# Patient Record
Sex: FEMALE | Race: WHITE | NOT HISPANIC OR LATINO | Employment: OTHER | RURAL
[De-identification: names, ages, dates, MRNs, and addresses within clinical notes are randomized per-mention and may not be internally consistent; named-entity substitution may affect disease eponyms.]

---

## 2017-01-09 ENCOUNTER — CONVERSION ENCOUNTER (OUTPATIENT)
Dept: FAMILY MEDICINE CLINIC | Facility: CLINIC | Age: 82
End: 2017-01-09

## 2017-01-09 LAB
ALBUMIN SERPL-MCNC: 3.9 G/DL (ref 3.6–5.1)
ALP SERPL-CCNC: 55 U/L (ref 33–130)
AST SERPL-CCNC: 26 U/L (ref 10–35)
BILIRUB SERPL-MCNC: 0.9 MG/DL (ref 0.2–1.2)
BUN SERPL-MCNC: 18 MG/DL (ref 7–25)
BUN/CREAT SERPL: 20 (CALC) (ref 6–22)
CALCIUM SERPL-MCNC: 9.8 MG/DL (ref 8.6–10.2)
CHLORIDE SERPL-SCNC: 103 MMOL/L (ref 98–110)
CONV CO2: 28 MMOL/L (ref 21–33)
CONV TOTAL PROTEIN: 6.4 G/DL (ref 6.2–8.3)
CREAT UR-MCNC: 0.9 MG/DL (ref 0.63–1.22)
GLOBULIN UR ELPH-MCNC: 2.5 MG/DL (ref 2.2–3.9)
GLUCOSE UR QL: 89 MG/DL (ref 65–99)
INSULIN SERPL-ACNC: 1.6 (CALC) (ref 1–2.1)
POTASSIUM SERPL-SCNC: 4.2 MMOL/L (ref 3.5–5.3)
SODIUM SERPL-SCNC: 139 MMOL/L (ref 135–146)

## 2017-03-08 ENCOUNTER — CONVERSION ENCOUNTER (OUTPATIENT)
Dept: FAMILY MEDICINE CLINIC | Facility: CLINIC | Age: 82
End: 2017-03-08

## 2017-03-08 LAB — TSH SERPL-ACNC: 3.45 MIU/L (ref 0.4–4.5)

## 2017-03-14 LAB
ALBUMIN SERPL-MCNC: 3.6 G/DL (ref 3.6–5.1)
ALP SERPL-CCNC: 55 U/L (ref 33–130)
AST SERPL-CCNC: 27 U/L (ref 10–35)
BILIRUB SERPL-MCNC: 1.1 MG/DL (ref 0.2–1.2)
BUN SERPL-MCNC: 13 MG/DL (ref 7–25)
BUN/CREAT SERPL: 14.4 (CALC) (ref 6–22)
CALCIUM SERPL-MCNC: 9.9 MG/DL (ref 8.6–10.2)
CHLORIDE SERPL-SCNC: 106 MMOL/L (ref 98–110)
CHOLEST SERPL-MCNC: 231 MG/DL (ref 125–200)
CONV CO2: 26 MMOL/L (ref 21–33)
CONV TOTAL PROTEIN: 6.3 G/DL (ref 6.2–8.3)
CREAT UR-MCNC: 0.9 MG/DL (ref 0.63–1.22)
GLOBULIN UR ELPH-MCNC: 2.7 MG/DL (ref 2.2–3.9)
GLUCOSE UR QL: 87 MG/DL (ref 65–99)
HDLC SERPL-MCNC: 66 MG/DL
INSULIN SERPL-ACNC: 1.3 (CALC) (ref 1–2.1)
LDLC SERPL CALC-MCNC: 152 MG/DL
POTASSIUM SERPL-SCNC: 4.3 MMOL/L (ref 3.5–5.3)
SODIUM SERPL-SCNC: 141 MMOL/L (ref 135–146)
TRIGL SERPL-MCNC: 63 MG/DL

## 2017-09-05 ENCOUNTER — HOSPITAL ENCOUNTER (OUTPATIENT)
Dept: OTHER | Facility: HOSPITAL | Age: 82
Discharge: HOME | End: 2017-09-05
Attending: FAMILY MEDICINE | Admitting: FAMILY MEDICINE

## 2017-10-12 ENCOUNTER — HOSPITAL ENCOUNTER (OUTPATIENT)
Dept: OTHER | Facility: HOSPITAL | Age: 82
Discharge: HOME | End: 2017-10-12
Attending: FAMILY MEDICINE | Admitting: FAMILY MEDICINE

## 2018-11-06 ENCOUNTER — HOSPITAL ENCOUNTER (OUTPATIENT)
Dept: OTHER | Facility: HOSPITAL | Age: 83
Discharge: HOME | End: 2018-11-06
Attending: FAMILY MEDICINE | Admitting: FAMILY MEDICINE

## 2018-12-04 ENCOUNTER — HOSPITAL ENCOUNTER (OUTPATIENT)
Dept: OTHER | Facility: HOSPITAL | Age: 83
Discharge: HOME | End: 2018-12-04
Attending: FAMILY MEDICINE | Admitting: FAMILY MEDICINE

## 2019-03-04 ENCOUNTER — HOSPITAL ENCOUNTER (OUTPATIENT)
Dept: OTHER | Facility: HOSPITAL | Age: 84
Discharge: HOME | End: 2019-03-04
Attending: FAMILY MEDICINE | Admitting: FAMILY MEDICINE

## 2019-05-03 ENCOUNTER — HOSPITAL ENCOUNTER (OUTPATIENT)
Dept: CARDIOLOGY | Facility: HOSPITAL | Age: 84
Discharge: HOME | End: 2019-05-03
Attending: FAMILY MEDICINE | Admitting: FAMILY MEDICINE

## 2019-05-22 ENCOUNTER — CONVERSION ENCOUNTER (OUTPATIENT)
Dept: CARDIOLOGY | Facility: CLINIC | Age: 84
End: 2019-05-22

## 2019-06-04 VITALS
HEART RATE: 66 BPM | HEIGHT: 58 IN | BODY MASS INDEX: 31.49 KG/M2 | WEIGHT: 150 LBS | DIASTOLIC BLOOD PRESSURE: 64 MMHG | SYSTOLIC BLOOD PRESSURE: 132 MMHG

## 2019-06-25 ENCOUNTER — OFFICE VISIT (OUTPATIENT)
Dept: CARDIOLOGY | Facility: CLINIC | Age: 84
End: 2019-06-25

## 2019-06-25 VITALS
HEART RATE: 66 BPM | SYSTOLIC BLOOD PRESSURE: 122 MMHG | WEIGHT: 150 LBS | BODY MASS INDEX: 26.58 KG/M2 | DIASTOLIC BLOOD PRESSURE: 62 MMHG | HEIGHT: 63 IN

## 2019-06-25 DIAGNOSIS — R55 SYNCOPE AND COLLAPSE: ICD-10-CM

## 2019-06-25 DIAGNOSIS — Z45.09 ENCOUNTER FOR LOOP RECORDER CHECK: ICD-10-CM

## 2019-06-25 DIAGNOSIS — R42 LIGHTHEADEDNESS: Primary | ICD-10-CM

## 2019-06-25 DIAGNOSIS — I10 ESSENTIAL HYPERTENSION: ICD-10-CM

## 2019-06-25 PROCEDURE — 93291 INTERROG DEV EVAL SCRMS IP: CPT | Performed by: NURSE PRACTITIONER

## 2019-06-25 PROCEDURE — 93000 ELECTROCARDIOGRAM COMPLETE: CPT | Performed by: NURSE PRACTITIONER

## 2019-06-25 PROCEDURE — 99213 OFFICE O/P EST LOW 20 MIN: CPT | Performed by: NURSE PRACTITIONER

## 2019-06-25 RX ORDER — AMLODIPINE BESYLATE 5 MG/1
TABLET ORAL EVERY 24 HOURS
COMMUNITY
Start: 2019-04-25 | End: 2020-01-20

## 2019-06-25 RX ORDER — LOSARTAN POTASSIUM 50 MG/1
50 TABLET ORAL DAILY
COMMUNITY
Start: 2019-04-25 | End: 2020-03-31 | Stop reason: SDUPTHER

## 2019-06-25 RX ORDER — LEVOTHYROXINE SODIUM 0.07 MG/1
TABLET ORAL EVERY 24 HOURS
COMMUNITY
Start: 2019-04-25 | End: 2019-07-11 | Stop reason: SDUPTHER

## 2019-06-25 NOTE — PROGRESS NOTES
Cardiology Office Follow Up Visit      Primary Care Provider:  Mita Moise MD    Reason for f/u:     Dizziness  Lightheadedness  History of syncope  Hypertension  Interrogation of loop recorder      Subjective         History of Present Illness       Stacy Monroy is a 88 y.o. female.  Her past medical history significant for hypertension    In April 2019, patient was evaluated due to an episode of syncope.    Patient underwent echocardiogram in March 2019 which showed EF of 55-60%.  Patient had stress test in November 2018 at [facility] which was reportedly unremarkable for ischemia.  Holter monitor showed no significant pause of bradycardia.  Tilt-table test showed hypotensive response with bradycardia during tilt-table test.    Patient underwent loop recorder implant at [facility] on June 25, 2019 by Dr. Layne.    Patient reports no recurrent syncope, she had some periods of weakness and low blood pressures at home.  She denies chest pain, dyspnea, PND, orthopnea, palpitations, or feelings of heart racing  She reports is been compliant with her medications      Past Medical History:   Diagnosis Date   • Hypertension    • Hyperthyroidism        Past Surgical History:   Procedure Laterality Date   • OTHER SURGICAL HISTORY  2019    Tilt table    • OTHER SURGICAL HISTORY  2019    tilt table          Current Outpatient Medications:   •  amLODIPine (NORVASC) 5 MG tablet, Daily., Disp: , Rfl:   •  Calcium Carb-Cholecalciferol (CALCIUM PLUS D3 ABSORBABLE) 600-2500 MG-UNIT capsule, CALCIUM PLUS D, Disp: , Rfl:   •  levothyroxine (SYNTHROID) 75 MCG tablet, Daily., Disp: , Rfl:   •  losartan (COZAAR) 50 MG tablet, Take 50 mg by mouth Daily., Disp: , Rfl:   •  Probiotic Product (PROBIOTIC-10 PO), PROBIOTIC, Disp: , Rfl:     Social History     Socioeconomic History   • Marital status:      Spouse name: Not on file   • Number of children: Not on file   • Years of education: Not on file   •  "Highest education level: Not on file   Tobacco Use   • Smoking status: Never Smoker   Substance and Sexual Activity   • Alcohol use: No     Frequency: Never   • Drug use: No   Social History Narrative    ** Merged History Encounter **            Family History   Problem Relation Age of Onset   • Hypertension Mother    • Stroke Mother    • Hypertension Father    • Stroke Father        The following portions of the patient's history were reviewed and updated as appropriate: allergies, current medications, past family history, past medical history, past social history, past surgical history and problem list.    Review of Systems   Constitution: Positive for weakness. Negative for decreased appetite and diaphoresis.   HENT: Negative for congestion, hearing loss and nosebleeds.    Cardiovascular: Negative for chest pain, claudication, dyspnea on exertion, irregular heartbeat, leg swelling, near-syncope, orthopnea, palpitations, paroxysmal nocturnal dyspnea and syncope.   Respiratory: Negative for cough, shortness of breath and sleep disturbances due to breathing.    Endocrine: Negative for polyuria.   Hematologic/Lymphatic: Does not bruise/bleed easily.   Skin: Negative for itching and rash.   Musculoskeletal: Negative for back pain, muscle weakness and myalgias.   Gastrointestinal: Negative for abdominal pain, change in bowel habit and nausea.   Genitourinary: Negative for dysuria, flank pain, frequency and hesitancy.   Neurological: Negative for dizziness and tremors.   Psychiatric/Behavioral: Negative for altered mental status. The patient does not have insomnia.      /62   Pulse 66   Ht 160 cm (63\")   Wt 68 kg (150 lb)   BMI 26.57 kg/m² .  Objective     Physical Exam   Constitutional: She is oriented to person, place, and time. She appears well-developed and well-nourished. No distress.   HENT:   Head: Normocephalic and atraumatic.   Eyes: Pupils are equal, round, and reactive to light.   Neck: Normal range " of motion. Neck supple. No JVD present.   Cardiovascular: Normal rate, regular rhythm, S1 normal, S2 normal, normal heart sounds and intact distal pulses.   No murmur heard.  Pulmonary/Chest: Effort normal and breath sounds normal.   Abdominal: Soft. Normal appearance. She exhibits no distension. There is no tenderness.   Musculoskeletal: Normal range of motion. She exhibits no edema.   Neurological: She is alert and oriented to person, place, and time. Gait normal.   Skin: Skin is warm and dry.   Loop recorder implant site on chest with no drainage.  There is a small scab area that remains.  Steri-Strips removed   Psychiatric: She has a normal mood and affect. Her speech is normal and behavior is normal. Thought content normal.         EKG: normal sinus rhythm, Q waves in anterior leads, left axis deviation, 1st degree AV block.  Heart rate 66    In Office Device Interrogation:     In office loop recorder interrogation.  The [employer] device battery stable no symptoms of arrhythmias events or atrial for ablation detected        Diagnoses and all orders for this visit:    1. Lightheadedness (Primary): Continues to have periods of weakness but no maría syncope or associated dizziness    2. Encounter for loop recorder check: Loop recorder with no events    3. Essential hypertension: Blood pressures been stable at home depending on the low side reports a systolic pressure as low as 100    4. Syncope: No recurrent syncopal events.    Other orders  -     ECG 12 Lead          Plan:  We will reduce losartan to once daily to see if her blood pressure will improve.  We will continue monitoring of her loop recorder via remote access  Continue current medications and treatment                     Next follow-up appointment: Scheduled in 3 months according

## 2019-06-26 LAB — TSH SERPL DL<=0.005 MIU/L-ACNC: 2.51 UIU/ML (ref 0.45–4.5)

## 2019-07-12 ENCOUNTER — CLINICAL SUPPORT NO REQUIREMENTS (OUTPATIENT)
Dept: CARDIOLOGY | Facility: CLINIC | Age: 84
End: 2019-07-12

## 2019-07-12 DIAGNOSIS — R55 SYNCOPE AND COLLAPSE: Primary | ICD-10-CM

## 2019-07-12 DIAGNOSIS — Z95.818 STATUS POST PLACEMENT OF IMPLANTABLE LOOP RECORDER: ICD-10-CM

## 2019-07-12 RX ORDER — LEVOTHYROXINE SODIUM 0.07 MG/1
TABLET ORAL
Qty: 30 TABLET | Refills: 1 | Status: SHIPPED | OUTPATIENT
Start: 2019-07-12 | End: 2019-07-16 | Stop reason: SDUPTHER

## 2019-07-16 DIAGNOSIS — E03.9 HYPOTHYROIDISM, UNSPECIFIED TYPE: Primary | ICD-10-CM

## 2019-07-16 RX ORDER — LEVOTHYROXINE SODIUM 0.07 MG/1
75 TABLET ORAL DAILY
Qty: 30 TABLET | Refills: 1 | Status: SHIPPED | OUTPATIENT
Start: 2019-07-16 | End: 2019-08-15

## 2019-07-22 PROBLEM — Z95.818 STATUS POST PLACEMENT OF IMPLANTABLE LOOP RECORDER: Status: ACTIVE | Noted: 2019-06-25

## 2019-07-22 PROCEDURE — 93297 REM INTERROG DEV EVAL ICPMS: CPT | Performed by: NURSE PRACTITIONER

## 2019-07-22 PROCEDURE — 93299 PR REM INTERROG ICPMS/SCRMS <30 D TECH REVIEW: CPT | Performed by: NURSE PRACTITIONER

## 2019-07-22 NOTE — PROGRESS NOTES
REMOTE LOOP RECORDER INTERROGATION      DEVICE COMPANY: [employer]    Remote loop recorder transmission reviewed.      Battery status stable      Events: None    Symptoms: None    Atrial Fibrillation: None      Continue remote loop recorder monitoring with monthly transmissions

## 2019-08-12 ENCOUNTER — CLINICAL SUPPORT NO REQUIREMENTS (OUTPATIENT)
Dept: CARDIOLOGY | Facility: CLINIC | Age: 84
End: 2019-08-12

## 2019-08-12 DIAGNOSIS — Z95.818 STATUS POST PLACEMENT OF IMPLANTABLE LOOP RECORDER: ICD-10-CM

## 2019-08-12 DIAGNOSIS — R55 SYNCOPE AND COLLAPSE: Primary | ICD-10-CM

## 2019-08-15 ENCOUNTER — OFFICE VISIT (OUTPATIENT)
Dept: FAMILY MEDICINE CLINIC | Facility: CLINIC | Age: 84
End: 2019-08-15

## 2019-08-15 VITALS
OXYGEN SATURATION: 97 % | HEART RATE: 84 BPM | WEIGHT: 148.8 LBS | TEMPERATURE: 97.5 F | RESPIRATION RATE: 18 BRPM | SYSTOLIC BLOOD PRESSURE: 122 MMHG | DIASTOLIC BLOOD PRESSURE: 80 MMHG | BODY MASS INDEX: 27.38 KG/M2 | HEIGHT: 62 IN

## 2019-08-15 DIAGNOSIS — H92.01 RIGHT EAR PAIN: ICD-10-CM

## 2019-08-15 DIAGNOSIS — E78.5 DYSLIPIDEMIA: ICD-10-CM

## 2019-08-15 DIAGNOSIS — H61.21 RIGHT EAR IMPACTED CERUMEN: ICD-10-CM

## 2019-08-15 DIAGNOSIS — M54.31 RIGHT SIDED SCIATICA: ICD-10-CM

## 2019-08-15 DIAGNOSIS — I10 ESSENTIAL HYPERTENSION: Primary | ICD-10-CM

## 2019-08-15 DIAGNOSIS — E03.8 OTHER SPECIFIED HYPOTHYROIDISM: ICD-10-CM
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
## 2019-08-15 PROCEDURE — 69209 REMOVE IMPACTED EAR WAX UNI: CPT | Performed by: FAMILY MEDICINE

## 2019-08-15 PROCEDURE — 99214 OFFICE O/P EST MOD 30 MIN: CPT | Performed by: FAMILY MEDICINE

## 2019-08-15 NOTE — PROGRESS NOTES
Subjective   Stacy Monroy is a 89 y.o. female.     Cerumen Impaction  Stacy presents with diminished hearing in the right ear for the past 3 weeks. There is a prior history of cerumen impaction. The patient has not been using ear drops to loosen wax immediately prior to this visit. The patient denies ear pain.        Fall   The accident occurred more than 1 week ago (many months ago). The fall occurred while walking. She landed on concrete. The point of impact was the face. The pain is present in the face, left wrist and left hand. The patient is experiencing no pain. Pertinent negatives include no abdominal pain, fever, headaches, nausea, numbness, tingling or vomiting. The treatment provided significant relief.   Hypertension   This is a chronic problem. The current episode started more than 1 year ago. Associated symptoms include peripheral edema. Pertinent negatives include no blurred vision, chest pain, headaches or shortness of breath. There are no associated agents to hypertension. Risk factors for coronary artery disease include post-menopausal state. Current antihypertension treatment includes calcium channel blockers and beta blockers. There are no compliance problems.  There is no history of angina, kidney disease or CVA. Identifiable causes of hypertension include a thyroid problem.   Hypothyroidism   This is a chronic problem. The current episode started more than 1 year ago. The problem occurs constantly. The problem has been resolved (with mediaction). Pertinent negatives include no abdominal pain, arthralgias, chest pain, coughing, fever, headaches, nausea, numbness, rash or vomiting. Nothing aggravates the symptoms. Treatments tried: synthroid.   Leg Pain    There was no injury mechanism. The pain is present in the right hip and right thigh. The quality of the pain is described as aching. The pain is moderate. The pain has been fluctuating since onset. Pertinent negatives include no inability to bear  weight, loss of motion, loss of sensation, muscle weakness, numbness or tingling. Exacerbated by: bending over. Treatments tried: bengay. The treatment provided mild relief.    The pain starts in her buttocks and goes down to her toes. Worse after she was outside in her garden    The following portions of the patient's history were reviewed and updated as appropriate: allergies, current medications, past family history, past medical history, past social history, past surgical history and problem list.    Family History   Problem Relation Age of Onset   • Hypertension Mother    • Stroke Mother    • Hypertension Father    • Stroke Father        Social History     Tobacco Use   • Smoking status: Never Smoker   Substance Use Topics   • Alcohol use: No     Frequency: Never   • Drug use: No       Past Surgical History:   Procedure Laterality Date   • OTHER SURGICAL HISTORY  2019    Tilt table    • OTHER SURGICAL HISTORY  2019    tilt table        Patient Active Problem List   Diagnosis   • Status post placement of implantable loop recorder   • Essential hypertension   • Other specified hypothyroidism       Current Outpatient Medications on File Prior to Visit   Medication Sig Dispense Refill   • amLODIPine (NORVASC) 5 MG tablet Daily.     • Calcium Carb-Cholecalciferol (CALCIUM PLUS D3 ABSORBABLE) 600-2500 MG-UNIT capsule CALCIUM PLUS D     • levothyroxine (SYNTHROID, LEVOTHROID) 75 MCG tablet Take 1 tablet by mouth Daily for 30 days. 30 tablet 1   • losartan (COZAAR) 50 MG tablet Take 50 mg by mouth Daily.     • Probiotic Product (PROBIOTIC-10 PO) PROBIOTIC       No current facility-administered medications on file prior to visit.        Allergies   Allergen Reactions   • Penicillins Rash       Review of Systems   Constitutional: Negative for activity change and fever.   HENT: Negative for ear pain, rhinorrhea, sinus pressure and voice change.    Eyes: Negative for blurred vision and visual disturbance.   Respiratory:  "Negative for cough and shortness of breath.    Cardiovascular: Negative for chest pain.   Gastrointestinal: Negative for abdominal pain, diarrhea, nausea and vomiting.   Endocrine: Negative for cold intolerance and heat intolerance.   Genitourinary: Negative for frequency and urgency.   Musculoskeletal: Negative for arthralgias.   Skin: Negative for rash.   Neurological: Negative for tingling, syncope and numbness.   Hematological: Does not bruise/bleed easily.   Psychiatric/Behavioral: Negative for depressed mood. The patient is not nervous/anxious.        Objective   Visit Vitals  /80   Pulse 84   Temp 97.5 °F (36.4 °C)   Resp 18   Ht 157.5 cm (62\")   Wt 67.5 kg (148 lb 12.8 oz)   SpO2 97%   BMI 27.22 kg/m²     Physical Exam   Constitutional: She is oriented to person, place, and time. She appears well-developed and well-nourished. She is cooperative. No distress.   HENT:   Right Ear: External ear normal. cerumen impaction is present.  Left Ear: External ear normal.   Nose: Nose normal.   Mouth/Throat: Oropharynx is clear and moist. No oropharyngeal exudate.   Cardiovascular: Normal rate, regular rhythm, normal heart sounds and intact distal pulses. Exam reveals no gallop and no friction rub.   No murmur heard.  Pulmonary/Chest: Effort normal and breath sounds normal. No respiratory distress. She has no wheezes. She has no rales.   Musculoskeletal: She exhibits no edema or deformity.        Lumbar back: She exhibits normal range of motion, no tenderness, no deformity, no pain and no spasm.   Lymphadenopathy:     She has no cervical adenopathy.   Neurological: She is alert and oriented to person, place, and time. She displays normal reflexes. She exhibits normal muscle tone. Coordination normal.   Skin: Skin is warm and dry. She is not diaphoretic.   Psychiatric: She has a normal mood and affect.   Vitals reviewed.        Assessment/Plan .  Problem List Items Addressed This Visit        Cardiovascular and " Mediastinum    Essential hypertension - Primary    Current Assessment & Plan     Stable  Continue current treatment            Endocrine    Other specified hypothyroidism    Current Assessment & Plan     Recheck today           Other Visit Diagnoses     Fall, subsequent encounter        Doing better. No further falls    Right sided sciatica        Right ear impacted cerumen        Removed irrigation    Right ear pain            Ice three times a day for about 10-15 minutes for the first 1-2 days. Then may alternate heat and ice. Better body mechanics discussed. Home exercises discussed and hand out given. Discussed nsaids and if they can be taken. May need imaging and or PT if persists.  No complications with cerumen irrigation

## 2019-08-16 LAB
ALBUMIN SERPL-MCNC: 3.9 G/DL (ref 3.5–4.7)
ALBUMIN/GLOB SERPL: 1.4 {RATIO} (ref 1.2–2.2)
ALP SERPL-CCNC: 63 IU/L (ref 39–117)
ALT SERPL-CCNC: 14 IU/L (ref 0–32)
AST SERPL-CCNC: 24 IU/L (ref 0–40)
BILIRUB SERPL-MCNC: 0.5 MG/DL (ref 0–1.2)
BUN SERPL-MCNC: 22 MG/DL (ref 8–27)
BUN/CREAT SERPL: 21 (ref 12–28)
CALCIUM SERPL-MCNC: 10.2 MG/DL (ref 8.7–10.3)
CHLORIDE SERPL-SCNC: 104 MMOL/L (ref 96–106)
CHOLEST SERPL-MCNC: 219 MG/DL (ref 100–199)
CHOLEST/HDLC SERPL: 3.2 RATIO (ref 0–4.4)
CO2 SERPL-SCNC: 24 MMOL/L (ref 20–29)
CREAT SERPL-MCNC: 1.03 MG/DL (ref 0.57–1)
GLOBULIN SER CALC-MCNC: 2.7 G/DL (ref 1.5–4.5)
GLUCOSE SERPL-MCNC: 84 MG/DL (ref 65–99)
HDLC SERPL-MCNC: 69 MG/DL
LDLC SERPL CALC-MCNC: 134 MG/DL (ref 0–99)
NONHDLC SERPL-MCNC: 150 MG/DL (ref 0–129)
POTASSIUM SERPL-SCNC: 4.5 MMOL/L (ref 3.5–5.2)
PROT SERPL-MCNC: 6.6 G/DL (ref 6–8.5)
SODIUM SERPL-SCNC: 142 MMOL/L (ref 134–144)
TRIGL SERPL-MCNC: 82 MG/DL (ref 0–149)
TSH SERPL DL<=0.005 MIU/L-ACNC: 2.03 UIU/ML (ref 0.45–4.5)

## 2019-08-19 ENCOUNTER — TELEPHONE (OUTPATIENT)
Dept: FAMILY MEDICINE CLINIC | Facility: CLINIC | Age: 84
End: 2019-08-19

## 2019-08-20 ENCOUNTER — TELEPHONE (OUTPATIENT)
Dept: FAMILY MEDICINE CLINIC | Facility: CLINIC | Age: 84
End: 2019-08-20

## 2019-08-22 PROCEDURE — 93299 PR REM INTERROG ICPMS/SCRMS <30 D TECH REVIEW: CPT | Performed by: NURSE PRACTITIONER

## 2019-08-22 PROCEDURE — 93297 REM INTERROG DEV EVAL ICPMS: CPT | Performed by: NURSE PRACTITIONER

## 2019-08-22 NOTE — PROGRESS NOTES
REMOTE LOOP RECORDER INTERROGATION      DEVICE COMPANY: [employer]    Remote loop recorder transmission reviewed.      Battery status stable      Events: None    Symptoms: None    Atrial Fibrillation: None      Continue remote loop recorder monitoring with monthly transmissions

## 2019-08-26 ENCOUNTER — TELEPHONE (OUTPATIENT)
Dept: FAMILY MEDICINE CLINIC | Facility: CLINIC | Age: 84
End: 2019-08-26

## 2019-09-10 ENCOUNTER — CLINICAL SUPPORT NO REQUIREMENTS (OUTPATIENT)
Dept: CARDIOLOGY | Facility: CLINIC | Age: 84
End: 2019-09-10

## 2019-09-10 DIAGNOSIS — R55 SYNCOPE AND COLLAPSE: Primary | ICD-10-CM

## 2019-09-10 DIAGNOSIS — Z95.818 STATUS POST PLACEMENT OF IMPLANTABLE LOOP RECORDER: ICD-10-CM

## 2019-09-16 NOTE — PROGRESS NOTES
REMOTE LOOP RECORDER INTERROGATION      DEVICE COMPANY: [employer]    Remote loop recorder transmission reviewed.      Battery status stable      Events: None    Symptoms: None    Atrial Fibrillation: None      Continue remote loop recorder monitoring with monthly transmissions              This encounter is not billed due to being earlier than 30 days since last check

## 2019-09-30 PROBLEM — E03.8 OTHER SPECIFIED HYPOTHYROIDISM: Status: RESOLVED | Noted: 2019-08-15 | Resolved: 2019-09-30

## 2019-09-30 PROBLEM — R55 SYNCOPE AND COLLAPSE: Status: ACTIVE | Noted: 2019-09-30

## 2019-10-04 ENCOUNTER — OFFICE VISIT (OUTPATIENT)
Dept: CARDIOLOGY | Facility: CLINIC | Age: 84
End: 2019-10-04

## 2019-10-04 VITALS
BODY MASS INDEX: 26.22 KG/M2 | WEIGHT: 148 LBS | SYSTOLIC BLOOD PRESSURE: 128 MMHG | HEART RATE: 79 BPM | HEIGHT: 63 IN | DIASTOLIC BLOOD PRESSURE: 66 MMHG

## 2019-10-04 DIAGNOSIS — Z95.818 STATUS POST PLACEMENT OF IMPLANTABLE LOOP RECORDER: ICD-10-CM

## 2019-10-04 DIAGNOSIS — I10 ESSENTIAL HYPERTENSION: Primary | ICD-10-CM

## 2019-10-04 DIAGNOSIS — R55 SYNCOPE AND COLLAPSE: ICD-10-CM

## 2019-10-04 PROCEDURE — 99214 OFFICE O/P EST MOD 30 MIN: CPT | Performed by: INTERNAL MEDICINE

## 2019-10-04 RX ORDER — CHLORAL HYDRATE 500 MG
1 CAPSULE ORAL DAILY
COMMUNITY

## 2019-10-04 RX ORDER — UBIDECARENONE 100 MG
100 CAPSULE ORAL DAILY
COMMUNITY

## 2019-10-04 RX ORDER — LEVOTHYROXINE SODIUM 0.07 MG/1
1 TABLET ORAL DAILY
COMMUNITY
Start: 2019-09-10 | End: 2020-02-17 | Stop reason: SDUPTHER

## 2019-10-04 NOTE — PROGRESS NOTES
Date of Office Visit: 10/04/2019  Encounter Provider: Dr Ruslan Layne  Place of Service: [facility] CARDIOLOGY [place]  Patient Name: Stacy Monroy  :1930  Mita Moise MD    Chief Complaint   Patient presents with   • Hypertension  Syncope     3 mo fu   • Loss of Consciousness     History of Present Illness    I am pleased to see Mrs. Monroy in my office today as a follow-up.    As you know, patient is 89-year-old white female whose past medical history significant for hypertension, who came today for follow-up.     In 2019, patient was evaluated due to an episode of syncope. Patient underwent echocardiogram in 2019 which showed EF of 55-60%.  Patient had stress test in 2018 at [facility] which was reportedly unremarkable for ischemia.  Holter monitor showed no significant pause of bradycardia.  Tilt-table test showed hypotensive response with bradycardia during tilt-table test. Patient underwent loop recorder implant at [facility] on 2019.    Since the previous visit, patient is doing fairly well from cardiovascular standpoint.  She has not had any further episode of syncope or presyncope.  No leg edema noted.  Patient denies any orthopnea, PND, syncope or presyncope.  No palpitation.  No dizziness and lightheadedness.  No chest pain.    Loop recorder telemetry strips are monitored and reviewed and no bradyarrhythmia or tachyarrhythmia was noted.    At this stage I will continue current treatment.  She brought her blood pressure logbook and most of the blood pressures are within desirable range.  At this stage I will recommend to increase aerobic activity.  Precautions for falls are discussed.  I will see the patient in 6 months    Past Medical History:   Diagnosis Date   • Other specified hypothyroidism 8/15/2019         Past Surgical History:   Procedure Laterality Date   • OTHER SURGICAL HISTORY  2019    Tilt table    • OTHER SURGICAL HISTORY   "2019    tilt table            Current Outpatient Medications:   •  amLODIPine (NORVASC) 5 MG tablet, Daily., Disp: , Rfl:   •  Calcium Carb-Cholecalciferol (CALCIUM PLUS D3 ABSORBABLE) 600-2500 MG-UNIT capsule, CALCIUM PLUS D, Disp: , Rfl:   •  coenzyme Q10 100 MG capsule, Take 100 mg by mouth Daily., Disp: , Rfl:   •  levothyroxine (SYNTHROID, LEVOTHROID) 75 MCG tablet, 1 tablet Daily., Disp: , Rfl:   •  losartan (COZAAR) 50 MG tablet, Take 50 mg by mouth Daily., Disp: , Rfl:   •  Omega-3 1000 MG capsule, Take 1 tablet by mouth Daily., Disp: , Rfl:       Social History     Socioeconomic History   • Marital status:      Spouse name: Not on file   • Number of children: Not on file   • Years of education: Not on file   • Highest education level: Not on file   Tobacco Use   • Smoking status: Never Smoker   Substance and Sexual Activity   • Alcohol use: No     Frequency: Never   • Drug use: No   Social History Narrative    ** Merged History Encounter **              Review of Systems   Constitution: Negative for chills and fever.   HENT: Negative for ear discharge and nosebleeds.    Eyes: Negative for discharge and redness.   Cardiovascular: Negative for chest pain, orthopnea, palpitations, paroxysmal nocturnal dyspnea and syncope.   Respiratory: Negative for cough, shortness of breath and wheezing.    Endocrine: Negative for heat intolerance.   Skin: Negative for rash.   Musculoskeletal: Positive for joint pain. Negative for myalgias.   Gastrointestinal: Negative for abdominal pain, melena, nausea and vomiting.   Genitourinary: Negative for dysuria and hematuria.   Neurological: Negative for dizziness, light-headedness, numbness and tremors.   Psychiatric/Behavioral: Negative for depression. The patient is not nervous/anxious.        Procedures    Procedures    No orders to display           Objective:    /66   Pulse 79   Ht 160 cm (63\")   Wt 67.1 kg (148 lb)   BMI 26.22 kg/m²         Physical Exam "   Constitutional: She is oriented to person, place, and time. She appears well-developed and well-nourished.   HENT:   Head: Normocephalic and atraumatic.   Eyes: No scleral icterus.   Neck: No thyromegaly present.   Cardiovascular: Normal rate, regular rhythm and normal heart sounds. Exam reveals no gallop and no friction rub.   No murmur heard.  Pulmonary/Chest: Effort normal and breath sounds normal. No respiratory distress. She has no wheezes. She has no rales.   Abdominal: There is no tenderness.   Musculoskeletal: She exhibits no edema.   Lymphadenopathy:     She has no cervical adenopathy.   Neurological: She is alert and oriented to person, place, and time.   Skin: No rash noted. No erythema.   Psychiatric: She has a normal mood and affect.           Assessment:       Diagnosis Plan   1. Essential hypertension     2. Syncope and collapse     3. Status post placement of implantable loop recorder              Plan:       At this stage blood pressure is very well controlled.  She has not had any further episode of syncope.  I would recommend to continue monitoring loop recorder.  I will see the patient in 6 months

## 2019-10-10 ENCOUNTER — CLINICAL SUPPORT NO REQUIREMENTS (OUTPATIENT)
Dept: CARDIOLOGY | Facility: CLINIC | Age: 84
End: 2019-10-10

## 2019-10-24 NOTE — PROGRESS NOTES
REMOTE LOOP RECORDER INTERROGATION      DEVICE COMPANY: [employer]    Remote loop recorder transmission reviewed.      Battery status stable      Events: None    Symptoms: None    Atrial Fibrillation: None      Continue remote loop recorder monitoring with monthly transmissions        Early for billing

## 2019-11-11 ENCOUNTER — CLINICAL SUPPORT NO REQUIREMENTS (OUTPATIENT)
Dept: CARDIOLOGY | Facility: CLINIC | Age: 84
End: 2019-11-11

## 2019-11-11 DIAGNOSIS — Z95.818 STATUS POST PLACEMENT OF IMPLANTABLE LOOP RECORDER: ICD-10-CM

## 2019-11-11 DIAGNOSIS — R55 SYNCOPE AND COLLAPSE: Primary | ICD-10-CM

## 2019-11-14 PROCEDURE — 93297 REM INTERROG DEV EVAL ICPMS: CPT | Performed by: NURSE PRACTITIONER

## 2019-11-14 PROCEDURE — 93299 PR REM INTERROG ICPMS/SCRMS <30 D TECH REVIEW: CPT | Performed by: NURSE PRACTITIONER

## 2019-11-14 NOTE — PROGRESS NOTES
REMOTE LOOP RECORDER INTERROGATION      DEVICE COMPANY: [employer]    Remote loop recorder transmission reviewed.      Battery status stable      Events: None    Symptoms: None    Atrial Fibrillation: None      Continue remote loop recorder monitoring with monthly transmissions

## 2019-12-09 ENCOUNTER — CLINICAL SUPPORT NO REQUIREMENTS (OUTPATIENT)
Dept: CARDIOLOGY | Facility: CLINIC | Age: 84
End: 2019-12-09

## 2019-12-09 DIAGNOSIS — Z95.818 STATUS POST PLACEMENT OF IMPLANTABLE LOOP RECORDER: Primary | ICD-10-CM

## 2020-01-08 ENCOUNTER — CLINICAL SUPPORT NO REQUIREMENTS (OUTPATIENT)
Dept: CARDIOLOGY | Facility: CLINIC | Age: 85
End: 2020-01-08

## 2020-01-08 DIAGNOSIS — R55 SYNCOPE AND COLLAPSE: Primary | ICD-10-CM

## 2020-01-08 DIAGNOSIS — Z95.818 STATUS POST PLACEMENT OF IMPLANTABLE LOOP RECORDER: ICD-10-CM

## 2020-01-20 RX ORDER — AMLODIPINE BESYLATE 5 MG/1
TABLET ORAL
Qty: 90 TABLET | Refills: 2 | Status: SHIPPED | OUTPATIENT
Start: 2020-01-20 | End: 2020-10-14 | Stop reason: SDUPTHER

## 2020-02-17 ENCOUNTER — OFFICE VISIT (OUTPATIENT)
Dept: FAMILY MEDICINE CLINIC | Facility: CLINIC | Age: 85
End: 2020-02-17

## 2020-02-17 VITALS
HEIGHT: 63 IN | BODY MASS INDEX: 26.54 KG/M2 | SYSTOLIC BLOOD PRESSURE: 130 MMHG | HEART RATE: 103 BPM | TEMPERATURE: 96.5 F | DIASTOLIC BLOOD PRESSURE: 70 MMHG | WEIGHT: 149.8 LBS | RESPIRATION RATE: 16 BRPM | OXYGEN SATURATION: 97 %

## 2020-02-17 DIAGNOSIS — Z66 DNR (DO NOT RESUSCITATE): ICD-10-CM

## 2020-02-17 DIAGNOSIS — I10 ESSENTIAL HYPERTENSION: ICD-10-CM

## 2020-02-17 DIAGNOSIS — I70.90 ATHEROSCLEROSIS: ICD-10-CM

## 2020-02-17 DIAGNOSIS — Z00.00 MEDICARE ANNUAL WELLNESS VISIT, SUBSEQUENT: Primary | ICD-10-CM

## 2020-02-17 DIAGNOSIS — N95.8 OTHER SPECIFIED MENOPAUSAL AND PERIMENOPAUSAL DISORDERS: Chronic | ICD-10-CM

## 2020-02-17 DIAGNOSIS — E53.8 B12 DEFICIENCY: ICD-10-CM

## 2020-02-17 DIAGNOSIS — E03.9 ACQUIRED HYPOTHYROIDISM: ICD-10-CM

## 2020-02-17 DIAGNOSIS — E78.2 MIXED HYPERLIPIDEMIA: ICD-10-CM

## 2020-02-17 DIAGNOSIS — Z12.31 SCREENING MAMMOGRAM, ENCOUNTER FOR: Chronic | ICD-10-CM

## 2020-02-17 PROBLEM — S02.85XA CLOSED FRACTURE OF RIGHT ORBIT (HCC): Status: RESOLVED | Noted: 2020-02-17 | Resolved: 2020-02-17

## 2020-02-17 PROBLEM — R01.1 HEART MURMUR: Status: ACTIVE | Noted: 2020-02-17

## 2020-02-17 PROBLEM — D51.0 PERNICIOUS ANEMIA: Status: ACTIVE | Noted: 2020-02-17

## 2020-02-17 PROBLEM — N28.9 RENAL INSUFFICIENCY: Status: RESOLVED | Noted: 2020-02-17 | Resolved: 2020-02-17

## 2020-02-17 PROBLEM — H26.9 CATARACTS, BILATERAL: Status: ACTIVE | Noted: 2020-02-17

## 2020-02-17 PROBLEM — N28.9 RENAL INSUFFICIENCY: Status: ACTIVE | Noted: 2020-02-17

## 2020-02-17 PROBLEM — Z86.19 HISTORY OF CHICKEN POX: Status: ACTIVE | Noted: 2020-02-17

## 2020-02-17 PROBLEM — J30.1 ACUTE SEASONAL ALLERGIC RHINITIS DUE TO POLLEN: Status: ACTIVE | Noted: 2020-02-17

## 2020-02-17 PROBLEM — G56.03 BILATERAL CARPAL TUNNEL SYNDROME: Status: ACTIVE | Noted: 2020-02-17

## 2020-02-17 PROBLEM — M25.571 ARTHRALGIA OF RIGHT FOOT: Status: ACTIVE | Noted: 2020-02-17

## 2020-02-17 PROBLEM — R55 SYNCOPE AND COLLAPSE: Status: RESOLVED | Noted: 2019-09-30 | Resolved: 2020-02-17

## 2020-02-17 PROBLEM — S02.85XA CLOSED FRACTURE OF RIGHT ORBIT: Status: ACTIVE | Noted: 2020-02-17

## 2020-02-17 PROBLEM — N95.1 MENOPAUSAL SYNDROME: Status: ACTIVE | Noted: 2020-02-17

## 2020-02-17 LAB
BILIRUB BLD-MCNC: NEGATIVE MG/DL
CLARITY, POC: CLEAR
COLOR UR: YELLOW
GLUCOSE UR STRIP-MCNC: NEGATIVE MG/DL
KETONES UR QL: NEGATIVE
LEUKOCYTE EST, POC: NEGATIVE
NITRITE UR-MCNC: NEGATIVE MG/ML
PH UR: 6.5 [PH] (ref 5–8)
PROT UR STRIP-MCNC: NEGATIVE MG/DL
RBC # UR STRIP: ABNORMAL /UL
SP GR UR: 1.01 (ref 1–1.03)
UROBILINOGEN UR QL: NORMAL

## 2020-02-17 PROCEDURE — 81003 URINALYSIS AUTO W/O SCOPE: CPT | Performed by: FAMILY MEDICINE

## 2020-02-17 PROCEDURE — 99497 ADVNCD CARE PLAN 30 MIN: CPT | Performed by: FAMILY MEDICINE

## 2020-02-17 PROCEDURE — 99213 OFFICE O/P EST LOW 20 MIN: CPT | Performed by: FAMILY MEDICINE

## 2020-02-17 PROCEDURE — G0439 PPPS, SUBSEQ VISIT: HCPCS | Performed by: FAMILY MEDICINE

## 2020-02-17 RX ORDER — LEVOTHYROXINE SODIUM 0.07 MG/1
75 TABLET ORAL DAILY
Qty: 90 TABLET | Refills: 3 | Status: SHIPPED | OUTPATIENT
Start: 2020-02-17 | End: 2021-02-15 | Stop reason: SDUPTHER

## 2020-02-17 NOTE — PROGRESS NOTES
QUICK REFERENCE INFORMATION:  The ABCs of the Annual Wellness Visit    Subsequent Medicare Wellness Visit     HEALTH RISK ASSESSMENT    : 1930    Recent Hospitalizations:  No hospitalization(s) within the last year..  ccc    Current Medical Providers:  Patient Care Team:  Mita Moise MD as PCP - General  Mita Moise MD as PCP - Family Medicine    Smoking Status:  Social History     Tobacco Use   Smoking Status Never Smoker   Smokeless Tobacco Never Used       Alcohol Consumption:  Social History     Substance and Sexual Activity   Alcohol Use No   • Frequency: Never       Depression Screen:   PHQ-2/PHQ-9 Depression Screening 2020   Little interest or pleasure in doing things 0   Feeling down, depressed, or hopeless 0   Trouble falling or staying asleep, or sleeping too much 1   Feeling tired or having little energy 0   Poor appetite or overeating 0   Feeling bad about yourself - or that you are a failure or have let yourself or your family down 0   Trouble concentrating on things, such as reading the newspaper or watching television 0   Moving or speaking so slowly that other people could have noticed. Or the opposite - being so fidgety or restless that you have been moving around a lot more than usual 0   Thoughts that you would be better off dead, or of hurting yourself in some way 0   Total Score 1   If you checked off any problems, how difficult have these problems made it for you to do your work, take care of things at home, or get along with other people? Not difficult at all     We spent more than 16 minutes asking patient questions, counseling and documenting in the chart.    Health Habits and Functional and Cognitive Screening:  Functional & Cognitive Status 2020   Do you have difficulty preparing food and eating? No   Do you have difficulty bathing yourself, getting dressed or grooming yourself? No   Do you have difficulty using the toilet? No   Do you have difficulty moving  around from place to place? No   Do you have trouble with steps or getting out of a bed or a chair? No   Current Diet Well Balanced Diet   Dental Exam Up to date   Eye Exam Not up to date   Exercise (times per week) 0 times per week   Current Exercise Activities Include None   Do you need help using the phone?  No   Are you deaf or do you have serious difficulty hearing?  Yes   Do you need help with transportation? No   Do you need help shopping? No   Do you need help preparing meals?  No   Do you need help with housework?  No   Do you need help with laundry? No   Do you need help taking your medications? No   Do you need help managing money? No   Do you ever drive or ride in a car without wearing a seat belt? No   Have you felt unusual stress, anger or loneliness in the last month? No   Who do you live with? Alone   If you need help, do you have trouble finding someone available to you? No   Have you been bothered in the last four weeks by sexual problems? No   Do you have difficulty concentrating, remembering or making decisions? Yes       Does the patient have evidence of cognitive impairment? No    Aspirin use counseling: Start ASA 81 mg daily She declines    Recent Lab Results:    Lab Results   Component Value Date    GLU 84 08/15/2019        Lab Results   Component Value Date    CHOL 223 (H) 11/01/2018    TRIG 82 08/15/2019    HDL 69 08/15/2019       Age-appropriate Screening Schedule:  Refer to the list below for future screening recommendations based on patient's age, sex and/or medical conditions. Orders for these recommended tests are listed in the plan section. The patient has been provided with a written plan.    Health Maintenance   Topic Date Due   • TDAP/TD VACCINES (1 - Tdap) 08/01/1941   • ZOSTER VACCINE (1 of 2) 08/01/1980   • LIPID PANEL  02/17/2020   • INFLUENZA VACCINE  Addressed        Subjective   History of Present Illness    Stacy Monroy is a 89 y.o. female who presents for an Annual Wellness  Visit.  Hyperlipidemia   This is a chronic problem. The current episode started more than 1 year ago. Exacerbating diseases include hypothyroidism. There are no known factors aggravating her hyperlipidemia. Associated symptoms include leg pain. Pertinent negatives include no chest pain, myalgias or shortness of breath. Current antihyperlipidemic treatment includes herbal therapy. The current treatment provides significant improvement of lipids. There are no compliance problems.  Risk factors for coronary artery disease include post-menopausal, hypertension and dyslipidemia.   Hypertension   This is a chronic problem. The current episode started more than 1 year ago. The problem is controlled. Pertinent negatives include no chest pain, palpitations or shortness of breath. Agents associated with hypertension include thyroid hormones. Risk factors for coronary artery disease include dyslipidemia and post-menopausal state. Past treatments include calcium channel blockers and angiotensin blockers. Current antihypertension treatment includes calcium channel blockers and angiotensin blockers. The current treatment provides significant improvement. There are no compliance problems.  Identifiable causes of hypertension include a thyroid problem.   Hypothyroidism   This is a chronic problem. The current episode started more than 1 year ago. The problem occurs constantly. The problem has been unchanged. Pertinent negatives include no abdominal pain, arthralgias, chest pain, coughing, fever, myalgias, nausea, rash or vomiting. Nothing aggravates the symptoms. Treatments tried: synthroid. The treatment provided significant relief.   Coronary Artery Disease   Presents for follow-up visit. Pertinent negatives include no chest pain, chest tightness, dizziness, leg swelling, muscle weakness, palpitations, shortness of breath or weight gain. Risk factors include hyperlipidemia. The symptoms have been stable. Compliance with diet is  good. Compliance with exercise is good. Compliance with medications is good.       The following portions of the patient's history were reviewed and updated as appropriate: allergies, current medications, past family history, past medical history, past social history, past surgical history and problem list.    Outpatient Medications Prior to Visit   Medication Sig Dispense Refill   • amLODIPine (NORVASC) 5 MG tablet TAKE ONE TABLET BY MOUTH ONCE DAILY FOR HEART AND FOR BLOOD PRESSURE 90 tablet 2   • Calcium Carb-Cholecalciferol (CALCIUM PLUS D3 ABSORBABLE) 600-2500 MG-UNIT capsule CALCIUM PLUS D     • coenzyme Q10 100 MG capsule Take 100 mg by mouth Daily.     • losartan (COZAAR) 50 MG tablet Take 50 mg by mouth Daily.     • Omega-3 1000 MG capsule Take 1 tablet by mouth Daily.     • levothyroxine (SYNTHROID, LEVOTHROID) 75 MCG tablet 1 tablet Daily.       No facility-administered medications prior to visit.        Patient Active Problem List   Diagnosis   • Status post placement of implantable loop recorder   • Essential hypertension   • Acute seasonal allergic rhinitis due to pollen   • Arthralgia of right foot   • Atherosclerosis   • B12 deficiency   • Bilateral carpal tunnel syndrome   • Cataracts, bilateral   • DNR (do not resuscitate)   • Heart murmur   • History of chicken pox   • Menopausal syndrome   • Mixed hyperlipidemia   • Pernicious anemia       Advance Care Planning:  ACP discussion was held with the patient during this visit.     A face-to-face visit was completed today with patient.  Counseling explanation, and discussion of advanced directives was performed.   The last advanced care visit was performed in 2019.  In a near life ending situation, from which she is not expected to recover functionally, and she is not able to speak for her, she does not want life sustaining measures. We discussed feeding tubes, ventilators and cardiac support as well as dialysis.    We spent more than 16 minutes  discussing Advanced Care Planning information and documenting in the chart.      Identification of Risk Factors:  Risk factors include: Advance Directive Discussion  Cardiovascular risk  Fall Risk.    Review of Systems   Constitutional: Negative for activity change, fever and unexpected weight gain.   HENT: Negative for ear pain, rhinorrhea, sinus pressure and voice change.    Eyes: Negative for visual disturbance.   Respiratory: Negative for cough, chest tightness and shortness of breath.    Cardiovascular: Negative for chest pain, palpitations and leg swelling.   Gastrointestinal: Negative for abdominal pain, diarrhea, nausea and vomiting.   Endocrine: Negative for cold intolerance and heat intolerance.   Genitourinary: Negative for frequency and urgency.   Musculoskeletal: Negative for arthralgias, myalgias and muscle weakness.   Skin: Negative for rash.   Neurological: Negative for dizziness and syncope.   Hematological: Does not bruise/bleed easily.   Psychiatric/Behavioral: Negative for depressed mood. The patient is not nervous/anxious.          Compared to one year ago, the patient feels her physical health is the same.  Compared to one year ago, the patient feels her mental health is the same.    Objective     Physical Exam   Constitutional: She is oriented to person, place, and time. She appears well-developed and well-nourished. No distress.   HENT:   Head: Normocephalic and atraumatic.   Right Ear: External ear normal.   Left Ear: External ear normal.   Nose: Nose normal.   Mouth/Throat: Oropharynx is clear and moist. No oropharyngeal exudate.   Eyes: Pupils are equal, round, and reactive to light. Conjunctivae and EOM are normal. Right eye exhibits no discharge. Left eye exhibits no discharge. No scleral icterus.   Neck: Normal range of motion. Neck supple. No tracheal deviation present. No thyromegaly present.   Cardiovascular: Normal rate, regular rhythm, normal heart sounds and intact distal pulses.  "Exam reveals no gallop and no friction rub.   No murmur heard.  Pulmonary/Chest: Effort normal and breath sounds normal. No stridor. No respiratory distress. She has no wheezes. She has no rales. Right breast exhibits no inverted nipple, no mass, no nipple discharge, no skin change and no tenderness. Left breast exhibits no inverted nipple, no mass, no nipple discharge, no skin change and no tenderness.   Abdominal: Soft. Bowel sounds are normal. She exhibits no distension and no mass. There is no tenderness. There is no rebound and no guarding.   Musculoskeletal: Normal range of motion. She exhibits no edema, tenderness or deformity.   Lymphadenopathy:     She has no cervical adenopathy.   Neurological: She is alert and oriented to person, place, and time. She has normal strength and normal reflexes. She displays no atrophy, no tremor and normal reflexes. No cranial nerve deficit or sensory deficit. She exhibits normal muscle tone. Coordination and gait normal.   Skin: Skin is warm and dry. Capillary refill takes less than 2 seconds. No rash noted. She is not diaphoretic. No erythema. No pallor.   Psychiatric: She has a normal mood and affect. Her behavior is normal. Judgment and thought content normal. Cognition and memory are normal. Cognition and memory are not impaired. She exhibits normal recent memory and normal remote memory.   Nursing note and vitals reviewed.  declines  exam    Vitals:    02/17/20 0854   BP: 130/70   Pulse: 103   Resp: 16   Temp: 96.5 °F (35.8 °C)   SpO2: 97%   Weight: 67.9 kg (149 lb 12.8 oz)   Height: 160 cm (63\")       Patient's Body mass index is 26.54 kg/m². BMI is within normal parameters. No follow-up required..      Assessment/Plan   Patient Self-Management and Personalized Health Advice  The patient has been provided with information about: fall prevention and designing advance directives and preventive services including:   · Annual Wellness Visit (AWV)  · Bone Density " Measurements.    Visit Diagnoses:    Problem List Items Addressed This Visit        Cardiovascular and Mediastinum    Essential hypertension     Stable continue current treatment         Relevant Orders    POC Urinalysis Dipstick, Automated (Completed)    TSH    Atherosclerosis     stable         Mixed hyperlipidemia    Relevant Orders    Comprehensive Metabolic Panel    Lipid Panel With / Chol / HDL Ratio       Digestive    B12 deficiency    Relevant Orders    CBC & Differential    Vitamin B12       Other    DNR (do not resuscitate)      Other Visit Diagnoses     Medicare annual wellness visit, subsequent    -  Primary    Other specified menopausal and perimenopausal disorders  (Chronic)       Last dexa 12/4/2018 WNL.     Screening mammogram, encounter for  (Chronic)       Last mammo 12/4/2018.    Relevant Orders    Mammo Screening Digital Tomosynthesis Bilateral With CAD (Completed)    Acquired hypothyroidism        Relevant Medications    levothyroxine (SYNTHROID, LEVOTHROID) 75 MCG tablet           Reviewed use of high risk medication in the elderly: yes  Reviewed for potential of harmful drug interactions in the elderly: yes    Follow Up:  No follow-ups on file.     An After Visit Summary and PPPS with all of these plans were given to the patient.           Discussed wearing sunscreen, seatbelts. Avoidance or caution with alcohol. Discussed screening as appropriate for age.

## 2020-02-21 ENCOUNTER — HOSPITAL ENCOUNTER (OUTPATIENT)
Dept: MAMMOGRAPHY | Facility: HOSPITAL | Age: 85
Discharge: HOME | End: 2020-02-21
Admitting: FAMILY MEDICINE

## 2020-02-21 DIAGNOSIS — Z12.31 SCREENING MAMMOGRAM, ENCOUNTER FOR: Chronic | ICD-10-CM

## 2020-02-21 PROCEDURE — 77067 SCR MAMMO BI INCL CAD: CPT

## 2020-02-21 PROCEDURE — 77063 BREAST TOMOSYNTHESIS BI: CPT

## 2020-03-09 ENCOUNTER — CLINICAL SUPPORT NO REQUIREMENTS (OUTPATIENT)
Dept: CARDIOLOGY | Facility: CLINIC | Age: 85
End: 2020-03-09

## 2020-03-09 DIAGNOSIS — Z95.818 STATUS POST PLACEMENT OF IMPLANTABLE LOOP RECORDER: ICD-10-CM

## 2020-03-09 DIAGNOSIS — E78.2 MIXED HYPERLIPIDEMIA: Primary | ICD-10-CM

## 2020-03-09 PROCEDURE — 93297 REM INTERROG DEV EVAL ICPMS: CPT | Performed by: NURSE PRACTITIONER

## 2020-03-09 PROCEDURE — G2066 INTER DEVC REMOTE 30D: HCPCS | Performed by: NURSE PRACTITIONER

## 2020-03-12 NOTE — PROGRESS NOTES
REMOTE LOOP RECORDER INTERROGATION    Received and reviewed on: March 9, 2020      DEVICE COMPANY: [employer]    Remote loop recorder transmission reviewed.      Battery status stable      Events: None    Symptoms: None    Atrial Fibrillation: None      Continue remote loop recorder monitoring with monthly transmissions

## 2020-03-31 RX ORDER — LOSARTAN POTASSIUM 50 MG/1
50 TABLET ORAL DAILY
Qty: 90 TABLET | Refills: 1 | Status: SHIPPED | OUTPATIENT
Start: 2020-03-31 | End: 2021-02-15 | Stop reason: SDUPTHER

## 2020-04-08 ENCOUNTER — CLINICAL SUPPORT NO REQUIREMENTS (OUTPATIENT)
Dept: CARDIOLOGY | Facility: CLINIC | Age: 85
End: 2020-04-08

## 2020-04-08 DIAGNOSIS — R55 SYNCOPE AND COLLAPSE: Primary | ICD-10-CM

## 2020-04-08 DIAGNOSIS — Z95.818 STATUS POST PLACEMENT OF IMPLANTABLE LOOP RECORDER: ICD-10-CM

## 2020-04-08 PROCEDURE — G2066 INTER DEVC REMOTE 30D: HCPCS | Performed by: NURSE PRACTITIONER

## 2020-04-08 PROCEDURE — 93297 REM INTERROG DEV EVAL ICPMS: CPT | Performed by: NURSE PRACTITIONER

## 2020-04-08 NOTE — PROGRESS NOTES
REMOTE LOOP RECORDER INTERROGATION    Received and reviewed on: April 8, 2020      DEVICE COMPANY: [employer]    Remote loop recorder transmission reviewed.      Battery status stable      Events: None    Symptoms: None    Atrial Fibrillation: None      Continue remote loop recorder monitoring with monthly transmissions

## 2020-05-08 ENCOUNTER — CLINICAL SUPPORT NO REQUIREMENTS (OUTPATIENT)
Dept: CARDIOLOGY | Facility: CLINIC | Age: 85
End: 2020-05-08

## 2020-05-08 DIAGNOSIS — Z95.818 STATUS POST PLACEMENT OF IMPLANTABLE LOOP RECORDER: ICD-10-CM

## 2020-05-08 DIAGNOSIS — R55 SYNCOPE AND COLLAPSE: Primary | ICD-10-CM

## 2020-05-08 PROCEDURE — G2066 INTER DEVC REMOTE 30D: HCPCS | Performed by: NURSE PRACTITIONER

## 2020-05-08 PROCEDURE — 93297 REM INTERROG DEV EVAL ICPMS: CPT | Performed by: NURSE PRACTITIONER

## 2020-05-14 NOTE — PROGRESS NOTES
REMOTE LOOP RECORDER INTERROGATION    Received and reviewed on: 5/8/2020      DEVICE COMPANY: [employer]    Remote loop recorder transmission reviewed.      Battery status stable      Events: None    Symptoms: None    Atrial Fibrillation: None      Continue remote loop recorder monitoring with monthly transmissions

## 2020-05-22 ENCOUNTER — OFFICE VISIT (OUTPATIENT)
Dept: CARDIOLOGY | Facility: CLINIC | Age: 85
End: 2020-05-22

## 2020-05-22 VITALS
BODY MASS INDEX: 26.58 KG/M2 | HEIGHT: 63 IN | HEART RATE: 70 BPM | SYSTOLIC BLOOD PRESSURE: 127 MMHG | WEIGHT: 150 LBS | DIASTOLIC BLOOD PRESSURE: 65 MMHG

## 2020-05-22 DIAGNOSIS — R55 SYNCOPE AND COLLAPSE: ICD-10-CM

## 2020-05-22 DIAGNOSIS — E78.2 MIXED HYPERLIPIDEMIA: ICD-10-CM

## 2020-05-22 DIAGNOSIS — I10 ESSENTIAL HYPERTENSION: Primary | ICD-10-CM

## 2020-05-22 DIAGNOSIS — R01.1 HEART MURMUR: ICD-10-CM

## 2020-05-22 PROCEDURE — 93000 ELECTROCARDIOGRAM COMPLETE: CPT | Performed by: INTERNAL MEDICINE

## 2020-05-22 PROCEDURE — 99213 OFFICE O/P EST LOW 20 MIN: CPT | Performed by: INTERNAL MEDICINE

## 2020-05-22 NOTE — PROGRESS NOTES
Date of Office Visit: 2020  Encounter Provider: Dr. Ruslan Layne  Place of Service: [facility] CARDIOLOGY [place]  Patient Name: Stacy Monroy  :1930  Mita Moise MD    Chief Complaint   Patient presents with   • Heart Murmur     6 month follow up   • Hypertension   • Hyperlipidemia   • Syncope     History of Present Illness    I am pleased to see Mrs. Monroy in my office today as a follow-up.    As you know, patient is 89-year-old white female whose past medical history significant for hypertension, who came today for follow-up.     In 2019, patient was evaluated due to an episode of syncope. Patient underwent echocardiogram in 2019 which showed EF of 55-60%.  Patient had stress test in 2018 at [facility] which was reportedly unremarkable for ischemia.  Holter monitor showed no significant pause of bradycardia.  Tilt-table test showed hypotensive response with bradycardia during tilt-table test. Patient underwent loop recorder implant at [facility] on 2019.    Since the previous visit, patient is doing fairly well from cardiovascular standpoint.  Patient denies any symptom of chest pain or tightness or heaviness.  No orthopnea PND no syncope or presyncope.  No chest heaviness.  No shortness of breath.  No palpitation.    EKG showed first-degree AV block.  Leftward axis noted.  No change from previous EKG.    At this stage, I would recommend that patient should continue current treatment.  Loop recorder transmissions are reviewed and patient did not have any tachyarrhythmia or bradycardia arrhythmia.    Past Medical History:   Diagnosis Date   • Heart murmur    • Hyperlipidemia    • Hypertension    • Other specified hypothyroidism 8/15/2019         Past Surgical History:   Procedure Laterality Date   • BREAST BIOPSY Right    • OTHER SURGICAL HISTORY  2019    Tilt table    • OTHER SURGICAL HISTORY  2019    tilt table            Current  Outpatient Medications:   •  amLODIPine (NORVASC) 5 MG tablet, TAKE ONE TABLET BY MOUTH ONCE DAILY FOR HEART AND FOR BLOOD PRESSURE, Disp: 90 tablet, Rfl: 2  •  Calcium Carb-Cholecalciferol (CALCIUM PLUS D3 ABSORBABLE) 600-2500 MG-UNIT capsule, CALCIUM PLUS D, Disp: , Rfl:   •  coenzyme Q10 100 MG capsule, Take 100 mg by mouth Daily., Disp: , Rfl:   •  levothyroxine (SYNTHROID, LEVOTHROID) 75 MCG tablet, Take 1 tablet by mouth Daily., Disp: 90 tablet, Rfl: 3  •  losartan (COZAAR) 50 MG tablet, Take 1 tablet by mouth Daily., Disp: 90 tablet, Rfl: 1  •  Omega-3 1000 MG capsule, Take 1 tablet by mouth Daily., Disp: , Rfl:       Social History     Socioeconomic History   • Marital status:      Spouse name: Not on file   • Number of children: Not on file   • Years of education: Not on file   • Highest education level: Not on file   Tobacco Use   • Smoking status: Never Smoker   • Smokeless tobacco: Never Used   Substance and Sexual Activity   • Alcohol use: No     Frequency: Never   • Drug use: No   • Sexual activity: Defer   Social History Narrative    ** Merged History Encounter **              Review of Systems   Constitution: Negative for chills and fever.   HENT: Negative for ear discharge and nosebleeds.    Eyes: Negative for discharge and redness.   Cardiovascular: Negative for chest pain, orthopnea, palpitations, paroxysmal nocturnal dyspnea and syncope.   Respiratory: Negative for cough, shortness of breath and wheezing.    Endocrine: Negative for heat intolerance.   Skin: Negative for rash.   Musculoskeletal: Positive for arthritis and joint pain. Negative for myalgias.   Gastrointestinal: Negative for abdominal pain, melena, nausea and vomiting.   Genitourinary: Negative for dysuria and hematuria.   Neurological: Negative for dizziness, light-headedness, numbness and tremors.   Psychiatric/Behavioral: Negative for depression. The patient is not nervous/anxious.        Procedures      ECG 12  "Lead  Date/Time: 5/22/2020 10:44 AM  Performed by: Ruslan Layne MD  Authorized by: Ruslan Layne MD   Comparison: compared with previous ECG   Similar to previous ECG  Rhythm: sinus rhythm  Conduction: left anterior fascicular block    Clinical impression: abnormal EKG            ECG 12 Lead    (Results Pending)           Objective:    /65   Pulse 70   Ht 160 cm (62.99\")   Wt 68 kg (150 lb)   BMI 26.58 kg/m²         Physical Exam   Constitutional: She is oriented to person, place, and time. She appears well-developed and well-nourished.   HENT:   Head: Normocephalic and atraumatic.   Eyes: No scleral icterus.   Neck: No thyromegaly present.   Cardiovascular: Normal rate, regular rhythm and normal heart sounds. Exam reveals no gallop and no friction rub.   No murmur heard.  Pulmonary/Chest: Effort normal and breath sounds normal. No respiratory distress. She has no wheezes. She has no rales.   Abdominal: There is no tenderness.   Musculoskeletal: She exhibits no edema.   Lymphadenopathy:     She has no cervical adenopathy.   Neurological: She is alert and oriented to person, place, and time.   Skin: No rash noted. No erythema.   Psychiatric: She has a normal mood and affect.           Assessment:       Diagnosis Plan   1. Essential hypertension  ECG 12 Lead   2. Heart murmur  ECG 12 Lead   3. Mixed hyperlipidemia  ECG 12 Lead   4. Syncope and collapse  ECG 12 Lead            Plan:       At this stage I will continue current treatment.  I will see the patient in 1 year  "

## 2020-06-08 ENCOUNTER — CLINICAL SUPPORT NO REQUIREMENTS (OUTPATIENT)
Dept: CARDIOLOGY | Facility: CLINIC | Age: 85
End: 2020-06-08

## 2020-06-08 DIAGNOSIS — Z95.818 STATUS POST PLACEMENT OF IMPLANTABLE LOOP RECORDER: ICD-10-CM

## 2020-06-08 DIAGNOSIS — R55 SYNCOPE AND COLLAPSE: Primary | ICD-10-CM

## 2020-06-08 PROCEDURE — G2066 INTER DEVC REMOTE 30D: HCPCS | Performed by: NURSE PRACTITIONER

## 2020-06-08 PROCEDURE — 93297 REM INTERROG DEV EVAL ICPMS: CPT | Performed by: NURSE PRACTITIONER

## 2020-06-08 NOTE — PROGRESS NOTES
REMOTE LOOP RECORDER INTERROGATION    Received and reviewed on: 6/8/2020      DEVICE COMPANY: [employer]    Remote loop recorder transmission reviewed.      Battery status stable      Events: None    Symptoms: None    Atrial Fibrillation: None      Continue remote loop recorder monitoring with monthly transmissions

## 2020-07-10 ENCOUNTER — CLINICAL SUPPORT NO REQUIREMENTS (OUTPATIENT)
Dept: CARDIOLOGY | Facility: CLINIC | Age: 85
End: 2020-07-10

## 2020-07-10 DIAGNOSIS — Z95.818 STATUS POST PLACEMENT OF IMPLANTABLE LOOP RECORDER: ICD-10-CM

## 2020-07-10 DIAGNOSIS — R55 SYNCOPE AND COLLAPSE: Primary | ICD-10-CM

## 2020-07-10 PROCEDURE — 93297 REM INTERROG DEV EVAL ICPMS: CPT | Performed by: NURSE PRACTITIONER

## 2020-07-10 PROCEDURE — G2066 INTER DEVC REMOTE 30D: HCPCS | Performed by: NURSE PRACTITIONER

## 2020-07-20 NOTE — PROGRESS NOTES
REMOTE LOOP RECORDER INTERROGATION    Received and reviewed on: 7/10/2020      DEVICE COMPANY: [employer]    Remote loop recorder transmission reviewed.      Battery status stable      Events: None    Symptoms: None    Atrial Fibrillation: None      Continue remote loop recorder monitoring with monthly transmissions

## 2020-08-10 ENCOUNTER — CLINICAL SUPPORT NO REQUIREMENTS (OUTPATIENT)
Dept: CARDIOLOGY | Facility: CLINIC | Age: 85
End: 2020-08-10

## 2020-08-10 DIAGNOSIS — Z95.818 STATUS POST PLACEMENT OF IMPLANTABLE LOOP RECORDER: ICD-10-CM

## 2020-08-10 DIAGNOSIS — R55 SYNCOPE AND COLLAPSE: Primary | ICD-10-CM

## 2020-08-10 PROCEDURE — 93297 REM INTERROG DEV EVAL ICPMS: CPT | Performed by: NURSE PRACTITIONER

## 2020-08-10 PROCEDURE — G2066 INTER DEVC REMOTE 30D: HCPCS | Performed by: NURSE PRACTITIONER

## 2020-08-10 NOTE — PROGRESS NOTES
REMOTE LOOP RECORDER INTERROGATION    Received and reviewed on: 8/10/2020      DEVICE COMPANY: [employer]    Remote loop recorder transmission reviewed.      Battery status stable      Events: None    Symptoms: None    Atrial Fibrillation: None      Continue remote loop recorder monitoring with monthly transmissions

## 2020-08-17 ENCOUNTER — OFFICE VISIT (OUTPATIENT)
Dept: FAMILY MEDICINE CLINIC | Facility: CLINIC | Age: 85
End: 2020-08-17

## 2020-08-17 ENCOUNTER — HOSPITAL ENCOUNTER (OUTPATIENT)
Dept: GENERAL RADIOLOGY | Facility: HOSPITAL | Age: 85
Discharge: HOME | End: 2020-08-17
Admitting: FAMILY MEDICINE

## 2020-08-17 ENCOUNTER — HOSPITAL ENCOUNTER (OUTPATIENT)
Dept: GENERAL RADIOLOGY | Facility: HOSPITAL | Age: 85
Discharge: HOME | End: 2020-08-17

## 2020-08-17 VITALS
OXYGEN SATURATION: 94 % | TEMPERATURE: 97.7 F | BODY MASS INDEX: 27.34 KG/M2 | DIASTOLIC BLOOD PRESSURE: 84 MMHG | SYSTOLIC BLOOD PRESSURE: 138 MMHG | WEIGHT: 148.6 LBS | HEART RATE: 85 BPM | RESPIRATION RATE: 18 BRPM | HEIGHT: 62 IN

## 2020-08-17 DIAGNOSIS — M25.551 RIGHT HIP PAIN: ICD-10-CM

## 2020-08-17 DIAGNOSIS — M54.41 ACUTE RIGHT-SIDED LOW BACK PAIN WITH RIGHT-SIDED SCIATICA: ICD-10-CM

## 2020-08-17 DIAGNOSIS — I10 ESSENTIAL HYPERTENSION: Primary | ICD-10-CM

## 2020-08-17 DIAGNOSIS — I70.90 ATHEROSCLEROSIS: ICD-10-CM

## 2020-08-17 PROBLEM — M54.40 ACUTE RIGHT-SIDED LOW BACK PAIN WITH SCIATICA: Status: ACTIVE | Noted: 2020-08-17

## 2020-08-17 PROBLEM — R55 SYNCOPE AND COLLAPSE: Status: RESOLVED | Noted: 2019-09-30 | Resolved: 2020-08-17

## 2020-08-17 PROCEDURE — 99214 OFFICE O/P EST MOD 30 MIN: CPT | Performed by: FAMILY MEDICINE

## 2020-08-17 PROCEDURE — 72110 X-RAY EXAM L-2 SPINE 4/>VWS: CPT

## 2020-08-17 PROCEDURE — 73502 X-RAY EXAM HIP UNI 2-3 VIEWS: CPT

## 2020-08-17 NOTE — PROGRESS NOTES
Subjective   Stacy Monroy is a 89 y.o. female.     Chief Complaint   Patient presents with   • Hypertension   • Hip Pain       Hypertension   This is a chronic problem. The current episode started more than 1 year ago. The problem has been waxing and waning since onset. Pertinent negatives include no blurred vision, chest pain, headaches, malaise/fatigue or shortness of breath. There are no associated agents to hypertension. Risk factors for coronary artery disease include post-menopausal state and dyslipidemia. Current antihypertension treatment includes calcium channel blockers. There are no compliance problems.    Hip Pain    The incident occurred more than 1 week ago (Fell two weeks ago face down, went to the ER). Incident location: drive way. The injury mechanism was a fall. The pain is present in the right hip, right foot, right toes, right ankle, right thigh and right knee. The quality of the pain is described as aching. The pain is mild. The pain has been constant since onset. Associated symptoms include an inability to bear weight, a loss of motion, muscle weakness and numbness. Pertinent negatives include no loss of sensation or tingling. She reports no foreign bodies present. The symptoms are aggravated by movement and weight bearing. Treatments tried: Aleve. The treatment provided mild relief.   Coronary Artery Disease   Presents for follow-up visit. Pertinent negatives include no chest pain, leg swelling, muscle weakness or shortness of breath. The symptoms have been stable. Compliance with diet is good. Compliance with exercise is variable. Compliance with medications is good.    She fell about 2 weeks ago. She tripped over her feet. She did go to [facility] ER. Will get those records. Xrays were negative she says.     The following portions of the patient's history were reviewed and updated as appropriate: allergies, current medications, past family history, past medical history, past social history, past  surgical history and problem list.    Allergies:  Allergies   Allergen Reactions   • Azithromycin Rash   • Penicillin G Rash   • Penicillins Rash       Social History:  Social History     Socioeconomic History   • Marital status:      Spouse name: Not on file   • Number of children: Not on file   • Years of education: Not on file   • Highest education level: Not on file   Tobacco Use   • Smoking status: Never Smoker   • Smokeless tobacco: Never Used   Substance and Sexual Activity   • Alcohol use: No     Frequency: Never   • Drug use: No   • Sexual activity: Defer   Social History Narrative    ** Merged History Encounter **            Family History:  Family History   Problem Relation Age of Onset   • Hypertension Mother    • Stroke Mother    • Hypertension Father    • Stroke Father    • Breast cancer Sister 50   • Ovarian cancer Daughter 57   • Breast cancer Niece 53       Past Medical History :  Patient Active Problem List   Diagnosis   • Status post placement of implantable loop recorder   • Essential hypertension   • Acute seasonal allergic rhinitis due to pollen   • Arthralgia of right foot   • Atherosclerosis   • B12 deficiency   • Bilateral carpal tunnel syndrome   • Cataracts, bilateral   • DNR (do not resuscitate)   • Heart murmur   • History of chicken pox   • Menopausal syndrome   • Mixed hyperlipidemia   • Pernicious anemia   • Right hip pain   • Acute right-sided low back pain with sciatica       Medication List:    Current Outpatient Medications:   •  amLODIPine (NORVASC) 5 MG tablet, TAKE ONE TABLET BY MOUTH ONCE DAILY FOR HEART AND FOR BLOOD PRESSURE, Disp: 90 tablet, Rfl: 2  •  Calcium Carb-Cholecalciferol (CALCIUM PLUS D3 ABSORBABLE) 600-2500 MG-UNIT capsule, CALCIUM PLUS D, Disp: , Rfl:   •  coenzyme Q10 100 MG capsule, Take 100 mg by mouth Daily., Disp: , Rfl:   •  levothyroxine (SYNTHROID, LEVOTHROID) 75 MCG tablet, Take 1 tablet by mouth Daily., Disp: 90 tablet, Rfl: 3  •  losartan  "(COZAAR) 50 MG tablet, Take 1 tablet by mouth Daily., Disp: 90 tablet, Rfl: 1  •  Omega-3 1000 MG capsule, Take 1 tablet by mouth Daily., Disp: , Rfl:     Past Surgical History:  Past Surgical History:   Procedure Laterality Date   • BREAST BIOPSY Right    • OTHER SURGICAL HISTORY  2019    Tilt table    • OTHER SURGICAL HISTORY  2019    tilt table        Review of Systems:  Review of Systems   Constitutional: Negative for activity change, fever and malaise/fatigue.   HENT: Negative for ear pain, rhinorrhea, sinus pressure and voice change.    Eyes: Negative for blurred vision and visual disturbance.   Respiratory: Negative for cough and shortness of breath.    Cardiovascular: Negative for chest pain and leg swelling.   Gastrointestinal: Negative for abdominal pain, diarrhea, nausea and vomiting.   Endocrine: Negative for cold intolerance and heat intolerance.   Genitourinary: Negative for frequency and urgency.   Musculoskeletal: Negative for arthralgias and muscle weakness.   Skin: Negative for rash.   Neurological: Positive for numbness. Negative for tingling and syncope.   Hematological: Does not bruise/bleed easily.   Psychiatric/Behavioral: Negative for depressed mood. The patient is not nervous/anxious.        Physical Exam:  Vital Signs:  Visit Vitals  /84   Pulse 85   Temp 97.7 °F (36.5 °C)   Resp 18   Ht 156.2 cm (61.5\")   Wt 67.4 kg (148 lb 9.6 oz)   SpO2 94%   BMI 27.62 kg/m²       Physical Exam   Constitutional: She is oriented to person, place, and time. She appears well-developed and well-nourished. She is cooperative.   Cardiovascular: Normal rate, regular rhythm and normal heart sounds. Exam reveals no gallop and no friction rub.   No murmur heard.  Pulmonary/Chest: Effort normal and breath sounds normal. She has no wheezes. She has no rales.   Musculoskeletal:        Right hip: She exhibits normal range of motion, normal strength, no tenderness, no crepitus and no deformity.        Lumbar " back: She exhibits normal range of motion, no tenderness, no swelling, no edema, no pain and no spasm.   Neurological: She is alert and oriented to person, place, and time. Coordination normal.   Skin: Skin is warm and dry.   Psychiatric: She has a normal mood and affect.   Vitals reviewed.      Assessment and Plan:  Problem List Items Addressed This Visit        Cardiovascular and Mediastinum    Essential hypertension - Primary    Overview     Stable  Continue current treatment         Relevant Orders    XR Hip With or Without Pelvis 2 - 3 View Right    XR Spine Lumbar Complete 4+VW    Atherosclerosis    Overview     Stable            Nervous and Auditory    Right hip pain    Acute right-sided low back pain with sciatica    Overview     Ice three times a day for about 10-15 minutes for the first 1-2 days. Then may alternate heat and ice. Better body mechanics discussed. Home exercises discussed and hand out given. Discussed nsaids and if they can be taken. May need imaging and or PT if persists.  Discussed red flags, if there is severe pain, fever with pain, loss of movement in one or both legs pain, numbness in groin or both legs, trouble urinating or defecating on oneself, then patient is to go to the ER.                  An After Visit Summary and PPPS were given to the patient.             I wore protective equipment throughout this patient encounter to include mask and gloves. Hand hygiene was performed before donning protective equipment and after removal when leaving the room.

## 2020-08-21 ENCOUNTER — TELEPHONE (OUTPATIENT)
Dept: FAMILY MEDICINE CLINIC | Facility: CLINIC | Age: 85
End: 2020-08-21

## 2020-08-21 NOTE — TELEPHONE ENCOUNTER
Called pt and she said she didn't want to see a back surgeon but she starts physical therapy next week and she will go from there.

## 2020-08-21 NOTE — TELEPHONE ENCOUNTER
----- Message from Imani Groves MA sent at 8/21/2020 11:56 AM EDT -----      ----- Message -----  From: Mita Moise MD  Sent: 8/17/2020  10:12 AM EDT  To: Imani Groves MA    She has advanced arthritis of her low back. I suggest an MRI of her back with out contrast to get a better look and if she would like to see a back surgeon.

## 2020-08-25 ENCOUNTER — TREATMENT (OUTPATIENT)
Dept: PHYSICAL THERAPY | Facility: CLINIC | Age: 85
End: 2020-08-25

## 2020-08-25 DIAGNOSIS — M54.41 ACUTE BACK PAIN WITH SCIATICA, RIGHT: ICD-10-CM

## 2020-08-25 DIAGNOSIS — M25.551 RIGHT HIP PAIN: Primary | ICD-10-CM

## 2020-08-25 PROCEDURE — G0283 ELEC STIM OTHER THAN WOUND: HCPCS | Performed by: PHYSICAL THERAPIST

## 2020-08-25 PROCEDURE — 97161 PT EVAL LOW COMPLEX 20 MIN: CPT | Performed by: PHYSICAL THERAPIST

## 2020-08-25 PROCEDURE — 97140 MANUAL THERAPY 1/> REGIONS: CPT | Performed by: PHYSICAL THERAPIST

## 2020-08-25 PROCEDURE — 97110 THERAPEUTIC EXERCISES: CPT | Performed by: PHYSICAL THERAPIST

## 2020-08-25 NOTE — PROGRESS NOTES
Physical Therapy Initial Evaluation and Plan of Care    Patient: Stacy Monroy   : 1930  Diagnosis/ICD-10 Code:  Right hip pain [M25.551]  Referring practitioner: Mita Moise MD  Date of Initial Visit: 2020  Today's Date: 2020  Patient seen for 1 sessions           Subjective Questionnaire: Oxford Hip 44% deficit  Pain right hip/buttock for approx 1 yr.  Hx of fall Aug 5 but had pain prior to that.  xrays anteriolesthesis L4-5 and retrolesthesis L1-L2, advanced arthropathy right L1-2, xray right hip mild deg changes.  Feels off balance and walks on right lateral foot to avoid pain      Subjective Evaluation    History of Present Illness  Mechanism of injury: Fall Aug 5 but had pain prior to this incident    Quality of life: good    Pain  Current pain ratin  Quality: dull ache, radiating, discomfort, pulling, tight and pressure  Relieving factors: rest  Aggravating factors: ambulation, squatting, prolonged positioning, stairs, movement and standing  Progression: no change    Social Support  Lives with: alone    Diagnostic Tests  X-ray: abnormal    Treatments  No previous or current treatments  Patient Goals  Patient goals for therapy: increased strength, decreased pain, improved balance and independence with ADLs/IADLs             Objective          Observations     Additional Hip Observation Details  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  Needs work to assist and promote balance of hip and pelvic strength and ROM, balance/gait training and HEP to assist with generalized endurance/strength/gait LE's.   Prognosis: good  Prognosis details: Patient very motivated and in much better condition than typical 89yo  Functional Limitations: walking, pulling, pushing, uncomfortable because of pain and standing  Goals  Plan Goals: STG  Initiate program for home to assist with level pelvis 1 week    LTG consistent level pelvis by discharge          Confidence and balance noted with gait without devices by discharge          Oxford hip to 25% or less by discharge          Consistent pain reports of 2/10 or less by discharge    Plan  Therapy options: will be seen for skilled physical therapy services  Planned modality interventions: thermotherapy (hydrocollator packs), electrical stimulation/[place] stimulation and ultrasound  Planned therapy interventions: balance/weight-bearing training, postural training, strengthening, stretching, home exercise program and gait training  Plan details: Suggest PT 2X weekly to reach above goals        Timed:         Manual Therapy:    15     mins  74074;     Therapeutic Exercise:    15     mins  01495;     Neuromuscular Anne:    0    mins  05050;    Therapeutic Activity:     0     mins  85153;     Gait Trainin     mins  86583;     Ultrasound:      0     mins  27668;    Ionto                               0    mins   66849    Un-Timed:  Electrical Stimulation:    15     mins  04266 ( );  Dry Needling     0     mins self-pay  Traction     0     mins 98219  Low Eval     15     Mins  24132  Mod Eval     0     Mins  24171  High Eval                       0     Mins  86187        Timed Treatment:   30   mins   Total Treatment:     60   mins    PT SIGNATURE: Khushi Terry, PT   DATE TREATMENT INITIATED: 8/25/2020    Medicare Initial Certification  Certification Period: 11/23/2020  I certify that the therapy services are furnished while this patient is under my care.  The services outlined above are required by this patient, and will be reviewed every 90 days.     PHYSICIAN: Mita Moise MD      DATE:     Please sign and return via fax to [phone].. Thank you, [facility].

## 2020-09-02 ENCOUNTER — TREATMENT (OUTPATIENT)
Dept: PHYSICAL THERAPY | Facility: CLINIC | Age: 85
End: 2020-09-02

## 2020-09-02 DIAGNOSIS — M25.551 RIGHT HIP PAIN: Primary | ICD-10-CM

## 2020-09-02 DIAGNOSIS — M54.41 ACUTE BACK PAIN WITH SCIATICA, RIGHT: ICD-10-CM

## 2020-09-02 PROCEDURE — 97110 THERAPEUTIC EXERCISES: CPT | Performed by: PHYSICAL THERAPIST

## 2020-09-02 PROCEDURE — G0283 ELEC STIM OTHER THAN WOUND: HCPCS | Performed by: PHYSICAL THERAPIST

## 2020-09-02 PROCEDURE — 97140 MANUAL THERAPY 1/> REGIONS: CPT | Performed by: PHYSICAL THERAPIST

## 2020-09-02 NOTE — PROGRESS NOTES
Physical Therapy Daily Progress Note        Patient: Stacy Monroy   : 1930  Diagnosis/ICD-10 Code:  Right hip pain [M25.551]  Referring practitioner: Mita Moise MD  Date of Initial Visit: Type: THERAPY  Noted: 2020  Today's Date: 2020  Patient seen for 2 sessions         Stacy Monroy reports: feeling some improvements with initial visit. Has been performing HEP on regular basis.       Subjective     Objective Added Hip abduction prox stab, bridging   See Exercise, Manual, and Modality Logs for complete treatment.       Assessment/Plan Patient presented with R anterior Innominate corrected with MET resulting in level ASIS. Patient tolerated progressed therapeutic exercise well with no reports of increased symptoms. Patient will continue to benefit from improved proximal stabilization.     Progress strengthening /stabilization /functional activity           Manual Therapy:    15     mins  25458;  Therapeutic Exercise:    15     mins  54886;     Neuromuscular Anne:        mins  05162;    Therapeutic Activity:          mins  59115;     Gait Training:           mins  32079;     Ultrasound:          mins  03417;    Electrical Stimulation:    15     mins  00171 ( );  Dry Needling          mins self-pay    Timed Treatment:   30   mins   Total Treatment:     45   mins    Ryder Grover PTA  Physical Therapist

## 2020-09-04 ENCOUNTER — TREATMENT (OUTPATIENT)
Dept: PHYSICAL THERAPY | Facility: CLINIC | Age: 85
End: 2020-09-04

## 2020-09-04 DIAGNOSIS — M54.41 ACUTE BACK PAIN WITH SCIATICA, RIGHT: ICD-10-CM

## 2020-09-04 DIAGNOSIS — M25.551 RIGHT HIP PAIN: Primary | ICD-10-CM

## 2020-09-04 PROCEDURE — 97140 MANUAL THERAPY 1/> REGIONS: CPT | Performed by: PHYSICAL THERAPIST

## 2020-09-04 PROCEDURE — 97110 THERAPEUTIC EXERCISES: CPT | Performed by: PHYSICAL THERAPIST

## 2020-09-04 PROCEDURE — G0283 ELEC STIM OTHER THAN WOUND: HCPCS | Performed by: PHYSICAL THERAPIST

## 2020-09-04 NOTE — PROGRESS NOTES
Physical Therapy Daily Progress Note      Patient: Stacy Monroy   : 1930  Diagnosis/ICD-10 Code:  Right hip pain [M25.551]   Problem List Items Addressed This Visit        Nervous and Auditory    Right hip pain - Primary      Other Visit Diagnoses     Acute back pain with sciatica, right             Referring practitioner: Mita Moise MD  Date of Initial Visit: Type: THERAPY  Noted: 2020  Today's Date: 2020    VISIT#: 3    Subjective patient reports feeling better at this time and is pleased with progress at this time.       Objective added Nustep, leg press. Reviewed HEP.     See Exercise, Manual, and Modality Logs for complete treatment.     Assessment/Plan Patient tolerated progressed therapeutic exercise well with no reports of increased symptoms. Patient ASIS level this visit . Making gradual gains towards goals at this time.     Progress strengthening /stabilization /functional activity         Timed:         Manual Therapy:    10     mins  53051;     Therapeutic Exercise:    20     mins  63758;     Neuromuscular Anne:        mins  78507;    Therapeutic Activity:          mins  16224;     Gait Training:           mins  56913;     Ultrasound:         mins  29449;    Ionto                                   mins   67161  Canalith Repos                   mins  06739    Un-Timed:  Electrical Stimulation:    15     mins  72643 ( );  Dry Needling          mins self-pay  Traction          mins 87924    Timed Treatment:   30   mins   Total Treatment:     45   mins    Ryder Grover PTA  Physical Therapist

## 2020-09-08 ENCOUNTER — TREATMENT (OUTPATIENT)
Dept: PHYSICAL THERAPY | Facility: CLINIC | Age: 85
End: 2020-09-08

## 2020-09-08 DIAGNOSIS — M54.41 ACUTE BACK PAIN WITH SCIATICA, RIGHT: ICD-10-CM

## 2020-09-08 DIAGNOSIS — M25.551 RIGHT HIP PAIN: Primary | ICD-10-CM

## 2020-09-08 PROCEDURE — 97110 THERAPEUTIC EXERCISES: CPT | Performed by: PHYSICAL THERAPIST

## 2020-09-08 PROCEDURE — 97140 MANUAL THERAPY 1/> REGIONS: CPT | Performed by: PHYSICAL THERAPIST

## 2020-09-08 PROCEDURE — G0283 ELEC STIM OTHER THAN WOUND: HCPCS | Performed by: PHYSICAL THERAPIST

## 2020-09-08 NOTE — PROGRESS NOTES
Physical Therapy Daily Progress Note        Patient: Stacy Monroy   : 1930  Diagnosis/ICD-10 Code:  Right hip pain [M25.551]  Referring practitioner: Mita Moise MD  Date of Initial Visit: Type: THERAPY  Noted: 2020  Today's Date: 2020  Patient seen for 4 sessions         Stacy Monroy reports: was more sore after last visit, no problems during visit, feels like balance is improving      Subjective     Objective   See Exercise, Manual, and Modality Logs for complete treatment.   Added incline board, standing hip abd/flex and PNF gait      Assessment/Plan    Improved balance with gait, level today           Timed:         Manual Therapy:    15     mins  67966;     Therapeutic Exercise:    15     mins  99183;     Neuromuscular Anne:    0    mins  01064;    Therapeutic Activity:     0     mins  33320;     Gait Trainin     mins  42885;     Ultrasound:     0     mins  87742;    Ionto                               0    mins   15838  Self Care                       0     mins   82421  Canalith Repos               0    mins  4209    Un-Timed:  Electrical Stimulation:    15     mins  75496 ( );  Dry Needling     0     mins self-pay  Traction     0     mins 53725  Low Eval     0     Mins  42196  Mod Eval     0     Mins  99751  High Eval                       0     Mins  62953    Timed Treatment:   30   mins   Total Treatment:     45   mins    Khushi Terry PT  Physical Therapist  [place] license [id number]

## 2020-09-10 ENCOUNTER — TREATMENT (OUTPATIENT)
Dept: PHYSICAL THERAPY | Facility: CLINIC | Age: 85
End: 2020-09-10

## 2020-09-10 DIAGNOSIS — M25.551 RIGHT HIP PAIN: Primary | ICD-10-CM

## 2020-09-10 DIAGNOSIS — M54.41 ACUTE BACK PAIN WITH SCIATICA, RIGHT: ICD-10-CM

## 2020-09-10 PROCEDURE — 97140 MANUAL THERAPY 1/> REGIONS: CPT | Performed by: PHYSICAL THERAPIST

## 2020-09-10 PROCEDURE — G0283 ELEC STIM OTHER THAN WOUND: HCPCS | Performed by: PHYSICAL THERAPIST

## 2020-09-10 PROCEDURE — 97110 THERAPEUTIC EXERCISES: CPT | Performed by: PHYSICAL THERAPIST

## 2020-09-10 NOTE — PROGRESS NOTES
Physical Therapy Daily Progress Note      Patient: Stayc Monroy   : 1930  Diagnosis/ICD-10 Code:  Right hip pain [M25.551]   Problem List Items Addressed This Visit        Nervous and Auditory    Right hip pain - Primary      Other Visit Diagnoses     Acute back pain with sciatica, right             Referring practitioner: Mita Moise MD  Date of Initial Visit: Type: THERAPY  Noted: 2020  Today's Date: 9/10/2020    VISIT#: 5    Subjective patient reports feeling good and is pleased with progress, and is feeling improved balance.       Objective began with mhp/estim, followed by manual interventions, finishing with  therapeutic exercise.     See Exercise, Manual, and Modality Logs for complete treatment.     Assessment/Plan Patient demonstrating improved transitional mobility within clinic and is progressing well towards goals at this time.     Progress per Plan of Care         Timed:         Manual Therapy:    10     mins  96837;     Therapeutic Exercise:    20     mins  34886;     Neuromuscular Anne:        mins  86727;    Therapeutic Activity:         mins  30115;     Gait Training:           mins  82498;     Ultrasound:          mins  86428;    Ionto                                   mins   95837  Canalith Repos                   mins  28735    Un-Timed:  Electrical Stimulation:    15     mins  32440 ( );  Dry Needling          mins self-pay  Traction          mins 81672    Timed Treatment:   30   mins   Total Treatment:     45   mins    Ryder Grover PTA  Physical Therapist

## 2020-09-15 ENCOUNTER — TREATMENT (OUTPATIENT)
Dept: PHYSICAL THERAPY | Facility: CLINIC | Age: 85
End: 2020-09-15

## 2020-09-15 DIAGNOSIS — M25.551 RIGHT HIP PAIN: Primary | ICD-10-CM

## 2020-09-15 DIAGNOSIS — M54.41 ACUTE BACK PAIN WITH SCIATICA, RIGHT: ICD-10-CM

## 2020-09-15 PROCEDURE — 97110 THERAPEUTIC EXERCISES: CPT | Performed by: PHYSICAL THERAPIST

## 2020-09-15 PROCEDURE — 97140 MANUAL THERAPY 1/> REGIONS: CPT | Performed by: PHYSICAL THERAPIST

## 2020-09-15 PROCEDURE — G0283 ELEC STIM OTHER THAN WOUND: HCPCS | Performed by: PHYSICAL THERAPIST

## 2020-09-15 NOTE — PROGRESS NOTES
Physical Therapy Daily Progress Note        Patient: Stacy Monroy   : 1930  Diagnosis/ICD-10 Code:  Right hip pain [M25.551]  Referring practitioner: Mita Moise MD  Date of Initial Visit: Type: THERAPY  Noted: 2020  Today's Date: 9/15/2020  Patient seen for 6 sessions         Stacy Monroy reports: very pleased, feeling more confident with gait      Subjective     Objective   See Exercise, Manual, and Modality Logs for complete treatment.       Assessment/Plan    Excellent progress, gait more steady, progress as needed            Timed:         Manual Therapy:    15     mins  31651;     Therapeutic Exercise:    15     mins  21414;     Neuromuscular Anne:    0    mins  79013;    Therapeutic Activity:     0     mins  43351;     Gait Trainin    mins  09779;     Ultrasound:     0     mins  02422;    Ionto                               0    mins   43128  Self Care                       0     mins   39397  Canalith Repos               0    mins  4209    Un-Timed:  Electrical Stimulation:    15     mins  03772 ( );  Dry Needling     0     mins self-pay  Traction     0     mins 52895  Low Eval     0     Mins  20005  Mod Eval     0     Mins  15137  High Eval                       0     Mins  79419    Timed Treatment:   35   mins   Total Treatment:     50   mins    Khushi Terry PT  Physical Therapist  [place] license [id number]

## 2020-09-22 ENCOUNTER — TREATMENT (OUTPATIENT)
Dept: PHYSICAL THERAPY | Facility: CLINIC | Age: 85
End: 2020-09-22

## 2020-09-22 DIAGNOSIS — M25.551 RIGHT HIP PAIN: Primary | ICD-10-CM

## 2020-09-22 DIAGNOSIS — M54.41 ACUTE BACK PAIN WITH SCIATICA, RIGHT: ICD-10-CM

## 2020-09-22 PROCEDURE — 97110 THERAPEUTIC EXERCISES: CPT | Performed by: PHYSICAL THERAPIST

## 2020-09-22 PROCEDURE — 97140 MANUAL THERAPY 1/> REGIONS: CPT | Performed by: PHYSICAL THERAPIST

## 2020-09-22 PROCEDURE — G0283 ELEC STIM OTHER THAN WOUND: HCPCS | Performed by: PHYSICAL THERAPIST

## 2020-09-22 NOTE — PROGRESS NOTES
Physical Therapy Daily Progress Note        Patient: Stacy Monroy   : 1930  Diagnosis/ICD-10 Code:  Right hip pain [M25.551]  Referring practitioner: Mita Moise MD  Date of Initial Visit: Type: THERAPY  Noted: 2020  Today's Date: 2020  Patient seen for 7 sessions         Stacy Monroy reports: some occasional pain, still feels more stable with gait      Subjective     Objective   See Exercise, Manual, and Modality Logs for complete treatment.   Completes ex with good form, more stable on feet      Assessment/Plan    Reinforce continued prox strength, leg length is =           Timed:         Manual Therapy:    15     mins  23103;     Therapeutic Exercise:    15     mins  08555;     Neuromuscular Anne:    0    mins  65627;    Therapeutic Activity:     0     mins  39378;     Gait Trainin     mins  61854;     Ultrasound:     0     mins  88420;    Ionto                               0    mins   61097  Self Care                       0     mins   60956  Canalith Repos               0    mins  4209    Un-Timed:  Electrical Stimulation:    15  mins  84104 ( );  Dry Needling     0     mins self-pay  Traction     0     mins 13743  Low Eval     0     Mins  10906  Mod Eval     0     Mins  35025  High Eval                       0     Mins  96672    Timed Treatment:   30   mins   Total Treatment:     45   mins    Khushi Terry PT  Physical Therapist  [place] license [id number]

## 2020-09-24 ENCOUNTER — TREATMENT (OUTPATIENT)
Dept: PHYSICAL THERAPY | Facility: CLINIC | Age: 85
End: 2020-09-24

## 2020-09-24 DIAGNOSIS — M25.551 RIGHT HIP PAIN: Primary | ICD-10-CM

## 2020-09-24 DIAGNOSIS — M54.41 ACUTE BACK PAIN WITH SCIATICA, RIGHT: ICD-10-CM

## 2020-09-24 PROCEDURE — 97110 THERAPEUTIC EXERCISES: CPT | Performed by: PHYSICAL THERAPIST

## 2020-09-24 PROCEDURE — G0283 ELEC STIM OTHER THAN WOUND: HCPCS | Performed by: PHYSICAL THERAPIST

## 2020-09-24 PROCEDURE — 97140 MANUAL THERAPY 1/> REGIONS: CPT | Performed by: PHYSICAL THERAPIST

## 2020-09-24 NOTE — PROGRESS NOTES
Physical Therapy Daily Progress Note      Patient: Stacy Monroy   : 1930  Diagnosis/ICD-10 Code:  Right hip pain [M25.551]   Problem List Items Addressed This Visit        Nervous and Auditory    Right hip pain - Primary      Other Visit Diagnoses     Acute back pain with sciatica, right             Referring practitioner: Mita Moise MD  Date of Initial Visit: Type: THERAPY  Noted: 2020  Today's Date: 2020    VISIT#: 8    Subjective Patient reports back continues to feel good, and is pleased with progress at this time. Has noticed improved balance the past couple weeks as well.       Objective ASIS level this visit.     See Exercise, Manual, and Modality Logs for complete treatment.     Assessment/Plan Patient tolerated all performed therapeutic exercise well. ASIS level upon assessment. Patient continues to progress well towards goals at this time.   Plan Goals: STG  Initiate program for home to assist with level pelvis 1 week MET    LTG consistent level pelvis by discharge Progressing          Confidence and balance noted with gait without devices by discharge PRogressing          Oxford hip to 25% or less by discharge NOT MET          Consistent pain reports of 2/10 or less by discharge MET  Progress per Plan of Care         Timed:         Manual Therapy:    15     mins  44804;     Therapeutic Exercise:    15     mins  67046;     Neuromuscular Anne:        mins  12842;    Therapeutic Activity:          mins  48332;     Gait Training:           mins  34192;     Ultrasound:          mins  51479;    Ionto                                   mins   36060  Canalith Repos                   mins  16886    Un-Timed:  Electrical Stimulation:    15     mins  98128 ( );  Dry Needling          mins self-pay  Traction          mins 10684    Timed Treatment:   30   mins   Total Treatment:     45   mins    Ryder Grover PTA  Physical Therapist

## 2020-09-29 ENCOUNTER — TREATMENT (OUTPATIENT)
Dept: PHYSICAL THERAPY | Facility: CLINIC | Age: 85
End: 2020-09-29

## 2020-09-29 DIAGNOSIS — M25.551 RIGHT HIP PAIN: Primary | ICD-10-CM

## 2020-09-29 DIAGNOSIS — M54.41 ACUTE BACK PAIN WITH SCIATICA, RIGHT: ICD-10-CM

## 2020-09-29 PROCEDURE — G0283 ELEC STIM OTHER THAN WOUND: HCPCS | Performed by: PHYSICAL THERAPIST

## 2020-09-29 PROCEDURE — 97140 MANUAL THERAPY 1/> REGIONS: CPT | Performed by: PHYSICAL THERAPIST

## 2020-09-29 PROCEDURE — 97110 THERAPEUTIC EXERCISES: CPT | Performed by: PHYSICAL THERAPIST

## 2020-09-29 NOTE — PROGRESS NOTES
Physical Therapy Daily Progress Note        Patient: Stacy Monroy   : 1930  Diagnosis/ICD-10 Code:  Right hip pain [M25.551]  Referring practitioner: Mtia Moise MD  Date of Initial Visit: Type: THERAPY  Noted: 2020  Today's Date: 2020  Patient seen for 9 sessions         Stacy Monroy reports: continues to note improvement in balance and gait, less hip pain      Subjective     Objective   See Exercise, Manual, and Modality Logs for complete treatment.   Good transitional movement. Improving gait      Assessment/Plan    Cont per POC           Timed:         Manual Therapy:    15     mins  74600;     Therapeutic Exercise:    15     mins  95926;     Neuromuscular Anne:    0    mins  12857;    Therapeutic Activity:     0     mins  21857;     Gait Trainin     mins  43819;     Ultrasound:     0     mins  46272;    Ionto                               0    mins   21743  Self Care                       0     mins   02075  Canalith Repos               0    mins  4209    Un-Timed:  Electrical Stimulation:    15     mins  93255 (MC );  Dry Needling     0     mins self-pay  Traction     0     mins 25671  Low Eval     0     Mins  65604  Mod Eval     0     Mins  99547  High Eval                       0     Mins  69873    Timed Treatment:   30   mins   Total Treatment:     45   mins    Khushi Terry PT  Physical Therapist  [place] license [id number]

## 2020-10-01 ENCOUNTER — TREATMENT (OUTPATIENT)
Dept: PHYSICAL THERAPY | Facility: CLINIC | Age: 85
End: 2020-10-01

## 2020-10-01 DIAGNOSIS — M54.41 ACUTE BACK PAIN WITH SCIATICA, RIGHT: ICD-10-CM

## 2020-10-01 DIAGNOSIS — M25.551 RIGHT HIP PAIN: Primary | ICD-10-CM

## 2020-10-01 PROCEDURE — 97110 THERAPEUTIC EXERCISES: CPT | Performed by: PHYSICAL THERAPIST

## 2020-10-01 PROCEDURE — 97140 MANUAL THERAPY 1/> REGIONS: CPT | Performed by: PHYSICAL THERAPIST

## 2020-10-01 PROCEDURE — G0283 ELEC STIM OTHER THAN WOUND: HCPCS | Performed by: PHYSICAL THERAPIST

## 2020-10-01 NOTE — PROGRESS NOTES
Physical Therapy Daily Progress Note      Patient: Stacy Monroy   : 1930  Diagnosis/ICD-10 Code:  Right hip pain [M25.551]   Problem List Items Addressed This Visit        Nervous and Auditory    Right hip pain - Primary      Other Visit Diagnoses     Acute back pain with sciatica, right             Referring practitioner: Mita Moise MD  Date of Initial Visit: Type: THERAPY  Noted: 2020  Today's Date: 10/1/2020    VISIT#: 10    Subjective Patient reports overall feeling better since beginning PT. Does still have occasional discomfort but pain is very manageable.       Objective Level ASIS, Rains Hip score 25% impairment.     See Exercise, Manual, and Modality Logs for complete treatment.     Assessment/Plan Patient has progressed well towards goals at this time as demonstrated by improved oxford functional score.   Plan Goals: STG  Initiate program for home to assist with level pelvis 1 week MET    LTG consistent level pelvis by discharge          Confidence and balance noted with gait without devices by discharge PROGRESSING          [place] hip to 25% or less by discharge MET 25% currently           Consistent pain reports of 2/10 or less by discharge MET  Progress per Plan of Care         Timed:         Manual Therapy:    15     mins  51157;     Therapeutic Exercise:    15     mins  67945;     Neuromuscular Anne:        mins  73008;    Therapeutic Activity:          mins  19741;     Gait Training:           mins  69244;     Ultrasound:          mins  61611;    Ionto                                   mins   92045  Canalith Repos                   mins  07933    Un-Timed:  Electrical Stimulation:    15     mins  85704 ( );  Dry Needling          mins self-pay  Traction          mins 99355    Timed Treatment:   30   mins   Total Treatment:     45   mins    Ryder Grover PTA  Physical Therapist

## 2020-10-14 RX ORDER — AMLODIPINE BESYLATE 5 MG/1
TABLET ORAL
Qty: 90 TABLET | Refills: 2 | Status: SHIPPED | OUTPATIENT
Start: 2020-10-14 | End: 2020-10-15

## 2020-10-15 ENCOUNTER — TREATMENT (OUTPATIENT)
Dept: PHYSICAL THERAPY | Facility: CLINIC | Age: 85
End: 2020-10-15

## 2020-10-15 DIAGNOSIS — M54.41 ACUTE BACK PAIN WITH SCIATICA, RIGHT: ICD-10-CM

## 2020-10-15 DIAGNOSIS — M25.551 RIGHT HIP PAIN: Primary | ICD-10-CM

## 2020-10-15 PROCEDURE — G0283 ELEC STIM OTHER THAN WOUND: HCPCS | Performed by: PHYSICAL THERAPIST

## 2020-10-15 PROCEDURE — 97140 MANUAL THERAPY 1/> REGIONS: CPT | Performed by: PHYSICAL THERAPIST

## 2020-10-15 PROCEDURE — 97110 THERAPEUTIC EXERCISES: CPT | Performed by: PHYSICAL THERAPIST

## 2020-10-15 RX ORDER — AMLODIPINE BESYLATE 5 MG/1
TABLET ORAL
Qty: 90 TABLET | Refills: 2 | Status: SHIPPED | OUTPATIENT
Start: 2020-10-15 | End: 2020-10-16

## 2020-10-15 NOTE — PROGRESS NOTES
Physical Therapy Daily Progress Note      Patient: Stacy Monroy   : 1930  Diagnosis/ICD-10 Code:  Right hip pain [M25.551]   Problems Addressed this Visit        Nervous and Auditory    Right hip pain - Primary      Other Visit Diagnoses     Acute back pain with sciatica, right          Diagnoses       Codes Comments    Right hip pain    -  Primary ICD-10-CM: M25.551  ICD-9-CM: 719.45     Acute back pain with sciatica, right     ICD-10-CM: M54.41  ICD-9-CM: 724.3          Referring practitioner: Mita Moise MD  Date of Initial Visit: Type: THERAPY  Noted: 2020  Today's Date: 10/15/2020    VISIT#: 11    Subjective Patient reports continuing to feel better and is pleased with progress to this point and expresses readiness for discharge.       Objective no new changes.    See Exercise, Manual, and Modality Logs for complete treatment.     Assessment/Plan Patient demonstrating improved transitional balance and responding well to current POC. Patient has currently met all goals at this time.   Plan Goals: STG  Initiate program for home to assist with level pelvis 1 week MET    LTG consistent level pelvis by discharge MET          Confidence and balance noted with gait without devices by discharge MET          Ary hip to 25% or less by discharge MET 25% currently           Consistent pain reports of 2/10 or less by discharge MET  Progress per Plan of Care         Timed:         Manual Therapy:    15     mins  88943;     Therapeutic Exercise:    15     mins  80511;     Neuromuscular Anne:        mins  07688;    Therapeutic Activity:          mins  28579;     Gait Training:           mins  54057;     Ultrasound:          mins  07383;    Ionto                                   mins   71390  Canalith Repos                   mins  93367    Un-Timed:  Electrical Stimulation:    15     mins  37825 ( );  Dry Needling          mins self-pay  Traction          mins 03025    Timed Treatment:   30   mins    Total Treatment:     45   mins    Ryder Grover, PTA  Physical Therapist

## 2020-10-16 RX ORDER — AMLODIPINE BESYLATE 5 MG/1
TABLET ORAL
Qty: 90 TABLET | Refills: 2 | Status: SHIPPED | OUTPATIENT
Start: 2020-10-16 | End: 2022-01-10

## 2020-11-16 ENCOUNTER — OFFICE VISIT (OUTPATIENT)
Dept: FAMILY MEDICINE CLINIC | Facility: CLINIC | Age: 85
End: 2020-11-16

## 2020-11-16 VITALS
HEIGHT: 62 IN | BODY MASS INDEX: 27.12 KG/M2 | DIASTOLIC BLOOD PRESSURE: 68 MMHG | HEART RATE: 65 BPM | RESPIRATION RATE: 18 BRPM | OXYGEN SATURATION: 99 % | SYSTOLIC BLOOD PRESSURE: 126 MMHG | TEMPERATURE: 97.8 F | WEIGHT: 147.4 LBS

## 2020-11-16 DIAGNOSIS — D51.0 PERNICIOUS ANEMIA: ICD-10-CM

## 2020-11-16 DIAGNOSIS — E03.9 ACQUIRED HYPOTHYROIDISM: ICD-10-CM

## 2020-11-16 DIAGNOSIS — M25.551 RIGHT HIP PAIN: ICD-10-CM

## 2020-11-16 DIAGNOSIS — E78.2 MIXED HYPERLIPIDEMIA: ICD-10-CM

## 2020-11-16 DIAGNOSIS — Z28.21 INFLUENZA VACCINE REFUSED: ICD-10-CM

## 2020-11-16 DIAGNOSIS — I10 ESSENTIAL HYPERTENSION: Primary | ICD-10-CM

## 2020-11-16 DIAGNOSIS — I70.90 ATHEROSCLEROSIS: ICD-10-CM

## 2020-11-16 PROCEDURE — 99214 OFFICE O/P EST MOD 30 MIN: CPT | Performed by: FAMILY MEDICINE

## 2020-11-16 NOTE — PROGRESS NOTES
Subjective   Stacy Monroy is a 89 y.o. female.     Chief Complaint   Patient presents with   • Hypertension   • Hyperlipidemia   • Anemia   • Hypothyroidism   • Hip Pain       Physical therapy helped patients hip but once she stopped going it started to hurt again.     Hypertension  This is a chronic problem. The current episode started more than 1 year ago. The problem has been waxing and waning since onset. The problem is controlled. Pertinent negatives include no anxiety, blurred vision, chest pain, headaches, malaise/fatigue, palpitations, shortness of breath or sweats. There are no associated agents to hypertension. Risk factors for coronary artery disease include dyslipidemia and sedentary lifestyle. Past treatments include nothing. Current antihypertension treatment includes angiotensin blockers. The current treatment provides moderate improvement. There are no compliance problems.    Hyperlipidemia  This is a chronic problem. The current episode started more than 1 year ago. The problem is controlled. Exacerbating diseases include hypothyroidism. There are no known factors aggravating her hyperlipidemia. Pertinent negatives include no chest pain or shortness of breath. Current antihyperlipidemic treatment includes herbal therapy. The current treatment provides moderate improvement of lipids. There are no compliance problems.    Anemia  Presents for follow-up visit. Symptoms include bruises/bleeds easily. There has been no abdominal pain, fever, leg swelling, malaise/fatigue or palpitations. Past medical history includes hypothyroidism. There are no compliance problems.  Compliance with medications is %.   Hypothyroidism  This is a chronic problem. The current episode started more than 1 year ago. The problem occurs constantly. The problem has been waxing and waning. Pertinent negatives include no abdominal pain, arthralgias, chest pain, coughing, fatigue, fever, headaches, nausea, numbness, rash,  vomiting or weakness. Nothing aggravates the symptoms. Treatments tried: levothyroxine. The treatment provided moderate relief.   Hip Pain   The incident occurred more than 1 week ago. There was no injury mechanism. The pain is present in the right hip. The quality of the pain is described as aching and cramping. The pain has been fluctuating since onset. Pertinent negatives include no inability to bear weight, loss of motion, loss of sensation, numbness or tingling. She reports no foreign bodies present. The symptoms are aggravated by movement and weight bearing. Treatments tried: physcial therapy. The treatment provided significant relief.    She says the PT really helped but once it was over, the pain started again    The following portions of the patient's history were reviewed and updated as appropriate: allergies, current medications, past family history, past medical history, past social history, past surgical history and problem list.    Allergies:  Allergies   Allergen Reactions   • Azithromycin Rash   • Penicillin G Rash   • Penicillins Rash       Social History:  Social History     Socioeconomic History   • Marital status:      Spouse name: Not on file   • Number of children: Not on file   • Years of education: Not on file   • Highest education level: Not on file   Tobacco Use   • Smoking status: Never Smoker   • Smokeless tobacco: Never Used   Substance and Sexual Activity   • Alcohol use: No     Frequency: Never   • Drug use: No   • Sexual activity: Defer   Social History Narrative    ** Merged History Encounter **            Family History:  Family History   Problem Relation Age of Onset   • Hypertension Mother    • Stroke Mother    • Hypertension Father    • Stroke Father    • Breast cancer Sister 50   • Ovarian cancer Daughter 57   • Breast cancer Niece 53       Past Medical History :  Patient Active Problem List   Diagnosis   • Status post placement of implantable loop recorder   • Essential  hypertension   • Acute seasonal allergic rhinitis due to pollen   • Arthralgia of right foot   • Atherosclerosis   • B12 deficiency   • Bilateral carpal tunnel syndrome   • Cataracts, bilateral   • DNR (do not resuscitate)   • Heart murmur   • History of chicken pox   • Menopausal syndrome   • Mixed hyperlipidemia   • Pernicious anemia   • Right hip pain   • Acute right-sided low back pain with sciatica   • Influenza vaccine refused       Medication List:    Current Outpatient Medications:   •  amLODIPine (NORVASC) 5 MG tablet, TAKE ONE TABLET BY MOUTH ONCE DAILY FOR HEART AND FOR BLOOD PRESSURE, Disp: 90 tablet, Rfl: 2  •  Calcium Carb-Cholecalciferol (CALCIUM PLUS D3 ABSORBABLE) 600-2500 MG-UNIT capsule, CALCIUM PLUS D, Disp: , Rfl:   •  levothyroxine (SYNTHROID, LEVOTHROID) 75 MCG tablet, Take 1 tablet by mouth Daily., Disp: 90 tablet, Rfl: 3  •  losartan (COZAAR) 50 MG tablet, Take 1 tablet by mouth Daily., Disp: 90 tablet, Rfl: 1  •  Omega-3 1000 MG capsule, Take 1 tablet by mouth Daily., Disp: , Rfl:   •  coenzyme Q10 100 MG capsule, Take 100 mg by mouth Daily., Disp: , Rfl:     Past Surgical History:  Past Surgical History:   Procedure Laterality Date   • BREAST BIOPSY Right    • OTHER SURGICAL HISTORY  2019    Tilt table    • OTHER SURGICAL HISTORY  2019    tilt table        Review of Systems:  Review of Systems   Constitutional: Negative for activity change, fatigue, fever and malaise/fatigue.   HENT: Negative for ear pain, rhinorrhea, sinus pressure and voice change.    Eyes: Negative for blurred vision and visual disturbance.   Respiratory: Negative for cough and shortness of breath.    Cardiovascular: Negative for chest pain and palpitations.   Gastrointestinal: Negative for abdominal pain, diarrhea, nausea and vomiting.   Endocrine: Negative for cold intolerance and heat intolerance.   Genitourinary: Negative for frequency and urgency.   Musculoskeletal: Negative for arthralgias.   Skin: Negative for  "rash.   Neurological: Negative for tingling, syncope, weakness and numbness.   Hematological: Bruises/bleeds easily.        Easy bruising   Psychiatric/Behavioral: Negative for depressed mood. The patient is not nervous/anxious.        Physical Exam:  Vital Signs:  Visit Vitals  /68   Pulse 65   Temp 97.8 °F (36.6 °C)   Resp 18   Ht 156.2 cm (61.5\")   Wt 66.9 kg (147 lb 6.4 oz)   SpO2 99%   BMI 27.40 kg/m²       Physical Exam  Vitals signs reviewed.   Constitutional:       Appearance: Normal appearance. She is well-developed.   HENT:      Head: Normocephalic and atraumatic.   Cardiovascular:      Rate and Rhythm: Normal rate and regular rhythm.      Heart sounds: Normal heart sounds. No murmur. No friction rub. No gallop.    Pulmonary:      Effort: Pulmonary effort is normal.      Breath sounds: Normal breath sounds. No wheezing or rales.   Skin:     General: Skin is warm and dry.   Neurological:      General: No focal deficit present.      Mental Status: She is alert and oriented to person, place, and time.      Coordination: Coordination normal.      Gait: Gait normal.   Psychiatric:         Behavior: Behavior is cooperative.         Assessment and Plan:  Problem List Items Addressed This Visit        Cardiovascular and Mediastinum    Essential hypertension - Primary    Overview     Stable  Continue current treatment         Atherosclerosis    Overview     Stable         Mixed hyperlipidemia    Overview     will get labs today  Congratulated on weight loss.         Relevant Orders    Comprehensive Metabolic Panel    Lipid Panel With / Chol / HDL Ratio       Nervous and Auditory    Right hip pain    Overview     Comes and goes. Offered Ortho referral but she wants to wait            Hematopoietic and Hemostatic    Pernicious anemia    Overview     She was given a B12 IM inj         Relevant Orders    CBC & Differential       Other    Influenza vaccine refused    Current Assessment & Plan     She is aware of " its protective affects but she declines           Other Visit Diagnoses     Acquired hypothyroidism        Relevant Orders    TSH          An After Visit Summary and PPPS were given to the patient.             I wore protective equipment throughout this patient encounter to include mask, gloves and eye protection. Hand hygiene was performed before donning protective equipment and after removal when leaving the room.

## 2020-11-19 LAB
ALBUMIN SERPL-MCNC: 4 G/DL (ref 3.5–4.6)
ALBUMIN/GLOB SERPL: 1.7 {RATIO} (ref 1.2–2.2)
ALP SERPL-CCNC: 66 IU/L (ref 39–117)
ALT SERPL-CCNC: 17 IU/L (ref 0–32)
AST SERPL-CCNC: 23 IU/L (ref 0–40)
BASOPHILS # BLD AUTO: 0.1 X10E3/UL (ref 0–0.2)
BASOPHILS NFR BLD AUTO: 1 %
BILIRUB SERPL-MCNC: 0.3 MG/DL (ref 0–1.2)
BUN SERPL-MCNC: 26 MG/DL (ref 10–36)
BUN/CREAT SERPL: 25 (ref 12–28)
CALCIUM SERPL-MCNC: 9.7 MG/DL (ref 8.7–10.3)
CHLORIDE SERPL-SCNC: 103 MMOL/L (ref 96–106)
CHOLEST SERPL-MCNC: 229 MG/DL (ref 100–199)
CHOLEST/HDLC SERPL: 3.2 RATIO (ref 0–4.4)
CO2 SERPL-SCNC: 24 MMOL/L (ref 20–29)
CREAT SERPL-MCNC: 1.02 MG/DL (ref 0.57–1)
EOSINOPHIL # BLD AUTO: 0.2 X10E3/UL (ref 0–0.4)
EOSINOPHIL NFR BLD AUTO: 3 %
ERYTHROCYTE [DISTWIDTH] IN BLOOD BY AUTOMATED COUNT: 14.4 % (ref 11.7–15.4)
GLOBULIN SER CALC-MCNC: 2.4 G/DL (ref 1.5–4.5)
GLUCOSE SERPL-MCNC: 138 MG/DL (ref 65–99)
HCT VFR BLD AUTO: 38.4 % (ref 34–46.6)
HDLC SERPL-MCNC: 71 MG/DL
HGB BLD-MCNC: 12.5 G/DL (ref 11.1–15.9)
IMM GRANULOCYTES # BLD AUTO: 0 X10E3/UL (ref 0–0.1)
IMM GRANULOCYTES NFR BLD AUTO: 0 %
LDLC SERPL CALC-MCNC: 139 MG/DL (ref 0–99)
LYMPHOCYTES # BLD AUTO: 2.2 X10E3/UL (ref 0.7–3.1)
LYMPHOCYTES NFR BLD AUTO: 33 %
MCH RBC QN AUTO: 28.2 PG (ref 26.6–33)
MCHC RBC AUTO-ENTMCNC: 32.6 G/DL (ref 31.5–35.7)
MCV RBC AUTO: 87 FL (ref 79–97)
MONOCYTES # BLD AUTO: 0.4 X10E3/UL (ref 0.1–0.9)
MONOCYTES NFR BLD AUTO: 7 %
NEUTROPHILS # BLD AUTO: 3.7 X10E3/UL (ref 1.4–7)
NEUTROPHILS NFR BLD AUTO: 56 %
PLATELET # BLD AUTO: 297 X10E3/UL (ref 150–450)
POTASSIUM SERPL-SCNC: 4.5 MMOL/L (ref 3.5–5.2)
PROT SERPL-MCNC: 6.4 G/DL (ref 6–8.5)
RBC # BLD AUTO: 4.44 X10E6/UL (ref 3.77–5.28)
SODIUM SERPL-SCNC: 138 MMOL/L (ref 134–144)
TRIGL SERPL-MCNC: 111 MG/DL (ref 0–149)
TSH SERPL DL<=0.005 MIU/L-ACNC: 1.5 UIU/ML (ref 0.45–4.5)
VLDLC SERPL CALC-MCNC: 19 MG/DL (ref 5–40)
WBC # BLD AUTO: 6.5 X10E3/UL (ref 3.4–10.8)

## 2020-11-23 ENCOUNTER — TELEPHONE (OUTPATIENT)
Dept: FAMILY MEDICINE CLINIC | Facility: CLINIC | Age: 85
End: 2020-11-23

## 2020-11-23 NOTE — TELEPHONE ENCOUNTER
----- Message from Mita Moise MD sent at 11/20/2020  5:22 PM EST -----  Labs show kidneys are stable. LDL went up a little to 139. Is she taking fish oil and co q 10

## 2020-12-07 ENCOUNTER — DOCUMENTATION (OUTPATIENT)
Dept: PHYSICAL THERAPY | Facility: CLINIC | Age: 85
End: 2020-12-07

## 2020-12-07 NOTE — PROGRESS NOTES
Discharge Summary  Discharge Summary from Physical Therapy Report      Dates  PT visit: 10/15/20  Number of Visits: 11     Discharge Status of Patient: See  Note dated 10/15/20    Goals: MET    Plan Goals: STG  Initiate program for home to assist with level pelvis 1 week MET    LTG consistent level pelvis by discharge MET          Confidence and balance noted with gait without devices by discharge MET          [place] hip to 25% or less by discharge MET 25% currently           Consistent pain reports of 2/10 or less by discharge MET    Discharge Plan: cont HEP    Comments     Date of Discharge 12/7/20        Khushi Terry, PT  Physical Therapist

## 2021-02-15 ENCOUNTER — OFFICE VISIT (OUTPATIENT)
Dept: FAMILY MEDICINE CLINIC | Facility: CLINIC | Age: 86
End: 2021-02-15

## 2021-02-15 VITALS
HEART RATE: 98 BPM | DIASTOLIC BLOOD PRESSURE: 78 MMHG | HEIGHT: 62 IN | RESPIRATION RATE: 18 BRPM | OXYGEN SATURATION: 99 % | TEMPERATURE: 97.5 F | SYSTOLIC BLOOD PRESSURE: 124 MMHG | WEIGHT: 152.6 LBS | BODY MASS INDEX: 28.08 KG/M2

## 2021-02-15 DIAGNOSIS — D51.0 PERNICIOUS ANEMIA: ICD-10-CM

## 2021-02-15 DIAGNOSIS — E03.9 ACQUIRED HYPOTHYROIDISM: ICD-10-CM

## 2021-02-15 DIAGNOSIS — I70.90 ATHEROSCLEROSIS: ICD-10-CM

## 2021-02-15 DIAGNOSIS — I10 ESSENTIAL HYPERTENSION: Primary | ICD-10-CM

## 2021-02-15 DIAGNOSIS — E78.2 MIXED HYPERLIPIDEMIA: ICD-10-CM

## 2021-02-15 PROCEDURE — 99214 OFFICE O/P EST MOD 30 MIN: CPT | Performed by: FAMILY MEDICINE

## 2021-02-15 RX ORDER — LEVOTHYROXINE SODIUM 0.07 MG/1
75 TABLET ORAL DAILY
Qty: 30 TABLET | Refills: 6 | Status: SHIPPED | OUTPATIENT
Start: 2021-02-15 | End: 2021-12-06

## 2021-02-15 RX ORDER — LOSARTAN POTASSIUM 50 MG/1
50 TABLET ORAL DAILY
Qty: 30 TABLET | Refills: 6 | Status: SHIPPED | OUTPATIENT
Start: 2021-02-15 | End: 2021-03-18 | Stop reason: SDUPTHER

## 2021-02-15 NOTE — PROGRESS NOTES
Subjective   Stacy Monroy is a 89 y.o. female.     Chief Complaint   Patient presents with   • Hypertension   • Hyperlipidemia   • Anemia   • Hypothyroidism       Hypertension  This is a chronic problem. The current episode started more than 1 year ago. The problem has been waxing and waning since onset. The problem is controlled. Pertinent negatives include no anxiety, blurred vision, chest pain, headaches, malaise/fatigue, palpitations, shortness of breath or sweats. There are no associated agents to hypertension. Risk factors for coronary artery disease include dyslipidemia and sedentary lifestyle. Past treatments include nothing. Current antihypertension treatment includes angiotensin blockers. The current treatment provides moderate improvement. There are no compliance problems.    Hyperlipidemia  This is a chronic problem. The current episode started more than 1 year ago. The problem is controlled. Exacerbating diseases include hypothyroidism. She has no history of diabetes. There are no known factors aggravating her hyperlipidemia. Pertinent negatives include no chest pain, leg pain or shortness of breath. Current antihyperlipidemic treatment includes herbal therapy. The current treatment provides moderate improvement of lipids. There are no compliance problems.    Anemia  Presents for follow-up visit. There has been no abdominal pain, bruising/bleeding easily, fever, leg swelling, malaise/fatigue or palpitations. Past medical history includes hypothyroidism. There are no compliance problems.  Compliance with medications is %.   Hypothyroidism  This is a chronic problem. The current episode started more than 1 year ago. The problem occurs constantly. The problem has been waxing and waning. Pertinent negatives include no abdominal pain, arthralgias, chest pain, coughing, fatigue, fever, headaches, nausea, rash, vomiting or weakness. Nothing aggravates the symptoms. Treatments tried: levothyroxine. The  treatment provided moderate relief.       The following portions of the patient's history were reviewed and updated as appropriate: allergies, current medications, past family history, past medical history, past social history, past surgical history and problem list.    Allergies:  Allergies   Allergen Reactions   • Azithromycin Rash   • Penicillin G Rash   • Penicillins Rash       Social History:  Social History     Socioeconomic History   • Marital status:      Spouse name: Not on file   • Number of children: Not on file   • Years of education: Not on file   • Highest education level: Not on file   Tobacco Use   • Smoking status: Never Smoker   • Smokeless tobacco: Never Used   Substance and Sexual Activity   • Alcohol use: No     Frequency: Never   • Drug use: No   • Sexual activity: Defer   Social History Narrative    ** Merged History Encounter **            Family History:  Family History   Problem Relation Age of Onset   • Hypertension Mother    • Stroke Mother    • Hypertension Father    • Stroke Father    • Breast cancer Sister 50   • Ovarian cancer Daughter 57   • Breast cancer Niece 53       Past Medical History :  Patient Active Problem List   Diagnosis   • Status post placement of implantable loop recorder   • Essential hypertension   • Acute seasonal allergic rhinitis due to pollen   • Arthralgia of right foot   • Atherosclerosis   • B12 deficiency   • Bilateral carpal tunnel syndrome   • Cataracts, bilateral   • DNR (do not resuscitate)   • Heart murmur   • History of chicken pox   • Menopausal syndrome   • Mixed hyperlipidemia   • Pernicious anemia   • Right hip pain   • Acute right-sided low back pain with sciatica   • Influenza vaccine refused       Medication List:    Current Outpatient Medications:   •  amLODIPine (NORVASC) 5 MG tablet, TAKE ONE TABLET BY MOUTH ONCE DAILY FOR HEART AND FOR BLOOD PRESSURE, Disp: 90 tablet, Rfl: 2  •  Calcium Carb-Cholecalciferol (CALCIUM PLUS D3 ABSORBABLE)  "600-2500 MG-UNIT capsule, CALCIUM PLUS D, Disp: , Rfl:   •  coenzyme Q10 100 MG capsule, Take 100 mg by mouth Daily., Disp: , Rfl:   •  levothyroxine (SYNTHROID, LEVOTHROID) 75 MCG tablet, Take 1 tablet by mouth Daily., Disp: 30 tablet, Rfl: 6  •  losartan (COZAAR) 50 MG tablet, Take 1 tablet by mouth Daily., Disp: 30 tablet, Rfl: 6  •  Omega-3 1000 MG capsule, Take 1 tablet by mouth Daily., Disp: , Rfl:     Past Surgical History:  Past Surgical History:   Procedure Laterality Date   • BREAST BIOPSY Right    • OTHER SURGICAL HISTORY  2019    Tilt table    • OTHER SURGICAL HISTORY  2019    tilt table        Review of Systems:  Review of Systems   Constitutional: Negative for activity change, fatigue, fever and malaise/fatigue.   HENT: Negative for ear pain, rhinorrhea, sinus pressure and voice change.    Eyes: Negative for blurred vision and visual disturbance.   Respiratory: Negative for cough and shortness of breath.    Cardiovascular: Negative for chest pain and palpitations.   Gastrointestinal: Negative for abdominal pain, diarrhea, nausea and vomiting.   Endocrine: Negative for cold intolerance and heat intolerance.   Genitourinary: Negative for frequency and urgency.   Musculoskeletal: Negative for arthralgias.   Skin: Negative for rash.   Neurological: Negative for syncope and weakness.   Hematological: Does not bruise/bleed easily.   Psychiatric/Behavioral: Negative for depressed mood. The patient is not nervous/anxious.        Physical Exam:  Vital Signs:  Visit Vitals  /78   Pulse 98   Temp 97.5 °F (36.4 °C)   Resp 18   Ht 156.2 cm (61.5\")   Wt 69.2 kg (152 lb 9.6 oz)   SpO2 99%   BMI 28.37 kg/m²       Physical Exam  Vitals signs reviewed.   Constitutional:       Appearance: Normal appearance. She is well-developed.   HENT:      Head: Normocephalic and atraumatic.   Cardiovascular:      Rate and Rhythm: Normal rate and regular rhythm.      Heart sounds: Normal heart sounds. No murmur. No friction rub. " No gallop.    Pulmonary:      Effort: Pulmonary effort is normal.      Breath sounds: Normal breath sounds. No wheezing or rales.   Skin:     General: Skin is warm and dry.   Neurological:      General: No focal deficit present.      Mental Status: She is alert and oriented to person, place, and time.      Coordination: Coordination normal.      Gait: Gait normal.   Psychiatric:         Behavior: Behavior is cooperative.         Assessment and Plan:  Problem List Items Addressed This Visit        Cardiac and Vasculature    Essential hypertension - Primary    Overview     Stable. Continue with cozaar 50mg. Will refill and get labs to check kidney function         Relevant Medications    losartan (COZAAR) 50 MG tablet    Atherosclerosis    Overview     Stable         Mixed hyperlipidemia    Overview     will get labs today  She has gained a little weight. Discussed diet            Hematology and Neoplasia    Pernicious anemia    Overview     Recheck cbc           Other Visit Diagnoses     Acquired hypothyroidism        Relevant Medications    levothyroxine (SYNTHROID, LEVOTHROID) 75 MCG tablet          An After Visit Summary and PPPS were given to the patient.             I wore protective equipment throughout this patient encounter to include mask, gloves and eye protection. Hand hygiene was performed before donning protective equipment and after removal when leaving the room.

## 2021-02-16 LAB
ALBUMIN SERPL-MCNC: 3.8 G/DL (ref 3.5–4.6)
ALBUMIN/GLOB SERPL: 1.4 {RATIO} (ref 1.2–2.2)
ALP SERPL-CCNC: 55 IU/L (ref 39–117)
ALT SERPL-CCNC: 10 IU/L (ref 0–32)
AST SERPL-CCNC: 21 IU/L (ref 0–40)
BASOPHILS # BLD AUTO: 0.1 X10E3/UL (ref 0–0.2)
BASOPHILS NFR BLD AUTO: 1 %
BILIRUB SERPL-MCNC: 0.4 MG/DL (ref 0–1.2)
BUN SERPL-MCNC: 23 MG/DL (ref 10–36)
BUN/CREAT SERPL: 25 (ref 12–28)
CALCIUM SERPL-MCNC: 9.9 MG/DL (ref 8.7–10.3)
CHLORIDE SERPL-SCNC: 105 MMOL/L (ref 96–106)
CHOLEST SERPL-MCNC: 226 MG/DL (ref 100–199)
CHOLEST/HDLC SERPL: 2.9 RATIO (ref 0–4.4)
CO2 SERPL-SCNC: 23 MMOL/L (ref 20–29)
CREAT SERPL-MCNC: 0.93 MG/DL (ref 0.57–1)
EOSINOPHIL # BLD AUTO: 0.2 X10E3/UL (ref 0–0.4)
EOSINOPHIL NFR BLD AUTO: 4 %
ERYTHROCYTE [DISTWIDTH] IN BLOOD BY AUTOMATED COUNT: 14.5 % (ref 11.7–15.4)
GLOBULIN SER CALC-MCNC: 2.7 G/DL (ref 1.5–4.5)
GLUCOSE SERPL-MCNC: 86 MG/DL (ref 65–99)
HCT VFR BLD AUTO: 39 % (ref 34–46.6)
HDLC SERPL-MCNC: 77 MG/DL
HGB BLD-MCNC: 12.8 G/DL (ref 11.1–15.9)
IMM GRANULOCYTES # BLD AUTO: 0 X10E3/UL (ref 0–0.1)
IMM GRANULOCYTES NFR BLD AUTO: 0 %
LDLC SERPL CALC-MCNC: 139 MG/DL (ref 0–99)
LYMPHOCYTES # BLD AUTO: 1.8 X10E3/UL (ref 0.7–3.1)
LYMPHOCYTES NFR BLD AUTO: 33 %
MCH RBC QN AUTO: 28.2 PG (ref 26.6–33)
MCHC RBC AUTO-ENTMCNC: 32.8 G/DL (ref 31.5–35.7)
MCV RBC AUTO: 86 FL (ref 79–97)
MONOCYTES # BLD AUTO: 0.4 X10E3/UL (ref 0.1–0.9)
MONOCYTES NFR BLD AUTO: 8 %
NEUTROPHILS # BLD AUTO: 3 X10E3/UL (ref 1.4–7)
NEUTROPHILS NFR BLD AUTO: 54 %
PLATELET # BLD AUTO: 258 X10E3/UL (ref 150–450)
POTASSIUM SERPL-SCNC: 4.6 MMOL/L (ref 3.5–5.2)
PROT SERPL-MCNC: 6.5 G/DL (ref 6–8.5)
RBC # BLD AUTO: 4.54 X10E6/UL (ref 3.77–5.28)
SODIUM SERPL-SCNC: 141 MMOL/L (ref 134–144)
TRIGL SERPL-MCNC: 60 MG/DL (ref 0–149)
TSH SERPL DL<=0.005 MIU/L-ACNC: 1.5 UIU/ML (ref 0.45–4.5)
VLDLC SERPL CALC-MCNC: 10 MG/DL (ref 5–40)
WBC # BLD AUTO: 5.6 X10E3/UL (ref 3.4–10.8)

## 2021-02-18 ENCOUNTER — TELEPHONE (OUTPATIENT)
Dept: FAMILY MEDICINE CLINIC | Facility: CLINIC | Age: 86
End: 2021-02-18

## 2021-02-18 NOTE — TELEPHONE ENCOUNTER
Pt said that she would rather not start a cholesterol medication just yet. I told her to add Flax seed to her diet and we will recheck in 3 months

## 2021-02-18 NOTE — TELEPHONE ENCOUNTER
----- Message from Mita Moise MD sent at 2/16/2021  1:37 PM EST -----  Her labs show her kidneys are better. Her LDL is still elevated. Have her add flax seed to her diet. How does she feel about a low dose cholesterol medication?

## 2021-03-18 DIAGNOSIS — I10 ESSENTIAL HYPERTENSION: ICD-10-CM

## 2021-03-19 RX ORDER — LOSARTAN POTASSIUM 50 MG/1
50 TABLET ORAL DAILY
Qty: 90 TABLET | Refills: 2 | Status: SHIPPED | OUTPATIENT
Start: 2021-03-19 | End: 2021-12-27

## 2021-04-14 PROCEDURE — 93298 REM INTERROG DEV EVAL SCRMS: CPT | Performed by: NURSE PRACTITIONER

## 2021-04-14 PROCEDURE — G2066 INTER DEVC REMOTE 30D: HCPCS | Performed by: NURSE PRACTITIONER

## 2021-05-04 ENCOUNTER — TELEPHONE (OUTPATIENT)
Dept: FAMILY MEDICINE CLINIC | Facility: CLINIC | Age: 86
End: 2021-05-04

## 2021-05-04 NOTE — TELEPHONE ENCOUNTER
Caller: Stacy Monroy    Relationship: Self    Best call back number: [phone]    What medication are you requesting: SOMETHING FOR A POSSIBLE UTI    What are your current symptoms: BURNING AND PAINFUL URINATION    How long have you been experiencing symptoms: 05/03/2021    Have you had these symptoms before:    [x] Yes  [] No    Have you been treated for these symptoms before:   [x] Yes  [] No    If a prescription is needed, what is your preferred pharmacy and phone number:    [facility] [employer] - [address] - [phone]  - [phone] FX  P: [phone]  F: [phone]      Additional notes: PATIENTS STATES THAT SHE TRIED DRINKING A LOT OF FLUIDS YESTERDAY

## 2021-05-07 ENCOUNTER — OFFICE VISIT (OUTPATIENT)
Dept: FAMILY MEDICINE CLINIC | Facility: CLINIC | Age: 86
End: 2021-05-07

## 2021-05-07 VITALS
SYSTOLIC BLOOD PRESSURE: 129 MMHG | DIASTOLIC BLOOD PRESSURE: 60 MMHG | HEART RATE: 91 BPM | RESPIRATION RATE: 16 BRPM | BODY MASS INDEX: 24.92 KG/M2 | TEMPERATURE: 98.7 F | WEIGHT: 146 LBS | OXYGEN SATURATION: 96 % | HEIGHT: 64 IN

## 2021-05-07 DIAGNOSIS — D51.0 PERNICIOUS ANEMIA: ICD-10-CM

## 2021-05-07 DIAGNOSIS — R30.9 PAINFUL URINATION: ICD-10-CM

## 2021-05-07 DIAGNOSIS — R53.83 OTHER FATIGUE: ICD-10-CM

## 2021-05-07 DIAGNOSIS — E78.2 MIXED HYPERLIPIDEMIA: ICD-10-CM

## 2021-05-07 DIAGNOSIS — E53.8 B12 DEFICIENCY: ICD-10-CM

## 2021-05-07 DIAGNOSIS — I10 ESSENTIAL HYPERTENSION: ICD-10-CM

## 2021-05-07 DIAGNOSIS — R55 SYNCOPE AND COLLAPSE: Primary | ICD-10-CM

## 2021-05-07 DIAGNOSIS — N30.01 ACUTE CYSTITIS WITH HEMATURIA: ICD-10-CM

## 2021-05-07 DIAGNOSIS — R73.9 HYPERGLYCEMIA: ICD-10-CM

## 2021-05-07 DIAGNOSIS — R94.5 LIVER FUNCTION ABNORMALITY: ICD-10-CM

## 2021-05-07 LAB
BILIRUB BLD-MCNC: NEGATIVE MG/DL
CLARITY, POC: CLEAR
COLOR UR: ABNORMAL
GLUCOSE BLDC GLUCOMTR-MCNC: 142 MG/DL (ref 70–130)
GLUCOSE UR STRIP-MCNC: NEGATIVE MG/DL
KETONES UR QL: NEGATIVE
LEUKOCYTE EST, POC: NEGATIVE
NITRITE UR-MCNC: NEGATIVE MG/ML
PH UR: 6.5 [PH] (ref 5–8)
PROT UR STRIP-MCNC: NEGATIVE MG/DL
RBC # UR STRIP: ABNORMAL /UL
SP GR UR: 1.01 (ref 1–1.03)
UROBILINOGEN UR QL: NORMAL

## 2021-05-07 PROCEDURE — 93000 ELECTROCARDIOGRAM COMPLETE: CPT | Performed by: FAMILY MEDICINE

## 2021-05-07 PROCEDURE — 82962 GLUCOSE BLOOD TEST: CPT | Performed by: FAMILY MEDICINE

## 2021-05-07 PROCEDURE — 81002 URINALYSIS NONAUTO W/O SCOPE: CPT | Performed by: FAMILY MEDICINE

## 2021-05-07 PROCEDURE — 99214 OFFICE O/P EST MOD 30 MIN: CPT | Performed by: FAMILY MEDICINE

## 2021-05-07 RX ORDER — NITROFURANTOIN 25; 75 MG/1; MG/1
100 CAPSULE ORAL 2 TIMES DAILY
COMMUNITY
Start: 2021-05-05 | End: 2021-05-17

## 2021-05-07 RX ORDER — PHENAZOPYRIDINE HYDROCHLORIDE 99.5 MG/1
2 TABLET ORAL 3 TIMES DAILY
COMMUNITY
End: 2021-05-17

## 2021-05-08 LAB
ALBUMIN SERPL-MCNC: 3.4 G/DL (ref 3.5–4.6)
ALBUMIN/GLOB SERPL: 1.3 {RATIO} (ref 1.2–2.2)
ALP SERPL-CCNC: 392 IU/L (ref 39–117)
ALT SERPL-CCNC: 419 IU/L (ref 0–32)
AST SERPL-CCNC: 400 IU/L (ref 0–40)
BASOPHILS # BLD AUTO: 0 X10E3/UL (ref 0–0.2)
BASOPHILS NFR BLD AUTO: 1 %
BILIRUB SERPL-MCNC: 2.4 MG/DL (ref 0–1.2)
BUN SERPL-MCNC: 21 MG/DL (ref 10–36)
BUN/CREAT SERPL: 19 (ref 12–28)
CALCIUM SERPL-MCNC: 9.6 MG/DL (ref 8.7–10.3)
CHLORIDE SERPL-SCNC: 97 MMOL/L (ref 96–106)
CHOLEST SERPL-MCNC: 193 MG/DL (ref 100–199)
CO2 SERPL-SCNC: 24 MMOL/L (ref 20–29)
CREAT SERPL-MCNC: 1.12 MG/DL (ref 0.57–1)
EOSINOPHIL # BLD AUTO: 0.2 X10E3/UL (ref 0–0.4)
EOSINOPHIL NFR BLD AUTO: 3 %
ERYTHROCYTE [DISTWIDTH] IN BLOOD BY AUTOMATED COUNT: 15.6 % (ref 11.7–15.4)
GLOBULIN SER CALC-MCNC: 2.7 G/DL (ref 1.5–4.5)
GLUCOSE SERPL-MCNC: 133 MG/DL (ref 65–99)
HBA1C MFR BLD: 5.8 % (ref 4.8–5.6)
HCT VFR BLD AUTO: 37.5 % (ref 34–46.6)
HDLC SERPL-MCNC: 35 MG/DL
HGB BLD-MCNC: 12.5 G/DL (ref 11.1–15.9)
IMM GRANULOCYTES # BLD AUTO: 0.1 X10E3/UL (ref 0–0.1)
IMM GRANULOCYTES NFR BLD AUTO: 1 %
LDLC SERPL CALC-MCNC: 123 MG/DL (ref 0–99)
LYMPHOCYTES # BLD AUTO: 0.3 X10E3/UL (ref 0.7–3.1)
LYMPHOCYTES NFR BLD AUTO: 5 %
MCH RBC QN AUTO: 27.8 PG (ref 26.6–33)
MCHC RBC AUTO-ENTMCNC: 33.3 G/DL (ref 31.5–35.7)
MCV RBC AUTO: 84 FL (ref 79–97)
MONOCYTES # BLD AUTO: 0.3 X10E3/UL (ref 0.1–0.9)
MONOCYTES NFR BLD AUTO: 5 %
NEUTROPHILS # BLD AUTO: 5.5 X10E3/UL (ref 1.4–7)
NEUTROPHILS NFR BLD AUTO: 85 %
PLATELET # BLD AUTO: 244 X10E3/UL (ref 150–450)
POTASSIUM SERPL-SCNC: 4.2 MMOL/L (ref 3.5–5.2)
PROT SERPL-MCNC: 6.1 G/DL (ref 6–8.5)
RBC # BLD AUTO: 4.49 X10E6/UL (ref 3.77–5.28)
SODIUM SERPL-SCNC: 133 MMOL/L (ref 134–144)
T3FREE SERPL-MCNC: 1.2 PG/ML (ref 2–4.4)
T4 FREE SERPL-MCNC: 1.3 NG/DL (ref 0.82–1.77)
TRIGL SERPL-MCNC: 196 MG/DL (ref 0–149)
TSH SERPL DL<=0.005 MIU/L-ACNC: 1.96 UIU/ML (ref 0.45–4.5)
VIT B12 SERPL-MCNC: 218 PG/ML (ref 232–1245)
VLDLC SERPL CALC-MCNC: 35 MG/DL (ref 5–40)
WBC # BLD AUTO: 6.4 X10E3/UL (ref 3.4–10.8)

## 2021-05-09 PROBLEM — R94.5 LIVER FUNCTION ABNORMALITY: Status: ACTIVE | Noted: 2021-05-09

## 2021-05-09 PROBLEM — R53.83 OTHER FATIGUE: Status: ACTIVE | Noted: 2021-05-09

## 2021-05-09 NOTE — PROGRESS NOTES
Debbie,Please inform patient there are significant lab abnormalities regarding her liver tests, AST, ALT, alkaline phosphatase, and bilirubin are all elevated indicating liver injury.  I would recommend repeating a CMP, along with additional tests including fractionated bilirubin, PTT and PT with INR, acute hepatitis panel, CMV and mono testing, JUDE and smooth muscle antibody and liver/gallbladder ultrasound.  If she agrees please order for these tests and schedule appointment with me Monday the 10th if she has any additional questions or concerns.  Please confirm if she has ever had any gallbladder disease and if she is currently having any abdominal pain, particularly in the right upper quadrant or other GI symptoms such as nausea vomiting or persistence appetite suppression.   Until cause is found I would recommend she avoid all acetaminophen/Tylenol, aspirin, and any over-the-counter supplements. Depending on results of these tests may need referral to GI for further evaluation.    Dr Moise,   I saw Mrs. Monroy on Friday for an acute for UTI and syncopal episode and uncovered this abnormality.  She is scheduled with you this coming Friday for her routine appointment.  I will do what I can to work-up this new issue prior to that time.Suyapa Ram MD

## 2021-05-10 ENCOUNTER — TELEPHONE (OUTPATIENT)
Dept: FAMILY MEDICINE CLINIC | Facility: CLINIC | Age: 86
End: 2021-05-10

## 2021-05-10 DIAGNOSIS — R10.12 BILATERAL UPPER ABDOMINAL PAIN: ICD-10-CM

## 2021-05-10 DIAGNOSIS — R94.5 LIVER FUNCTION ABNORMALITY: Primary | ICD-10-CM

## 2021-05-10 DIAGNOSIS — R10.11 BILATERAL UPPER ABDOMINAL PAIN: ICD-10-CM

## 2021-05-10 NOTE — TELEPHONE ENCOUNTER
Spoke with niece let her know the us is tomorrow at 1:30 she is not suppose to eat or drink anything for 4 hours prior. She is also going to get the labs done when she does the us.

## 2021-05-10 NOTE — TELEPHONE ENCOUNTER
Caller: GAIL REZA    Relationship: Emergency Contact    Best call back number: [phone]    What orders are you requesting (i.e. lab or imaging): TESTING REFERRAL INFORMATION      Additional notes: PLEASE CALL HER BACK REGARDING HER AUNT'S REFERRAL INFORMATION.

## 2021-05-11 ENCOUNTER — HOSPITAL ENCOUNTER (OUTPATIENT)
Dept: ULTRASOUND IMAGING | Facility: HOSPITAL | Age: 86
Discharge: HOME | End: 2021-05-11
Admitting: FAMILY MEDICINE

## 2021-05-11 DIAGNOSIS — R94.5 LIVER FUNCTION ABNORMALITY: ICD-10-CM

## 2021-05-11 DIAGNOSIS — R10.11 BILATERAL UPPER ABDOMINAL PAIN: ICD-10-CM

## 2021-05-11 DIAGNOSIS — R10.12 BILATERAL UPPER ABDOMINAL PAIN: ICD-10-CM

## 2021-05-11 DIAGNOSIS — R19.01 ABDOMINAL MASS, RUQ (RIGHT UPPER QUADRANT): Primary | ICD-10-CM

## 2021-05-11 PROCEDURE — 76705 ECHO EXAM OF ABDOMEN: CPT

## 2021-05-12 DIAGNOSIS — R10.12 BILATERAL UPPER ABDOMINAL PAIN: ICD-10-CM

## 2021-05-12 DIAGNOSIS — R19.01 ABDOMINAL MASS, RUQ (RIGHT UPPER QUADRANT): ICD-10-CM

## 2021-05-12 DIAGNOSIS — R10.11 BILATERAL UPPER ABDOMINAL PAIN: ICD-10-CM

## 2021-05-12 DIAGNOSIS — R94.5 LIVER FUNCTION ABNORMALITY: Primary | ICD-10-CM

## 2021-05-17 ENCOUNTER — OFFICE VISIT (OUTPATIENT)
Dept: FAMILY MEDICINE CLINIC | Facility: CLINIC | Age: 86
End: 2021-05-17

## 2021-05-17 VITALS
SYSTOLIC BLOOD PRESSURE: 122 MMHG | BODY MASS INDEX: 25.33 KG/M2 | DIASTOLIC BLOOD PRESSURE: 58 MMHG | WEIGHT: 148.4 LBS | HEIGHT: 64 IN | TEMPERATURE: 97.3 F | HEART RATE: 106 BPM | RESPIRATION RATE: 18 BRPM | OXYGEN SATURATION: 98 %

## 2021-05-17 DIAGNOSIS — R19.01 RUQ ABDOMINAL MASS: ICD-10-CM

## 2021-05-17 DIAGNOSIS — I10 ESSENTIAL HYPERTENSION: ICD-10-CM

## 2021-05-17 DIAGNOSIS — E78.2 MIXED HYPERLIPIDEMIA: ICD-10-CM

## 2021-05-17 DIAGNOSIS — R94.5 LIVER FUNCTION ABNORMALITY: ICD-10-CM

## 2021-05-17 DIAGNOSIS — N39.0 ACUTE UTI: Primary | ICD-10-CM

## 2021-05-17 LAB
BILIRUB BLD-MCNC: NEGATIVE MG/DL
CLARITY, POC: ABNORMAL
COLOR UR: YELLOW
GLUCOSE UR STRIP-MCNC: NEGATIVE MG/DL
KETONES UR QL: NEGATIVE
LEUKOCYTE EST, POC: ABNORMAL
NITRITE UR-MCNC: NEGATIVE MG/ML
PH UR: 7.5 [PH] (ref 5–8)
PROT UR STRIP-MCNC: ABNORMAL MG/DL
RBC # UR STRIP: ABNORMAL /UL
SP GR UR: 1 (ref 1–1.03)
UROBILINOGEN UR QL: NORMAL

## 2021-05-17 PROCEDURE — 99214 OFFICE O/P EST MOD 30 MIN: CPT | Performed by: FAMILY MEDICINE

## 2021-05-17 PROCEDURE — 81003 URINALYSIS AUTO W/O SCOPE: CPT | Performed by: FAMILY MEDICINE

## 2021-05-17 NOTE — ASSESSMENT & PLAN NOTE
Hypertension is unchanged.  Continue current treatment regimen.  Dietary sodium restriction.  Blood pressure will be reassessed in 3 months.

## 2021-05-17 NOTE — PROGRESS NOTES
Subjective   Stacy Monroy is a 89 y.o. female.     Chief Complaint   Patient presents with   • Loss of Consciousness   • Urinary Tract Infection       Patient was seen by Dr. Ram last week for a UTI and syncope Last week. Patient is seeing GI tomorrow.      Urinary Tract Infection   This is a new problem. The current episode started 1 to 4 weeks ago. The problem has been waxing and waning. The quality of the pain is described as burning. The pain is mild. There has been no fever. She is not sexually active. There is no history of pyelonephritis. Associated symptoms include flank pain and nausea. Pertinent negatives include no chills, discharge, frequency, hematuria, hesitancy, possible pregnancy, urgency or vomiting. She has tried antibiotics (macrobid patient finished it.) for the symptoms.    No culture last visit.     I personally reviewed and updated the patient's allergies, medications, problem list, and past medical, surgical, social, and family history. I have reviewed and confirmed the accuracy of the History of Present Illness and Review of Symptoms as documented by the MA/LPN/RN. Mita Moise MD    Allergies:  Allergies   Allergen Reactions   • Azithromycin Rash   • Penicillin G Rash   • Penicillins Rash       Social History:  Social History     Socioeconomic History   • Marital status:      Spouse name: Not on file   • Number of children: Not on file   • Years of education: Not on file   • Highest education level: Not on file   Tobacco Use   • Smoking status: Never Smoker   • Smokeless tobacco: Never Used   Substance and Sexual Activity   • Alcohol use: No   • Drug use: No   • Sexual activity: Defer       Family History:  Family History   Problem Relation Age of Onset   • Hypertension Mother    • Stroke Mother    • Hypertension Father    • Stroke Father    • Breast cancer Sister 50   • Ovarian cancer Daughter 57   • Breast cancer Niece 53       Past Medical History :  Patient Active Problem  List   Diagnosis   • Status post placement of implantable loop recorder   • Essential hypertension   • Acute seasonal allergic rhinitis due to pollen   • Arthralgia of right foot   • Atherosclerosis   • B12 deficiency   • Bilateral carpal tunnel syndrome   • Cataracts, bilateral   • DNR (do not resuscitate)   • Heart murmur   • History of chicken pox   • Menopausal syndrome   • Mixed hyperlipidemia   • Pernicious anemia   • Right hip pain   • Acute right-sided low back pain with sciatica   • Influenza vaccine refused   • Liver function abnormality   • Other fatigue   • Acute UTI   • RUQ abdominal mass       Medication List:    Current Outpatient Medications:   •  amLODIPine (NORVASC) 5 MG tablet, TAKE ONE TABLET BY MOUTH ONCE DAILY FOR HEART AND FOR BLOOD PRESSURE, Disp: 90 tablet, Rfl: 2  •  Calcium Carb-Cholecalciferol (CALCIUM PLUS D3 ABSORBABLE) 600-2500 MG-UNIT capsule, CALCIUM PLUS D, Disp: , Rfl:   •  coenzyme Q10 100 MG capsule, Take 100 mg by mouth Daily., Disp: , Rfl:   •  CRANBERRY PO, Take 15,000 mg by mouth Daily., Disp: , Rfl:   •  levothyroxine (SYNTHROID, LEVOTHROID) 75 MCG tablet, Take 1 tablet by mouth Daily., Disp: 30 tablet, Rfl: 6  •  losartan (COZAAR) 50 MG tablet, Take 1 tablet by mouth Daily., Disp: 90 tablet, Rfl: 2  •  Omega-3 1000 MG capsule, Take 1 tablet by mouth Daily., Disp: , Rfl:     Past Surgical History:  Past Surgical History:   Procedure Laterality Date   • BREAST BIOPSY Right    • OTHER SURGICAL HISTORY  2019    Tilt table    • OTHER SURGICAL HISTORY  2019    tilt table        Review of Systems:  Review of Systems   Constitutional: Negative for activity change, chills and fever.   HENT: Negative for ear pain, rhinorrhea, sinus pressure and voice change.    Eyes: Negative for visual disturbance.   Respiratory: Negative for cough and shortness of breath.    Cardiovascular: Negative for chest pain.   Gastrointestinal: Positive for nausea. Negative for abdominal pain, diarrhea and  "vomiting.   Endocrine: Negative for cold intolerance and heat intolerance.   Genitourinary: Positive for flank pain. Negative for frequency, hematuria, hesitancy and urgency.   Musculoskeletal: Negative for arthralgias.   Skin: Negative for rash.   Neurological: Negative for syncope.   Hematological: Does not bruise/bleed easily.   Psychiatric/Behavioral: Negative for depressed mood. The patient is not nervous/anxious.        Physical Exam:  Vital Signs:  Vital Signs:   /58   Pulse 106   Temp 97.3 °F (36.3 °C)   Resp 18   Ht 162.6 cm (64\")   Wt 67.3 kg (148 lb 6.4 oz)   SpO2 98%   BMI 25.47 kg/m²     Result Review :   The following data was reviewed by: Mita Moise MD on 05/17/2021:  CMP    CMP 11/18/20 2/15/21 5/7/21   Glucose 138 (A) 86 133 (A)   BUN 26 23 21   Creatinine 1.02 (A) 0.93 1.12 (A)   eGFR Non African Am 49 (A) 54 (A) 43 (A)   eGFR  Am 56 (A) 63 50 (A)   Sodium 138 141 133 (A)   Potassium 4.5 4.6 4.2   Chloride 103 105 97   Calcium 9.7 9.9 9.6   Total Protein 6.4 6.5 6.1   Albumin 4.0 3.8 3.4 (A)   Globulin 2.4 2.7 2.7   Total Bilirubin 0.3 0.4 2.4 (A)   Alkaline Phosphatase 66 55 392 (A)   AST (SGOT) 23 21 400 (A)   ALT (SGPT) 17 10 419 (A)   (A) Abnormal value       Comments are available for some flowsheets but are not being displayed.           CBC    CBC 11/18/20 2/15/21 5/7/21   WBC 6.5 5.6 6.4   RBC 4.44 4.54 4.49   Hemoglobin 12.5 12.8 12.5   Hematocrit 38.4 39.0 37.5   MCV 87 86 84   MCH 28.2 28.2 27.8   MCHC 32.6 32.8 33.3   RDW 14.4 14.5 15.6 (A)   Platelets 297 258 244   (A) Abnormal value            Repeat labs 5/17/21 at [facility] CBC and CMP    Data reviewed: Radiologic studies U/S RUQ         Physical Exam  Vitals reviewed.   Constitutional:       Appearance: Normal appearance. She is well-developed.   HENT:      Head: Normocephalic and atraumatic.   Eyes:      General:         Right eye: No discharge.         Left eye: No discharge.   Cardiovascular:      Rate and " Rhythm: Normal rate and regular rhythm.      Heart sounds: Normal heart sounds. No murmur heard.   No friction rub. No gallop.    Pulmonary:      Effort: Pulmonary effort is normal. No respiratory distress.      Breath sounds: Normal breath sounds. No wheezing or rales.   Abdominal:      General: Abdomen is flat. Bowel sounds are normal. There is no distension.      Palpations: Abdomen is soft. There is no mass.      Tenderness: There is abdominal tenderness (mild suprapubic). There is no guarding or rebound.      Hernia: No hernia is present.   Skin:     General: Skin is warm and dry.      Findings: No rash.   Neurological:      Mental Status: She is alert and oriented to person, place, and time.      Coordination: Coordination normal.      Gait: Gait normal.   Psychiatric:         Behavior: Behavior is cooperative.         Assessment and Plan:  Problems Addressed this Visit        Cardiac and Vasculature    Essential hypertension     Hypertension is unchanged.  Continue current treatment regimen.  Dietary sodium restriction.  Blood pressure will be reassessed in 3 months.         Mixed hyperlipidemia       Gastrointestinal Abdominal     Liver function abnormality     Better on recheck today but she will be seeing [facility] tomorrow. MRI ordered          RUQ abdominal mass     Cystic structure in the gallbladder vs mass in RUQ. Awaiting MRI that was ordered            Genitourinary and Reproductive     Acute UTI - Primary     She has been treated. Will get culture to see if it is resolved. WBC is wnl         Relevant Orders    POCT urinalysis dipstick, multipro (Completed)    Urine Culture - Urine, Urine, Random Void    CBC & Differential (Completed)    Comprehensive Metabolic Panel (Completed)      Diagnoses       Codes Comments    Acute UTI    -  Primary ICD-10-CM: N39.0  ICD-9-CM: 599.0     Liver function abnormality     ICD-10-CM: R94.5  ICD-9-CM: 794.8     Essential hypertension     ICD-10-CM: I10  ICD-9-CM: 401.9      Mixed hyperlipidemia     ICD-10-CM: E78.2  ICD-9-CM: 272.2     RUQ abdominal mass     ICD-10-CM: R19.01  ICD-9-CM: 789.31            An After Visit Summary and PPPS were given to the patient.         I wore protective equipment throughout this patient encounter to include mask. Hand hygiene was performed before donning protective equipment and after removal when leaving the room.

## 2021-05-18 ENCOUNTER — OFFICE (OUTPATIENT)
Dept: URBAN - METROPOLITAN AREA CLINIC 64 | Facility: CLINIC | Age: 86
End: 2021-05-18

## 2021-05-18 ENCOUNTER — TELEPHONE (OUTPATIENT)
Dept: CARDIOLOGY | Facility: CLINIC | Age: 86
End: 2021-05-18

## 2021-05-18 ENCOUNTER — APPOINTMENT (OUTPATIENT)
Dept: GENERAL RADIOLOGY | Facility: HOSPITAL | Age: 86
End: 2021-05-18

## 2021-05-18 ENCOUNTER — HOSPITAL ENCOUNTER (INPATIENT)
Facility: HOSPITAL | Age: 86
LOS: 6 days | Discharge: HOME | End: 2021-05-24
Attending: EMERGENCY MEDICINE | Admitting: INTERNAL MEDICINE

## 2021-05-18 VITALS
DIASTOLIC BLOOD PRESSURE: 77 MMHG | HEIGHT: 63 IN | HEART RATE: 65 BPM | WEIGHT: 147 LBS | SYSTOLIC BLOOD PRESSURE: 161 MMHG

## 2021-05-18 DIAGNOSIS — I49.9 CARDIAC ARRHYTHMIA, UNSPECIFIED CARDIAC ARRHYTHMIA TYPE: Primary | ICD-10-CM

## 2021-05-18 DIAGNOSIS — R74.8 ABNORMAL LEVELS OF OTHER SERUM ENZYMES: ICD-10-CM

## 2021-05-18 DIAGNOSIS — I45.9 STOKES-ADAMS SYNCOPE: ICD-10-CM

## 2021-05-18 DIAGNOSIS — I49.5 SINUS NODE DYSFUNCTION (HCC): ICD-10-CM

## 2021-05-18 DIAGNOSIS — R94.5 LIVER FUNCTION ABNORMALITY: ICD-10-CM

## 2021-05-18 DIAGNOSIS — R94.5 ABNORMAL RESULTS OF LIVER FUNCTION STUDIES: Primary | ICD-10-CM

## 2021-05-18 DIAGNOSIS — R55 SYNCOPE, UNSPECIFIED SYNCOPE TYPE: ICD-10-CM

## 2021-05-18 DIAGNOSIS — C23 MALIGNANT NEOPLASM OF GALLBLADDER: ICD-10-CM

## 2021-05-18 PROBLEM — E03.9 HYPOTHYROIDISM (ACQUIRED): Status: ACTIVE | Noted: 2019-08-15

## 2021-05-18 LAB
ANION GAP SERPL CALCULATED.3IONS-SCNC: 10 MMOL/L (ref 5–15)
BASOPHILS # BLD AUTO: 0.1 10*3/MM3 (ref 0–0.2)
BASOPHILS NFR BLD AUTO: 0.9 % (ref 0–1.5)
BUN SERPL-MCNC: 20 MG/DL (ref 8–23)
BUN/CREAT SERPL: 20 (ref 7–25)
CALCIUM SPEC-SCNC: 9.6 MG/DL (ref 8.2–9.6)
CHLORIDE SERPL-SCNC: 103 MMOL/L (ref 98–107)
CO2 SERPL-SCNC: 26 MMOL/L (ref 22–29)
CREAT SERPL-MCNC: 1 MG/DL (ref 0.57–1)
DEPRECATED RDW RBC AUTO: 54.3 FL (ref 37–54)
EOSINOPHIL # BLD AUTO: 0.1 10*3/MM3 (ref 0–0.4)
EOSINOPHIL NFR BLD AUTO: 1.7 % (ref 0.3–6.2)
ERYTHROCYTE [DISTWIDTH] IN BLOOD BY AUTOMATED COUNT: 18.2 % (ref 12.3–15.4)
GFR SERPL CREATININE-BSD FRML MDRD: 52 ML/MIN/1.73
GLUCOSE SERPL-MCNC: 107 MG/DL (ref 65–99)
HCT VFR BLD AUTO: 35 % (ref 34–46.6)
HGB BLD-MCNC: 11.6 G/DL (ref 12–15.9)
INR PPP: 0.95 (ref 0.93–1.1)
LYMPHOCYTES # BLD AUTO: 1.9 10*3/MM3 (ref 0.7–3.1)
LYMPHOCYTES NFR BLD AUTO: 27 % (ref 19.6–45.3)
MCH RBC QN AUTO: 28 PG (ref 26.6–33)
MCHC RBC AUTO-ENTMCNC: 33.1 G/DL (ref 31.5–35.7)
MCV RBC AUTO: 84.7 FL (ref 79–97)
MONOCYTES # BLD AUTO: 0.7 10*3/MM3 (ref 0.1–0.9)
MONOCYTES NFR BLD AUTO: 9.9 % (ref 5–12)
NEUTROPHILS NFR BLD AUTO: 4.3 10*3/MM3 (ref 1.7–7)
NEUTROPHILS NFR BLD AUTO: 60.5 % (ref 42.7–76)
NRBC BLD AUTO-RTO: 0.1 /100 WBC (ref 0–0.2)
NT-PROBNP SERPL-MCNC: 150.7 PG/ML (ref 0–1800)
PLATELET # BLD AUTO: 377 10*3/MM3 (ref 140–450)
PMV BLD AUTO: 7.2 FL (ref 6–12)
POTASSIUM SERPL-SCNC: 4.5 MMOL/L (ref 3.5–5.2)
PROTHROMBIN TIME: 10.5 SECONDS (ref 9.6–11.7)
RBC # BLD AUTO: 4.13 10*6/MM3 (ref 3.77–5.28)
SODIUM SERPL-SCNC: 139 MMOL/L (ref 136–145)
WBC # BLD AUTO: 7.1 10*3/MM3 (ref 3.4–10.8)

## 2021-05-18 PROCEDURE — 85025 COMPLETE CBC W/AUTO DIFF WBC: CPT | Performed by: EMERGENCY MEDICINE

## 2021-05-18 PROCEDURE — 93005 ELECTROCARDIOGRAM TRACING: CPT

## 2021-05-18 PROCEDURE — 99222 1ST HOSP IP/OBS MODERATE 55: CPT | Performed by: PHYSICIAN ASSISTANT

## 2021-05-18 PROCEDURE — 99204 OFFICE O/P NEW MOD 45 MIN: CPT | Performed by: INTERNAL MEDICINE

## 2021-05-18 PROCEDURE — 83735 ASSAY OF MAGNESIUM: CPT | Performed by: PHYSICIAN ASSISTANT

## 2021-05-18 PROCEDURE — 99284 EMERGENCY DEPT VISIT MOD MDM: CPT

## 2021-05-18 PROCEDURE — 80048 BASIC METABOLIC PNL TOTAL CA: CPT | Performed by: EMERGENCY MEDICINE

## 2021-05-18 PROCEDURE — 71045 X-RAY EXAM CHEST 1 VIEW: CPT

## 2021-05-18 PROCEDURE — G0378 HOSPITAL OBSERVATION PER HR: HCPCS

## 2021-05-18 PROCEDURE — 93005 ELECTROCARDIOGRAM TRACING: CPT | Performed by: EMERGENCY MEDICINE

## 2021-05-18 PROCEDURE — 85610 PROTHROMBIN TIME: CPT | Performed by: EMERGENCY MEDICINE

## 2021-05-18 PROCEDURE — 83880 ASSAY OF NATRIURETIC PEPTIDE: CPT | Performed by: PHYSICIAN ASSISTANT

## 2021-05-18 PROCEDURE — 84443 ASSAY THYROID STIM HORMONE: CPT | Performed by: PHYSICIAN ASSISTANT

## 2021-05-18 RX ORDER — SODIUM CHLORIDE 9 MG/ML
125 INJECTION, SOLUTION INTRAVENOUS CONTINUOUS
Status: DISCONTINUED | OUTPATIENT
Start: 2021-05-18 | End: 2021-05-19

## 2021-05-18 RX ORDER — SODIUM CHLORIDE 0.9 % (FLUSH) 0.9 %
10 SYRINGE (ML) INJECTION AS NEEDED
Status: DISCONTINUED | OUTPATIENT
Start: 2021-05-18 | End: 2021-05-24 | Stop reason: HOSPADM

## 2021-05-18 RX ADMIN — SODIUM CHLORIDE 125 ML/HR: 9 INJECTION, SOLUTION INTRAVENOUS at 22:15

## 2021-05-18 NOTE — PROGRESS NOTES
Ms Kumar saw Dr Mcdaniels today to follow up on her elevated liver enzymes. We ordered her an MRI and her repeat labs looked better.  The antibiotic caused her liver enzymes to go up. He said this could be cancer in her gallbladder. He referred her to Dr Padilla. I spoke with him that she has a loop recorder and can not have an MRI. A CT scan was ordered

## 2021-05-18 NOTE — TELEPHONE ENCOUNTER
Received remote device transmission from patient's loop recorder this morning    It reveals an episode of prolonged pause that occurred on May 6, 2021 at 1900.    Pause was up to 20 seconds in duration.    Attempted to contact the patient and her home number with no answer.    Reviewed her medical records which reports she saw her PCP on May 7 and reported a syncopal episode occurring on May 6.    We attempted to call the patient on her cell phone and she answered and was out to lunch with a friend.  I informed the patient that she had had an arrhythmia and prolonged pauses likely being the cause of her syncopal episode.  I discussed her case with Dr. Layne and advised the patient to come to the emergency room at [facility] for admission and consideration for pacemaker implant.    The patient refused and said that she had a lot of things going on currently and could not even think about a pacemaker at this time.    I did discuss with the patient that this could be life-threatening to her.    I contacted Dr. Moise the patient's PCP to also notify her of this finding on the loop recorder.

## 2021-05-18 NOTE — PROGRESS NOTES
Nurse Practioner Fatoumata called from Dr Layne's office that Stacy had a syncopal episode 1 week ago before she saw Dr Ram for her illness. The loop recorder report returned today and showed a pause that may have caused her syncopal episode. It could be potentially dangerous. She called and spoke with Stacy who did not want to talk about it right now. She called me to try to talk to Stacy. I could not get a hold of her so I called her emergency contact, her niece, rafal. She said Stacy is finishing her lunch and would speak with Dr Layne's office.

## 2021-05-19 ENCOUNTER — APPOINTMENT (OUTPATIENT)
Dept: GENERAL RADIOLOGY | Facility: HOSPITAL | Age: 86
End: 2021-05-19

## 2021-05-19 ENCOUNTER — APPOINTMENT (OUTPATIENT)
Dept: MRI IMAGING | Facility: HOSPITAL | Age: 86
End: 2021-05-19

## 2021-05-19 PROBLEM — I49.5 SSS (SICK SINUS SYNDROME): Status: ACTIVE | Noted: 2021-05-19

## 2021-05-19 PROBLEM — I45.9 STOKES-ADAMS SYNCOPE: Status: ACTIVE | Noted: 2021-05-18

## 2021-05-19 PROBLEM — I49.5 SINUS NODE DYSFUNCTION: Status: ACTIVE | Noted: 2021-05-18

## 2021-05-19 LAB
ANION GAP SERPL CALCULATED.3IONS-SCNC: 9 MMOL/L (ref 5–15)
BASOPHILS # BLD AUTO: 0 10*3/MM3 (ref 0–0.2)
BASOPHILS NFR BLD AUTO: 0.8 % (ref 0–1.5)
BUN SERPL-MCNC: 19 MG/DL (ref 8–23)
BUN/CREAT SERPL: 20.7 (ref 7–25)
CALCIUM SPEC-SCNC: 9.5 MG/DL (ref 8.2–9.6)
CHLORIDE SERPL-SCNC: 105 MMOL/L (ref 98–107)
CO2 SERPL-SCNC: 27 MMOL/L (ref 22–29)
CREAT SERPL-MCNC: 0.92 MG/DL (ref 0.57–1)
DEPRECATED RDW RBC AUTO: 52.9 FL (ref 37–54)
EOSINOPHIL # BLD AUTO: 0.2 10*3/MM3 (ref 0–0.4)
EOSINOPHIL NFR BLD AUTO: 2.6 % (ref 0.3–6.2)
ERYTHROCYTE [DISTWIDTH] IN BLOOD BY AUTOMATED COUNT: 17.8 % (ref 12.3–15.4)
GFR SERPL CREATININE-BSD FRML MDRD: 57 ML/MIN/1.73
GLUCOSE SERPL-MCNC: 93 MG/DL (ref 65–99)
HCT VFR BLD AUTO: 34.8 % (ref 34–46.6)
HGB BLD-MCNC: 11.4 G/DL (ref 12–15.9)
LYMPHOCYTES # BLD AUTO: 1.8 10*3/MM3 (ref 0.7–3.1)
LYMPHOCYTES NFR BLD AUTO: 30.2 % (ref 19.6–45.3)
MAGNESIUM SERPL-MCNC: 2.1 MG/DL (ref 1.6–2.4)
MAGNESIUM SERPL-MCNC: 2.2 MG/DL (ref 1.6–2.4)
MCH RBC QN AUTO: 27.7 PG (ref 26.6–33)
MCHC RBC AUTO-ENTMCNC: 32.8 G/DL (ref 31.5–35.7)
MCV RBC AUTO: 84.6 FL (ref 79–97)
MONOCYTES # BLD AUTO: 0.6 10*3/MM3 (ref 0.1–0.9)
MONOCYTES NFR BLD AUTO: 9.1 % (ref 5–12)
NEUTROPHILS NFR BLD AUTO: 3.5 10*3/MM3 (ref 1.7–7)
NEUTROPHILS NFR BLD AUTO: 57.3 % (ref 42.7–76)
NRBC BLD AUTO-RTO: 0 /100 WBC (ref 0–0.2)
PLATELET # BLD AUTO: 359 10*3/MM3 (ref 140–450)
PMV BLD AUTO: 7.7 FL (ref 6–12)
POTASSIUM SERPL-SCNC: 4.4 MMOL/L (ref 3.5–5.2)
RBC # BLD AUTO: 4.11 10*6/MM3 (ref 3.77–5.28)
SARS-COV-2 RNA PNL SPEC NAA+PROBE: NOT DETECTED
SODIUM SERPL-SCNC: 141 MMOL/L (ref 136–145)
TSH SERPL DL<=0.05 MIU/L-ACNC: 4.41 UIU/ML (ref 0.27–4.2)
VIT B12 BLD-MCNC: 157 PG/ML (ref 211–946)
WBC # BLD AUTO: 6.1 10*3/MM3 (ref 3.4–10.8)

## 2021-05-19 PROCEDURE — 25010000002 FENTANYL CITRATE (PF) 100 MCG/2ML SOLUTION: Performed by: INTERNAL MEDICINE

## 2021-05-19 PROCEDURE — 02HK3JZ INSERTION OF PACEMAKER LEAD INTO RIGHT VENTRICLE, PERCUTANEOUS APPROACH: ICD-10-PCS | Performed by: INTERNAL MEDICINE

## 2021-05-19 PROCEDURE — C1898 LEAD, PMKR, OTHER THAN TRANS: HCPCS | Performed by: INTERNAL MEDICINE

## 2021-05-19 PROCEDURE — 0 IOPAMIDOL PER 1 ML: Performed by: INTERNAL MEDICINE

## 2021-05-19 PROCEDURE — C1894 INTRO/SHEATH, NON-LASER: HCPCS | Performed by: INTERNAL MEDICINE

## 2021-05-19 PROCEDURE — 85025 COMPLETE CBC W/AUTO DIFF WBC: CPT | Performed by: PHYSICIAN ASSISTANT

## 2021-05-19 PROCEDURE — 02H63JZ INSERTION OF PACEMAKER LEAD INTO RIGHT ATRIUM, PERCUTANEOUS APPROACH: ICD-10-PCS | Performed by: INTERNAL MEDICINE

## 2021-05-19 PROCEDURE — 33208 INSRT HEART PM ATRIAL & VENT: CPT | Performed by: INTERNAL MEDICINE

## 2021-05-19 PROCEDURE — G0378 HOSPITAL OBSERVATION PER HR: HCPCS

## 2021-05-19 PROCEDURE — 80048 BASIC METABOLIC PNL TOTAL CA: CPT | Performed by: PHYSICIAN ASSISTANT

## 2021-05-19 PROCEDURE — 99152 MOD SED SAME PHYS/QHP 5/>YRS: CPT | Performed by: INTERNAL MEDICINE

## 2021-05-19 PROCEDURE — 25010000002 MIDAZOLAM PER 1 MG: Performed by: INTERNAL MEDICINE

## 2021-05-19 PROCEDURE — U0003 INFECTIOUS AGENT DETECTION BY NUCLEIC ACID (DNA OR RNA); SEVERE ACUTE RESPIRATORY SYNDROME CORONAVIRUS 2 (SARS-COV-2) (CORONAVIRUS DISEASE [COVID-19]), AMPLIFIED PROBE TECHNIQUE, MAKING USE OF HIGH THROUGHPUT TECHNOLOGIES AS DESCRIBED BY CMS-2020-01-R: HCPCS | Performed by: EMERGENCY MEDICINE

## 2021-05-19 PROCEDURE — 25010000002 GADOTERIDOL PER 1 ML: Performed by: INTERNAL MEDICINE

## 2021-05-19 PROCEDURE — 25010000002 VANCOMYCIN 1 G RECONSTITUTED SOLUTION 1 EACH VIAL: Performed by: NURSE PRACTITIONER

## 2021-05-19 PROCEDURE — 71045 X-RAY EXAM CHEST 1 VIEW: CPT

## 2021-05-19 PROCEDURE — 82607 VITAMIN B-12: CPT | Performed by: PHYSICIAN ASSISTANT

## 2021-05-19 PROCEDURE — 74183 MRI ABD W/O CNTR FLWD CNTR: CPT

## 2021-05-19 PROCEDURE — 99153 MOD SED SAME PHYS/QHP EA: CPT | Performed by: INTERNAL MEDICINE

## 2021-05-19 PROCEDURE — 99232 SBSQ HOSP IP/OBS MODERATE 35: CPT | Performed by: INTERNAL MEDICINE

## 2021-05-19 PROCEDURE — 33286 RMVL SUBQ CAR RHYTHM MNTR: CPT | Performed by: INTERNAL MEDICINE

## 2021-05-19 PROCEDURE — 0JH606Z INSERTION OF PACEMAKER, DUAL CHAMBER INTO CHEST SUBCUTANEOUS TISSUE AND FASCIA, OPEN APPROACH: ICD-10-PCS | Performed by: INTERNAL MEDICINE

## 2021-05-19 PROCEDURE — 83735 ASSAY OF MAGNESIUM: CPT | Performed by: PHYSICIAN ASSISTANT

## 2021-05-19 PROCEDURE — A9579 GAD-BASE MR CONTRAST NOS,1ML: HCPCS | Performed by: INTERNAL MEDICINE

## 2021-05-19 PROCEDURE — C1785 PMKR, DUAL, RATE-RESP: HCPCS | Performed by: INTERNAL MEDICINE

## 2021-05-19 DEVICE — LD PM TENDRIL STS 6F52CM 2088TC52: Type: IMPLANTABLE DEVICE | Status: FUNCTIONAL

## 2021-05-19 DEVICE — LD PM TENDRIL STS 6F46CM 2088TC46: Type: IMPLANTABLE DEVICE | Status: FUNCTIONAL

## 2021-05-19 DEVICE — GEN PM ASSURITY MRI DR RF PM2272: Type: IMPLANTABLE DEVICE | Status: FUNCTIONAL

## 2021-05-19 RX ORDER — AMLODIPINE BESYLATE 5 MG/1
5 TABLET ORAL NIGHTLY
Status: DISCONTINUED | OUTPATIENT
Start: 2021-05-20 | End: 2021-05-24 | Stop reason: HOSPADM

## 2021-05-19 RX ORDER — SODIUM CHLORIDE 0.9 % (FLUSH) 0.9 %
10 SYRINGE (ML) INJECTION EVERY 12 HOURS SCHEDULED
Status: DISCONTINUED | OUTPATIENT
Start: 2021-05-19 | End: 2021-05-24 | Stop reason: HOSPADM

## 2021-05-19 RX ORDER — CALCIUM GLUCONATE 20 MG/ML
2 INJECTION, SOLUTION INTRAVENOUS AS NEEDED
Status: DISCONTINUED | OUTPATIENT
Start: 2021-05-19 | End: 2021-05-24 | Stop reason: HOSPADM

## 2021-05-19 RX ORDER — LIDOCAINE HYDROCHLORIDE AND EPINEPHRINE 10; 10 MG/ML; UG/ML
INJECTION, SOLUTION INFILTRATION; PERINEURAL AS NEEDED
Status: DISCONTINUED | OUTPATIENT
Start: 2021-05-19 | End: 2021-05-19 | Stop reason: HOSPADM

## 2021-05-19 RX ORDER — ONDANSETRON 4 MG/1
4 TABLET, FILM COATED ORAL EVERY 6 HOURS PRN
Status: DISCONTINUED | OUTPATIENT
Start: 2021-05-19 | End: 2021-05-24 | Stop reason: HOSPADM

## 2021-05-19 RX ORDER — AMLODIPINE BESYLATE 5 MG/1
5 TABLET ORAL
Status: DISCONTINUED | OUTPATIENT
Start: 2021-05-19 | End: 2021-05-19

## 2021-05-19 RX ORDER — POTASSIUM CHLORIDE 20 MEQ/1
40 TABLET, EXTENDED RELEASE ORAL AS NEEDED
Status: DISCONTINUED | OUTPATIENT
Start: 2021-05-19 | End: 2021-05-24 | Stop reason: HOSPADM

## 2021-05-19 RX ORDER — NALOXONE HCL 0.4 MG/ML
0.4 VIAL (ML) INJECTION
Status: DISCONTINUED | OUTPATIENT
Start: 2021-05-19 | End: 2021-05-24 | Stop reason: HOSPADM

## 2021-05-19 RX ORDER — CHOLECALCIFEROL (VITAMIN D3) 125 MCG
5 CAPSULE ORAL NIGHTLY PRN
Status: DISCONTINUED | OUTPATIENT
Start: 2021-05-19 | End: 2021-05-24 | Stop reason: HOSPADM

## 2021-05-19 RX ORDER — FENTANYL CITRATE 50 UG/ML
INJECTION, SOLUTION INTRAMUSCULAR; INTRAVENOUS AS NEEDED
Status: DISCONTINUED | OUTPATIENT
Start: 2021-05-19 | End: 2021-05-19 | Stop reason: HOSPADM

## 2021-05-19 RX ORDER — POTASSIUM CHLORIDE 1.5 G/1.77G
40 POWDER, FOR SOLUTION ORAL AS NEEDED
Status: DISCONTINUED | OUTPATIENT
Start: 2021-05-19 | End: 2021-05-24 | Stop reason: HOSPADM

## 2021-05-19 RX ORDER — LOSARTAN POTASSIUM 50 MG/1
50 TABLET ORAL DAILY
Status: DISCONTINUED | OUTPATIENT
Start: 2021-05-19 | End: 2021-05-24 | Stop reason: HOSPADM

## 2021-05-19 RX ORDER — ACETAMINOPHEN 650 MG/1
650 SUPPOSITORY RECTAL EVERY 4 HOURS PRN
Status: DISCONTINUED | OUTPATIENT
Start: 2021-05-19 | End: 2021-05-24 | Stop reason: HOSPADM

## 2021-05-19 RX ORDER — CALCIUM GLUCONATE 20 MG/ML
1 INJECTION, SOLUTION INTRAVENOUS AS NEEDED
Status: DISCONTINUED | OUTPATIENT
Start: 2021-05-19 | End: 2021-05-24 | Stop reason: HOSPADM

## 2021-05-19 RX ORDER — ACETAMINOPHEN 160 MG/5ML
650 SOLUTION ORAL EVERY 4 HOURS PRN
Status: DISCONTINUED | OUTPATIENT
Start: 2021-05-19 | End: 2021-05-24 | Stop reason: HOSPADM

## 2021-05-19 RX ORDER — SODIUM CHLORIDE 0.9 % (FLUSH) 0.9 %
3 SYRINGE (ML) INJECTION EVERY 12 HOURS SCHEDULED
Status: DISCONTINUED | OUTPATIENT
Start: 2021-05-19 | End: 2021-05-24 | Stop reason: HOSPADM

## 2021-05-19 RX ORDER — MAGNESIUM SULFATE HEPTAHYDRATE 40 MG/ML
2 INJECTION, SOLUTION INTRAVENOUS AS NEEDED
Status: DISCONTINUED | OUTPATIENT
Start: 2021-05-19 | End: 2021-05-24 | Stop reason: HOSPADM

## 2021-05-19 RX ORDER — SODIUM CHLORIDE 0.9 % (FLUSH) 0.9 %
3-10 SYRINGE (ML) INJECTION AS NEEDED
Status: DISCONTINUED | OUTPATIENT
Start: 2021-05-19 | End: 2021-05-24 | Stop reason: HOSPADM

## 2021-05-19 RX ORDER — MIDAZOLAM HYDROCHLORIDE 1 MG/ML
INJECTION INTRAMUSCULAR; INTRAVENOUS AS NEEDED
Status: DISCONTINUED | OUTPATIENT
Start: 2021-05-19 | End: 2021-05-19 | Stop reason: HOSPADM

## 2021-05-19 RX ORDER — HYDROMORPHONE HCL 110MG/55ML
0.5 PATIENT CONTROLLED ANALGESIA SYRINGE INTRAVENOUS
Status: DISCONTINUED | OUTPATIENT
Start: 2021-05-19 | End: 2021-05-24 | Stop reason: HOSPADM

## 2021-05-19 RX ORDER — SODIUM CHLORIDE 0.9 % (FLUSH) 0.9 %
10 SYRINGE (ML) INJECTION AS NEEDED
Status: DISCONTINUED | OUTPATIENT
Start: 2021-05-19 | End: 2021-05-24 | Stop reason: HOSPADM

## 2021-05-19 RX ORDER — LEVOTHYROXINE SODIUM 0.07 MG/1
75 TABLET ORAL DAILY
Status: DISCONTINUED | OUTPATIENT
Start: 2021-05-19 | End: 2021-05-24 | Stop reason: HOSPADM

## 2021-05-19 RX ORDER — ACETAMINOPHEN 325 MG/1
650 TABLET ORAL EVERY 4 HOURS PRN
Status: DISCONTINUED | OUTPATIENT
Start: 2021-05-19 | End: 2021-05-24 | Stop reason: HOSPADM

## 2021-05-19 RX ORDER — ONDANSETRON 2 MG/ML
4 INJECTION INTRAMUSCULAR; INTRAVENOUS EVERY 6 HOURS PRN
Status: DISCONTINUED | OUTPATIENT
Start: 2021-05-19 | End: 2021-05-24 | Stop reason: HOSPADM

## 2021-05-19 RX ORDER — SODIUM CHLORIDE 9 MG/ML
125 INJECTION, SOLUTION INTRAVENOUS CONTINUOUS
Status: DISPENSED | OUTPATIENT
Start: 2021-05-19 | End: 2021-05-19

## 2021-05-19 RX ORDER — MAGNESIUM SULFATE HEPTAHYDRATE 40 MG/ML
4 INJECTION, SOLUTION INTRAVENOUS AS NEEDED
Status: DISCONTINUED | OUTPATIENT
Start: 2021-05-19 | End: 2021-05-24 | Stop reason: HOSPADM

## 2021-05-19 RX ORDER — NITROGLYCERIN 0.4 MG/1
0.4 TABLET SUBLINGUAL
Status: DISCONTINUED | OUTPATIENT
Start: 2021-05-19 | End: 2021-05-24 | Stop reason: HOSPADM

## 2021-05-19 RX ADMIN — GADOTERIDOL 20 ML: 279.3 INJECTION, SOLUTION INTRAVENOUS at 12:04

## 2021-05-19 RX ADMIN — Medication 10 ML: at 20:56

## 2021-05-19 RX ADMIN — ACETAMINOPHEN 650 MG: 325 TABLET, FILM COATED ORAL at 20:56

## 2021-05-19 RX ADMIN — LEVOTHYROXINE SODIUM 75 MCG: 0.07 TABLET ORAL at 08:30

## 2021-05-19 RX ADMIN — Medication 10 ML: at 02:00

## 2021-05-19 RX ADMIN — AMLODIPINE BESYLATE 5 MG: 5 TABLET ORAL at 08:30

## 2021-05-19 RX ADMIN — Medication 10 ML: at 08:30

## 2021-05-19 RX ADMIN — SODIUM CHLORIDE 1000 MG: 900 INJECTION, SOLUTION INTRAVENOUS at 14:54

## 2021-05-20 LAB
ANION GAP SERPL CALCULATED.3IONS-SCNC: 9 MMOL/L (ref 5–15)
BACTERIA UR CULT: ABNORMAL
BACTERIA UR CULT: ABNORMAL
BASOPHILS # BLD AUTO: 0.1 10*3/MM3 (ref 0–0.2)
BASOPHILS NFR BLD AUTO: 0.8 % (ref 0–1.5)
BUN SERPL-MCNC: 19 MG/DL (ref 8–23)
BUN/CREAT SERPL: 22.4 (ref 7–25)
CALCIUM SPEC-SCNC: 8.9 MG/DL (ref 8.2–9.6)
CHLORIDE SERPL-SCNC: 107 MMOL/L (ref 98–107)
CO2 SERPL-SCNC: 23 MMOL/L (ref 22–29)
CREAT SERPL-MCNC: 0.85 MG/DL (ref 0.57–1)
DEPRECATED RDW RBC AUTO: 52.9 FL (ref 37–54)
EOSINOPHIL # BLD AUTO: 0.1 10*3/MM3 (ref 0–0.4)
EOSINOPHIL NFR BLD AUTO: 1.9 % (ref 0.3–6.2)
ERYTHROCYTE [DISTWIDTH] IN BLOOD BY AUTOMATED COUNT: 17.7 % (ref 12.3–15.4)
GFR SERPL CREATININE-BSD FRML MDRD: 63 ML/MIN/1.73
GLUCOSE SERPL-MCNC: 125 MG/DL (ref 65–99)
HCT VFR BLD AUTO: 31.8 % (ref 34–46.6)
HGB BLD-MCNC: 10.4 G/DL (ref 12–15.9)
LYMPHOCYTES # BLD AUTO: 1.7 10*3/MM3 (ref 0.7–3.1)
LYMPHOCYTES NFR BLD AUTO: 26.1 % (ref 19.6–45.3)
MAGNESIUM SERPL-MCNC: 2 MG/DL (ref 1.6–2.4)
MCH RBC QN AUTO: 27.7 PG (ref 26.6–33)
MCHC RBC AUTO-ENTMCNC: 32.6 G/DL (ref 31.5–35.7)
MCV RBC AUTO: 84.9 FL (ref 79–97)
MONOCYTES # BLD AUTO: 0.8 10*3/MM3 (ref 0.1–0.9)
MONOCYTES NFR BLD AUTO: 12 % (ref 5–12)
NEUTROPHILS NFR BLD AUTO: 3.9 10*3/MM3 (ref 1.7–7)
NEUTROPHILS NFR BLD AUTO: 59.2 % (ref 42.7–76)
NRBC BLD AUTO-RTO: 0.1 /100 WBC (ref 0–0.2)
OTHER ANTIBIOTIC SUSC ISLT: ABNORMAL
PLATELET # BLD AUTO: 318 10*3/MM3 (ref 140–450)
PMV BLD AUTO: 7.5 FL (ref 6–12)
POTASSIUM SERPL-SCNC: 4.2 MMOL/L (ref 3.5–5.2)
QT INTERVAL: 355 MS
RBC # BLD AUTO: 3.75 10*6/MM3 (ref 3.77–5.28)
SODIUM SERPL-SCNC: 139 MMOL/L (ref 136–145)
WBC # BLD AUTO: 6.6 10*3/MM3 (ref 3.4–10.8)

## 2021-05-20 PROCEDURE — 97116 GAIT TRAINING THERAPY: CPT

## 2021-05-20 PROCEDURE — 80048 BASIC METABOLIC PNL TOTAL CA: CPT | Performed by: INTERNAL MEDICINE

## 2021-05-20 PROCEDURE — 85025 COMPLETE CBC W/AUTO DIFF WBC: CPT | Performed by: INTERNAL MEDICINE

## 2021-05-20 PROCEDURE — 25010000002 MEROPENEM PER 100 MG: Performed by: INTERNAL MEDICINE

## 2021-05-20 PROCEDURE — 97161 PT EVAL LOW COMPLEX 20 MIN: CPT

## 2021-05-20 PROCEDURE — 99024 POSTOP FOLLOW-UP VISIT: CPT | Performed by: INTERNAL MEDICINE

## 2021-05-20 PROCEDURE — 83735 ASSAY OF MAGNESIUM: CPT | Performed by: INTERNAL MEDICINE

## 2021-05-20 PROCEDURE — 25010000002 VANCOMYCIN 1 G RECONSTITUTED SOLUTION 1 EACH VIAL: Performed by: INTERNAL MEDICINE

## 2021-05-20 PROCEDURE — 99232 SBSQ HOSP IP/OBS MODERATE 35: CPT | Performed by: INTERNAL MEDICINE

## 2021-05-20 RX ADMIN — LEVOTHYROXINE SODIUM 75 MCG: 0.07 TABLET ORAL at 08:08

## 2021-05-20 RX ADMIN — MEROPENEM 500 MG: 500 INJECTION, POWDER, FOR SOLUTION INTRAVENOUS at 09:58

## 2021-05-20 RX ADMIN — LOSARTAN POTASSIUM 50 MG: 50 TABLET, FILM COATED ORAL at 08:08

## 2021-05-20 RX ADMIN — SODIUM CHLORIDE 1000 MG: 900 INJECTION, SOLUTION INTRAVENOUS at 02:28

## 2021-05-20 RX ADMIN — AMLODIPINE BESYLATE 5 MG: 5 TABLET ORAL at 20:12

## 2021-05-20 RX ADMIN — Medication 10 ML: at 08:09

## 2021-05-20 RX ADMIN — Medication 3 ML: at 08:07

## 2021-05-20 RX ADMIN — MEROPENEM 500 MG: 500 INJECTION, POWDER, FOR SOLUTION INTRAVENOUS at 17:16

## 2021-05-20 RX ADMIN — Medication 3 ML: at 20:12

## 2021-05-21 LAB
ANION GAP SERPL CALCULATED.3IONS-SCNC: 8 MMOL/L (ref 5–15)
BASOPHILS # BLD AUTO: 0.1 10*3/MM3 (ref 0–0.2)
BASOPHILS NFR BLD AUTO: 0.9 % (ref 0–1.5)
BUN SERPL-MCNC: 18 MG/DL (ref 8–23)
BUN/CREAT SERPL: 21.7 (ref 7–25)
CALCIUM SPEC-SCNC: 9 MG/DL (ref 8.2–9.6)
CHLORIDE SERPL-SCNC: 106 MMOL/L (ref 98–107)
CO2 SERPL-SCNC: 23 MMOL/L (ref 22–29)
CREAT SERPL-MCNC: 0.83 MG/DL (ref 0.57–1)
DEPRECATED RDW RBC AUTO: 52.1 FL (ref 37–54)
EOSINOPHIL # BLD AUTO: 0.1 10*3/MM3 (ref 0–0.4)
EOSINOPHIL NFR BLD AUTO: 2 % (ref 0.3–6.2)
ERYTHROCYTE [DISTWIDTH] IN BLOOD BY AUTOMATED COUNT: 17.6 % (ref 12.3–15.4)
GFR SERPL CREATININE-BSD FRML MDRD: 65 ML/MIN/1.73
GLUCOSE SERPL-MCNC: 99 MG/DL (ref 65–99)
HCT VFR BLD AUTO: 33.6 % (ref 34–46.6)
HGB BLD-MCNC: 11.1 G/DL (ref 12–15.9)
LYMPHOCYTES # BLD AUTO: 1.8 10*3/MM3 (ref 0.7–3.1)
LYMPHOCYTES NFR BLD AUTO: 26.4 % (ref 19.6–45.3)
MAGNESIUM SERPL-MCNC: 2 MG/DL (ref 1.6–2.4)
MCH RBC QN AUTO: 28.1 PG (ref 26.6–33)
MCHC RBC AUTO-ENTMCNC: 33.2 G/DL (ref 31.5–35.7)
MCV RBC AUTO: 84.6 FL (ref 79–97)
MONOCYTES # BLD AUTO: 0.8 10*3/MM3 (ref 0.1–0.9)
MONOCYTES NFR BLD AUTO: 11.7 % (ref 5–12)
NEUTROPHILS NFR BLD AUTO: 4.1 10*3/MM3 (ref 1.7–7)
NEUTROPHILS NFR BLD AUTO: 59 % (ref 42.7–76)
NRBC BLD AUTO-RTO: 0 /100 WBC (ref 0–0.2)
PLATELET # BLD AUTO: 302 10*3/MM3 (ref 140–450)
PMV BLD AUTO: 7.4 FL (ref 6–12)
POTASSIUM SERPL-SCNC: 4.3 MMOL/L (ref 3.5–5.2)
RBC # BLD AUTO: 3.97 10*6/MM3 (ref 3.77–5.28)
SODIUM SERPL-SCNC: 137 MMOL/L (ref 136–145)
WBC # BLD AUTO: 7 10*3/MM3 (ref 3.4–10.8)

## 2021-05-21 PROCEDURE — 85025 COMPLETE CBC W/AUTO DIFF WBC: CPT | Performed by: INTERNAL MEDICINE

## 2021-05-21 PROCEDURE — 99232 SBSQ HOSP IP/OBS MODERATE 35: CPT | Performed by: INTERNAL MEDICINE

## 2021-05-21 PROCEDURE — 97530 THERAPEUTIC ACTIVITIES: CPT

## 2021-05-21 PROCEDURE — 83735 ASSAY OF MAGNESIUM: CPT | Performed by: INTERNAL MEDICINE

## 2021-05-21 PROCEDURE — 80048 BASIC METABOLIC PNL TOTAL CA: CPT | Performed by: INTERNAL MEDICINE

## 2021-05-21 PROCEDURE — 25010000002 MEROPENEM PER 100 MG: Performed by: INTERNAL MEDICINE

## 2021-05-21 PROCEDURE — 97116 GAIT TRAINING THERAPY: CPT

## 2021-05-21 RX ADMIN — MEROPENEM 500 MG: 500 INJECTION, POWDER, FOR SOLUTION INTRAVENOUS at 09:00

## 2021-05-21 RX ADMIN — Medication 10 ML: at 02:22

## 2021-05-21 RX ADMIN — MEROPENEM 500 MG: 500 INJECTION, POWDER, FOR SOLUTION INTRAVENOUS at 17:16

## 2021-05-21 RX ADMIN — Medication 20 ML: at 20:28

## 2021-05-21 RX ADMIN — Medication 3 ML: at 09:00

## 2021-05-21 RX ADMIN — Medication 10 ML: at 08:38

## 2021-05-21 RX ADMIN — MEROPENEM 500 MG: 500 INJECTION, POWDER, FOR SOLUTION INTRAVENOUS at 02:22

## 2021-05-21 RX ADMIN — Medication 3 ML: at 20:29

## 2021-05-21 RX ADMIN — LEVOTHYROXINE SODIUM 75 MCG: 0.07 TABLET ORAL at 08:38

## 2021-05-21 RX ADMIN — AMLODIPINE BESYLATE 5 MG: 5 TABLET ORAL at 20:28

## 2021-05-21 RX ADMIN — LOSARTAN POTASSIUM 50 MG: 50 TABLET, FILM COATED ORAL at 08:38

## 2021-05-22 LAB
ANION GAP SERPL CALCULATED.3IONS-SCNC: 7 MMOL/L (ref 5–15)
BASOPHILS # BLD AUTO: 0.1 10*3/MM3 (ref 0–0.2)
BASOPHILS NFR BLD AUTO: 0.9 % (ref 0–1.5)
BUN SERPL-MCNC: 18 MG/DL (ref 8–23)
BUN/CREAT SERPL: 21.2 (ref 7–25)
CALCIUM SPEC-SCNC: 9 MG/DL (ref 8.2–9.6)
CHLORIDE SERPL-SCNC: 106 MMOL/L (ref 98–107)
CO2 SERPL-SCNC: 25 MMOL/L (ref 22–29)
CREAT SERPL-MCNC: 0.85 MG/DL (ref 0.57–1)
DEPRECATED RDW RBC AUTO: 52.1 FL (ref 37–54)
EOSINOPHIL # BLD AUTO: 0.1 10*3/MM3 (ref 0–0.4)
EOSINOPHIL NFR BLD AUTO: 2.3 % (ref 0.3–6.2)
ERYTHROCYTE [DISTWIDTH] IN BLOOD BY AUTOMATED COUNT: 17.5 % (ref 12.3–15.4)
GFR SERPL CREATININE-BSD FRML MDRD: 63 ML/MIN/1.73
GLUCOSE SERPL-MCNC: 95 MG/DL (ref 65–99)
HCT VFR BLD AUTO: 32.2 % (ref 34–46.6)
HGB BLD-MCNC: 10.5 G/DL (ref 12–15.9)
LYMPHOCYTES # BLD AUTO: 1.7 10*3/MM3 (ref 0.7–3.1)
LYMPHOCYTES NFR BLD AUTO: 27.7 % (ref 19.6–45.3)
MAGNESIUM SERPL-MCNC: 2.1 MG/DL (ref 1.6–2.4)
MCH RBC QN AUTO: 27.7 PG (ref 26.6–33)
MCHC RBC AUTO-ENTMCNC: 32.7 G/DL (ref 31.5–35.7)
MCV RBC AUTO: 84.7 FL (ref 79–97)
MONOCYTES # BLD AUTO: 0.7 10*3/MM3 (ref 0.1–0.9)
MONOCYTES NFR BLD AUTO: 11.8 % (ref 5–12)
NEUTROPHILS NFR BLD AUTO: 3.6 10*3/MM3 (ref 1.7–7)
NEUTROPHILS NFR BLD AUTO: 57.3 % (ref 42.7–76)
NRBC BLD AUTO-RTO: 0.1 /100 WBC (ref 0–0.2)
PLATELET # BLD AUTO: 282 10*3/MM3 (ref 140–450)
PMV BLD AUTO: 7.7 FL (ref 6–12)
POTASSIUM SERPL-SCNC: 4.2 MMOL/L (ref 3.5–5.2)
RBC # BLD AUTO: 3.81 10*6/MM3 (ref 3.77–5.28)
SODIUM SERPL-SCNC: 138 MMOL/L (ref 136–145)
WBC # BLD AUTO: 6.2 10*3/MM3 (ref 3.4–10.8)

## 2021-05-22 PROCEDURE — 99232 SBSQ HOSP IP/OBS MODERATE 35: CPT | Performed by: INTERNAL MEDICINE

## 2021-05-22 PROCEDURE — 83735 ASSAY OF MAGNESIUM: CPT | Performed by: INTERNAL MEDICINE

## 2021-05-22 PROCEDURE — 80048 BASIC METABOLIC PNL TOTAL CA: CPT | Performed by: INTERNAL MEDICINE

## 2021-05-22 PROCEDURE — 25010000002 MEROPENEM PER 100 MG: Performed by: INTERNAL MEDICINE

## 2021-05-22 PROCEDURE — 85025 COMPLETE CBC W/AUTO DIFF WBC: CPT | Performed by: INTERNAL MEDICINE

## 2021-05-22 PROCEDURE — 99024 POSTOP FOLLOW-UP VISIT: CPT | Performed by: INTERNAL MEDICINE

## 2021-05-22 RX ADMIN — MEROPENEM 500 MG: 500 INJECTION, POWDER, FOR SOLUTION INTRAVENOUS at 01:22

## 2021-05-22 RX ADMIN — Medication 10 ML: at 09:22

## 2021-05-22 RX ADMIN — MEROPENEM 500 MG: 500 INJECTION, POWDER, FOR SOLUTION INTRAVENOUS at 17:22

## 2021-05-22 RX ADMIN — MEROPENEM 500 MG: 500 INJECTION, POWDER, FOR SOLUTION INTRAVENOUS at 09:21

## 2021-05-22 RX ADMIN — AMLODIPINE BESYLATE 5 MG: 5 TABLET ORAL at 20:37

## 2021-05-22 RX ADMIN — LEVOTHYROXINE SODIUM 75 MCG: 0.07 TABLET ORAL at 06:04

## 2021-05-22 RX ADMIN — Medication 3 ML: at 20:37

## 2021-05-22 RX ADMIN — Medication 10 ML: at 20:37

## 2021-05-22 RX ADMIN — LOSARTAN POTASSIUM 50 MG: 50 TABLET, FILM COATED ORAL at 09:21

## 2021-05-22 NOTE — PROGRESS NOTES
[facility] Medicine Services Daily Progress Note      Hospitalist Team  LOS 2 days      Patient Care Team:  Mita Moise MD as PCP - General  Mita Moise MD as PCP - Family Medicine    Patient Location: 2103/1      Subjective   Subjective   No complaints    Chief Complaint / Subjective  Chief Complaint   Patient presents with   • Syncope         Brief Synopsis of Hospital Course/HPI  89-year-old frail-appearing female with multiple comorbidities significant for syncope, status post loop recorder implantation which showed prolonged asystolic pause, gallbladder mass with elevated LFTs, pernicious anemia, B12 deficiency, hypothyroidism, hypertension, hypothyroidism, family history of breast cancer, CKD, stage III and HLD.  She presented to [facility] ED on 5/15/2021 complaining of syncopal episode--> with noted prolonged asystolic pause on loop recorder. Denies chest pain, no shortness of breath, and no reported fever or chills.  Abdominal pain, nausea or vomiting     Laboratory is mostly unremarkable except for elevated LFTs with ALT/AST of 419/400 and alkaline phosphate of 392. EKG shows sinus rhythm with heart rate of 75 with first-degree AV block and inferior and anterior Q waves with poor progression.  Most recent 2D echo on 3/4/2019 revealed preserved LV function, with estimated EF of 55 to 60% with diastolic dysfunction.  Chest x-ray showed mild cardiomegaly and in no acute cardiopulmonary findings.       Patient was evaluated by cardiologist and underwent PPM placement with extraction of the loop recorder on 5/19/2021.  Urine culture grew ESBL E. coli and patient started on meropenem.     5/21/2021: Feeling better and continue with meropenem today and possibly discharge in a.m. with one  treatment of fosfomycin 3 g x 1 prior to discharge as recommended by ID     5/22/2021: No complaints or events overnight.  Unfortunately, not able to discharge home with IM fosfomycin as previously  "planned .  Medication unavailable here in the hospital and out of pharmacy.  Continue current management until Monday.    Objective   Objective    Seen and examined in follow-up evaluation.  Sitting comfortably in chair and in no acute distress or discomfort.  Family member at bedside.  Vital Signs  Temp:  [97.3 °F (36.3 °C)-98 °F (36.7 °C)] 97.4 °F (36.3 °C)  Heart Rate:  [65-89] 67  Resp:  [11-17] 11  BP: (130-140)/(52-97) 140/57  Oxygen Therapy  SpO2: 98 %  Pulse Oximetry Type: Intermittent  Device (Oxygen Therapy): room air  Flow (L/min): 2  Flowsheet Rows      First Filed Value   Admission Height  160 cm (63\") Documented at 05/18/2021 1919   Admission Weight  66.7 kg (147 lb 0.8 oz) Documented at 05/18/2021 1919        Intake & Output (last 3 days)       05/19 0701 - 05/20 0700 05/20 0701 - 05/21 0700 05/21 0701 - 05/22 0700 05/22 0701 - 05/23 0700    P.O. 0 1080 1440 840    IV Piggyback   100 200    Total Intake(mL/kg) 0 (0) 1080 (16) 1540 (23) 1040 (15.5)    Urine (mL/kg/hr) 1400 (0.9) 2900 (1.8) 700 (0.4)     Total Output 1400 2900 700     Net -1400 -1820 +840 +1040            Urine Unmeasured Occurrence  1 x 2 x     Stool Unmeasured Occurrence   1 x 1 x        Lines, Drains & Airways    Active LDAs     Name:   Placement date:   Placement time:   Site:   Days:    Peripheral IV 05/21/21 1706 Left Antecubital   05/21/21 1706    Antecubital   1                  Physical Exam  Constitutional:       General: She is not in acute distress.     Appearance: Normal appearance. She is not ill-appearing.   HENT:      Head: Normocephalic and atraumatic.      Mouth/Throat:      Mouth: Mucous membranes are moist.   Eyes:      Pupils: Pupils are equal, round, and reactive to light.   Cardiovascular:      Rate and Rhythm: Normal rate and regular rhythm.      Pulses: Normal pulses.   Pulmonary:      Effort: Pulmonary effort is normal.      Breath sounds: Normal breath sounds.   Abdominal:      Palpations: Abdomen is soft. "   Musculoskeletal:         General: No swelling. Normal range of motion.      Cervical back: Normal range of motion and neck supple.   Skin:     General: Skin is warm.   Neurological:      General: No focal deficit present.      Mental Status: She is alert. Mental status is at baseline.      Comments: Hard of hearing   Psychiatric:         Mood and Affect: Mood normal.           Wounds (last 24 hours)      LDA Wound     Row Name 05/22/21 1615 05/22/21 1206 05/22/21 0750       Wound 05/19/21 1544 Left upper chest Incision    Wound - Properties Group Placement Date: 05/19/21  -KS Placement Time: 1544  -KS Present on Hospital Admission: N  -KS Side: Left  -KS Orientation: upper  -KS Location: chest  -KS Primary Wound Type: Incision  -KS    Dressing Appearance  --  --  dry;intact  -RG    Closure  LIONEL  -RG  LIONEL  -RG  LIONEL  -RG    Base  dressing in place, unable to visualize  -RG  dressing in place, unable to visualize  -RG  dressing in place, unable to visualize  -RG    Retired Wound - Properties Group Date first assessed: 05/19/21  -KS Time first assessed: 1544  -KS Present on Hospital Admission: N  -KS Side: Left  -KS Location: chest  -KS Primary Wound Type: Incision  -KS       Wound 05/19/21 1608 Left upper chest Incision    Wound - Properties Group Placement Date: 05/19/21  -KS Placement Time: 1608  -KS Present on Hospital Admission: N  -KS Side: Left  -KS Orientation: upper  -KS Location: chest  -KS Primary Wound Type: Incision  -KS    Base  dressing in place, unable to visualize  -RG  dressing in place, unable to visualize  -RG  dressing in place, unable to visualize  -RG    Retired Wound - Properties Group Date first assessed: 05/19/21  -KS Time first assessed: 1608  -KS Present on Hospital Admission: N  -KS Side: Left  -KS Location: chest  -KS Primary Wound Type: Incision  -KS    Row Name 05/22/21 0300 05/22/21 0008 05/21/21 1915       Wound 05/19/21 1544 Left upper chest Incision    Wound - Properties Group  Placement Date: 05/19/21 -KS Placement Time: 1544  -KS Present on Hospital Admission: N  -KS Side: Left  -KS Orientation: upper  -KS Location: chest  -KS Primary Wound Type: Incision  -KS    Dressing Appearance  dry;intact  -RD  dry;intact  -RD  dry;intact;dried drainage  -RD    Closure  LIONEL  -RD  LIONEL  -RD  LIONEL  -RD    Base  dressing in place, unable to visualize  -RD  dressing in place, unable to visualize  -RD  dressing in place, unable to visualize  -RD    Drainage Amount  --  scant  -RD  scant  -RD    Retired Wound - Properties Group Date first assessed: 05/19/21  -KS Time first assessed: 1544  -KS Present on Hospital Admission: N  -KS Side: Left  -KS Location: chest  -KS Primary Wound Type: Incision  -KS       Wound 05/19/21 1608 Left upper chest Incision    Wound - Properties Group Placement Date: 05/19/21 -KS Placement Time: 1608 -KS Present on Hospital Admission: N  -KS Side: Left  -KS Orientation: upper  -KS Location: chest  -KS Primary Wound Type: Incision  -KS    Dressing Appearance  dry;intact  -RD  dry;intact  -RD  dry;intact  -RD    Closure  LIONEL  -RD  LIONEL  -RD  LIONEL  -RD    Base  dressing in place, unable to visualize  -RD  dressing in place, unable to visualize  -RD  dressing in place, unable to visualize  -RD    Drainage Amount  --  none  -RD  none  -RD    Retired Wound - Properties Group Date first assessed: 05/19/21  -KS Time first assessed: 1608 -KS Present on Hospital Admission: N  -KS Side: Left  -KS Location: chest  -KS Primary Wound Type: Incision  -KS      User Key  (r) = Recorded By, (t) = Taken By, (c) = Cosigned By    Initials Name Provider Type    KS Schoenstein, Kayla Technologist    Joann Chong RN Registered Nurse    Mariajose Case RN Registered Nurse          Procedures:    Procedure(s):  Pacemaker DC new          Results Review:     I reviewed the patient's new clinical results.      Lab Results (last 24 hours)     Procedure Component Value Units Date/Time    Basic  Metabolic Panel [[id number]]  (Normal) Collected: 05/22/21 0235    Specimen: Blood Updated: 05/22/21 0416     Glucose 95 mg/dL      BUN 18 mg/dL      Creatinine 0.85 mg/dL      Sodium 138 mmol/L      Potassium 4.2 mmol/L      Chloride 106 mmol/L      CO2 25.0 mmol/L      Calcium 9.0 mg/dL      eGFR Non African Amer 63 mL/min/1.73      BUN/Creatinine Ratio 21.2     Anion Gap 7.0 mmol/L     Narrative:      GFR Normal >60  Chronic Kidney Disease <60  Kidney Failure <15      Magnesium [[id number]]  (Normal) Collected: 05/22/21 0235    Specimen: Blood Updated: 05/22/21 0416     Magnesium 2.1 mg/dL     CBC & Differential [[id number]]  (Abnormal) Collected: 05/22/21 0235    Specimen: Blood Updated: 05/22/21 0354    Narrative:      The following orders were created for panel order CBC & Differential.  Procedure                               Abnormality         Status                     ---------                               -----------         ------                     CBC Auto Differential[[id number]]        Abnormal            Final result                 Please view results for these tests on the individual orders.    CBC Auto Differential [[id number]]  (Abnormal) Collected: 05/22/21 0235    Specimen: Blood Updated: 05/22/21 0354     WBC 6.20 10*3/mm3      RBC 3.81 10*6/mm3      Hemoglobin 10.5 g/dL      Hematocrit 32.2 %      MCV 84.7 fL      MCH 27.7 pg      MCHC 32.7 g/dL      RDW 17.5 %      RDW-SD 52.1 fl      MPV 7.7 fL      Platelets 282 10*3/mm3      Neutrophil % 57.3 %      Lymphocyte % 27.7 %      Monocyte % 11.8 %      Eosinophil % 2.3 %      Basophil % 0.9 %      Neutrophils, Absolute 3.60 10*3/mm3      Lymphocytes, Absolute 1.70 10*3/mm3      Monocytes, Absolute 0.70 10*3/mm3      Eosinophils, Absolute 0.10 10*3/mm3      Basophils, Absolute 0.10 10*3/mm3      nRBC 0.1 /100 WBC         No results found for: HGBA1C  Results from last 7 days   Lab Units 05/18/21 2006   INR  0.95           No results found for:  LIPASE  Lab Results   Component Value Date    CHOL 223 (H) 11/01/2018    CHLPL 193 05/07/2021    TRIG 196 (H) 05/07/2021    HDL 35 (L) 05/07/2021     (H) 05/07/2021       No results found for: INTRAOP, PREDX, FINALDX, COMDX    Microbiology Results (last 10 days)     Procedure Component Value - Date/Time    COVID PRE-OP / PRE-PROCEDURE SCREENING ORDER (NO ISOLATION) - Swab, Nasopharynx [[id number]]  (Normal) Collected: 05/19/21 0121    Lab Status: Final result Specimen: Swab from Nasopharynx Updated: 05/19/21 0217    Narrative:      The following orders were created for panel order COVID PRE-OP / PRE-PROCEDURE SCREENING ORDER (NO ISOLATION) - Swab, Nasopharynx.  Procedure                               Abnormality         Status                     ---------                               -----------         ------                     COVID-19,CEPHEID,COR/MARISOL...[[id number]]  Normal              Final result                 Please view results for these tests on the individual orders.    COVID-19,CEPHEID,COR/MARISOL/PAD IN-HOUSE(OR EMERGENT/ADD-ON),NP SWAB IN TRANSPORT MEDIA 3-4 HR TAT, RT-PCR - Swab, Nasopharynx [[id number]]  (Normal) Collected: 05/19/21 0121    Lab Status: Final result Specimen: Swab from Nasopharynx Updated: 05/19/21 0217     COVID19 Not Detected    Narrative:      Fact sheet for providers: https://www.fda.gov/media/104101/download     Fact sheet for patients: https://www.fda.gov/media/378421/download  Fact sheet for providers: https://www.fda.gov/media/969883/download     Fact sheet for patients: https://www.fda.gov/media/231782/download    Urine Culture - Urine, Urine, Random Void [[id number]]  (Abnormal) Collected: 05/17/21 0925    Lab Status: Final result Specimen: Urine, Random Void Updated: 05/20/21 0340     Urine Culture Final report     Result 1 Escherichia coli     Comment: 50,000-100,000 colony forming units per mL  Susceptibility profile is consistent with a probable ESBL.           Susceptibility Testing Comment     Comment:       ** S = Susceptible; I = Intermediate; R = Resistant **                     P = Positive; N = Negative              MICS are expressed in micrograms per mL     Antibiotic                 RSLT#1    RSLT#2    RSLT#3    RSLT#4  Amoxicillin/Clavulanic Acid    I  Ampicillin                     R  Cefazolin                      R  Cefepime                       R  Ceftriaxone                    R  Cefuroxime                     R  Ciprofloxacin                  R  Ertapenem                      S  Gentamicin                     R  Imipenem                       S  Levofloxacin                   R  Meropenem                      S  Nitrofurantoin                 S  Piperacillin/Tazobactam        S  Tetracycline                   R  Tobramycin                     R  Trimethoprim/Sulfa             S         Narrative:      Performed at:  [address] [place] [address]  [id number]  : Cuong Casiano PhD, Phone:  [phone]          ECG/EMG Results (most recent)     Procedure Component Value Units Date/Time    EP/CRM Study [[id number]] Resulted: 05/19/21 1618     Updated: 05/20/21 0734    Narrative:            Permanent pacemaker procedure note.    Procedure:   Dual-chamber  permanent pacemaker      Date of procedure :May 19, 2021    Indication: Syncope, sinus node dysfunction, asystolic pause,, sick sinus   syndrome .    Hardware:  Pacemaker generator: [employer] model number PM [id number],serial #   [id number]  Right atrial lead is [employer] model #2088 TC-48, serial number   [id number]  Right ventricular lead is [employer] model #2088 TC-52, serial   number [id number]      Thresholds:  Right atrial threshold revealed a P wave amplitude of 3.1 mV at 1.5 V and   impedance of 660 ohms  Right ventricular threshold revealed a R wave amplitude of  10.8 mV at 0.5   V and impedance of650 ohms        Description of procedure:  Moderate  conscious sedation was given utilizing IV Versed and fentanyl and   Dilaudid sometimes, administered by RN with continuous EKG oximetry and   hemodynamic monitoring supervised by me throughout the entire case,   conscious sedation time was  60 minutes.  I was present with the patient   for the duration of moderate sedation and supervised staff who had no   other duties and monitored the patient for the entire procedure patient   had Aguilera 2-3 sedation scale.  Under local  anesthesia left subclavian   vein was accessed via the modified Seldinger technique without difficulty.    A pocket was created and hemostasis achieved.  A 6 Polish sheath was   placed in active-fixation lead was placed in right ventricular septum   adequate thresholds obtained and secured in the pocket,  10 volts   stimulation was done to make sure there was no diaphragmatic stimulation.    Similarly active fixation right atrial lead was placed in right atrial   appendage adequate this was obtained and secured in the pocket.  Pocket   was cleaned with antibiotic solution generator connected to leads and   placed in pocket, pocket closed with suture skin closed with staples.    Under local anesthesia a loop recorder pocket was opened and loop recorder   explanted and skin closed with staples. Patient tolerated procedure well   complications were none.     Blood loss :blood loss was 5 cc.        Conclusion: Successful dual-chamber permanent pacemaker implantation and   loop recorder explantation      Plan:   We will monitor overnight  Recheck device in a.m.  Pacer care and wound care education  Follow-up in 1 week in the office for staple removal  Follow-up in pacemaker clinic    ECG 12 Lead [[id number]] Collected: 05/18/21 1924     Updated: 05/20/21 0817     QT Interval 355 ms     Narrative:      HEART RATE= 75  bpm  RR Interval= 804  ms  DC Interval= 216  ms  P Horizontal Axis= -10  deg  P Front Axis= 56  deg  QRSD Interval= 81  ms  QT Interval=  355  ms  QRS Axis= -55  deg  T Wave Axis= 52  deg  - ABNORMAL ECG -  Sinus rhythm  Borderline prolonged TN interval  Probable left atrial enlargement  Inferior infarct, old  Anterior infarct, old  When compared with ECG of 28-Apr-2019 13:48:22,  Electronically Signed By: Gómez Higgins (MARISOL) 20-May-2021 08:15:56  Date and Time of Study: 2021-05-18 19:24:49                  MRI Abdomen With & Without Contrast    Result Date: 5/19/2021   1. 2.3 x 4.0 x 4.5 cm multi loculated, macrocystic lesion within the pancreatic head. It corresponds to the abnormality questioned on previous ultrasound. Given the presence of individual cystic locules measuring up to 2.8 cm in size, it would be favored to represent a mucinous cystadenoma or cystadenocarcinoma, although admittedly that entity more typically occurs within the pancreatic tail or pancreatic body region. While serous cystadenoma/cystadenocarcinoma occurs commonly in the head, the size of the cystic locules the vicinity thought less likely. Surgical consultation is advised. 2. Pancreatic parenchymal atrophy. 3. Tiny benign hepatic cysts. 4. Uncomplicated cholelithiasis. 5. Tiny bilateral renal cysts.   Electronically Signed By-Eulalia Webb MD On:5/19/2021 12:22 PM This report was finalized on [id number] by  Eulalia Webb MD.    XR Chest 1 View    Result Date: 5/19/2021  1.A left cardiac pacemaker/AICD is noted. The leads extend to the region of the right atrium and right ventricle or intraventricular septum. There is no evidence for pneumothorax following lead placement. 2.Otherwise there is no significant change when compared to the prior study. There is no evidence for acute cardiopulmonary process.  Electronically Signed By-Ventura Wells MD On:5/19/2021 5:52 PM This report was finalized on [id number] by  Ventura Wells MD.    XR Chest 1 View    Result Date: 5/18/2021  1.Mild cardiomegaly with suspected cardiac device projecting over the lower left thorax. 2.No  radiographic findings of acute pulmonary abnormality.  Electronically Signed By-Samy Martinez MD On:5/18/2021 8:25 PM This report was finalized on [id number] by  Samy Martinez MD.          Xrays, labs reviewed personally by physician.    Medication Review:   I have reviewed the patient's current medication list      Scheduled Meds  amLODIPine, 5 mg, Oral, Nightly  levothyroxine, 75 mcg, Oral, Daily  losartan, 50 mg, Oral, Daily  meropenem, 500 mg, Intravenous, Q8H  sodium chloride, 10 mL, Intravenous, Q12H  sodium chloride, 3 mL, Intravenous, Q12H        Meds Infusions  Pharmacy to Dose meropenem (MERREM),         Meds PRN  •  acetaminophen **OR** acetaminophen **OR** acetaminophen  •  Calcium Gluconate-NaCl **AND** calcium gluconate **AND** Calcium, Ionized  •  HYDROmorphone **AND** naloxone  •  magnesium sulfate **OR** magnesium sulfate **OR** magnesium sulfate  •  melatonin  •  nitroglycerin  •  ondansetron **OR** ondansetron  •  [DISCONTINUED] meropenem **AND** Pharmacy to Dose meropenem (MERREM)  •  potassium & sodium phosphates **OR** potassium & sodium phosphates  •  potassium chloride  •  potassium chloride  •  [COMPLETED] Insert peripheral IV **AND** sodium chloride  •  sodium chloride  •  sodium chloride        Assessment/Plan   Assessment/Plan     Active Hospital Problems    Diagnosis  POA   • **Syncope [R55]  Yes   • SSS (sick sinus syndrome) (CMS/HCC) [I49.5]  Unknown   • Cardiac arrhythmia [I49.9]  Yes   • 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       MEDICAL DECISION MAKING COMPLEXITY BY PROBLEM:   Syncope     -S/p PPM implantation on 5/19/2021 for SSS with prolonged pulses         SSS with asystolic pauses     -gianfranco WOODALL E.  Coli      -currently on meropenem       - fosfomycin not available here and outside pharmacy        -will cont  current antibiotic and monitor infectious parameters closely         Gallbladder mass with elevated LFTs     -GI is  aware, and was seen recently in the office with work-up in progress.      -mri of the abdomen without contrast on 5/19/2021 showed a 2.3 x 4.0 x 4.5 cm multiloculated, microcytic lesion within the pancreatic head favoring mucinous cystadenoma or cyst adenocarcinoma      -outpatient GI follow-up with management     CKD, stage IIIa     -stable, cont to monitor renal indices closely     Hypertension      -Amlodipine/losartan     Hypothyroidism     -Levothyroxine     Family history of breast CA     -biopsy in the past     History of recurrent UTI      -patient stated that she was recently treated with nitrofurantoin     Generalized weakness:      -cont PT as tolerated and encouraged to increase activities     Disposition: Possibly home on Monday                VTE Prophylaxis -   Mechanical Order History:      Ordered        05/19/21 0143  Place Sequential Compression Device  Once         05/19/21 0143  Maintain Sequential Compression Device  Continuous                 Pharmalogical Order History:     None            Code Status -   Code Status and Medical Interventions:   Ordered at: 05/18/21 2132     Limited Support to NOT Include:    Cardioversion/Defibrillation    Intubation     Code Status:    No CPR     Medical Interventions (Level of Support Prior to Arrest):    Limited       Discharge Planning          Electronically signed by Manpreet Dinh MD, 05/22/21, 17:31 EDT.  [facility] Hospitalist Team

## 2021-05-22 NOTE — PLAN OF CARE
Goal Outcome Evaluation:         Patient alert oriented able to make needs known. On room air. Patient ambulates in room with standby assist. Patient has had no complaints this shift. Sling to left arm continues IV ABT.

## 2021-05-22 NOTE — NURSING NOTE
RN spoke with [facility].  They did not have the fosfamycin in stock and the pharmacist is not able to give an im injections.  Notified Dr. Garrison. Pt and family agreeable to staying to continue iv antibiotics through the weekend.

## 2021-05-22 NOTE — PROGRESS NOTES
Cardiology Progress Note    Patient Identification:  Name: Stacy Monroy  Age: 89 y.o.  Sex: female  :  1930  MRN: [id number]                 Follow Up / Chief Complaint: Syncope, sick sinus syndrome status post pacemaker  Chief Complaint   Patient presents with   • Syncope       Interval History: Patient underwent dual-chamber permanent pacemaker implantation 2021       Subjective: Patient seen and examined.  Chart reviewed.  Labs reviewed.  Discussed with RN taking care of patient.      Objective: Urine cultures are growing [id number],000 E. coli probably ESBL     History of Present Illness:       This is a 89-year-old with PMH of     Syncope, ILR  Hypertension  Mixed hyperlipidemia  Hypothyroidism  Hyperglycemia, prediabetes with hemoglobin A1c of 5.8 on 2021  CKD 3A with GFR 43  Pernicious anemia, B12 deficiency  Gallbladder mass, elevated LFTs  Breast biopsy  Family history of breast CA and sister  Non-smoker  Allergies/intolerance to penicillin, azithromycin           Presented through emergency room 2021 with complaint of syncope.  Patient's loop recorder revealed prolonged asystolic pause therefore patient got admitted.  Patient has elevated LFTs and work-up revealed gallbladder mass.  Work-up revealed proBNP normal at 150.  CMP with elevated ALT at 419, AST at 400, alkaline phosphatase 392, GFR 43 with creatinine of 1.12.  Hemoglobin A1c 2020 of 5.8.  Lipid profile with low HDL at 35, triglycerides 196, , total cholesterol 193.  Normal CBC  Chest x-ray 2021 with mild cardiomegaly, loop recorder, no acute pulmonary abnormality  EKG reviewed by me from 2021 reveals sinus rhythm with rate of 75 bpm with first-degree AV block, left atrial enlargement, inferior and anterior Q waves and poor R wave progression.  Echocardiogram from 3/4/2019 revealed normal LV systolic function EF of 55 to 60% with diastolic dysfunction..     Assessment:       Sick sinus syndrome with  asystolic pauses s/p [employer] dual-chamber permanent pacemaker implantation 5/19/2020  Syncope  Hypertension  Dyslipidemia with low HDL  Prediabetes  Elevated LFTs possible gallbladder mass  CKD 3        Recommendations / Plan:         Telemetry is revealing sinus rhythm  Patient's TSH previously was normal.  Patient underwent dual-chamber permanent pacemaker implantation 5/19/2020  Patient is being treated for UTI currently.  Thank you very much for letting me participate in the care of this pleasant lady  We will follow up as outpatient.  Call me back if I can be of any further assistance.    Past Medical History:  Past Medical History:   Diagnosis Date   • Arthritis    • Heart murmur    • Hyperlipidemia    • Hypertension    • Other specified hypothyroidism 8/15/2019     Past Surgical History:  Past Surgical History:   Procedure Laterality Date   • BREAST BIOPSY Right    • CARDIAC ELECTROPHYSIOLOGY PROCEDURE N/A 5/19/2021    Procedure: Pacemaker DC new;  Surgeon: Edmar Rubin MD;  Location: [facility] INVASIVE LOCATION;  Service: Cardiovascular;  Laterality: N/A;   • OTHER SURGICAL HISTORY  2019    Tilt table    • OTHER SURGICAL HISTORY  2019    tilt table         Social History:   Social History     Tobacco Use   • Smoking status: Never Smoker   • Smokeless tobacco: Never Used   Substance Use Topics   • Alcohol use: No      Family History:  Family History   Problem Relation Age of Onset   • Hypertension Mother    • Stroke Mother    • Hypertension Father    • Stroke Father    • Breast cancer Sister 50   • Ovarian cancer Daughter 57   • Breast cancer Niece 53          Allergies:  Allergies   Allergen Reactions   • Azithromycin Rash   • Penicillin G Rash   • Penicillins Rash     Scheduled Meds:  amLODIPine, 5 mg, Nightly  levothyroxine, 75 mcg, Daily  losartan, 50 mg, Daily  meropenem, 500 mg, Q8H  sodium chloride, 10 mL, Q12H  sodium chloride, 3 mL, Q12H          Review of Systems:  "  ROS    Constitution: Negative for chills and fever.   Cardiovascular: Negative for chest pain and palpitations.   Respiratory: Negative for cough and hemoptysis.    Gastrointestinal: Negative for nausea.        Constitutional:  Temp:  [97.3 °F (36.3 °C)-98.5 °F (36.9 °C)] 97.3 °F (36.3 °C)  Heart Rate:  [65-89] 71  Resp:  [14-20] 14  BP: (111-135)/(52-97) 135/97    Physical Exam   /97 (BP Location: Right arm, Patient Position: Sitting)   Pulse 71   Temp 97.3 °F (36.3 °C) (Oral)   Resp 14   Ht 160 cm (63\")   Wt 67.1 kg (147 lb 14.9 oz)   LMP  (LMP Unknown)   SpO2 97%   BMI 26.20 kg/m²   General:  Appears in no acute distress  Eyes: Sclera is anicteric,  conjunctiva is clear   HEENT:  No JVD. Thyroid not visibly enlarged. No mucosal pallor or cyanosis  Respiratory: Respirations regular and unlabored at rest.  Bilaterally good breath sounds, with good air entry in all fields. No crackles, rubs or wheezes auscultated  Cardiovascular: S1,S2 Regular rate and rhythm. No murmur, rub or gallop auscultated.  . No pretibial pitting edema  Gastrointestinal: Abdomen nondistended, soft  Musculoskeletal:  No abnormal movements  Extremities: No digital clubbing or cyanosis  Skin: Color pink. Skin warm and dry to touch. No rashes  No xanthoma  Neuro: Alert and awake, no lateralizing deficits appreciated    INTAKE AND OUTPUT:    Intake/Output Summary (Last 24 hours) at 5/22/2021 1151  Last data filed at 5/22/2021 0921  Gross per 24 hour   Intake 1640 ml   Output 700 ml   Net 940 ml       Cardiographics  Telemetry: Sinus rhythm    ECG:   ECG 12 Lead   Final Result   HEART RATE= 75  bpm   RR Interval= 804  ms   MS Interval= 216  ms   P Horizontal Axis= -10  deg   P Front Axis= 56  deg   QRSD Interval= 81  ms   QT Interval= 355  ms   QRS Axis= -55  deg   T Wave Axis= 52  deg   - ABNORMAL ECG -   Sinus rhythm   Borderline prolonged MS interval   Probable left atrial enlargement   Inferior infarct, old   Anterior infarct, " "old   When compared with ECG of 28-Apr-2019 13:48:22,   Electronically Signed By: Gómez Higgins (MARISOL) 20-May-2021 08:15:56   Date and Time of Study: 2021-05-18 19:24:49        I have personally reviewed EKG    Echocardiogram:     Lab Review   I have reviewed the labs      Results from last 7 days   Lab Units 05/22/21  0235   MAGNESIUM mg/dL 2.1     Results from last 7 days   Lab Units 05/22/21  0235   SODIUM mmol/L 138   POTASSIUM mmol/L 4.2   BUN mg/dL 18   CREATININE mg/dL 0.85   CALCIUM mg/dL 9.0         Results from last 7 days   Lab Units 05/22/21  0235 05/21/21  0232 05/20/21  0259   WBC 10*3/mm3 6.20 7.00 6.60   HEMOGLOBIN g/dL 10.5* 11.1* 10.4*   HEMATOCRIT % 32.2* 33.6* 31.8*   PLATELETS 10*3/mm3 282 302 318     Results from last 7 days   Lab Units 05/18/21 2006   INR  0.95       RADIOLOGY:  Imaging Results (Last 24 Hours)     ** No results found for the last 24 hours. **                )5/22/2021  Edmar Rubin MD      EMR Dragon/Transcription:   \"Dictated utilizing Dragon dictation\".   "

## 2021-05-23 LAB
ANION GAP SERPL CALCULATED.3IONS-SCNC: 8 MMOL/L (ref 5–15)
BASOPHILS # BLD AUTO: 0.1 10*3/MM3 (ref 0–0.2)
BASOPHILS NFR BLD AUTO: 1.3 % (ref 0–1.5)
BUN SERPL-MCNC: 20 MG/DL (ref 8–23)
BUN/CREAT SERPL: 24.4 (ref 7–25)
CALCIUM SPEC-SCNC: 9.4 MG/DL (ref 8.2–9.6)
CHLORIDE SERPL-SCNC: 106 MMOL/L (ref 98–107)
CO2 SERPL-SCNC: 26 MMOL/L (ref 22–29)
CREAT SERPL-MCNC: 0.82 MG/DL (ref 0.57–1)
DEPRECATED RDW RBC AUTO: 52.5 FL (ref 37–54)
EOSINOPHIL # BLD AUTO: 0.1 10*3/MM3 (ref 0–0.4)
EOSINOPHIL NFR BLD AUTO: 2.6 % (ref 0.3–6.2)
ERYTHROCYTE [DISTWIDTH] IN BLOOD BY AUTOMATED COUNT: 17.7 % (ref 12.3–15.4)
GFR SERPL CREATININE-BSD FRML MDRD: 65 ML/MIN/1.73
GLUCOSE SERPL-MCNC: 92 MG/DL (ref 65–99)
HCT VFR BLD AUTO: 33.4 % (ref 34–46.6)
HGB BLD-MCNC: 10.9 G/DL (ref 12–15.9)
LYMPHOCYTES # BLD AUTO: 1.6 10*3/MM3 (ref 0.7–3.1)
LYMPHOCYTES NFR BLD AUTO: 30.1 % (ref 19.6–45.3)
MAGNESIUM SERPL-MCNC: 2 MG/DL (ref 1.6–2.4)
MCH RBC QN AUTO: 27.6 PG (ref 26.6–33)
MCHC RBC AUTO-ENTMCNC: 32.7 G/DL (ref 31.5–35.7)
MCV RBC AUTO: 84.3 FL (ref 79–97)
MONOCYTES # BLD AUTO: 0.6 10*3/MM3 (ref 0.1–0.9)
MONOCYTES NFR BLD AUTO: 11.3 % (ref 5–12)
NEUTROPHILS NFR BLD AUTO: 2.9 10*3/MM3 (ref 1.7–7)
NEUTROPHILS NFR BLD AUTO: 54.7 % (ref 42.7–76)
NRBC BLD AUTO-RTO: 0 /100 WBC (ref 0–0.2)
PLATELET # BLD AUTO: 275 10*3/MM3 (ref 140–450)
PMV BLD AUTO: 8 FL (ref 6–12)
POTASSIUM SERPL-SCNC: 4.6 MMOL/L (ref 3.5–5.2)
RBC # BLD AUTO: 3.96 10*6/MM3 (ref 3.77–5.28)
SODIUM SERPL-SCNC: 140 MMOL/L (ref 136–145)
WBC # BLD AUTO: 5.3 10*3/MM3 (ref 3.4–10.8)

## 2021-05-23 PROCEDURE — 80048 BASIC METABOLIC PNL TOTAL CA: CPT | Performed by: INTERNAL MEDICINE

## 2021-05-23 PROCEDURE — 83735 ASSAY OF MAGNESIUM: CPT | Performed by: INTERNAL MEDICINE

## 2021-05-23 PROCEDURE — 99232 SBSQ HOSP IP/OBS MODERATE 35: CPT | Performed by: INTERNAL MEDICINE

## 2021-05-23 PROCEDURE — 25010000002 MEROPENEM PER 100 MG: Performed by: INTERNAL MEDICINE

## 2021-05-23 PROCEDURE — 85025 COMPLETE CBC W/AUTO DIFF WBC: CPT | Performed by: INTERNAL MEDICINE

## 2021-05-23 RX ADMIN — Medication 3 ML: at 21:38

## 2021-05-23 RX ADMIN — LEVOTHYROXINE SODIUM 75 MCG: 0.07 TABLET ORAL at 08:02

## 2021-05-23 RX ADMIN — AMLODIPINE BESYLATE 5 MG: 5 TABLET ORAL at 21:37

## 2021-05-23 RX ADMIN — Medication 3 ML: at 09:43

## 2021-05-23 RX ADMIN — MEROPENEM 500 MG: 500 INJECTION, POWDER, FOR SOLUTION INTRAVENOUS at 19:19

## 2021-05-23 RX ADMIN — Medication 10 ML: at 21:38

## 2021-05-23 RX ADMIN — LOSARTAN POTASSIUM 50 MG: 50 TABLET, FILM COATED ORAL at 09:43

## 2021-05-23 RX ADMIN — MEROPENEM 500 MG: 500 INJECTION, POWDER, FOR SOLUTION INTRAVENOUS at 01:44

## 2021-05-23 RX ADMIN — MEROPENEM 500 MG: 500 INJECTION, POWDER, FOR SOLUTION INTRAVENOUS at 09:43

## 2021-05-23 NOTE — PROGRESS NOTES
[facility] Medicine Services Daily Progress Note      Hospitalist Team  LOS 3 days      Patient Care Team:  Mita Moise MD as PCP - General  Mita Moise MD as PCP - Family Medicine    Patient Location: 2103/1      Subjective   Subjective   Feels much better and has no complaint.  Chief Complaint / Subjective  Chief Complaint   Patient presents with   • Syncope         Brief Synopsis of Hospital Course/HPI  89-year-old frail-appearing female with multiple comorbidities significant for syncope, status post loop recorder implantation which showed prolonged asystolic pause, gallbladder mass with elevated LFTs, pernicious anemia, B12 deficiency, hypothyroidism, hypertension, hypothyroidism, family history of breast cancer, CKD, stage III and HLD.  She presented to [facility] ED on 5/15/2021 complaining of syncopal episode--> with noted prolonged asystolic pause on loop recorder. Denies chest pain, no shortness of breath, and no reported fever or chills.  Abdominal pain, nausea or vomiting     Laboratory is mostly unremarkable except for elevated LFTs with ALT/AST of 419/400 and alkaline phosphate of 392. EKG shows sinus rhythm with heart rate of 75 with first-degree AV block and inferior and anterior Q waves with poor progression.  Most recent 2D echo on 3/4/2019 revealed preserved LV function, with estimated EF of 55 to 60% with diastolic dysfunction.  Chest x-ray showed mild cardiomegaly and in no acute cardiopulmonary findings.       Patient was evaluated by cardiologist and underwent PPM placement with extraction of the loop recorder on 5/19/2021.  Urine culture grew ESBL E. coli and patient started on meropenem.     5/21/2021: Feeling better and continue with meropenem today and possibly discharge in a.m. with one  treatment of fosfomycin 3 g x 1 prior to discharge as recommended by ID     5/22/2021: No complaints or events overnight.  Unfortunately, not able to discharge home with IM  "fosfomycin as previously planned .  Medication unavailable here in the hospital and out of pharmacy.  Continue current management until Monday.          Objective   Objective    Seen and examined in follow-up.  Sitting comfortably in chair in no distress or discomfort.  No events reported overnight.  Vital Signs  Temp:  [97.4 °F (36.3 °C)-98.2 °F (36.8 °C)] 97.4 °F (36.3 °C)  Heart Rate:  [65-82] 69  Resp:  [11-18] 14  BP: (124-140)/(42-91) 136/42  Oxygen Therapy  SpO2: 99 %  Pulse Oximetry Type: Intermittent  Device (Oxygen Therapy): room air  Flow (L/min): 2  Flowsheet Rows      First Filed Value   Admission Height  160 cm (63\") Documented at 05/18/2021 1919   Admission Weight  66.7 kg (147 lb 0.8 oz) Documented at 05/18/2021 1919        Intake & Output (last 3 days)       05/20 0701 - 05/21 0700 05/21 0701 - 05/22 0700 05/22 0701 - 05/23 0700 05/23 0701 - 05/24 0700    P.O. 1080 1440 840 540    IV Piggyback  100 300 100    Total Intake(mL/kg) 1080 (16) 1540 (23) 1140 (17.2) 640 (9.7)    Urine (mL/kg/hr) 2900 (1.8) 700 (0.4) 900 (0.6)     Total Output 2900 700 900     Net -1820 +840 +240 +640            Urine Unmeasured Occurrence 1 x 2 x      Stool Unmeasured Occurrence  1 x 1 x         Lines, Drains & Airways    Active LDAs     Name:   Placement date:   Placement time:   Site:   Days:    Peripheral IV 05/21/21 1706 Left Antecubital   05/21/21 1706    Antecubital   1                Physical Exam  Constitutional:       General: She is not in acute distress.     Appearance: Normal appearance. She is not ill-appearing.   HENT:      Head: Normocephalic and atraumatic.      Mouth/Throat:      Mouth: Mucous membranes are moist.   Eyes:      Pupils: Pupils are equal, round, and reactive to light.   Cardiovascular:      Rate and Rhythm: Normal rate and regular rhythm.      Pulses: Normal pulses.   Pulmonary:      Effort: Pulmonary effort is normal.      Breath sounds: Normal breath sounds.   Abdominal: "      Palpations: Abdomen is soft.   Musculoskeletal:         General: No swelling. Normal range of motion.      Cervical back: Normal range of motion and neck supple.   Skin:     General: Skin is warm.   Neurological:      General: No focal deficit present.      Mental Status: She is alert. Mental status is at baseline.      Comments: Hard of hearing   Psychiatric:         Mood and Affect: Mood normal.           Wounds (last 24 hours)      LDA Wound     Row Name 05/23/21 0406 05/23/21 0034 05/22/21 2034       Wound 05/19/21 1544 Left upper chest Incision    Wound - Properties Group Placement Date: 05/19/21  -KS Placement Time: 1544  -KS Present on Hospital Admission: N  -KS Side: Left  -KS Orientation: upper  -KS Location: chest  -KS Primary Wound Type: Incision  -KS    Dressing Appearance  --  --  dry;intact  -EM    Closure  LIONEL  -EM  LIONEL  -EM  LIONEL  -EM    Base  dressing in place, unable to visualize  -EM  dressing in place, unable to visualize  -EM  dressing in place, unable to visualize  -EM    Retired Wound - Properties Group Date first assessed: 05/19/21  -KS Time first assessed: 1544  -KS Present on Hospital Admission: N  -KS Side: Left  -KS Location: chest  -KS Primary Wound Type: Incision  -KS       Wound 05/19/21 1608 Left upper chest Incision    Wound - Properties Group Placement Date: 05/19/21  -KS Placement Time: 1608  -KS Present on Hospital Admission: N  -KS Side: Left  -KS Orientation: upper  -KS Location: chest  -KS Primary Wound Type: Incision  -KS    Base  dressing in place, unable to visualize  -EM  dressing in place, unable to visualize  -EM  dressing in place, unable to visualize  -EM    Retired Wound - Properties Group Date first assessed: 05/19/21  -KS Time first assessed: 1608  -KS Present on Hospital Admission: N  -KS Side: Left  -KS Location: chest  -KS Primary Wound Type: Incision  -KS    Row Name 05/22/21 1615             Wound 05/19/21 1544 Left upper chest Incision    Wound - Properties  Group Placement Date: 05/19/21  -KS Placement Time: 1544  -KS Present on Hospital Admission: N  -KS Side: Left  -KS Orientation: upper  -KS Location: chest  -KS Primary Wound Type: Incision  -KS    Closure  LIONEL  -RG      Base  dressing in place, unable to visualize  -RG      Retired Wound - Properties Group Date first assessed: 05/19/21  -KS Time first assessed: 1544  -KS Present on Hospital Admission: N  -KS Side: Left  -KS Location: chest  -KS Primary Wound Type: Incision  -KS       Wound 05/19/21 1608 Left upper chest Incision    Wound - Properties Group Placement Date: 05/19/21  -KS Placement Time: 1608  -KS Present on Hospital Admission: N  -KS Side: Left  -KS Orientation: upper  -KS Location: chest  -KS Primary Wound Type: Incision  -KS    Base  dressing in place, unable to visualize  -RG      Retired Wound - Properties Group Date first assessed: 05/19/21  -KS Time first assessed: 1608  -KS Present on Hospital Admission: N  -KS Side: Left  -KS Location: chest  -KS Primary Wound Type: Incision  -KS      User Key  (r) = Recorded By, (t) = Taken By, (c) = Cosigned By    Initials Name Provider Type    KS Schoenstein, Kayla Technologist    Joann Chong RN Registered Nurse    Demi Herndon RN Registered Nurse          Procedures:    Procedure(s):  Pacemaker DC new    Results Review:     I reviewed the patient's new clinical results.      Lab Results (last 24 hours)     Procedure Component Value Units Date/Time    Basic Metabolic Panel [[id number]]  (Normal) Collected: 05/23/21 0441    Specimen: Blood Updated: 05/23/21 0513     Glucose 92 mg/dL      BUN 20 mg/dL      Creatinine 0.82 mg/dL      Sodium 140 mmol/L      Potassium 4.6 mmol/L      Chloride 106 mmol/L      CO2 26.0 mmol/L      Calcium 9.4 mg/dL      eGFR Non African Amer 65 mL/min/1.73      BUN/Creatinine Ratio 24.4     Anion Gap 8.0 mmol/L     Narrative:      GFR Normal >60  Chronic Kidney Disease <60  Kidney Failure <15      Magnesium  [[id number]]  (Normal) Collected: 05/23/21 0303    Specimen: Blood Updated: 05/23/21 0423     Magnesium 2.0 mg/dL     CBC & Differential [[id number]]  (Abnormal) Collected: 05/23/21 0303    Specimen: Blood Updated: 05/23/21 0409    Narrative:      The following orders were created for panel order CBC & Differential.  Procedure                               Abnormality         Status                     ---------                               -----------         ------                     CBC Auto Differential[[id number]]        Abnormal            Final result                 Please view results for these tests on the individual orders.    CBC Auto Differential [[id number]]  (Abnormal) Collected: 05/23/21 0303    Specimen: Blood Updated: 05/23/21 0409     WBC 5.30 10*3/mm3      RBC 3.96 10*6/mm3      Hemoglobin 10.9 g/dL      Hematocrit 33.4 %      MCV 84.3 fL      MCH 27.6 pg      MCHC 32.7 g/dL      RDW 17.7 %      RDW-SD 52.5 fl      MPV 8.0 fL      Platelets 275 10*3/mm3      Neutrophil % 54.7 %      Lymphocyte % 30.1 %      Monocyte % 11.3 %      Eosinophil % 2.6 %      Basophil % 1.3 %      Neutrophils, Absolute 2.90 10*3/mm3      Lymphocytes, Absolute 1.60 10*3/mm3      Monocytes, Absolute 0.60 10*3/mm3      Eosinophils, Absolute 0.10 10*3/mm3      Basophils, Absolute 0.10 10*3/mm3      nRBC 0.0 /100 WBC         No results found for: HGBA1C  Results from last 7 days   Lab Units 05/18/21 2006   INR  0.95           No results found for: LIPASE  Lab Results   Component Value Date    CHOL 223 (H) 11/01/2018    CHLPL 193 05/07/2021    TRIG 196 (H) 05/07/2021    HDL 35 (L) 05/07/2021     (H) 05/07/2021       No results found for: INTRAOP, PREDX, FINALDX, COMDX    Microbiology Results (last 10 days)     Procedure Component Value - Date/Time    COVID PRE-OP / PRE-PROCEDURE SCREENING ORDER (NO ISOLATION) - Swab, Nasopharynx [[id number]]  (Normal) Collected: 05/19/21 0121    Lab Status: Final result Specimen: Swab  from Nasopharynx Updated: 05/19/21 0217    Narrative:      The following orders were created for panel order COVID PRE-OP / PRE-PROCEDURE SCREENING ORDER (NO ISOLATION) - Swab, Nasopharynx.  Procedure                               Abnormality         Status                     ---------                               -----------         ------                     COVID-19,CEPHEID,COR/MARISOL...[[id number]]  Normal              Final result                 Please view results for these tests on the individual orders.    COVID-19,CEPHEID,COR/MARISOL/PAD IN-HOUSE(OR EMERGENT/ADD-ON),NP SWAB IN TRANSPORT MEDIA 3-4 HR TAT, RT-PCR - Swab, Nasopharynx [[id number]]  (Normal) Collected: 05/19/21 0121    Lab Status: Final result Specimen: Swab from Nasopharynx Updated: 05/19/21 0217     COVID19 Not Detected    Narrative:      Fact sheet for providers: https://www.fda.gov/media/189366/download     Fact sheet for patients: https://www.fda.gov/media/107079/download  Fact sheet for providers: https://www.fda.gov/media/196640/download     Fact sheet for patients: https://www.fda.gov/media/551527/download    Urine Culture - Urine, Urine, Random Void [[id number]]  (Abnormal) Collected: 05/17/21 0925    Lab Status: Final result Specimen: Urine, Random Void Updated: 05/20/21 0340     Urine Culture Final report     Result 1 Escherichia coli     Comment: 50,000-100,000 colony forming units per mL  Susceptibility profile is consistent with a probable ESBL.          Susceptibility Testing Comment     Comment:       ** S = Susceptible; I = Intermediate; R = Resistant **                     P = Positive; N = Negative              MICS are expressed in micrograms per mL     Antibiotic                 RSLT#1    RSLT#2    RSLT#3    RSLT#4  Amoxicillin/Clavulanic Acid    I  Ampicillin                     R  Cefazolin                      R  Cefepime                       R  Ceftriaxone                    R  Cefuroxime                     R  Ciprofloxacin                   R  Ertapenem                      S  Gentamicin                     R  Imipenem                       S  Levofloxacin                   R  Meropenem                      S  Nitrofurantoin                 S  Piperacillin/Tazobactam        S  Tetracycline                   R  Tobramycin                     R  Trimethoprim/Sulfa             S         Narrative:      Performed at:  [address] [place] [address]  [id number]  : Cuong Casiano PhD, Phone:  [phone]          ECG/EMG Results (most recent)     Procedure Component Value Units Date/Time    EP/CRM Study [[id number]] Resulted: 05/19/21 1618     Updated: 05/20/21 0734    Narrative:            Permanent pacemaker procedure note.    Procedure:   Dual-chamber  permanent pacemaker      Date of procedure :May 19, 2021    Indication: Syncope, sinus node dysfunction, asystolic pause,, sick sinus   syndrome .    Hardware:  Pacemaker generator: [employer] model number PM [id number],serial #   [id number]  Right atrial lead is [employer] model #2088 TC-48, serial number   [id number]  Right ventricular lead is [employer] model #2088 TC-52, serial   number [id number]      Thresholds:  Right atrial threshold revealed a P wave amplitude of 3.1 mV at 1.5 V and   impedance of 660 ohms  Right ventricular threshold revealed a R wave amplitude of  10.8 mV at 0.5   V and impedance of650 ohms        Description of procedure:  Moderate conscious sedation was given utilizing IV Versed and fentanyl and   Dilaudid sometimes, administered by RN with continuous EKG oximetry and   hemodynamic monitoring supervised by me throughout the entire case,   conscious sedation time was  60 minutes.  I was present with the patient   for the duration of moderate sedation and supervised staff who had no   other duties and monitored the patient for the entire procedure patient   had Aguilera 2-3 sedation scale.  Under local  anesthesia left  subclavian   vein was accessed via the modified Seldinger technique without difficulty.    A pocket was created and hemostasis achieved.  A 6 Slovak sheath was   placed in active-fixation lead was placed in right ventricular septum   adequate thresholds obtained and secured in the pocket,  10 volts   stimulation was done to make sure there was no diaphragmatic stimulation.    Similarly active fixation right atrial lead was placed in right atrial   appendage adequate this was obtained and secured in the pocket.  Pocket   was cleaned with antibiotic solution generator connected to leads and   placed in pocket, pocket closed with suture skin closed with staples.    Under local anesthesia a loop recorder pocket was opened and loop recorder   explanted and skin closed with staples. Patient tolerated procedure well   complications were none.     Blood loss :blood loss was 5 cc.        Conclusion: Successful dual-chamber permanent pacemaker implantation and   loop recorder explantation      Plan:   We will monitor overnight  Recheck device in a.m.  Pacer care and wound care education  Follow-up in 1 week in the office for staple removal  Follow-up in pacemaker clinic    ECG 12 Lead [[id number]] Collected: 05/18/21 1924     Updated: 05/20/21 0817     QT Interval 355 ms     Narrative:      HEART RATE= 75  bpm  RR Interval= 804  ms  VA Interval= 216  ms  P Horizontal Axis= -10  deg  P Front Axis= 56  deg  QRSD Interval= 81  ms  QT Interval= 355  ms  QRS Axis= -55  deg  T Wave Axis= 52  deg  - ABNORMAL ECG -  Sinus rhythm  Borderline prolonged VA interval  Probable left atrial enlargement  Inferior infarct, old  Anterior infarct, old  When compared with ECG of 28-Apr-2019 13:48:22,  Electronically Signed By: Gómez Higgins (MARISOL) 20-May-2021 08:15:56  Date and Time of Study: 2021-05-18 19:24:49                  MRI Abdomen With & Without Contrast    Result Date: 5/19/2021   1. 2.3 x 4.0 x 4.5 cm multi loculated, macrocystic lesion  within the pancreatic head. It corresponds to the abnormality questioned on previous ultrasound. Given the presence of individual cystic locules measuring up to 2.8 cm in size, it would be favored to represent a mucinous cystadenoma or cystadenocarcinoma, although admittedly that entity more typically occurs within the pancreatic tail or pancreatic body region. While serous cystadenoma/cystadenocarcinoma occurs commonly in the head, the size of the cystic locules the vicinity thought less likely. Surgical consultation is advised. 2. Pancreatic parenchymal atrophy. 3. Tiny benign hepatic cysts. 4. Uncomplicated cholelithiasis. 5. Tiny bilateral renal cysts.   Electronically Signed By-Eulalia Webb MD On:5/19/2021 12:22 PM This report was finalized on [id number] by  Eulalia Webb MD.    XR Chest 1 View    Result Date: 5/19/2021  1.A left cardiac pacemaker/AICD is noted. The leads extend to the region of the right atrium and right ventricle or intraventricular septum. There is no evidence for pneumothorax following lead placement. 2.Otherwise there is no significant change when compared to the prior study. There is no evidence for acute cardiopulmonary process.  Electronically Signed By-Ventura Wells MD On:5/19/2021 5:52 PM This report was finalized on [id number] by  Ventura Wells MD.    XR Chest 1 View    Result Date: 5/18/2021  1.Mild cardiomegaly with suspected cardiac device projecting over the lower left thorax. 2.No radiographic findings of acute pulmonary abnormality.  Electronically Signed By-Samy Martinez MD On:5/18/2021 8:25 PM This report was finalized on [id number] by  Samy Martinez MD.          Xrays, labs reviewed personally by physician.    Medication Review:   I have reviewed the patient's current medication list      Scheduled Meds  amLODIPine, 5 mg, Oral, Nightly  levothyroxine, 75 mcg, Oral, Daily  losartan, 50 mg, Oral, Daily  meropenem, 500 mg, Intravenous, Q8H  sodium chloride, 10  mL, Intravenous, Q12H  sodium chloride, 3 mL, Intravenous, Q12H        Meds Infusions  Pharmacy to Dose meropenem (MERREM),         Meds PRN  •  acetaminophen **OR** acetaminophen **OR** acetaminophen  •  Calcium Gluconate-NaCl **AND** calcium gluconate **AND** Calcium, Ionized  •  HYDROmorphone **AND** naloxone  •  magnesium sulfate **OR** magnesium sulfate **OR** magnesium sulfate  •  melatonin  •  nitroglycerin  •  ondansetron **OR** ondansetron  •  [DISCONTINUED] meropenem **AND** Pharmacy to Dose meropenem (MERREM)  •  potassium & sodium phosphates **OR** potassium & sodium phosphates  •  potassium chloride  •  potassium chloride  •  [COMPLETED] Insert peripheral IV **AND** sodium chloride  •  sodium chloride  •  sodium chloride        Assessment/Plan   Assessment/Plan     Active Hospital Problems    Diagnosis  POA   • **Syncope [R55]  Yes   • SSS (sick sinus syndrome) (CMS/HCC) [I49.5]  Unknown   • Cardiac arrhythmia [I49.9]  Yes   • 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       MEDICAL DECISION MAKING COMPLEXITY BY PROBLEM:   Syncope     -S/p PPM implantation on 5/19/2021 for SSS with prolonged pulses         SSS with asystolic pauses     -seeabove     ESBL E. Coli      -on meropenem       - stable infectious parameters afebrile          Gallbladder mass with elevated LFTs     -GI is  aware, and was seen recently in the office with work-up in progress.      -mri of the abdomen without contrast on 5/19/2021 showed a 2.3 x 4.0 x 4.5 cm multiloculated, microcytic lesion within the pancreatic head favoring mucinous cystadenoma or cyst adenocarcinoma      -outpatient GI follow-up with management     CKD, stage IIIa     -stable, cont to monitor renal  indices closely     Hypertension      -Amlodipine/losartan     Hypothyroidism     -Levothyroxine     Family history of breast CA     -biopsy in the past     History of recurrent UTI      -patient stated that she was recently treated with nitrofurantoin     Generalized weakness:      -cont PT as tolerated and encouraged to increase activities     Disposition: Possibly home in a.m.            VTE Prophylaxis -   Mechanical Order History:      Ordered        05/19/21 0143  Place Sequential Compression Device  Once         05/19/21 0143  Maintain Sequential Compression Device  Continuous                 Pharmalogical Order History:     None            Code Status -   Code Status and Medical Interventions:   Ordered at: 05/18/21 2132     Limited Support to NOT Include:    Cardioversion/Defibrillation    Intubation     Code Status:    No CPR     Medical Interventions (Level of Support Prior to Arrest):    Limited       Discharge Planning        Electronically signed by Manpreet Dinh MD, 05/23/21, 13:32 EDT.  [facility] Hospitalist Team

## 2021-05-23 NOTE — PLAN OF CARE
Goal Outcome Evaluation:      Pt is very pleasant, independent and compliant. No complaints of pain or SOA. Pt ambulated to bathroom independently. Will continue to educate about antibiotics, medications, fall prevention, and skin preservation.

## 2021-05-23 NOTE — PROGRESS NOTES
Cardiology Progress Note    Patient Identification:  Name: Stacy Monroy  Age: 89 y.o.  Sex: female  :  1930  MRN: [id number]                 Follow Up / Chief Complaint: Syncope, sick sinus syndrome status post pacemaker  Chief Complaint   Patient presents with   • Syncope       Interval History: Patient underwent dual-chamber permanent pacemaker implantation 2021       Subjective: Patient seen and examined.  Chart reviewed.  Labs reviewed.  Discussed with RN taking care of patient.      Objective: Urine cultures are growing [id number],000 E. coli probably ESBL     History of Present Illness:       This is a 89-year-old with PMH of     Syncope, ILR  Hypertension  Mixed hyperlipidemia  Hypothyroidism  Hyperglycemia, prediabetes with hemoglobin A1c of 5.8 on 2021  CKD 3A with GFR 43  Pernicious anemia, B12 deficiency  Gallbladder mass, elevated LFTs  Breast biopsy  Family history of breast CA and sister  Non-smoker  Allergies/intolerance to penicillin, azithromycin           Presented through emergency room 2021 with complaint of syncope.  Patient's loop recorder revealed prolonged asystolic pause therefore patient got admitted.  Patient has elevated LFTs and work-up revealed gallbladder mass.  Work-up revealed proBNP normal at 150.  CMP with elevated ALT at 419, AST at 400, alkaline phosphatase 392, GFR 43 with creatinine of 1.12.  Hemoglobin A1c 2020 of 5.8.  Lipid profile with low HDL at 35, triglycerides 196, , total cholesterol 193.  Normal CBC  Chest x-ray 2021 with mild cardiomegaly, loop recorder, no acute pulmonary abnormality  EKG reviewed by me from 2021 reveals sinus rhythm with rate of 75 bpm with first-degree AV block, left atrial enlargement, inferior and anterior Q waves and poor R wave progression.  Echocardiogram from 3/4/2019 revealed normal LV systolic function EF of 55 to 60% with diastolic dysfunction..     Assessment:       Sick sinus syndrome with  asystolic pauses s/p [place] dual-chamber permanent pacemaker implantation 5/19/2020  Syncope  Hypertension  Dyslipidemia with low HDL  Prediabetes  Elevated LFTs possible gallbladder mass  CKD 3        Recommendations / Plan:         Telemetry is revealing sinus rhythm  Patient's TSH previously was normal.  Patient underwent dual-chamber permanent pacemaker implantation 5/19/2020  Patient is being treated for UTI currently.  Thank you very much for letting me participate in the care of this pleasant lady  We will follow up as outpatient.  Call me back if I can be of any further assistance.    Past Medical History:  Past Medical History:   Diagnosis Date   • Arthritis    • Heart murmur    • Hyperlipidemia    • Hypertension    • Other specified hypothyroidism 8/15/2019     Past Surgical History:  Past Surgical History:   Procedure Laterality Date   • BREAST BIOPSY Right    • CARDIAC ELECTROPHYSIOLOGY PROCEDURE N/A 5/19/2021    Procedure: Pacemaker DC new;  Surgeon: Edmar Rubin MD;  Location: [facility] INVASIVE LOCATION;  Service: Cardiovascular;  Laterality: N/A;   • OTHER SURGICAL HISTORY  2019    Tilt table    • OTHER SURGICAL HISTORY  2019    tilt table         Social History:   Social History     Tobacco Use   • Smoking status: Never Smoker   • Smokeless tobacco: Never Used   Substance Use Topics   • Alcohol use: No      Family History:  Family History   Problem Relation Age of Onset   • Hypertension Mother    • Stroke Mother    • Hypertension Father    • Stroke Father    • Breast cancer Sister 50   • Ovarian cancer Daughter 57   • Breast cancer Niece 53          Allergies:  Allergies   Allergen Reactions   • Azithromycin Rash   • Penicillin G Rash   • Penicillins Rash     Scheduled Meds:  amLODIPine, 5 mg, Nightly  levothyroxine, 75 mcg, Daily  losartan, 50 mg, Daily  meropenem, 500 mg, Q8H  sodium chloride, 10 mL, Q12H  sodium chloride, 3 mL, Q12H          Review of Systems:  "  ROS    Constitution: Negative for chills and fever.   Cardiovascular: Negative for chest pain and palpitations.   Respiratory: Negative for cough and hemoptysis.    Gastrointestinal: Negative for nausea.        Constitutional:  Temp:  [97.3 °F (36.3 °C)-98.2 °F (36.8 °C)] 97.9 °F (36.6 °C)  Heart Rate:  [65-82] 65  Resp:  [11-18] 16  BP: (124-140)/(57-97) 127/57    Physical Exam   /57 (BP Location: Right arm, Patient Position: Lying)   Pulse 65   Temp 97.9 °F (36.6 °C) (Oral)   Resp 16   Ht 160 cm (63\")   Wt 66.1 kg (145 lb 11.6 oz)   LMP  (LMP Unknown)   SpO2 96%   BMI 25.81 kg/m²   General:  Appears in no acute distress  Eyes: Sclera is anicteric,  conjunctiva is clear   HEENT:  No JVD. Thyroid not visibly enlarged. No mucosal pallor or cyanosis  Respiratory: Respirations regular and unlabored at rest.  Bilaterally good breath sounds, with good air entry in all fields. No crackles, rubs or wheezes auscultated  Cardiovascular: S1,S2 Regular rate and rhythm. No murmur, rub or gallop auscultated.  . No pretibial pitting edema  Gastrointestinal: Abdomen nondistended, soft  Musculoskeletal:  No abnormal movements  Extremities: No digital clubbing or cyanosis  Skin: Color pink. Skin warm and dry to touch. No rashes  No xanthoma  Neuro: Alert and awake, no lateralizing deficits appreciated    INTAKE AND OUTPUT:    Intake/Output Summary (Last 24 hours) at 5/23/2021 0936  Last data filed at 5/23/2021 0915  Gross per 24 hour   Intake 980 ml   Output 900 ml   Net 80 ml       Cardiographics  Telemetry: Sinus rhythm    ECG:   ECG 12 Lead   Final Result   HEART RATE= 75  bpm   RR Interval= 804  ms   IA Interval= 216  ms   P Horizontal Axis= -10  deg   P Front Axis= 56  deg   QRSD Interval= 81  ms   QT Interval= 355  ms   QRS Axis= -55  deg   T Wave Axis= 52  deg   - ABNORMAL ECG -   Sinus rhythm   Borderline prolonged IA interval   Probable left atrial enlargement   Inferior infarct, old   Anterior infarct, old " "  When compared with ECG of 28-Apr-2019 13:48:22,   Electronically Signed By: Gómez Higgins (MARIS[facility]) 20-May-2021 08:15:56   Date and Time of Study: 2021-05-18 19:24:49        I have personally reviewed EKG    Echocardiogram:     Lab Review   I have reviewed the labs      Results from last 7 days   Lab Units 05/23/21  0303   MAGNESIUM mg/dL 2.0     Results from last 7 days   Lab Units 05/23/21  0441   SODIUM mmol/L 140   POTASSIUM mmol/L 4.6   BUN mg/dL 20   CREATININE mg/dL 0.82   CALCIUM mg/dL 9.4         Results from last 7 days   Lab Units 05/23/21  0303 05/22/21  0235 05/21/21  0232   WBC 10*3/mm3 5.30 6.20 7.00   HEMOGLOBIN g/dL 10.9* 10.5* 11.1*   HEMATOCRIT % 33.4* 32.2* 33.6*   PLATELETS 10*3/mm3 275 282 302     Results from last 7 days   Lab Units 05/18/21 2006   INR  0.95       RADIOLOGY:  Imaging Results (Last 24 Hours)     ** No results found for the last 24 hours. **                )5/23/2021  Edmar Rubin MD      EMR Dragon/Transcription:   \"Dictated utilizing Dragon dictation\".   "

## 2021-05-24 ENCOUNTER — READMISSION MANAGEMENT (OUTPATIENT)
Dept: CALL CENTER | Facility: HOSPITAL | Age: 86
End: 2021-05-24

## 2021-05-24 VITALS
BODY MASS INDEX: 26.21 KG/M2 | SYSTOLIC BLOOD PRESSURE: 109 MMHG | OXYGEN SATURATION: 98 % | DIASTOLIC BLOOD PRESSURE: 52 MMHG | HEART RATE: 67 BPM | WEIGHT: 147.93 LBS | HEIGHT: 63 IN | TEMPERATURE: 97.8 F | RESPIRATION RATE: 16 BRPM

## 2021-05-24 LAB
ANION GAP SERPL CALCULATED.3IONS-SCNC: 8 MMOL/L (ref 5–15)
BASOPHILS # BLD AUTO: 0.1 10*3/MM3 (ref 0–0.2)
BASOPHILS NFR BLD AUTO: 2.4 % (ref 0–1.5)
BUN SERPL-MCNC: 17 MG/DL (ref 8–23)
BUN/CREAT SERPL: 23 (ref 7–25)
CALCIUM SPEC-SCNC: 9.5 MG/DL (ref 8.2–9.6)
CHLORIDE SERPL-SCNC: 106 MMOL/L (ref 98–107)
CO2 SERPL-SCNC: 25 MMOL/L (ref 22–29)
CREAT SERPL-MCNC: 0.74 MG/DL (ref 0.57–1)
DEPRECATED RDW RBC AUTO: 50.8 FL (ref 37–54)
EOSINOPHIL # BLD AUTO: 0.1 10*3/MM3 (ref 0–0.4)
EOSINOPHIL NFR BLD AUTO: 3 % (ref 0.3–6.2)
ERYTHROCYTE [DISTWIDTH] IN BLOOD BY AUTOMATED COUNT: 17.2 % (ref 12.3–15.4)
GFR SERPL CREATININE-BSD FRML MDRD: 74 ML/MIN/1.73
GLUCOSE SERPL-MCNC: 86 MG/DL (ref 65–99)
HCT VFR BLD AUTO: 34.9 % (ref 34–46.6)
HGB BLD-MCNC: 11.5 G/DL (ref 12–15.9)
LYMPHOCYTES # BLD AUTO: 1.7 10*3/MM3 (ref 0.7–3.1)
LYMPHOCYTES NFR BLD AUTO: 35.2 % (ref 19.6–45.3)
MAGNESIUM SERPL-MCNC: 2 MG/DL (ref 1.6–2.4)
MCH RBC QN AUTO: 28 PG (ref 26.6–33)
MCHC RBC AUTO-ENTMCNC: 33 G/DL (ref 31.5–35.7)
MCV RBC AUTO: 84.8 FL (ref 79–97)
MONOCYTES # BLD AUTO: 0.6 10*3/MM3 (ref 0.1–0.9)
MONOCYTES NFR BLD AUTO: 11.8 % (ref 5–12)
NEUTROPHILS NFR BLD AUTO: 2.3 10*3/MM3 (ref 1.7–7)
NEUTROPHILS NFR BLD AUTO: 47.6 % (ref 42.7–76)
NRBC BLD AUTO-RTO: 0 /100 WBC (ref 0–0.2)
PLATELET # BLD AUTO: 259 10*3/MM3 (ref 140–450)
PMV BLD AUTO: 7.7 FL (ref 6–12)
POTASSIUM SERPL-SCNC: 4.3 MMOL/L (ref 3.5–5.2)
RBC # BLD AUTO: 4.12 10*6/MM3 (ref 3.77–5.28)
SODIUM SERPL-SCNC: 139 MMOL/L (ref 136–145)
WBC # BLD AUTO: 4.8 10*3/MM3 (ref 3.4–10.8)

## 2021-05-24 PROCEDURE — 99024 POSTOP FOLLOW-UP VISIT: CPT | Performed by: NURSE PRACTITIONER

## 2021-05-24 PROCEDURE — 99239 HOSP IP/OBS DSCHRG MGMT >30: CPT | Performed by: INTERNAL MEDICINE

## 2021-05-24 PROCEDURE — 83735 ASSAY OF MAGNESIUM: CPT | Performed by: INTERNAL MEDICINE

## 2021-05-24 PROCEDURE — 25010000002 MEROPENEM PER 100 MG: Performed by: INTERNAL MEDICINE

## 2021-05-24 PROCEDURE — 85025 COMPLETE CBC W/AUTO DIFF WBC: CPT | Performed by: INTERNAL MEDICINE

## 2021-05-24 PROCEDURE — 80048 BASIC METABOLIC PNL TOTAL CA: CPT | Performed by: INTERNAL MEDICINE

## 2021-05-24 PROCEDURE — 97116 GAIT TRAINING THERAPY: CPT

## 2021-05-24 RX ADMIN — LOSARTAN POTASSIUM 50 MG: 50 TABLET, FILM COATED ORAL at 09:59

## 2021-05-24 RX ADMIN — MEROPENEM 500 MG: 500 INJECTION, POWDER, FOR SOLUTION INTRAVENOUS at 09:59

## 2021-05-24 RX ADMIN — Medication 3 ML: at 09:59

## 2021-05-24 RX ADMIN — MEROPENEM 500 MG: 500 INJECTION, POWDER, FOR SOLUTION INTRAVENOUS at 04:50

## 2021-05-24 RX ADMIN — LEVOTHYROXINE SODIUM 75 MCG: 0.07 TABLET ORAL at 04:50

## 2021-05-24 NOTE — DISCHARGE SUMMARY
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       Hospital Course:  89-year-old frail-appearing female with multiple comorbidities significant for syncope, status post loop recorder implantation which showed prolonged asystolic pause, gallbladder mass with elevated LFTs, pernicious anemia, B12 deficiency, hypothyroidism, hypertension, hypothyroidism, family history of breast cancer, CKD, stage III and HLD.  She presented to [facility] ED on 5/15/2021 complaining of syncopal episode--> with noted prolonged asystolic pause on loop recorder. Denies chest pain, no shortness of breath, and no reported fever or chills.  Abdominal pain, nausea or vomiting     Laboratory is mostly unremarkable except for elevated LFTs with ALT/AST of 419/400 and alkaline phosphate of 392. EKG shows sinus rhythm with heart rate of 75 with first-degree AV block and inferior and anterior Q waves with poor progression.  Most recent 2D echo on 3/4/2019 revealed preserved LV function, with estimated EF of 55 to 60% with diastolic dysfunction.  Chest x-ray showed mild cardiomegaly and in no acute  cardiopulmonary findings.       Patient was admitted and treated for syncopal episode with noted SSS with prolonged pauses on loop recorder.  She was evaluated by cardiologist and underwent PPM implantation on 5/19/2021.  Hospital course was slightly protracted due to urine culture growing ESBL E. coli with history of recurrent urinary tract infection.  She was treated with IV antibiotic with meropenem for total of 7 antibiotics treatments.  The rest of her clinical course was unremarkable.  So far, she remained afebrile with stable hemodynamics.  On discharge, she denies chest pain, no shortness of breath, dizziness or lightheadedness.  No dysuria, abdominal pain, nausea vomiting.                 Recommendation for Outpatient Providers:       Follow-up with PCP and cardiologist as scheduled      Reasons For Change In Medications and Indications for New Medications:        Day of Discharge     HPI:       Vital Signs:   Temp:  [97.8 °F (36.6 °C)-99.3 °F (37.4 °C)] 97.8 °F (36.6 °C)  Heart Rate:  [65-80] 67  Resp:  [11-20] 16  BP: (109-149)/(52-70) 109/52     Physical Exam  Constitutional:       General: She is not in acute distress.     Appearance: Normal appearance. She is not ill-appearing.   HENT:      Head: Normocephalic and atraumatic.      Mouth/Throat:      Mouth: Mucous membranes are moist.   Eyes:      Pupils: Pupils are equal, round, and reactive to light.   Cardiovascular:      Rate and Rhythm: Normal rate and regular rhythm.      Pulses: Normal pulses.   Pulmonary:      Effort: Pulmonary effort is normal.      Breath sounds: Normal breath sounds.   Abdominal:      Palpations: Abdomen is soft.   Musculoskeletal:         General: No swelling. Normal range of motion.      Cervical back: Normal range of motion and neck supple.   Skin:     General: Skin is warm.   Neurological:      General: No focal deficit present.      Mental Status: She is alert. Mental status is at baseline.      Comments: Hard of  hearing   Psychiatric:         Mood and Affect: Mood normal.         Pertinent  and/or Most Recent Results     Results from last 7 days   Lab Units 05/24/21  0521 05/23/21  0441 05/23/21  0303 05/22/21  0235 05/21/21  0232 05/20/21  0259 05/19/21  0310 05/18/21 2006   WBC 10*3/mm3 4.80  --  5.30 6.20 7.00 6.60 6.10 7.10   HEMOGLOBIN g/dL 11.5*  --  10.9* 10.5* 11.1* 10.4* 11.4* 11.6*   HEMATOCRIT % 34.9  --  33.4* 32.2* 33.6* 31.8* 34.8 35.0   PLATELETS 10*3/mm3 259  --  275 282 302 318 359 377   SODIUM mmol/L 139 140  --  138 137 139 141 139   POTASSIUM mmol/L 4.3 4.6  --  4.2 4.3 4.2 4.4 4.5   CHLORIDE mmol/L 106 106  --  106 106 107 105 103   CO2 mmol/L 25.0 26.0  --  25.0 23.0 23.0 27.0 26.0   BUN mg/dL 17 20  --  18 18 19 19 20   CREATININE mg/dL 0.74 0.82  --  0.85 0.83 0.85 0.92 1.00   GLUCOSE mg/dL 86 92  --  95 99 125* 93 107*   CALCIUM mg/dL 9.5 9.4  --  9.0 9.0 8.9 9.5 9.6     Results from last 7 days   Lab Units 05/18/21 2006   PROTIME Seconds 10.5   INR  0.95           Invalid input(s): TG, LDLCALC, LDLREALC  Results from last 7 days   Lab Units 05/18/21 2006   TSH uIU/mL 4.410*   PROBNP pg/mL 150.7       Brief Urine Lab Results  (Last result in the past 365 days)      Color   Clarity   Blood   Leuk Est   Nitrite   Protein   CREAT   Urine HCG        05/17/21 0910 Yellow Cloudy 50 Eddie/ul 75 Emmanuel/ul Negative Trace               Microbiology Results Abnormal     Procedure Component Value - Date/Time    COVID PRE-OP / PRE-PROCEDURE SCREENING ORDER (NO ISOLATION) - Swab, Nasopharynx [[id number]]  (Normal) Collected: 05/19/21 0121    Lab Status: Final result Specimen: Swab from Nasopharynx Updated: 05/19/21 0217    Narrative:      The following orders were created for panel order COVID PRE-OP / PRE-PROCEDURE SCREENING ORDER (NO ISOLATION) - Swab, Nasopharynx.  Procedure                               Abnormality         Status                     ---------                               -----------          ------                     COVID-19,CEPHEID,COR/MARISOL...[[id number]]  Normal              Final result                 Please view results for these tests on the individual orders.    COVID-19,CEPHEID,COR/MARISOL/PAD IN-HOUSE(OR EMERGENT/ADD-ON),NP SWAB IN TRANSPORT MEDIA 3-4 HR TAT, RT-PCR - Swab, Nasopharynx [[id number]]  (Normal) Collected: 05/19/21 0121    Lab Status: Final result Specimen: Swab from Nasopharynx Updated: 05/19/21 0217     COVID19 Not Detected    Narrative:      Fact sheet for providers: https://www.fda.gov/media/333834/download     Fact sheet for patients: https://www.fda.gov/media/125027/download  Fact sheet for providers: https://www.fda.gov/media/397408/download     Fact sheet for patients: https://www.fda.gov/media/096267/download          MRI Abdomen With & Without Contrast    Result Date: 5/19/2021  Impression:  1. 2.3 x 4.0 x 4.5 cm multi loculated, macrocystic lesion within the pancreatic head. It corresponds to the abnormality questioned on previous ultrasound. Given the presence of individual cystic locules measuring up to 2.8 cm in size, it would be favored to represent a mucinous cystadenoma or cystadenocarcinoma, although admittedly that entity more typically occurs within the pancreatic tail or pancreatic body region. While serous cystadenoma/cystadenocarcinoma occurs commonly in the head, the size of the cystic locules the vicinity thought less likely. Surgical consultation is advised. 2. Pancreatic parenchymal atrophy. 3. Tiny benign hepatic cysts. 4. Uncomplicated cholelithiasis. 5. Tiny bilateral renal cysts.   Electronically Signed By-Eulalia Webb MD On:5/19/2021 12:22 PM This report was finalized on [id number] by  Eulalia Webb MD.    XR Chest 1 View    Result Date: 5/19/2021  Impression: 1.A left cardiac pacemaker/AICD is noted. The leads extend to the region of the right atrium and right ventricle or intraventricular septum. There is no evidence for pneumothorax following  lead placement. 2.Otherwise there is no significant change when compared to the prior study. There is no evidence for acute cardiopulmonary process.  Electronically Signed By-Ventura Wells MD On:5/19/2021 5:52 PM This report was finalized on [id number] by  Ventura Wells MD.    XR Chest 1 View    Result Date: 5/18/2021  Impression: 1.Mild cardiomegaly with suspected cardiac device projecting over the lower left thorax. 2.No radiographic findings of acute pulmonary abnormality.  Electronically Signed By-Samy Martinez MD On:5/18/2021 8:25 PM This report was finalized on [id number] by  Samy Martinez MD.    US Abdomen Limited    Result Date: 5/11/2021  Impression: 1. A normal gallbladder is not visualized. What appears to be a complex cystic structure with multiple thick septations is seen in the expected location of the gallbladder. This could represent an abnormal gallbladder with internal mass lesion or fibrous bands, or even gallbladder adenomyomatosis.. Consider correlation to MRI abdomen without and with contrast for further evaluation.  Electronically Signed By-Eulalia Webb MD On:5/11/2021 3:18 PM This report was finalized on [id number] by  Eulalia Webb MD.          Test Results Pending at Discharge        Procedures Performed  Procedure(s):  Pacemaker DC new         Consults:   Consults     Date and Time Order Name Status Description    5/19/2021  9:03 AM Inpatient Gastroenterology Consult      5/19/2021  9:02 AM Inpatient Cardiac Electrophysiology Consult Completed     5/19/2021  7:44 AM Inpatient Cardiology Consult              Discharge Details        Discharge Medications      ASK your doctor about these medications      Instructions Start Date   amLODIPine 5 MG tablet  Commonly known as: NORVASC   TAKE ONE TABLET BY MOUTH ONCE DAILY FOR HEART AND FOR BLOOD PRESSURE      calcium citrate-vitamin d 200-250 MG-UNIT tablet tablet  Commonly known as: CITRACAL   1 tablet, Oral, Daily      coenzyme  Q10 100 MG capsule   100 mg, Oral, Daily      levothyroxine 75 MCG tablet  Commonly known as: SYNTHROID, LEVOTHROID   75 mcg, Oral, Daily      losartan 50 MG tablet  Commonly known as: COZAAR   50 mg, Oral, Daily      Omega-3 1000 MG capsule   1 tablet, Oral, Daily             Allergies   Allergen Reactions   • Azithromycin Rash   • Penicillin G Rash   • Penicillins Rash         Discharge Disposition:  Home    Diet:  Hospital:  Diet Order   Procedures   • Diet Cardiac, Diabetic/Consistent Carbs; Healthy Heart; Diabetic - Consistent Carb         Discharge Activity:   Ad clyde. as tolerated      CODE STATUS:    Code Status and Medical Interventions:   Ordered at: 05/18/21 2132     Limited Support to NOT Include:    Cardioversion/Defibrillation    Intubation     Code Status:    No CPR     Medical Interventions (Level of Support Prior to Arrest):    Limited         Follow-up Appointments  Future Appointments   Date Time Provider Department Center   5/27/2021  1:00 PM [facility] NA   5/27/2021  1:00 PM NURSE/MA, [facility] [place] [facility] NA   5/28/2021 10:00 AM Ruslan Layne MD [facility]             Condition on Discharge:      Stable      Electronically signed by Manpreet Dinh MD, 05/24/21, 1:22 PM EDT.      Time: I spent  > 30  minutes on this discharge activity which included face-to-face encounter with the patient/reviewing the data in the system/coordination of the care with the nursing staff as well as consultants/documentation/entering orders.

## 2021-05-24 NOTE — THERAPY TREATMENT NOTE
Subjective: Pt agreeable to participate and reports she will likely be discharging home today and will have assist from two nieces that will provide 24/7 assist as needed.    Objective:     Bed mobility - N/A or Not attempted. (Pt sitting in recliner at start and end of session  Transfers - Modified-Independent  Ambulation - 400 feet Supervision (occasional scissoring/narrow YULISSA but able to self correct with no LOB)  Stairs - Not assessed this date (pt reports she has no concerns, as she only has 1 HOMERO home and will have assistance from her niece)    Pain: 0/10 (denies pain)    Education: Provided education on importance of mobility and skilled verbal / tactile cueing throughout intervention.     Assessment: Pt progressing very well in therapy and appears to have no further skilled therapy needs, demonstrating MOD I with transfers and supervision for gait 400ft with no AD. Anticipate safe d/c home with 24/7 supervison/assist from nieces as needed and plan to d/c from PT services.    Plan/Recommendations:   Pt would benefit from Home with family assist at discharge from facility and requires no DME at discharge.   Pt desires Home with family assist at discharge. Pt cooperative; agreeable to therapeutic recommendations and plan of care.     Basic Mobility 6-click:  Rollin = Total, A lot = 2, A little = 3; 4 = None  Supine>Sit:   1 = Total, A lot = 2, A little = 3; 4 = None   Sit>Stand with arms:  1 = Total, A lot = 2, A little = 3; 4 = None  Bed>Chair:   1 = Total, A lot = 2, A little = 3; 4 = None  Ambulate in room:  1 = Total, A lot = 2, A little = 3; 4 = None  3-5 Steps with railin = Total, A lot = 2, A little = 3; 4 = None  Score: 22    Post-Tx Position: Up in Chair and Call light and personal items within reach (no chair alarm at start of session)  PPE: gloves, surgical mask, eyewear protection

## 2021-05-24 NOTE — PLAN OF CARE
Problem: Adult Inpatient Plan of Care  Goal: Plan of Care Review  Flowsheets  Taken 5/24/2021 1205 by Jesenia Scott, PT  Outcome Summary: Pt progressing very well in therapy and appears to have no further skilled therapy needs, demonstrating MOD I with transfers and supervision for gait 400ft with no AD. Anticipate safe d/c home with 24/7 supervison/assist from nieces as needed and plan to d/c from PT services.  Progress: improving  Plan of Care Reviewed With: patient

## 2021-05-24 NOTE — PAYOR COMM NOTE
"Discharge notification for case# [id number]    ----------    THANK YOU,    MELIDA Jean, RN  Utilization Review  [facility]  Phone: [phone]  Fax: [phone]      NPI: [id number]  Tax ID: [id number]    Stacy Monroy (89 y.o. Female)     Date of Birth Social Security Number Address Home Phone MRN    1930  [address] [facility] [place] [facility] [phone] [id number]    [facility] Mar[facility] Status          None        Admission Date Admission Type Admitting Provider Attending Provider Department, Room/Bed    21 Emergency Manpreet Dinh MD  [facility] CARE,     Discharge Date Discharge Disposition Discharge Destination        2021 Home or Self Care              Attending Provider: (none)   Allergies: Azithromycin, Penicillin G, Penicillins    Isolation: None   Infection: None   Code Status: No CPR    Ht: 160 cm (63\")   Wt: 67.1 kg (147 lb 14.9 oz)    Admission Cmt: None   Principal Problem: Syncope [R55]                 Active Insurance as of 2021     Primary Coverage     Payor Plan Insurance Group Employer/Plan Group    AETNA MEDICARE REPLACEMENT AETNA MEDICARE REPLACEMENT [id number]     Payor Plan Address Payor Plan Phone Number Payor Plan Fax Number Effective Dates    [address] [phone]  2020 - None Entered    [facility] [id number]       Subscriber Name Subscriber Birth Date Member ID       STACY MONROY 1930 [id number]                 Emergency Contacts      (Rel.) Home Phone Work Phone Mobile Phone    GAIL REZA (Friend) [phone] -- --               Discharge Summary      Manpreet Dinh MD at 21 [facility]                [facility] Services  DISCHARGE SUMMARY        Prepared For PCP:  Mita Moise MD    Patient Name: Stacy Monroy  : 1930  MRN: [id number]      Date of Admission:   2021    Date of Discharge:  2021    Length of stay:  LOS: " 4 days     Hospital Course     Presenting Problem:   Cardiac arrhythmia, unspecified cardiac arrhythmia type [I49.9]  Syncope, unspecified syncope type [R55]      Active Hospital Problems    Diagnosis  POA   • **Syncope [R55]  Yes   • SSS (sick sinus syndrome) (CMS/HCC) [I49.5]  Unknown   • Cardiac arrhythmia [I49.9]  Yes   • 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       Hospital Course:  89-year-old frail-appearing female with multiple comorbidities significant for syncope, status post loop recorder implantation which showed prolonged asystolic pause, gallbladder mass with elevated LFTs, pernicious anemia, B12 deficiency, hypothyroidism, hypertension, hypothyroidism, family history of breast cancer, CKD, stage III and HLD.  She presented to [facility] ED on 5/15/2021 complaining of syncopal episode--> with noted prolonged asystolic pause on loop recorder. Denies chest pain, no shortness of breath, and no reported fever or chills.  Abdominal pain, nausea or vomiting     Laboratory is mostly unremarkable except for elevated LFTs with ALT/AST of 419/400 and alkaline phosphate of 392. EKG shows sinus rhythm with heart rate of 75 with first-degree AV block and inferior and anterior Q waves with poor progression.  Most recent 2D echo on 3/4/2019 revealed preserved LV function, with estimated EF of 55 to 60% with diastolic dysfunction.  Chest x-ray showed mild cardiomegaly and in no acute cardiopulmonary findings.       Patient was admitted and treated for syncopal episode with noted SSS with prolonged pauses on loop recorder.  She was evaluated by cardiologist and underwent PPM implantation on 5/19/2021.  Hospital course was slightly protracted due to urine  culture growing ESBL E. coli with history of recurrent urinary tract infection.  She was treated with IV antibiotic with meropenem for total of 7 antibiotics treatments.  The rest of her clinical course was unremarkable.  So far, she remained afebrile with stable hemodynamics.  On discharge, she denies chest pain, no shortness of breath, dizziness or lightheadedness.  No dysuria, abdominal pain, nausea vomiting.                 Recommendation for Outpatient Providers:       Follow-up with PCP and cardiologist as scheduled      Reasons For Change In Medications and Indications for New Medications:        Day of Discharge     HPI:       Vital Signs:   Temp:  [97.8 °F (36.6 °C)-99.3 °F (37.4 °C)] 97.8 °F (36.6 °C)  Heart Rate:  [65-80] 67  Resp:  [11-20] 16  BP: (109-149)/(52-70) 109/52     Physical Exam  Constitutional:       General: She is not in acute distress.     Appearance: Normal appearance. She is not ill-appearing.   HENT:      Head: Normocephalic and atraumatic.      Mouth/Throat:      Mouth: Mucous membranes are moist.   Eyes:      Pupils: Pupils are equal, round, and reactive to light.   Cardiovascular:      Rate and Rhythm: Normal rate and regular rhythm.      Pulses: Normal pulses.   Pulmonary:      Effort: Pulmonary effort is normal.      Breath sounds: Normal breath sounds.   Abdominal:      Palpations: Abdomen is soft.   Musculoskeletal:         General: No swelling. Normal range of motion.      Cervical back: Normal range of motion and neck supple.   Skin:     General: Skin is warm.   Neurological:      General: No focal deficit present.      Mental Status: She is alert. Mental status is at baseline.      Comments: Hard of hearing   Psychiatric:         Mood and Affect: Mood normal.         Pertinent  and/or Most Recent Results     Results from last 7 days   Lab Units 05/24/21  0521 05/23/21  0441 05/23/21  0303 05/22/21  0235 05/21/21  0232 05/20/21  0259 05/19/21  0310 05/18/21 2006   WBC 10*3/mm3  4.80  --  5.30 6.20 7.00 6.60 6.10 7.10   HEMOGLOBIN g/dL 11.5*  --  10.9* 10.5* 11.1* 10.4* 11.4* 11.6*   HEMATOCRIT % 34.9  --  33.4* 32.2* 33.6* 31.8* 34.8 35.0   PLATELETS 10*3/mm3 259  --  275 282 302 318 359 377   SODIUM mmol/L 139 140  --  138 137 139 141 139   POTASSIUM mmol/L 4.3 4.6  --  4.2 4.3 4.2 4.4 4.5   CHLORIDE mmol/L 106 106  --  106 106 107 105 103   CO2 mmol/L 25.0 26.0  --  25.0 23.0 23.0 27.0 26.0   BUN mg/dL 17 20  --  18 18 19 19 20   CREATININE mg/dL 0.74 0.82  --  0.85 0.83 0.85 0.92 1.00   GLUCOSE mg/dL 86 92  --  95 99 125* 93 107*   CALCIUM mg/dL 9.5 9.4  --  9.0 9.0 8.9 9.5 9.6     Results from last 7 days   Lab Units 05/18/21 2006   PROTIME Seconds 10.5   INR  0.95           Invalid input(s): TG, LDLCALC, LDLREALC  Results from last 7 days   Lab Units 05/18/21 2006   TSH uIU/mL 4.410*   PROBNP pg/mL 150.7       Brief Urine Lab Results  (Last result in the past 365 days)      Color   Clarity   Blood   Leuk Est   Nitrite   Protein   CREAT   Urine HCG        05/17/21 0910 Yellow Cloudy 50 Eddie/ul 75 Emmanuel/ul Negative Trace               Microbiology Results Abnormal     Procedure Component Value - Date/Time    COVID PRE-OP / PRE-PROCEDURE SCREENING ORDER (NO ISOLATION) - Swab, Nasopharynx [[id number]]  (Normal) Collected: 05/19/21 0121    Lab Status: Final result Specimen: Swab from Nasopharynx Updated: 05/19/21 0217    Narrative:      The following orders were created for panel order COVID PRE-OP / PRE-PROCEDURE SCREENING ORDER (NO ISOLATION) - Swab, Nasopharynx.  Procedure                               Abnormality         Status                     ---------                               -----------         ------                     COVID-19,CEPHEID,COR/MARISOL...[[id number]]  Normal              Final result                 Please view results for these tests on the individual orders.    COVID-19,CEPHEID,COR/MARISOL/PAD IN-HOUSE(OR EMERGENT/ADD-ON),NP SWAB IN TRANSPORT MEDIA 3-4 HR TAT, RT-PCR -  Swab, Nasopharynx [[id number]]  (Normal) Collected: 05/19/21 0121    Lab Status: Final result Specimen: Swab from Nasopharynx Updated: 05/19/21 0217     COVID19 Not Detected    Narrative:      Fact sheet for providers: https://www.fda.gov/media/220962/download     Fact sheet for patients: https://www.fda.gov/media/258618/download  Fact sheet for providers: https://www.fda.gov/media/278253/download     Fact sheet for patients: https://www.fda.gov/media/047467/download          MRI Abdomen With & Without Contrast    Result Date: 5/19/2021  Impression:  1. 2.3 x 4.0 x 4.5 cm multi loculated, macrocystic lesion within the pancreatic head. It corresponds to the abnormality questioned on previous ultrasound. Given the presence of individual cystic locules measuring up to 2.8 cm in size, it would be favored to represent a mucinous cystadenoma or cystadenocarcinoma, although admittedly that entity more typically occurs within the pancreatic tail or pancreatic body region. While serous cystadenoma/cystadenocarcinoma occurs commonly in the head, the size of the cystic locules the vicinity thought less likely. Surgical consultation is advised. 2. Pancreatic parenchymal atrophy. 3. Tiny benign hepatic cysts. 4. Uncomplicated cholelithiasis. 5. Tiny bilateral renal cysts.   Electronically Signed By-Eulalia Webb MD On:5/19/2021 12:22 PM This report was finalized on [id number] by  Eulalia Webb MD.    XR Chest 1 View    Result Date: 5/19/2021  Impression: 1.A left cardiac pacemaker/AICD is noted. The leads extend to the region of the right atrium and right ventricle or intraventricular septum. There is no evidence for pneumothorax following lead placement. 2.Otherwise there is no significant change when compared to the prior study. There is no evidence for acute cardiopulmonary process.  Electronically Signed By-Ventura Wells MD On:5/19/2021 5:52 PM This report was finalized on [id number] by  Ventura Wells MD.    XR  Chest 1 View    Result Date: 5/18/2021  Impression: 1.Mild cardiomegaly with suspected cardiac device projecting over the lower left thorax. 2.No radiographic findings of acute pulmonary abnormality.  Electronically Signed By-Samy Martinez MD On:5/18/2021 8:25 PM This report was finalized on [id number] by  Samy Martinez MD.    US Abdomen Limited    Result Date: 5/11/2021  Impression: 1. A normal gallbladder is not visualized. What appears to be a complex cystic structure with multiple thick septations is seen in the expected location of the gallbladder. This could represent an abnormal gallbladder with internal mass lesion or fibrous bands, or even gallbladder adenomyomatosis.. Consider correlation to MRI abdomen without and with contrast for further evaluation.  Electronically Signed By-Eulalia Webb MD On:5/11/2021 3:18 PM This report was finalized on [id number] by  Eulalia Webb MD.          Test Results Pending at Discharge        Procedures Performed  Procedure(s):  Pacemaker DC new         Consults:   Consults     Date and Time Order Name Status Description    5/19/2021  9:03 AM Inpatient Gastroenterology Consult      5/19/2021  9:02 AM Inpatient Cardiac Electrophysiology Consult Completed     5/19/2021  7:44 AM Inpatient Cardiology Consult              Discharge Details        Discharge Medications      ASK your doctor about these medications      Instructions Start Date   amLODIPine 5 MG tablet  Commonly known as: NORVASC   TAKE ONE TABLET BY MOUTH ONCE DAILY FOR HEART AND FOR BLOOD PRESSURE      calcium citrate-vitamin d 200-250 MG-UNIT tablet tablet  Commonly known as: CITRACAL   1 tablet, Oral, Daily      coenzyme Q10 100 MG capsule   100 mg, Oral, Daily      levothyroxine 75 MCG tablet  Commonly known as: SYNTHROID, LEVOTHROID   75 mcg, Oral, Daily      losartan 50 MG tablet  Commonly known as: COZAAR   50 mg, Oral, Daily      Omega-3 1000 MG capsule   1 tablet, Oral, Daily              Allergies   Allergen Reactions   • Azithromycin Rash   • Penicillin G Rash   • Penicillins Rash         Discharge Disposition:  Home    Diet:  Hospital:  Diet Order   Procedures   • Diet Cardiac, Diabetic/Consistent Carbs; Healthy Heart; Diabetic - Consistent Carb         Discharge Activity:   Ad clyde. as tolerated      CODE STATUS:    Code Status and Medical Interventions:   Ordered at: 05/18/21 2132     Limited Support to NOT Include:    Cardioversion/Defibrillation    Intubation     Code Status:    No CPR     Medical Interventions (Level of Support Prior to Arrest):    Limited         Follow-up Appointments  Future Appointments   Date Time Provider Department Center   5/27/2021  1:00 PM [facility] NA   5/27/2021  1:00 PM NURSE/MA, [facility] [place] [facility] NA   5/28/2021 10:00 AM Ruslan Layne MD [facility]             Condition on Discharge:      Stable      Electronically signed by Manpreet Dinh MD, 05/24/21, 1:22 PM EDT.      Time: I spent  > 30  minutes on this discharge activity which included face-to-face encounter with the patient/reviewing the data in the system/coordination of the care with the nursing staff as well as consultants/documentation/entering orders.      Electronically signed by Manpreet Dinh MD at 05/24/21 5638

## 2021-05-24 NOTE — PLAN OF CARE
Goal Outcome Evaluation:        Outcome Summary: Pt without complaints during shift, HR paced and pt will be discharging home this afternoon. Will continue to monitor

## 2021-05-24 NOTE — OUTREACH NOTE
Prep Survey      Responses   [facility]   Is LACE score < 7 ?  No   Emergency Room discharge w/ pulse ox?  No   Eligibility  TCM   Hospital  [facility]   Date of Admission  05/18/21   Date of Discharge  05/24/21   Discharge Disposition  Home or Self Care   Discharge diagnosis  syncope, SSS, PPM inserted   Does the patient have one of the following disease processes/diagnoses(primary or secondary)?  Other   Does the patient have Home health ordered?  No   Is there a DME ordered?  No   Prep survey completed?  Yes          Jennifer Akhtar RN

## 2021-05-24 NOTE — PLAN OF CARE
Goal Outcome Evaluation:      Pt compliant with all she is asked. Eager to be discharged home.

## 2021-05-24 NOTE — PROGRESS NOTES
LOS: 4 days   Admiting Physician- Manpreet Dinh MD    Reason For Followup:    Syncope  Sick sinus syndrome  Sinus pause  S/p pacemaker    Subjective     No complaints    Objective     Hemodynamically stable    Review of Systems:   Review of Systems   Constitutional: Negative for chills and fever.   HENT: Negative for ear discharge and nosebleeds.    Eyes: Negative for discharge and redness.   Cardiovascular: Negative for chest pain, orthopnea, palpitations, paroxysmal nocturnal dyspnea and syncope.   Respiratory: Negative for cough, shortness of breath and wheezing.    Endocrine: Negative for heat intolerance.   Skin: Negative for rash.   Musculoskeletal: Negative for arthritis and myalgias.   Gastrointestinal: Negative for abdominal pain, melena, nausea and vomiting.   Genitourinary: Negative for dysuria and hematuria.   Neurological: Negative for dizziness, light-headedness, numbness and tremors.   Psychiatric/Behavioral: Negative for depression. The patient is not nervous/anxious.          Vital Signs  Vitals:    05/23/21 2137 05/23/21 2230 05/24/21 0203 05/24/21 0452   BP: 149/67 143/57 123/56    BP Location: Right arm      Patient Position: Sitting      Pulse: 71 73 69    Resp: 13 14 16    Temp:  98.1 °F (36.7 °C) 97.8 °F (36.6 °C)    TempSrc:  Oral Oral    SpO2: 99% 98% 97%    Weight:    67.1 kg (147 lb 14.9 oz)   Height:         Wt Readings from Last 1 Encounters:   05/24/21 67.1 kg (147 lb 14.9 oz)       Intake/Output Summary (Last 24 hours) at 5/24/2021 0854  Last data filed at 5/24/2021 0450  Gross per 24 hour   Intake 1780 ml   Output 400 ml   Net 1380 ml     Physical Exam:  Constitutional:       Appearance: Well-developed.   Eyes:      General: No scleral icterus.        Right eye: No discharge.   HENT:      Head: Normocephalic and atraumatic.   Neck:      Thyroid: No thyromegaly.      Lymphadenopathy: No cervical adenopathy.   Pulmonary:      Effort: Pulmonary effort is normal. No respiratory  distress.      Breath sounds: Normal breath sounds. No wheezing. No rales.   Chest:      Comments: Pacemaker site is stable no hematoma  Cardiovascular:      Normal rate. Regular rhythm.      No gallop.   Edema:     Peripheral edema absent.   Abdominal:      Tenderness: There is no abdominal tenderness.   Skin:     Findings: No erythema or rash.   Neurological:      Mental Status: Alert and oriented to person, place, and time.         Results Review:   Lab Results (last 24 hours)     Procedure Component Value Units Date/Time    Basic Metabolic Panel [[id number]]  (Normal) Collected: 05/24/21 0521    Specimen: Blood Updated: 05/24/21 0553     Glucose 86 mg/dL      BUN 17 mg/dL      Creatinine 0.74 mg/dL      Sodium 139 mmol/L      Potassium 4.3 mmol/L      Chloride 106 mmol/L      CO2 25.0 mmol/L      Calcium 9.5 mg/dL      eGFR Non African Amer 74 mL/min/1.73      BUN/Creatinine Ratio 23.0     Anion Gap 8.0 mmol/L     Narrative:      GFR Normal >60  Chronic Kidney Disease <60  Kidney Failure <15      Magnesium [[id number]]  (Normal) Collected: 05/24/21 0521    Specimen: Blood Updated: 05/24/21 0553     Magnesium 2.0 mg/dL     CBC & Differential [[id number]]  (Abnormal) Collected: 05/24/21 0521    Specimen: Blood Updated: 05/24/21 0529    Narrative:      The following orders were created for panel order CBC & Differential.  Procedure                               Abnormality         Status                     ---------                               -----------         ------                     CBC Auto Differential[[id number]]        Abnormal            Final result                 Please view results for these tests on the individual orders.    CBC Auto Differential [[id number]]  (Abnormal) Collected: 05/24/21 0521    Specimen: Blood Updated: 05/24/21 0529     WBC 4.80 10*3/mm3      RBC 4.12 10*6/mm3      Hemoglobin 11.5 g/dL      Hematocrit 34.9 %      MCV 84.8 fL      MCH 28.0 pg      MCHC 33.0 g/dL      RDW 17.2 %       RDW-SD 50.8 fl      MPV 7.7 fL      Platelets 259 10*3/mm3      Neutrophil % 47.6 %      Lymphocyte % 35.2 %      Monocyte % 11.8 %      Eosinophil % 3.0 %      Basophil % 2.4 %      Neutrophils, Absolute 2.30 10*3/mm3      Lymphocytes, Absolute 1.70 10*3/mm3      Monocytes, Absolute 0.60 10*3/mm3      Eosinophils, Absolute 0.10 10*3/mm3      Basophils, Absolute 0.10 10*3/mm3      nRBC 0.0 /100 WBC         Imaging Results (Last 72 Hours)     ** No results found for the last 72 hours. **        ECG/EMG Results (most recent)     Procedure Component Value Units Date/Time    EP/CRM Study [[id number]] Resulted: 05/19/21 1618     Updated: 05/20/21 0734    Narrative:            Permanent pacemaker procedure note.    Procedure:   Dual-chamber  permanent pacemaker      Date of procedure :May 19, 2021    Indication: Syncope, sinus node dysfunction, asystolic pause,, sick sinus   syndrome .    Hardware:  Pacemaker generator: [employer] model number PM [id number],serial #   [id number]  Right atrial lead is [employer] model #2088 TC-48, serial number   [id number]  Right ventricular lead is [employer] model #2088 TC-52, serial   number [id number]      Thresholds:  Right atrial threshold revealed a P wave amplitude of 3.1 mV at 1.5 V and   impedance of 660 ohms  Right ventricular threshold revealed a R wave amplitude of  10.8 mV at 0.5   V and impedance of650 ohms        Description of procedure:  Moderate conscious sedation was given utilizing IV Versed and fentanyl and   Dilaudid sometimes, administered by RN with continuous EKG oximetry and   hemodynamic monitoring supervised by me throughout the entire case,   conscious sedation time was  60 minutes.  I was present with the patient   for the duration of moderate sedation and supervised staff who had no   other duties and monitored the patient for the entire procedure patient   had Aguilera 2-3 sedation scale.  Under local  anesthesia left subclavian   vein was  accessed via the modified Seldinger technique without difficulty.    A pocket was created and hemostasis achieved.  A 6 [place] sheath was   placed in active-fixation lead was placed in right ventricular septum   adequate thresholds obtained and secured in the pocket,  10 volts   stimulation was done to make sure there was no diaphragmatic stimulation.    Similarly active fixation right atrial lead was placed in right atrial   appendage adequate this was obtained and secured in the pocket.  Pocket   was cleaned with antibiotic solution generator connected to leads and   placed in pocket, pocket closed with suture skin closed with staples.    Under local anesthesia a loop recorder pocket was opened and loop recorder   explanted and skin closed with staples. Patient tolerated procedure well   complications were none.     Blood loss :blood loss was 5 cc.        Conclusion: Successful dual-chamber permanent pacemaker implantation and   loop recorder explantation      Plan:   We will monitor overnight  Recheck device in a.m.  Pacer care and wound care education  Follow-up in 1 week in the office for staple removal  Follow-up in pacemaker clinic    ECG 12 Lead [[id number]] Collected: 05/18/21 1924     Updated: 05/20/21 0817     QT Interval 355 ms     Narrative:      HEART RATE= 75  bpm  RR Interval= 804  ms  TX Interval= 216  ms  P Horizontal Axis= -10  deg  P Front Axis= 56  deg  QRSD Interval= 81  ms  QT Interval= 355  ms  QRS Axis= -55  deg  T Wave Axis= 52  deg  - ABNORMAL ECG -  Sinus rhythm  Borderline prolonged TX interval  Probable left atrial enlargement  Inferior infarct, old  Anterior infarct, old  When compared with ECG of 28-Apr-2019 13:48:22,  Electronically Signed By: Gómez Higgins (MARISOL) 20-May-2021 08:15:56  Date and Time of Study: 2021-05-18 19:24:49        CBC    Results from last 7 days   Lab Units 05/24/21  0521 05/23/21  0303 05/22/21  0235 05/21/21  0232 05/20/21  0259 05/19/21  0310 05/18/21 2006   WBC  10*3/mm3 4.80 5.30 6.20 7.00 6.60 6.10 7.10   HEMOGLOBIN g/dL 11.5* 10.9* 10.5* 11.1* 10.4* 11.4* 11.6*   PLATELETS 10*3/mm3 259 275 282 302 318 359 377     BMP   Results from last 7 days   Lab Units 05/24/21  0521 05/23/21  0441 05/23/21  0303 05/22/21  0235 05/21/21  0232 05/20/21  0259 05/19/21  0310 05/18/21 2006   SODIUM mmol/L 139 140  --  138 137 139 141 139   POTASSIUM mmol/L 4.3 4.6  --  4.2 4.3 4.2 4.4 4.5   CHLORIDE mmol/L 106 106  --  106 106 107 105 103   CO2 mmol/L 25.0 26.0  --  25.0 23.0 23.0 27.0 26.0   BUN mg/dL 17 20  --  18 18 19 19 20   CREATININE mg/dL 0.74 0.82  --  0.85 0.83 0.85 0.92 1.00   GLUCOSE mg/dL 86 92  --  95 99 125* 93 107*   MAGNESIUM mg/dL 2.0  --  2.0 2.1 2.0 2.0 2.1 2.2     CMP   Results from last 7 days   Lab Units 05/24/21  0521 05/23/21  0441 05/22/21  0235 05/21/21  0232 05/20/21  0259 05/19/21 0310 05/18/21 2006   SODIUM mmol/L 139 140 138 137 139 141 139   POTASSIUM mmol/L 4.3 4.6 4.2 4.3 4.2 4.4 4.5   CHLORIDE mmol/L 106 106 106 106 107 105 103   CO2 mmol/L 25.0 26.0 25.0 23.0 23.0 27.0 26.0   BUN mg/dL 17 20 18 18 19 19 20   CREATININE mg/dL 0.74 0.82 0.85 0.83 0.85 0.92 1.00   GLUCOSE mg/dL 86 92 95 99 125* 93 107*     Cardiac Studies:  Echo-   Stress Myoview-  Cath-      Medication Review:   Scheduled Meds:amLODIPine, 5 mg, Oral, Nightly  levothyroxine, 75 mcg, Oral, Daily  losartan, 50 mg, Oral, Daily  meropenem, 500 mg, Intravenous, Q8H  sodium chloride, 10 mL, Intravenous, Q12H  sodium chloride, 3 mL, Intravenous, Q12H      Continuous Infusions:Pharmacy to Dose meropenem (MERREM),       PRN Meds:.•  acetaminophen **OR** acetaminophen **OR** acetaminophen  •  Calcium Gluconate-NaCl **AND** calcium gluconate **AND** Calcium, Ionized  •  HYDROmorphone **AND** naloxone  •  magnesium sulfate **OR** magnesium sulfate **OR** magnesium sulfate  •  melatonin  •  nitroglycerin  •  ondansetron **OR** ondansetron  •  [DISCONTINUED] meropenem **AND** Pharmacy to Dose  meropenem (MERREM)  •  potassium & sodium phosphates **OR** potassium & sodium phosphates  •  potassium chloride  •  potassium chloride  •  [COMPLETED] Insert peripheral IV **AND** sodium chloride  •  sodium chloride  •  sodium chloride      Assessment/Plan   Patient Active Problem List   Diagnosis   • Status post placement of implantable loop recorder   • Essential hypertension   • Hypothyroidism (acquired)   • Syncope   • Acute seasonal allergic rhinitis due to pollen   • Arthralgia of right foot   • Atherosclerosis   • B12 deficiency   • Bilateral carpal tunnel syndrome   • Cataracts, bilateral   • DNR (do not resuscitate)   • Heart murmur   • History of chicken pox   • Menopausal syndrome   • Mixed hyperlipidemia   • Pernicious anemia   • Right hip pain   • Acute right-sided low back pain with sciatica   • Influenza vaccine refused   • Liver function abnormality   • Other fatigue   • Acute UTI   • RUQ abdominal mass   • Cardiac arrhythmia   • Deleon-Wasserman syncope   • Sinus node dysfunction (CMS/HCC)   • SSS (sick sinus syndrome) (CMS/HCC)     MDM:    1.  Sinus pause/sick sinus syndrome:    Patient underwent pacemaker and did well.  Pacemaker site and functioning is appropriate patient can be discharged    2.  UTI:    Patient is on antibiotic.      3.  Mass in pancreatic head. To f/u with GI as op    MRI showed cystic mass in the pancreatic head.  Further recommendation as per GI    OK for d/c home from cards standpoint  Rhythm and hemodynamics stable.   Op f/u in our office on Thursday for staple removal as scheduled      WILDER Lopez  05/24/21  08:54 EDT

## 2021-05-25 ENCOUNTER — TRANSITIONAL CARE MANAGEMENT TELEPHONE ENCOUNTER (OUTPATIENT)
Dept: CALL CENTER | Facility: HOSPITAL | Age: 86
End: 2021-05-25

## 2021-05-25 NOTE — CASE MANAGEMENT/SOCIAL WORK
Case Management Discharge Note                Selected Continued Care - Discharged on 5/24/2021 Admission date: 5/18/2021 - Discharge disposition: Home or Self Care                     Final Discharge Disposition Code: 01 - home or self-care

## 2021-05-25 NOTE — OUTREACH NOTE
Call Center TCM Note      Responses   [facility]   Does the patient have one of the following disease processes/diagnoses(primary or secondary)?  Other   TCM attempt successful?  Yes [cali Ramirez]   Call start time  0858   Call end time  0900   Discharge diagnosis  syncope, SSS, PPM inserted   Person spoke with today (if not patient) and relationship  Patient   Meds reviewed with patient/caregiver?  Yes   Is the patient having any side effects they believe may be caused by any medication additions or changes?  No   Does the patient have all medications ordered at discharge?  N/A   Is the patient taking all medications as directed (includes completed medication regime)?  Yes   Does the patient have a primary care provider?   Yes   Does the patient have an appointment with their PCP within 7 days of discharge?  Greater than 7 days   Comments regarding PCP  [facility] on 6/4/21 at 1:00 PM   What is preventing the patient from scheduling follow up appointments within 7 days of discharge?  Earlier appointment not available   Nursing Interventions  Verified appointment date/time/provider   Has the patient kept scheduled appointments due by today?  N/A   What is the Home health agency?   No HH   Psychosocial issues?  No   Did the patient receive a copy of their discharge instructions?  Yes   Nursing interventions  Reviewed instructions with patient   What is the patient's perception of their health status since discharge?  Improving   Is the patient/caregiver able to teach back signs and symptoms related to disease process for when to call PCP?  Yes   Is the patient/caregiver able to teach back signs and symptoms related to disease process for when to call 911?  Yes   Is the patient/caregiver able to teach back the hierarchy of who to call/visit for symptoms/problems? PCP, Specialist, Home health nurse, Urgent Care, ED, 911  Yes   If the patient is a current smoker, are they  able to teach back resources for cessation?  Not a smoker   TCM call completed?  Yes   Wrap up additional comments  Pt states she feels great. Pt verified PCP [facility] appt on 6/4/21. No questions or concerns at this time.          Mayra Tomas RN    5/25/2021, 09:10 EDT

## 2021-05-27 ENCOUNTER — CLINICAL SUPPORT NO REQUIREMENTS (OUTPATIENT)
Dept: CARDIOLOGY | Facility: CLINIC | Age: 86
End: 2021-05-27

## 2021-05-27 DIAGNOSIS — Z95.0 PRESENCE OF CARDIAC PACEMAKER: ICD-10-CM

## 2021-05-27 DIAGNOSIS — I49.5 SSS (SICK SINUS SYNDROME) (HCC): ICD-10-CM

## 2021-05-27 DIAGNOSIS — I49.5 SINUS NODE DYSFUNCTION (HCC): Primary | ICD-10-CM

## 2021-05-27 PROCEDURE — 93280 PM DEVICE PROGR EVAL DUAL: CPT | Performed by: NURSE PRACTITIONER

## 2021-05-27 PROCEDURE — 99024 POSTOP FOLLOW-UP VISIT: CPT | Performed by: NURSE PRACTITIONER

## 2021-05-28 NOTE — PROGRESS NOTES
DEVICE INTERROGATION:  IN OFFICE    DEVICE TYPE:   Dual-chamber pacemaker    :   [employer]    BATTERY:  Stable    TIME TO ELECTIVE REPLACEMENT INDICATORS:   12.2 years    CHARGE TIME:   Not applicable        LEAD DATA:       DEVICE REPROGRAMMED FOR TESTING      Atrial:   4.0 mV, 450 ohms, 1.5 V@0.4 ms    Ventricular:     9.3 mV, 530 ohms, 0.75 V@0.4 ms    LV:      Atrial pacing percentage: 1.6%    Ventricular pacing percentage: Less than 1%      Arrhythmia Logbook Reviewed: No A. fib        Summary:    Stable Device Function    No significant arhythmia burden.     Battery status is stable.    Reviewed with: Dr. Layne      NEXT IN OFFICE DEVICE CHECK DUE: 3 months    REMOTE DEVICE INTERROGATIONS: Ongoing      Wil removed intact pacemaker pocket left upper chest without significant redness swelling or hematoma.  Wound wound edges are well approximated and there is no drainage

## 2021-06-02 ENCOUNTER — ON CAMPUS - OUTPATIENT (OUTPATIENT)
Dept: URBAN - METROPOLITAN AREA HOSPITAL 77 | Facility: HOSPITAL | Age: 86
End: 2021-06-02
Payer: MEDICARE

## 2021-06-02 ENCOUNTER — APPOINTMENT (OUTPATIENT)
Dept: MRI IMAGING | Facility: HOSPITAL | Age: 86
End: 2021-06-02

## 2021-06-02 DIAGNOSIS — K44.9 DIAPHRAGMATIC HERNIA WITHOUT OBSTRUCTION OR GANGRENE: ICD-10-CM

## 2021-06-02 DIAGNOSIS — R10.13 EPIGASTRIC PAIN: ICD-10-CM

## 2021-06-02 DIAGNOSIS — K80.00 CALCULUS OF GALLBLADDER WITH ACUTE CHOLECYSTITIS WITHOUT OBS: ICD-10-CM

## 2021-06-02 DIAGNOSIS — K25.9 GASTRIC ULCER, UNSPECIFIED AS ACUTE OR CHRONIC, WITHOUT HEMO: ICD-10-CM

## 2021-06-02 DIAGNOSIS — K86.2 CYST OF PANCREAS: ICD-10-CM

## 2021-06-02 DIAGNOSIS — K22.10 ULCER OF ESOPHAGUS WITHOUT BLEEDING: ICD-10-CM

## 2021-06-02 DIAGNOSIS — K20.80 OTHER ESOPHAGITIS WITHOUT BLEEDING: ICD-10-CM

## 2021-06-02 PROCEDURE — 43238 EGD US FINE NEEDLE BX/ASPIR: CPT | Performed by: INTERNAL MEDICINE

## 2021-06-02 PROCEDURE — 43239 EGD BIOPSY SINGLE/MULTIPLE: CPT | Mod: 59 | Performed by: INTERNAL MEDICINE

## 2021-06-03 ENCOUNTER — READMISSION MANAGEMENT (OUTPATIENT)
Dept: CALL CENTER | Facility: HOSPITAL | Age: 86
End: 2021-06-03

## 2021-06-03 NOTE — OUTREACH NOTE
Medical Week 2 Survey      Responses   [facility]   Does the patient have one of the following disease processes/diagnoses(primary or secondary)?  Other   Week 2 attempt successful?  Yes   Call start time  1127   Call end time  1131   Meds reviewed with patient/caregiver?  Yes   Is the patient having any side effects they believe may be caused by any medication additions or changes?  No   Does the patient have all medications ordered at discharge?  N/A   Is the patient taking all medications as directed (includes completed medication regime)?  Yes   Does the patient have a primary care provider?   Yes   Has the patient kept scheduled appointments due by today?  N/A   Has home health visited the patient within 72 hours of discharge?  N/A   Psychosocial issues?  No   Did the patient receive a copy of their discharge instructions?  Yes   Nursing interventions  Reviewed instructions with patient   What is the patient's perception of their health status since discharge?  Improving   Is the patient/caregiver able to teach back signs and symptoms related to disease process for when to call PCP?  Yes   Is the patient/caregiver able to teach back signs and symptoms related to disease process for when to call 911?  Yes   Is the patient/caregiver able to teach back the hierarchy of who to call/visit for symptoms/problems? PCP, Specialist, Home health nurse, Urgent Care, ED, 911  Yes   Week 2 Call Completed?  Yes   Wrap up additional comments  Patient states is improved, and will keep PCP appt tomorrow. States has chronic right lower leg edema that is related to lower back issues. Denies any syncope or any problems today.          Marycruz Moreau RN

## 2021-06-04 ENCOUNTER — TELEPHONE (OUTPATIENT)
Dept: FAMILY MEDICINE CLINIC | Facility: CLINIC | Age: 86
End: 2021-06-04

## 2021-06-04 ENCOUNTER — OFFICE VISIT (OUTPATIENT)
Dept: FAMILY MEDICINE CLINIC | Facility: CLINIC | Age: 86
End: 2021-06-04

## 2021-06-04 VITALS
TEMPERATURE: 96.4 F | OXYGEN SATURATION: 98 % | HEART RATE: 86 BPM | DIASTOLIC BLOOD PRESSURE: 82 MMHG | SYSTOLIC BLOOD PRESSURE: 122 MMHG | WEIGHT: 146.6 LBS | RESPIRATION RATE: 18 BRPM | BODY MASS INDEX: 25.98 KG/M2 | HEIGHT: 63 IN

## 2021-06-04 DIAGNOSIS — I49.5 SSS (SICK SINUS SYNDROME) (HCC): ICD-10-CM

## 2021-06-04 DIAGNOSIS — N39.0 URINARY TRACT INFECTION WITHOUT HEMATURIA, SITE UNSPECIFIED: ICD-10-CM

## 2021-06-04 DIAGNOSIS — Z95.818 STATUS POST PLACEMENT OF IMPLANTABLE LOOP RECORDER: Primary | ICD-10-CM

## 2021-06-04 DIAGNOSIS — K25.3 ACUTE GASTRIC ULCER WITHOUT HEMORRHAGE OR PERFORATION: ICD-10-CM

## 2021-06-04 DIAGNOSIS — R19.01 RUQ ABDOMINAL MASS: ICD-10-CM

## 2021-06-04 DIAGNOSIS — N39.0 ACUTE UTI: ICD-10-CM

## 2021-06-04 LAB
BILIRUB BLD-MCNC: NEGATIVE MG/DL
CLARITY, POC: CLEAR
COLOR UR: YELLOW
GLUCOSE UR STRIP-MCNC: NEGATIVE MG/DL
KETONES UR QL: NEGATIVE
LEUKOCYTE EST, POC: NEGATIVE
NITRITE UR-MCNC: NEGATIVE MG/ML
PH UR: 5 [PH] (ref 5–8)
PROT UR STRIP-MCNC: NEGATIVE MG/DL
RBC # UR STRIP: NEGATIVE /UL
SP GR UR: 1.01 (ref 1–1.03)
UROBILINOGEN UR QL: NORMAL

## 2021-06-04 PROCEDURE — 81003 URINALYSIS AUTO W/O SCOPE: CPT | Performed by: FAMILY MEDICINE

## 2021-06-04 PROCEDURE — 99214 OFFICE O/P EST MOD 30 MIN: CPT | Performed by: FAMILY MEDICINE

## 2021-06-04 RX ORDER — PANTOPRAZOLE SODIUM 40 MG/1
40 TABLET, DELAYED RELEASE ORAL DAILY
Qty: 30 TABLET | Refills: 1 | Status: SHIPPED | OUTPATIENT
Start: 2021-06-04 | End: 2021-12-21

## 2021-06-04 NOTE — PROGRESS NOTES
Subjective   Stacy Monroy is a 89 y.o. female.     Chief Complaint   Patient presents with   • Hospital Follow Up Visit   • Urinary Tract Infection       Heart Problem  This is a new problem. The current episode started more than 1 month ago. The problem occurs intermittently. The problem has been rapidly improving. Pertinent negatives include no abdominal pain, arthralgias, chest pain, chills, congestion, coughing, fever, nausea, rash, swollen glands, visual change, vomiting or weakness. Nothing aggravates the symptoms. Treatments tried: pacemaker. The treatment provided significant relief.   Urinary Tract Infection   This is a new problem. The current episode started 1 to 4 weeks ago. The problem has been resolved. The patient is experiencing no pain. There has been no fever. Pertinent negatives include no chills, frequency, nausea, urgency or vomiting. She has tried antibiotics for the symptoms. The treatment provided significant relief.        I personally reviewed and updated the patient's allergies, medications, problem list, and past medical, surgical, social, and family history. I have reviewed and confirmed the accuracy of the History of Present Illness and Review of Symptoms as documented by the MA/LPN/RN. Mita Moise MD    Allergies:  Allergies   Allergen Reactions   • Azithromycin Rash   • Penicillin G Rash   • Penicillins Rash       Social History:  Social History     Socioeconomic History   • Marital status:      Spouse name: Not on file   • Number of children: Not on file   • Years of education: Not on file   • Highest education level: Not on file   Tobacco Use   • Smoking status: Never Smoker   • Smokeless tobacco: Never Used   Substance and Sexual Activity   • Alcohol use: No   • Drug use: No   • Sexual activity: Defer       Family History:  Family History   Problem Relation Age of Onset   • Hypertension Mother    • Stroke Mother    • Hypertension Father    • Stroke Father    • Breast  cancer Sister 50   • Ovarian cancer Daughter 57   • Breast cancer Niece 53       Past Medical History :  Patient Active Problem List   Diagnosis   • Status post placement of implantable loop recorder   • Essential hypertension   • Hypothyroidism (acquired)   • Syncope   • Acute seasonal allergic rhinitis due to pollen   • Arthralgia of right foot   • Atherosclerosis   • B12 deficiency   • Bilateral carpal tunnel syndrome   • Cataracts, bilateral   • DNR (do not resuscitate)   • Heart murmur   • History of chicken pox   • Menopausal syndrome   • Mixed hyperlipidemia   • Pernicious anemia   • Right hip pain   • Acute right-sided low back pain with sciatica   • Influenza vaccine refused   • Liver function abnormality   • Other fatigue   • Acute UTI   • RUQ abdominal mass   • Cardiac arrhythmia   • Deleon-Wasserman syncope   • Sinus node dysfunction (CMS/HCC)   • SSS (sick sinus syndrome) (CMS/HCC)   • Acute gastric ulcer without hemorrhage or perforation       Medication List:    Current Outpatient Medications:   •  amLODIPine (NORVASC) 5 MG tablet, TAKE ONE TABLET BY MOUTH ONCE DAILY FOR HEART AND FOR BLOOD PRESSURE, Disp: 90 tablet, Rfl: 2  •  calcium citrate-vitamin d (CITRACAL) 200-250 MG-UNIT tablet tablet, Take 1 tablet by mouth Daily., Disp: , Rfl:   •  coenzyme Q10 100 MG capsule, Take 100 mg by mouth Daily., Disp: , Rfl:   •  levothyroxine (SYNTHROID, LEVOTHROID) 75 MCG tablet, Take 1 tablet by mouth Daily., Disp: 30 tablet, Rfl: 6  •  losartan (COZAAR) 50 MG tablet, Take 1 tablet by mouth Daily., Disp: 90 tablet, Rfl: 2  •  Omega-3 1000 MG capsule, Take 1 tablet by mouth Daily., Disp: , Rfl:   •  pantoprazole (PROTONIX) 40 MG EC tablet, Take 1 tablet by mouth Daily., Disp: 30 tablet, Rfl: 1    Past Surgical History:  Past Surgical History:   Procedure Laterality Date   • BREAST BIOPSY Right    • CARDIAC ELECTROPHYSIOLOGY PROCEDURE N/A 5/19/2021    Procedure: Pacemaker DC new;  Surgeon: Edmar Rubin,  "MD;  Location: [facility] INVASIVE LOCATION;  Service: Cardiovascular;  Laterality: N/A;   • OTHER SURGICAL HISTORY  2019    Tilt table    • OTHER SURGICAL HISTORY  2019    tilt table        Review of Systems:  Review of Systems   Constitutional: Negative for activity change, chills and fever.   HENT: Negative for congestion, ear pain, rhinorrhea, sinus pressure, swollen glands and voice change.    Eyes: Negative for visual disturbance.   Respiratory: Negative for cough and shortness of breath.    Cardiovascular: Negative for chest pain.   Gastrointestinal: Negative for abdominal pain, diarrhea, nausea and vomiting.   Endocrine: Negative for cold intolerance and heat intolerance.   Genitourinary: Negative for frequency and urgency.   Musculoskeletal: Negative for arthralgias.   Skin: Negative for rash.   Neurological: Negative for syncope and weakness.   Hematological: Does not bruise/bleed easily.   Psychiatric/Behavioral: Negative for depressed mood. The patient is not nervous/anxious.        Physical Exam:  Vital Signs:  Vital Signs:   /82   Pulse 86   Temp 96.4 °F (35.8 °C)   Resp 18   Ht 160 cm (62.99\")   Wt 66.5 kg (146 lb 9.6 oz)   SpO2 98%   BMI 25.98 kg/m²     Result Review :   The following data was reviewed by: Mita Moise MD on 06/04/2021:  CMP    CMP 5/22/21 5/23/21 5/24/21   Glucose 95 92 86   BUN 18 20 17   Creatinine 0.85 0.82 0.74   eGFR Non African Am 63 65 74   Sodium 138 140 139   Potassium 4.2 4.6 4.3   Chloride 106 106 106   Calcium 9.0 9.4 9.5           CBC    CBC 5/22/21 5/23/21 5/24/21   WBC 6.20 5.30 4.80   RBC 3.81 3.96 4.12   Hemoglobin 10.5 (A) 10.9 (A) 11.5 (A)   Hematocrit 32.2 (A) 33.4 (A) 34.9   MCV 84.7 84.3 84.8   MCH 27.7 27.6 28.0   MCHC 32.7 32.7 33.0   RDW 17.5 (A) 17.7 (A) 17.2 (A)   Platelets 282 275 259   (A) Abnormal value            UA    Urinalysis 5/7/21 5/17/21 6/4/21   Ketones, UA Negative Negative Negative   Leukocytes, UA Negative 75 Emmanuel/ul (A) " Negative   (A) Abnormal value            Urine Culture    Urine Culture 5/17/21   Urine Culture Final report (A)   (A) Abnormal value            Data reviewed: Recent hospitalization notes [facility]         Physical Exam  Vitals reviewed.   Constitutional:       Appearance: Normal appearance. She is well-developed.   HENT:      Head: Normocephalic and atraumatic.   Eyes:      General:         Right eye: No discharge.         Left eye: No discharge.   Cardiovascular:      Rate and Rhythm: Normal rate and regular rhythm.      Heart sounds: Normal heart sounds. No murmur heard.   No friction rub. No gallop.    Pulmonary:      Effort: Pulmonary effort is normal. No respiratory distress.      Breath sounds: Normal breath sounds. No wheezing or rales.   Abdominal:      General: Abdomen is flat. Bowel sounds are normal. There is no distension.      Palpations: Abdomen is soft. There is no mass.      Tenderness: There is no abdominal tenderness. There is no guarding or rebound.      Hernia: No hernia is present.   Skin:     General: Skin is warm and dry.      Findings: No rash.   Neurological:      Mental Status: She is alert and oriented to person, place, and time.      Coordination: Coordination normal.      Gait: Gait normal.   Psychiatric:         Behavior: Behavior is cooperative.         Assessment and Plan:  Problems Addressed this Visit        Cardiac and Vasculature    Status post placement of implantable loop recorder - Primary    SSS (sick sinus syndrome) (CMS/HCC)     resolved            Gastrointestinal Abdominal     RUQ abdominal mass     .MRI and EGD with u/s showed cystic structure on pancreas  She sees Dr Padilla soon         Acute gastric ulcer without hemorrhage or perforation     Started on protonix\Limit tobacco, alcohol, caffeine, chocalate, citrus fruits, recumbency after meals and large portions. Discussed link between PPI's and increased risk of hip, wrist, and spine fractures           Relevant  Medications    pantoprazole (PROTONIX) 40 MG EC tablet       Genitourinary and Reproductive     Acute UTI     resolved           Other Visit Diagnoses     Urinary tract infection without hematuria, site unspecified        Relevant Orders    POC Urinalysis Dipstick, Automated (Completed)      Diagnoses       Codes Comments    Status post placement of implantable loop recorder    -  Primary ICD-10-CM: Z95.818  ICD-9-CM: V45.09     SSS (sick sinus syndrome) (CMS/[facility])     ICD-10-CM: I49.5  ICD-9-CM: 427.81     RUQ abdominal mass     ICD-10-CM: R19.01  ICD-9-CM: 789.31     Urinary tract infection without hematuria, site unspecified     ICD-10-CM: N39.0  ICD-9-CM: 599.0     Acute gastric ulcer without hemorrhage or perforation     ICD-10-CM: K25.3  ICD-9-CM: 531.30     Acute UTI     ICD-10-CM: N39.0  ICD-9-CM: 599.0            An After Visit Summary and PPPS were given to the patient.         I wore protective equipment throughout this patient encounter to include mask. Hand hygiene was performed before donning protective equipment and after removal when leaving the room.

## 2021-06-11 NOTE — ASSESSMENT & PLAN NOTE
Started on protonix\Limit tobacco, alcohol, caffeine, chocalate, citrus fruits, recumbency after meals and large portions. Discussed link between PPI's and increased risk of hip, wrist, and spine fractures

## 2021-07-09 ENCOUNTER — OFFICE VISIT (OUTPATIENT)
Dept: CARDIOLOGY | Facility: CLINIC | Age: 86
End: 2021-07-09

## 2021-07-09 VITALS
SYSTOLIC BLOOD PRESSURE: 123 MMHG | WEIGHT: 141 LBS | HEIGHT: 63 IN | BODY MASS INDEX: 24.98 KG/M2 | DIASTOLIC BLOOD PRESSURE: 69 MMHG | HEART RATE: 80 BPM | OXYGEN SATURATION: 95 %

## 2021-07-09 DIAGNOSIS — Z95.0 PRESENCE OF CARDIAC PACEMAKER: ICD-10-CM

## 2021-07-09 DIAGNOSIS — I10 ESSENTIAL HYPERTENSION: Primary | ICD-10-CM

## 2021-07-09 DIAGNOSIS — I49.5 SSS (SICK SINUS SYNDROME) (HCC): ICD-10-CM

## 2021-07-09 DIAGNOSIS — R01.1 HEART MURMUR: ICD-10-CM

## 2021-07-09 DIAGNOSIS — E78.2 MIXED HYPERLIPIDEMIA: ICD-10-CM

## 2021-07-09 DIAGNOSIS — I45.9 STOKES-ADAMS SYNCOPE: ICD-10-CM

## 2021-07-09 PROBLEM — Z86.19 PERSONAL HISTORY OF OTHER INFECTIOUS AND PARASITIC DISEASES: Status: ACTIVE | Noted: 2020-02-17

## 2021-07-09 PROBLEM — Z96.1 PRESENCE OF INTRAOCULAR LENS: Status: ACTIVE | Noted: 2021-06-11

## 2021-07-09 PROBLEM — D49.0 INTRADUCTAL PAPILLARY MUCINOUS NEOPLASM OF PANCREAS: Status: ACTIVE | Noted: 2021-06-11

## 2021-07-09 PROBLEM — H35.371 PUCKERING OF MACULA, RIGHT EYE: Status: ACTIVE | Noted: 2021-06-11

## 2021-07-09 PROCEDURE — 99214 OFFICE O/P EST MOD 30 MIN: CPT | Performed by: INTERNAL MEDICINE

## 2021-07-09 PROCEDURE — 93280 PM DEVICE PROGR EVAL DUAL: CPT | Performed by: INTERNAL MEDICINE

## 2021-07-09 NOTE — PROGRESS NOTES
Date of Office Visit: 2021  Encounter Provider: Dr. Ruslan Layne    Place of Service: [facility] CARDIOLOGY [place]  Patient Name: Stacy Monroy  :1930  Mita Moise MD    Chief Complaint   Patient presents with   • Pacemaker Check   • Hypertension     History of Present Illness    I am pleased to see Mrs. Monroy in my office today as a follow-up.    As you know, patient is 89-year-old white female whose past medical history significant for hypertension, who came today for follow-up.     In 2019, patient was evaluated due to an episode of syncope. Patient underwent echocardiogram in 2019 which showed EF of 55-60%.  Patient had stress test in 2018 at [facility] which was reportedly unremarkable for ischemia.  Holter monitor showed no significant pause of bradycardia.  Tilt-table test showed hypotensive response with bradycardia during tilt-table test. Patient underwent loop recorder implant at [facility] on 2019.    In May 2021, patient had a syncopal episode and was admitted at [facility].  Patient loop recorder showed long pause.  Patient underwent dual-chamber pacemaker implantation.  Postprocedure patient did well.    Since the discharge from the hospital, patient is doing well.  Her device is functioning appropriately.  Pacemaker site has healed well.  She denies any further episode of syncope or presyncope.  No chest pain.  No shortness of breath.  No orthopnea PND    Pacemaker is interrogated and it is functioning appropriately.    At this stage I will continue current treatment.  Blood pressure is well controlled.  Continue current therapy.  Interrogate pacemaker again in 6 months..      Past Medical History:   Diagnosis Date   • Arthritis    • Heart murmur    • Hyperlipidemia    • Hypertension    • Other specified hypothyroidism 8/15/2019   • Presence of cardiac pacemaker 2021    [facility]         Past Surgical History:    Procedure Laterality Date   • BREAST BIOPSY Right    • CARDIAC ELECTROPHYSIOLOGY PROCEDURE N/A 5/19/2021    Procedure: Pacemaker DC new;  Surgeon: Edmar Rubin MD;  Location: [facility] INVASIVE LOCATION;  Service: Cardiovascular;  Laterality: N/A;   • OTHER SURGICAL HISTORY  2019    Tilt table    • OTHER SURGICAL HISTORY  2019    tilt table            Current Outpatient Medications:   •  amLODIPine (NORVASC) 5 MG tablet, TAKE ONE TABLET BY MOUTH ONCE DAILY FOR HEART AND FOR BLOOD PRESSURE, Disp: 90 tablet, Rfl: 2  •  calcium citrate-vitamin d (CITRACAL) 200-250 MG-UNIT tablet tablet, Take 1 tablet by mouth Daily., Disp: , Rfl:   •  coenzyme Q10 100 MG capsule, Take 100 mg by mouth Daily., Disp: , Rfl:   •  levothyroxine (SYNTHROID, LEVOTHROID) 75 MCG tablet, Take 1 tablet by mouth Daily., Disp: 30 tablet, Rfl: 6  •  losartan (COZAAR) 50 MG tablet, Take 1 tablet by mouth Daily., Disp: 90 tablet, Rfl: 2  •  Omega-3 1000 MG capsule, Take 1 tablet by mouth Daily., Disp: , Rfl:   •  pantoprazole (PROTONIX) 40 MG EC tablet, Take 1 tablet by mouth Daily., Disp: 30 tablet, Rfl: 1      Social History     Socioeconomic History   • Marital status:      Spouse name: Not on file   • Number of children: Not on file   • Years of education: Not on file   • Highest education level: Not on file   Tobacco Use   • Smoking status: Never Smoker   • Smokeless tobacco: Never Used   Vaping Use   • Vaping Use: Never assessed   Substance and Sexual Activity   • Alcohol use: No   • Drug use: No   • Sexual activity: Defer         Review of Systems   Constitutional: Negative for chills and fever.   HENT: Negative for ear discharge and nosebleeds.    Eyes: Negative for discharge and redness.   Cardiovascular: Negative for chest pain, orthopnea, palpitations, paroxysmal nocturnal dyspnea and syncope.   Respiratory: Negative for cough, shortness of breath and wheezing.    Endocrine: Negative for heat intolerance.   Skin:  "Negative for rash.   Musculoskeletal: Positive for arthritis and joint pain. Negative for myalgias.   Gastrointestinal: Negative for abdominal pain, melena, nausea and vomiting.   Genitourinary: Negative for dysuria and hematuria.   Neurological: Negative for dizziness, light-headedness, numbness and tremors.   Psychiatric/Behavioral: Negative for depression. The patient is not nervous/anxious.        Procedures    Procedures    No orders to display           Objective:    /69   Pulse 80   Ht 160 cm (62.99\")   Wt 64 kg (141 lb)   LMP  (LMP Unknown)   SpO2 95%   BMI 24.98 kg/m²         Constitutional:       Appearance: Well-developed.   Eyes:      General: No scleral icterus.        Right eye: No discharge.   HENT:      Head: Normocephalic and atraumatic.   Neck:      Thyroid: No thyromegaly.      Lymphadenopathy: No cervical adenopathy.   Pulmonary:      Effort: Pulmonary effort is normal. No respiratory distress.      Breath sounds: Normal breath sounds. No wheezing. No rales.   Cardiovascular:      Normal rate. Regular rhythm.      No gallop.   Abdominal:      Tenderness: There is no abdominal tenderness.   Skin:     Findings: No erythema or rash.   Neurological:      Mental Status: Alert and oriented to person, place, and time.             Assessment:       Diagnosis Plan   1. Essential hypertension     2. Deleon-Wasserman syncope     3. Heart murmur     4. Mixed hyperlipidemia     5. SSS (sick sinus syndrome) (CMS/HCC)     6. Presence of cardiac pacemaker              Plan:       MDM:    1.  Syncope:    Patient had syncopal episode which was due to sick sinus syndrome and a long pause.  Patient underwent pacemaker implantation    2.  S/p permanent pacemaker:    Since the pacemaker implantation she has not had any further episode of syncope.  Pacemaker is functioning appropriately    3.  Hypertension:    Blood pressure is very well controlled.        "

## 2021-07-14 ENCOUNTER — OFFICE (OUTPATIENT)
Dept: URBAN - METROPOLITAN AREA CLINIC 64 | Facility: CLINIC | Age: 86
End: 2021-07-14

## 2021-07-14 VITALS
SYSTOLIC BLOOD PRESSURE: 141 MMHG | HEIGHT: 63 IN | HEART RATE: 76 BPM | WEIGHT: 143 LBS | DIASTOLIC BLOOD PRESSURE: 69 MMHG

## 2021-07-14 DIAGNOSIS — K86.2 CYST OF PANCREAS: ICD-10-CM

## 2021-07-14 DIAGNOSIS — R74.8 ABNORMAL LEVELS OF OTHER SERUM ENZYMES: ICD-10-CM

## 2021-07-14 DIAGNOSIS — C25.9 MALIGNANT NEOPLASM OF PANCREAS, UNSPECIFIED: ICD-10-CM

## 2021-07-14 DIAGNOSIS — K83.4 SPASM OF SPHINCTER OF ODDI: ICD-10-CM

## 2021-07-14 PROCEDURE — 99213 OFFICE O/P EST LOW 20 MIN: CPT | Performed by: INTERNAL MEDICINE

## 2021-08-23 PROCEDURE — 93294 REM INTERROG EVL PM/LDLS PM: CPT | Performed by: NURSE PRACTITIONER

## 2021-08-23 PROCEDURE — 93296 REM INTERROG EVL PM/IDS: CPT | Performed by: NURSE PRACTITIONER

## 2021-09-11 ENCOUNTER — LAB (OUTPATIENT)
Dept: LAB | Facility: HOSPITAL | Age: 86
End: 2021-09-11

## 2021-09-11 ENCOUNTER — HOSPITAL ENCOUNTER (OUTPATIENT)
Dept: CARDIOLOGY | Facility: HOSPITAL | Age: 86
Discharge: HOME | End: 2021-09-11

## 2021-09-11 LAB
ANION GAP SERPL CALCULATED.3IONS-SCNC: 9.9 MMOL/L (ref 5–15)
BUN SERPL-MCNC: 21 MG/DL (ref 8–23)
BUN/CREAT SERPL: 25.3 (ref 7–25)
CALCIUM SPEC-SCNC: 10.3 MG/DL (ref 8.2–9.6)
CHLORIDE SERPL-SCNC: 105 MMOL/L (ref 98–107)
CO2 SERPL-SCNC: 25.1 MMOL/L (ref 22–29)
CREAT SERPL-MCNC: 0.83 MG/DL (ref 0.57–1)
DEPRECATED RDW RBC AUTO: 47.6 FL (ref 37–54)
ERYTHROCYTE [DISTWIDTH] IN BLOOD BY AUTOMATED COUNT: 15.1 % (ref 12.3–15.4)
GFR SERPL CREATININE-BSD FRML MDRD: 64 ML/MIN/1.73
GLUCOSE SERPL-MCNC: 89 MG/DL (ref 65–99)
HCT VFR BLD AUTO: 41.9 % (ref 34–46.6)
HGB BLD-MCNC: 12.9 G/DL (ref 12–15.9)
MCH RBC QN AUTO: 26.5 PG (ref 26.6–33)
MCHC RBC AUTO-ENTMCNC: 30.8 G/DL (ref 31.5–35.7)
MCV RBC AUTO: 86.2 FL (ref 79–97)
PLATELET # BLD AUTO: 267 10*3/MM3 (ref 140–450)
PMV BLD AUTO: 10.6 FL (ref 6–12)
POTASSIUM SERPL-SCNC: 4.6 MMOL/L (ref 3.5–5.2)
QT INTERVAL: 383 MS
RBC # BLD AUTO: 4.86 10*6/MM3 (ref 3.77–5.28)
SARS-COV-2 ORF1AB RESP QL NAA+PROBE: NOT DETECTED
SODIUM SERPL-SCNC: 140 MMOL/L (ref 136–145)
WBC # BLD AUTO: 6.12 10*3/MM3 (ref 3.4–10.8)

## 2021-09-11 PROCEDURE — 85027 COMPLETE CBC AUTOMATED: CPT

## 2021-09-11 PROCEDURE — 80048 BASIC METABOLIC PNL TOTAL CA: CPT

## 2021-09-11 PROCEDURE — C9803 HOPD COVID-19 SPEC COLLECT: HCPCS

## 2021-09-11 PROCEDURE — U0004 COV-19 TEST NON-CDC HGH THRU: HCPCS

## 2021-09-11 PROCEDURE — 93010 ELECTROCARDIOGRAM REPORT: CPT | Performed by: INTERNAL MEDICINE

## 2021-09-11 PROCEDURE — 93005 ELECTROCARDIOGRAM TRACING: CPT | Performed by: INTERNAL MEDICINE

## 2021-09-14 ENCOUNTER — APPOINTMENT (OUTPATIENT)
Dept: GENERAL RADIOLOGY | Facility: HOSPITAL | Age: 86
End: 2021-09-14

## 2021-09-14 ENCOUNTER — ON CAMPUS - OUTPATIENT (OUTPATIENT)
Dept: URBAN - METROPOLITAN AREA HOSPITAL 85 | Facility: HOSPITAL | Age: 86
End: 2021-09-14
Payer: MEDICARE

## 2021-09-14 ENCOUNTER — ANESTHESIA (OUTPATIENT)
Dept: GASTROENTEROLOGY | Facility: HOSPITAL | Age: 86
End: 2021-09-14

## 2021-09-14 ENCOUNTER — ANESTHESIA EVENT (OUTPATIENT)
Dept: GASTROENTEROLOGY | Facility: HOSPITAL | Age: 86
End: 2021-09-14

## 2021-09-14 ENCOUNTER — HOSPITAL ENCOUNTER (OUTPATIENT)
Facility: HOSPITAL | Age: 86
Setting detail: HOSPITAL OUTPATIENT SURGERY
Discharge: HOME | End: 2021-09-14
Attending: INTERNAL MEDICINE | Admitting: INTERNAL MEDICINE

## 2021-09-14 VITALS
HEIGHT: 63 IN | OXYGEN SATURATION: 96 % | BODY MASS INDEX: 25.04 KG/M2 | TEMPERATURE: 97.6 F | RESPIRATION RATE: 11 BRPM | DIASTOLIC BLOOD PRESSURE: 70 MMHG | HEART RATE: 60 BPM | SYSTOLIC BLOOD PRESSURE: 171 MMHG | WEIGHT: 141.31 LBS

## 2021-09-14 DIAGNOSIS — K83.1 OBSTRUCTION OF BILE DUCT: ICD-10-CM

## 2021-09-14 DIAGNOSIS — K57.10 DIVERTICULOSIS OF SMALL INTESTINE WITHOUT PERFORATION OR ABS: ICD-10-CM

## 2021-09-14 DIAGNOSIS — K44.9 DIAPHRAGMATIC HERNIA WITHOUT OBSTRUCTION OR GANGRENE: ICD-10-CM

## 2021-09-14 DIAGNOSIS — K80.20 CALCULUS OF GALLBLADDER WITHOUT CHOLECYSTITIS WITHOUT OBSTRU: ICD-10-CM

## 2021-09-14 DIAGNOSIS — K86.2 CYST OF PANCREAS: ICD-10-CM

## 2021-09-14 DIAGNOSIS — R94.5 ABNORMAL RESULTS OF LIVER FUNCTION STUDIES: ICD-10-CM

## 2021-09-14 PROCEDURE — 88104 CYTOPATH FL NONGYN SMEARS: CPT | Performed by: INTERNAL MEDICINE

## 2021-09-14 PROCEDURE — C2625 STENT, NON-COR, TEM W/DEL SY: HCPCS | Performed by: INTERNAL MEDICINE

## 2021-09-14 PROCEDURE — 25010000002 PROPOFOL 10 MG/ML EMULSION: Performed by: ANESTHESIOLOGY

## 2021-09-14 PROCEDURE — C1769 GUIDE WIRE: HCPCS | Performed by: INTERNAL MEDICINE

## 2021-09-14 PROCEDURE — C1726 CATH, BAL DIL, NON-VASCULAR: HCPCS | Performed by: INTERNAL MEDICINE

## 2021-09-14 PROCEDURE — 74330 X-RAY BILE/PANC ENDOSCOPY: CPT

## 2021-09-14 PROCEDURE — 25010000002 ONDANSETRON PER 1 MG: Performed by: ANESTHESIOLOGY

## 2021-09-14 PROCEDURE — C1874 STENT, COATED/COV W/DEL SYS: HCPCS | Performed by: INTERNAL MEDICINE

## 2021-09-14 PROCEDURE — 43274 ERCP DUCT STENT PLACEMENT: CPT | Mod: 59 | Performed by: INTERNAL MEDICINE

## 2021-09-14 PROCEDURE — 25010000002 IOPAMIDOL 61 % SOLUTION 30 ML VIAL: Performed by: INTERNAL MEDICINE

## 2021-09-14 PROCEDURE — 25010000002 GLUCAGON (HUMAN RECOMBINANT) 1 MG RECONSTITUTED SOLUTION: Performed by: ANESTHESIOLOGY

## 2021-09-14 PROCEDURE — 25010000002 SUCCINYLCHOLINE PER 20 MG: Performed by: ANESTHESIOLOGY

## 2021-09-14 DEVICE — PANCREATIC STENT
Type: IMPLANTABLE DEVICE | Site: PANCREAS | Status: FUNCTIONAL
Brand: ADVANIX™ PANCREATIC STENT

## 2021-09-14 DEVICE — STENT SYSTEM
Type: IMPLANTABLE DEVICE | Site: BILE DUCT | Status: FUNCTIONAL
Brand: WALLFLEX™ BILIARY

## 2021-09-14 RX ORDER — PROPOFOL 10 MG/ML
VIAL (ML) INTRAVENOUS AS NEEDED
Status: DISCONTINUED | OUTPATIENT
Start: 2021-09-14 | End: 2021-09-14 | Stop reason: SURG

## 2021-09-14 RX ORDER — SODIUM CHLORIDE 9 MG/ML
INJECTION INTRAVENOUS
Status: COMPLETED
Start: 2021-09-14 | End: 2021-09-14

## 2021-09-14 RX ORDER — ONDANSETRON 2 MG/ML
INJECTION INTRAMUSCULAR; INTRAVENOUS AS NEEDED
Status: DISCONTINUED | OUTPATIENT
Start: 2021-09-14 | End: 2021-09-14 | Stop reason: SURG

## 2021-09-14 RX ORDER — LIDOCAINE HYDROCHLORIDE 10 MG/ML
INJECTION, SOLUTION EPIDURAL; INFILTRATION; INTRACAUDAL; PERINEURAL AS NEEDED
Status: DISCONTINUED | OUTPATIENT
Start: 2021-09-14 | End: 2021-09-14 | Stop reason: SURG

## 2021-09-14 RX ORDER — SODIUM CHLORIDE 9 MG/ML
9 INJECTION, SOLUTION INTRAVENOUS CONTINUOUS
Status: DISCONTINUED | OUTPATIENT
Start: 2021-09-14 | End: 2021-09-14 | Stop reason: HOSPADM

## 2021-09-14 RX ORDER — ONDANSETRON 2 MG/ML
4 INJECTION INTRAMUSCULAR; INTRAVENOUS ONCE AS NEEDED
Status: COMPLETED | OUTPATIENT
Start: 2021-09-14 | End: 2021-09-14

## 2021-09-14 RX ORDER — SUCCINYLCHOLINE CHLORIDE 20 MG/ML
INJECTION INTRAMUSCULAR; INTRAVENOUS AS NEEDED
Status: DISCONTINUED | OUTPATIENT
Start: 2021-09-14 | End: 2021-09-14 | Stop reason: SURG

## 2021-09-14 RX ADMIN — GLUCAGON HYDROCHLORIDE 0.5 MG: 1 INJECTION, POWDER, FOR SOLUTION INTRAMUSCULAR; INTRAVENOUS; SUBCUTANEOUS at 11:19

## 2021-09-14 RX ADMIN — ONDANSETRON 4 MG: 2 INJECTION INTRAMUSCULAR; INTRAVENOUS at 10:59

## 2021-09-14 RX ADMIN — ONDANSETRON 4 MG: 2 INJECTION INTRAMUSCULAR; INTRAVENOUS at 12:44

## 2021-09-14 RX ADMIN — SODIUM CHLORIDE 9 ML/HR: 9 INJECTION, SOLUTION INTRAVENOUS at 10:10

## 2021-09-14 RX ADMIN — PROPOFOL 150 MG: 10 INJECTION, EMULSION INTRAVENOUS at 10:48

## 2021-09-14 RX ADMIN — LIDOCAINE HYDROCHLORIDE 50 MG: 10 INJECTION, SOLUTION EPIDURAL; INFILTRATION; INTRACAUDAL; PERINEURAL at 10:48

## 2021-09-14 RX ADMIN — GLUCAGON HYDROCHLORIDE 0.5 MG: 1 INJECTION, POWDER, FOR SOLUTION INTRAMUSCULAR; INTRAVENOUS; SUBCUTANEOUS at 11:10

## 2021-09-14 RX ADMIN — SUCCINYLCHOLINE CHLORIDE 80 MG: 20 INJECTION INTRAMUSCULAR; INTRAVENOUS at 10:48

## 2021-09-14 NOTE — ANESTHESIA POSTPROCEDURE EVALUATION
Patient: Stacy Monroy    Procedure Summary     Date: 09/14/21 Room / Location: [facility] ENDOSCOPY 2 / [facility] ENDOSCOPY    Anesthesia Start: 1042 Anesthesia Stop: 1207    Procedure: ERCP WITH SPHINCTEROTOMY, sphincteroplasty, clearance of bile duct with balloon. brushing, placement of pancreatic stent and placement of metal biliary stent (N/A ) Diagnosis:       Cyst of pancreas      (Cyst of pancreas [K86.2])    Surgeons: Adrienne Wilson MD Provider: Edinson Osman MD    Anesthesia Type: general ASA Status: 2          Anesthesia Type: general    Vitals  Vitals Value Taken Time   /70 09/14/21 1239   Temp     Pulse 60 09/14/21 1241   Resp 11 09/14/21 1239   SpO2 94 % 09/14/21 1241   Vitals shown include unvalidated device data.        Post Anesthesia Care and Evaluation    Patient location during evaluation: PACU  Patient participation: complete - patient participated  Level of consciousness: awake  Pain scale: See nurse's notes for pain score.  Pain management: adequate  Airway patency: patent  Anesthetic complications: No anesthetic complications  PONV Status: none  Cardiovascular status: acceptable  Respiratory status: acceptable  Hydration status: acceptable    Comments: Patient seen and examined postoperatively; vital signs stable; SpO2 greater than or equal to 90%; cardiopulmonary status stable; nausea/vomiting adequately controlled; pain adequately controlled; no apparent anesthesia complications; patient discharged from anesthesia care when discharge criteria were met

## 2021-09-14 NOTE — ANESTHESIA PROCEDURE NOTES
Airway  Urgency: elective    Date/Time: 9/14/2021 10:49 AM  Airway not difficult    General Information and Staff    Patient location during procedure: OR  Anesthesiologist: Edinson Osman MD    Indications and Patient Condition  Indications for airway management: airway protection    Preoxygenated: yes  MILS not maintained throughout  Mask difficulty assessment: 1 - vent by mask    Final Airway Details  Final airway type: endotracheal airway      Successful airway: ETT  Cuffed: yes   Successful intubation technique: direct laryngoscopy  Endotracheal tube insertion site: oral  Blade: Anju  Blade size: 3  ETT size (mm): 7.0  Cormack-Lehane Classification: grade I - full view of glottis  Placement verified by: capnometry and palpation of cuff   Measured from: lips  ETT/EBT  to lips (cm): 21  Number of attempts at approach: 1  Assessment: lips, teeth, and gum same as pre-op and atraumatic intubation    Additional Comments  ASA monitors applied; preoxygenated with 100% FiO2 via anesthesia face mask; induction of general anesthesia; bag-mask ventilation; patient's position optimized; laryngoscopy; cuffed ETT lubricated with lidocaine jelly and placed into the trachea; cuff inflated to seal with minimally occlusive airway cuff pressure; ETT connected to anesthesia circuit; atraumatic/dentition in preoperative condition; ETT secured in place; correct placement in the trachea confirmed by bilateral chest rise, tube condensation, and return of EtCO2 > 30 mmHg x3

## 2021-09-14 NOTE — OP NOTE
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.2]  Elevated CEA in cystic fluid suggesting cystadenoCA of panc head with extrinsic compression of CBD, pain, and elevated LFTs  She does not wish Whipple surgery so palliative Rx was accepted with biliary and panc stenting  Sphincterotomy was done towards 11 o'clock to 8 mm with ERBE and no bleeding  Extrinsic cystic masses at panc head obstructing last 4 cm of CBD with difficult cannulation with Dreamtome (dilated proximal CBD duct to 9 mm and strictured down to 3 mm distally with patent cystic duct and many GB stones noted)  Balloon dilation of distal 1/2 of CBD and sphincterotomy to 8 mm using Hurricane balloon and 11ATM with mild bleeding, balloon sweeps were done to 9 mm without stones recovered and balloon cholangiogram demonstrated extrinsic CBD compression from cystic masses (like giant balloons up to 3-5 cm X 3) of lower 1/2 of CBD.  Brushing was done X 4 passes of distal CBD area but likely negative as extrinsic compression noted of cystic masses  PD stent was done to 5Fr X 7 cm, single pigtail in good position and should pass on its own in 3 weeks  CBD metal stent ([id number], partially covered) was placed with good expansion and flow of distal CBD preventing extrinsic compression of distal CBD  Normal sideview EGD otherwise besides periamp diverticulum and 5 cm hiatal hernia of GEJ    Procedure/CPT® Codes:      Procedure(s):  ERCP WITH SPHINCTEROTOMY, sphincteroplasty, clearance of bile duct with balloon. brushing, placement of  pancreatic stent and placement of metal biliary stent    Staff:  Surgeon(s):  Adrienne Wilson MD         Anesthesia: Monitored Anesthesia Care  Indocin suppository  50 mg    Implants:    Implant Name Type Inv. Item Serial No.  Lot No. LRB No. Used Action   STNT PANC ADVANIX/NAVIFLEX NO/LD/LACEY SGL/PIG 5F 7CM - [id number] Stent STNT PANC ADVANIX/NAVIFLEX NO/LD/LACEY SGL/PIG 5F 7CM  [employer] [id number] N/A 1 Implanted   STNT BILI WALLFLX RX PC W/PERMALUME 8.5F [id number] - [id number] Stent STNT BILI WALLFLX RX PC W/PERMALUME 8.5F [id number]  [employer] [id number] N/A 1 Implanted       Specimen:        See Below    Complications:  NONE    EBL = NONE    Description of Procedure:  Informed consent was obtained for the procedure, including sedation.  Risks of perforation, hemorrhage, adverse drug reaction and aspiration and pancreatitis were discussed.    The patient was brought into the endoscopy suite. Continuous cardiopulmonary monitoring was performed.  After general anesthesia was administered and endotracheal intubation was achieved, the bite block was inserted into the patient's mouth. The patient was placed in the right semiprone position on the fluoroscopy table. Indocin suppository was inserted into the patient's rectum for prophylaxis.   A  film was obtained which showed NORMAL besides GB stones.  The duodenoscope was inserted into the patient's mouth and advanced to the second portion of the duodenum without difficulty.   Limited examination of the patient's esophagus, stomach, and duodenum were performed and were normal besides periamp diverticulum X 2 cm.  The duodenoscope was brought into the short position with the major papilla in direct visualization, and it appeared NORMAL, hidden under folds, and numerous motility contractions making cannulation difficult.  The DreamTome Rx pappillotome was inserted in the biliary os.  The hydrophilc tip wire was inserted into the  distal common bile duct with moderate difficulty.  Glucagon given to slow motility X 0.5 mg IVP X 2.  The catheter was advanced over the wire, bile was aspirated, and a cholangiogram was obtained.  The cholangiogram showed below.      On completion of the exam, the bowel was decompressed, the scope was removed from the patient, the patient tolerated the procedure well, there were no immediate post-operative complications. See below. The pancreatic duct was not investigated since it was not the duct of interest.         * Cyst of pancreas [K86.2]  Elevated CEA in cystic fluid suggesting cystadenoCA of panc head with extrinsic compression of CBD, pain, and elevated LFTs  She does not wish Whipple surgery so palliative Rx was accepted with biliary and panc stenting  Sphincterotomy was done towards 11 o'clock to 8 mm with ERBE and no bleeding  Extrinsic cystic masses at panc head obstructing last 4 cm of CBD with difficult cannulation with Dreamtome (dilated proximal CBD duct to 9 mm and strictured down to 3 mm distally with patent cystic duct and many GB stones noted)  Balloon dilation of distal 1/2 of CBD and sphincterotomy to 8 mm using Hurricane balloon and 11ATM with mild bleeding, balloon sweeps were done to 9 mm without stones recovered and balloon cholangiogram demonstrated extrinsic CBD compression from cystic masses (like giant balloons up to 3-5 cm X 3) of lower 1/2 of CBD.  Brushing was done X 4 passes of distal CBD area but likely negative as extrinsic compression noted of cystic masses  PD stent was done to 5Fr X 7 cm, single pigtail in good position and should pass on its own in 3 weeks  CBD metal stent ([id number], partially covered) was placed with good expansion and flow of distal CBD preventing extrinsic compression of distal CBD  Normal sideview EGD otherwise besides periamp diverticulum and 5 cm hiatal hernia of GEJ    Impression:     * Cyst of pancreas [K86.2]  Elevated CEA in cystic fluid suggesting  cystadenoCA of panc head with extrinsic compression of CBD, pain, and elevated LFTs  She does not wish Whipple surgery so palliative Rx was accepted with biliary and panc stenting  Sphincterotomy was done towards 11 o'clock to 8 mm with ERBE and no bleeding  Extrinsic cystic masses at panc head obstructing last 4 cm of CBD with difficult cannulation with Dreamtome (dilated proximal CBD duct to 9 mm and strictured down to 3 mm distally with patent cystic duct and many GB stones noted)  Balloon dilation of distal 1/2 of CBD and sphincterotomy to 8 mm using Hurricane balloon and 11ATM with mild bleeding, balloon sweeps were done to 9 mm without stones recovered and balloon cholangiogram demonstrated extrinsic CBD compression from cystic masses (like giant balloons up to 3-5 cm X 3) of lower 1/2 of CBD.  Brushing was done X 4 passes of distal CBD area but likely negative as extrinsic compression noted of cystic masses  PD stent was done to 5Fr X 7 cm, single pigtail in good position and should pass on its own in 3 weeks  CBD metal stent ([id number], partially covered) was placed with good expansion and flow of distal CBD preventing extrinsic compression of distal CBD  Normal sideview EGD otherwise besides periamp diverticulum and 5 cm hiatal hernia of GEJ    Recommendations:  OV 1 month  Repeat ERCP yearly to reassess metal stent patency  MRCP 6 months to monitor cystic masses of panc head    Verito MD     Date: 9/14/2021  Time: 11:52 EDT

## 2021-09-14 NOTE — DISCHARGE INSTRUCTIONS
A responsible adult should stay with you and you should rest quietly for the rest of the day.    Do not drink alcohol, drive, operate any heavy machinery or power tools or make any legal/important decisions for the next 24 hours.     Progress your diet as tolerated.  If you begin to experience severe pain, increased shortness of breath, racing heartbeat or a fever above 101 F, seek immediate medical attention.     Follow up with MD as instructed. Call office for results in 3 to 5 days if needed. [phone]    Follow up in 1 month

## 2021-09-14 NOTE — H&P
" LOS: 0 days   Patient Care Team:  Mita Moise MD as PCP - General  Mita Moise MD as PCP - Family Medicine      Subjective     Interval History:     Subjective: Outpt ERCP with sphincterotomy, stone retrieval, brushing of bile duct, and stenting of bile duct  No GI complaints  No chest pains or SOA  No fever or chills  No pain complaints.  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 She also had a gastrinoma on her gastric polyp removal. Esoph ulcer biopsy was negative. No cytology done according to [facility] Pathology Department. Her LFT's have risen worrisome for CBD obstruction related to her pancreatic cystic mass. ERCP with metal stenting is needed as she's not a surgical candidate due to her age and comorbidities.    ROS:   No chest pain, shortness of breath, or cough. Agreeable to scope and anesthesia  No change since scanned H&P       Medication Review:   No current facility-administered medications for this encounter.      Objective     Vital Signs  Vitals:    09/09/21 1110   Weight: 64.9 kg (143 lb)   Height: 160 cm (63\")       Physical Exam:    General Appearance:    Awake and alert, in no acute distress   Head:    Normocephalic, without obvious abnormality   Eyes:          Conjunctivae normal, anicteric sclera   Throat:   No oral lesions, no thrush, oral mucosa moist   Neck:   No adenopathy, supple, no JVD   Lungs:     respirations regular, even and unlabored   Abdomen:     Soft, non-tender, no rebound or guarding, non-distended, no hepatosplenomegaly   Rectal:     Deferred   Extremities:   No edema, no cyanosis   Skin:   No bruising or rash, no jaundice        Results Review:    Lab Results (last 24 hours)     ** No results found for the last 24 " hours. **          Imaging Results (Last 24 Hours)     ** No results found for the last 24 hours. **            Assessment/Plan   Proceed with scope and MAC anesthesia    No change from office records    Verito MD  09/14/21  09:44 EDT

## 2021-09-14 NOTE — ANESTHESIA PREPROCEDURE EVALUATION
Anesthesia Evaluation     Patient summary reviewed and Nursing notes reviewed   NPO Solid Status: > 8 hours  NPO Liquid Status: > 8 hours           Airway   Mallampati: II  TM distance: >3 FB  Neck ROM: full  No difficulty expected  Dental - normal exam     Pulmonary - normal exam   Cardiovascular - normal exam    ECG reviewed    (+) pacemaker, hypertension, valvular problems/murmurs, dysrhythmias, hyperlipidemia,       Neuro/Psych  (+) syncope, numbness,     GI/Hepatic/Renal/Endo    (+)  PUD, GI bleeding ,     Musculoskeletal     Abdominal  - normal exam    Bowel sounds: normal.   Substance History      OB/GYN          Other   arthritis,      ROS/Med Hx Other: Sinus rhythm  Prolonged SD interval  Probable left atrial enlargement  Inferior infarct, old  Anteroseptal infarct, age indeterminate    2019  Left ventricular systolic function is normal.  Ejection Fraction is 55-60%  The left ventricular wall motion is normal.  The transmitral spectral Doppler flow pattern is suggestive of impaired LV  relaxation.                   Anesthesia Plan    ASA 2     general     intravenous induction     Anesthetic plan, all risks, benefits, and alternatives have been provided, discussed and informed consent has been obtained with: patient.

## 2021-09-15 LAB
LAB AP CASE REPORT: NORMAL
PATH REPORT.FINAL DX SPEC: NORMAL
PATH REPORT.GROSS SPEC: NORMAL

## 2021-09-21 ENCOUNTER — OFFICE VISIT (OUTPATIENT)
Dept: FAMILY MEDICINE CLINIC | Facility: CLINIC | Age: 86
End: 2021-09-21

## 2021-09-21 VITALS
OXYGEN SATURATION: 97 % | DIASTOLIC BLOOD PRESSURE: 78 MMHG | HEART RATE: 70 BPM | WEIGHT: 138.6 LBS | SYSTOLIC BLOOD PRESSURE: 118 MMHG | RESPIRATION RATE: 18 BRPM | HEIGHT: 63 IN | BODY MASS INDEX: 24.56 KG/M2 | TEMPERATURE: 97.1 F

## 2021-09-21 DIAGNOSIS — D49.0 INTRADUCTAL PAPILLARY MUCINOUS NEOPLASM OF PANCREAS: Primary | ICD-10-CM

## 2021-09-21 DIAGNOSIS — R53.83 OTHER FATIGUE: ICD-10-CM

## 2021-09-21 DIAGNOSIS — I70.90 ATHEROSCLEROSIS: ICD-10-CM

## 2021-09-21 DIAGNOSIS — I10 ESSENTIAL HYPERTENSION: ICD-10-CM

## 2021-09-21 DIAGNOSIS — E78.2 MIXED HYPERLIPIDEMIA: ICD-10-CM

## 2021-09-21 PROBLEM — H35.3190 NONEXUDATIVE AGE-RELATED MACULAR DEGENERATION: Status: ACTIVE | Noted: 2021-07-28

## 2021-09-21 PROBLEM — H43.819 POSTERIOR VITREOUS DETACHMENT: Status: ACTIVE | Noted: 2021-09-07

## 2021-09-21 PROCEDURE — 1111F DSCHRG MED/CURRENT MED MERGE: CPT | Performed by: FAMILY MEDICINE

## 2021-09-21 PROCEDURE — 99214 OFFICE O/P EST MOD 30 MIN: CPT | Performed by: FAMILY MEDICINE

## 2021-09-21 NOTE — PROGRESS NOTES
Subjective   Stacy Monroy is a 89 y.o. female.     Chief Complaint   Patient presents with   • Hospital Follow Up Visit   • Abdominal Pain   Stacy was seen at [facility] . She was admitted on 09/14/21  for cyst on pancreas. She was discharged on 09/14/21. Discharge diagnosis was pancreas cyst. Labs that were performed during the encounter included: ERCP. Diagnostic studies that were performed included: ercp. Currently Stacy receives care at home. Complications from the hospital stay include gastrointestinal. The patient stated that they do not need help with their daily life and activities. The patient stated that they do not have emotional support at home.    History of Present Illness     I personally reviewed and updated the patient's allergies, medications, problem list, and past medical, surgical, social, and family history. I have reviewed and confirmed the accuracy of the History of Present Illness and Review of Symptoms as documented by the MA/LPN/RN. Mita Moise MD    Allergies:  Allergies   Allergen Reactions   • Azithromycin Rash     ALL mycin drugs   • Erythromycin Rash     ALL mycins drugs   • Penicillin G Rash   • Penicillins Rash     ALL mycin drugs       Social History:  Social History     Socioeconomic History   • Marital status:      Spouse name: Not on file   • Number of children: Not on file   • Years of education: Not on file   • Highest education level: Not on file   Tobacco Use   • Smoking status: Never Smoker   • Smokeless tobacco: Never Used   Vaping Use   • Vaping Use: Never assessed   Substance and Sexual Activity   • Alcohol use: No   • Drug use: No   • Sexual activity: Defer       Family History:  Family History   Problem Relation Age of Onset   • Hypertension Mother    • Stroke Mother    • Hypertension Father    • Stroke Father    • Breast cancer Sister 50   • Ovarian cancer Daughter 57   • Breast cancer Niece 53       Past Medical History :  Patient Active Problem  List   Diagnosis   • Status post placement of implantable loop recorder   • Essential hypertension   • Hypothyroidism (acquired)   • Syncope   • Acute seasonal allergic rhinitis due to pollen   • Arthralgia of right foot   • Atherosclerosis   • B12 deficiency   • Bilateral carpal tunnel syndrome   • Cataracts, bilateral   • DNR (do not resuscitate)   • Heart murmur   • History of chicken pox   • Menopausal syndrome   • Mixed hyperlipidemia   • Pernicious anemia   • Right hip pain   • Acute right-sided low back pain with sciatica   • Influenza vaccine refused   • Liver function abnormality   • Other fatigue   • Acute UTI   • RUQ abdominal mass   • Cardiac arrhythmia   • Deleon-Wasserman syncope   • Sinus node dysfunction (CMS/HCC)   • SSS (sick sinus syndrome) (CMS/HCC)   • Acute gastric ulcer without hemorrhage or perforation   • Presence of cardiac pacemaker   • Intraductal papillary mucinous neoplasm of pancreas   • Personal history of other infectious and parasitic diseases   • Presence of intraocular lens   • Puckering of macula, right eye   • Nonexudative age-related macular degeneration   • Posterior vitreous detachment       Medication List:    Current Outpatient Medications:   •  amLODIPine (NORVASC) 5 MG tablet, TAKE ONE TABLET BY MOUTH ONCE DAILY FOR HEART AND FOR BLOOD PRESSURE (Patient taking differently: Take 5 mg by mouth Daily. TAKE ONE TABLET BY MOUTH ONCE DAILY FOR HEART AND FOR BLOOD PRESSURE), Disp: 90 tablet, Rfl: 2  •  calcium citrate-vitamin d (CITRACAL) 200-250 MG-UNIT tablet tablet, Take 1 tablet by mouth Daily., Disp: , Rfl:   •  coenzyme Q10 100 MG capsule, Take 100 mg by mouth Daily., Disp: , Rfl:   •  levothyroxine (SYNTHROID, LEVOTHROID) 75 MCG tablet, Take 1 tablet by mouth Daily., Disp: 30 tablet, Rfl: 6  •  losartan (COZAAR) 50 MG tablet, Take 1 tablet by mouth Daily., Disp: 90 tablet, Rfl: 2  •  Omega-3 1000 MG capsule, Take 1 tablet by mouth Daily., Disp: , Rfl:   •  pantoprazole  "(PROTONIX) 40 MG EC tablet, Take 1 tablet by mouth Daily., Disp: 30 tablet, Rfl: 1    Past Surgical History:  Past Surgical History:   Procedure Laterality Date   • BLADDER REPAIR     • BREAST BIOPSY Right    • CARDIAC ELECTROPHYSIOLOGY PROCEDURE N/A 5/19/2021    Procedure: Pacemaker DC new;  Surgeon: Edmar Rubin MD;  Location: [facility] CATH INVASIVE LOCATION;  Service: Cardiovascular;  Laterality: N/A;   • ERCP N/A 9/14/2021    Procedure: ERCP WITH SPHINCTEROTOMY, sphincteroplasty, clearance of bile duct with balloon. brushing, placement of pancreatic stent and placement of metal biliary stent;  Surgeon: Adrienne Wilson MD;  Location: [facility] ENDOSCOPY;  Service: Gastroenterology;  Laterality: N/A;  post op: cystic mass compressing bile duct   • HYSTERECTOMY     • OTHER SURGICAL HISTORY  2019    Tilt table    • OTHER SURGICAL HISTORY  2019    tilt table        Review of Systems:  Review of Systems   Constitutional: Negative for activity change and fever.   HENT: Negative for ear pain, rhinorrhea, sinus pressure and voice change.    Eyes: Negative for visual disturbance.   Respiratory: Negative for cough and shortness of breath.    Cardiovascular: Negative for chest pain.   Gastrointestinal: Negative for abdominal pain, diarrhea, nausea and vomiting.   Endocrine: Negative for cold intolerance and heat intolerance.   Genitourinary: Negative for frequency and urgency.   Musculoskeletal: Negative for arthralgias.   Skin: Negative for rash.   Neurological: Negative for syncope.   Hematological: Does not bruise/bleed easily.   Psychiatric/Behavioral: Negative for depressed mood. The patient is not nervous/anxious.        Physical Exam:  Vital Signs:  Vital Signs:   /78 (BP Location: Left arm, Patient Position: Sitting, Cuff Size: Adult)   Pulse 70   Temp 97.1 °F (36.2 °C)   Resp 18   Ht 160 cm (63\")   Wt 62.9 kg (138 lb 9.6 oz)   SpO2 97%   BMI 24.55 kg/m²     Result Review :       \plain     "     Physical Exam  Vitals reviewed.   Constitutional:       Appearance: Normal appearance. She is well-developed.   HENT:      Head: Normocephalic and atraumatic.   Eyes:      General:         Right eye: No discharge.         Left eye: No discharge.   Cardiovascular:      Rate and Rhythm: Normal rate and regular rhythm.      Heart sounds: Normal heart sounds. No murmur heard.   No friction rub. No gallop.    Pulmonary:      Effort: Pulmonary effort is normal. No respiratory distress.      Breath sounds: Normal breath sounds. No wheezing or rales.   Abdominal:      General: Abdomen is flat. Bowel sounds are normal. There is no distension.      Palpations: Abdomen is soft. There is no mass.      Tenderness: There is no abdominal tenderness. There is no guarding or rebound.      Hernia: No hernia is present.   Skin:     General: Skin is warm and dry.      Findings: No rash.   Neurological:      Mental Status: She is alert and oriented to person, place, and time.      Coordination: Coordination normal.      Gait: Gait normal.   Psychiatric:         Behavior: Behavior is cooperative.         Assessment and Plan:  Problems Addressed this Visit        Cardiac and Vasculature    Essential hypertension     Hypertension is unchanged.  Continue current treatment regimen.  Blood pressure will be reassessed in 3 months.         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    Relevant Orders    Amylase (Completed)    Lipase (Completed)       Symptoms and Signs    Other fatigue     Will check labs         Relevant Orders    CBC & Differential (Completed)    TSH (Completed)      Diagnoses       Codes Comments    Intraductal papillary mucinous neoplasm of pancreas    -  Primary ICD-10-CM: D49.0  ICD-9-CM: 239.0     Other fatigue      ICD-10-CM: R53.83  ICD-9-CM: 780.79     Mixed hyperlipidemia     ICD-10-CM: E78.2  ICD-9-CM: 272.2     Essential hypertension     ICD-10-CM: I10  ICD-9-CM: 401.9     Atherosclerosis     ICD-10-CM: I70.90  ICD-9-CM: 440.9            An After Visit Summary and PPPS were given to the patient.         I wore protective equipment throughout this patient encounter to include mask, eye protection and gown. Hand hygiene was performed before donning protective equipment and after removal when leaving the room.

## 2021-09-22 LAB
ALBUMIN SERPL-MCNC: 4 G/DL (ref 3.5–4.6)
ALBUMIN/GLOB SERPL: 1.4 {RATIO} (ref 1.2–2.2)
ALP SERPL-CCNC: 79 IU/L (ref 44–121)
ALT SERPL-CCNC: 15 IU/L (ref 0–32)
AMYLASE SERPL-CCNC: 63 U/L (ref 31–110)
AST SERPL-CCNC: 22 IU/L (ref 0–40)
BASOPHILS # BLD AUTO: 0.1 X10E3/UL (ref 0–0.2)
BASOPHILS NFR BLD AUTO: 1 %
BILIRUB SERPL-MCNC: 0.4 MG/DL (ref 0–1.2)
BUN SERPL-MCNC: 18 MG/DL (ref 10–36)
BUN/CREAT SERPL: 20 (ref 12–28)
CALCIUM SERPL-MCNC: 10.1 MG/DL (ref 8.7–10.3)
CHLORIDE SERPL-SCNC: 102 MMOL/L (ref 96–106)
CHOLEST SERPL-MCNC: 220 MG/DL (ref 100–199)
CHOLEST/HDLC SERPL: 4.1 RATIO (ref 0–4.4)
CO2 SERPL-SCNC: 21 MMOL/L (ref 20–29)
CREAT SERPL-MCNC: 0.9 MG/DL (ref 0.57–1)
EOSINOPHIL # BLD AUTO: 0.3 X10E3/UL (ref 0–0.4)
EOSINOPHIL NFR BLD AUTO: 4 %
ERYTHROCYTE [DISTWIDTH] IN BLOOD BY AUTOMATED COUNT: 15.7 % (ref 11.7–15.4)
GLOBULIN SER CALC-MCNC: 2.8 G/DL (ref 1.5–4.5)
GLUCOSE SERPL-MCNC: 92 MG/DL (ref 65–99)
HCT VFR BLD AUTO: 40.2 % (ref 34–46.6)
HDLC SERPL-MCNC: 54 MG/DL
HGB BLD-MCNC: 12.9 G/DL (ref 11.1–15.9)
IMM GRANULOCYTES # BLD AUTO: 0 X10E3/UL (ref 0–0.1)
IMM GRANULOCYTES NFR BLD AUTO: 0 %
LDLC SERPL CALC-MCNC: 146 MG/DL (ref 0–99)
LIPASE SERPL-CCNC: 94 U/L (ref 14–85)
LYMPHOCYTES # BLD AUTO: 2.9 X10E3/UL (ref 0.7–3.1)
LYMPHOCYTES NFR BLD AUTO: 37 %
MCH RBC QN AUTO: 27.5 PG (ref 26.6–33)
MCHC RBC AUTO-ENTMCNC: 32.1 G/DL (ref 31.5–35.7)
MCV RBC AUTO: 86 FL (ref 79–97)
MONOCYTES # BLD AUTO: 0.7 X10E3/UL (ref 0.1–0.9)
MONOCYTES NFR BLD AUTO: 9 %
NEUTROPHILS # BLD AUTO: 3.8 X10E3/UL (ref 1.4–7)
NEUTROPHILS NFR BLD AUTO: 49 %
PLATELET # BLD AUTO: 312 X10E3/UL (ref 150–450)
POTASSIUM SERPL-SCNC: 4.6 MMOL/L (ref 3.5–5.2)
PROT SERPL-MCNC: 6.8 G/DL (ref 6–8.5)
RBC # BLD AUTO: 4.69 X10E6/UL (ref 3.77–5.28)
SODIUM SERPL-SCNC: 140 MMOL/L (ref 134–144)
TRIGL SERPL-MCNC: 112 MG/DL (ref 0–149)
TSH SERPL DL<=0.005 MIU/L-ACNC: 1.75 UIU/ML (ref 0.45–4.5)
VLDLC SERPL CALC-MCNC: 20 MG/DL (ref 5–40)
WBC # BLD AUTO: 7.8 X10E3/UL (ref 3.4–10.8)

## 2021-09-23 ENCOUNTER — TELEPHONE (OUTPATIENT)
Dept: FAMILY MEDICINE CLINIC | Facility: CLINIC | Age: 86
End: 2021-09-23

## 2021-09-23 NOTE — TELEPHONE ENCOUNTER
----- Message from Abby Reno MA sent at 9/23/2021 10:18 AM EDT -----    ----- Message -----  From: Mita Moise MD  Sent: 9/23/2021   9:07 AM EDT  To: Abby Reno MA    Her thyroid is better, kidney function is down, one pancreas number is up, the other is ok. LDL went up. Most of this is from having her procedure. The LDL is up some but the rest is ok. Stay hydrated. Watch the fatty foods. Will recheck at next visit.

## 2021-11-09 ENCOUNTER — OFFICE (OUTPATIENT)
Dept: URBAN - METROPOLITAN AREA CLINIC 64 | Facility: CLINIC | Age: 86
End: 2021-11-09

## 2021-11-09 VITALS
DIASTOLIC BLOOD PRESSURE: 69 MMHG | HEART RATE: 70 BPM | SYSTOLIC BLOOD PRESSURE: 152 MMHG | WEIGHT: 138 LBS | HEIGHT: 63 IN

## 2021-11-09 DIAGNOSIS — K86.2 CYST OF PANCREAS: ICD-10-CM

## 2021-11-09 PROCEDURE — 99213 OFFICE O/P EST LOW 20 MIN: CPT | Performed by: INTERNAL MEDICINE

## 2021-12-05 DIAGNOSIS — E03.9 ACQUIRED HYPOTHYROIDISM: ICD-10-CM

## 2021-12-06 RX ORDER — LEVOTHYROXINE SODIUM 0.07 MG/1
TABLET ORAL
Qty: 30 TABLET | Refills: 6 | Status: SHIPPED | OUTPATIENT
Start: 2021-12-06 | End: 2021-12-21 | Stop reason: SDUPTHER

## 2021-12-16 NOTE — PROGRESS NOTES
Date of Office Visit: 2021  Encounter Provider: Dr. Ruslan Layne  Place of Service: [facility] CARDIOLOGY [place]  Patient Name: Stacy Monroy  :1930  Mita Moise MD    Chief Complaint   Patient presents with   • Heart Murmur     6 month follow up/device check   • Hypertension   • Hyperlipidemia     History of Present Illness    I am pleased to see Mrs. Monroy in my office today as a follow-up.    As you know, patient is 89-year-old white female whose past medical history significant for hypertension, who came today for follow-up.     In 2019, patient was evaluated due to an episode of syncope. Patient underwent echocardiogram in 2019 which showed EF of 55-60%.  Patient had stress test in 2018 at [facility] which was reportedly unremarkable for ischemia.  Holter monitor showed no significant pause of bradycardia.  Tilt-table test showed hypotensive response with bradycardia during tilt-table test. Patient underwent loop recorder implant at [facility] on 2019.    In May 2021, patient had a syncopal episode and was admitted at [facility].  Patient loop recorder showed long pause.  Patient underwent dual-chamber pacemaker implantation.  Postprocedure patient did well.    Pacemaker is interrogated today and it is functioning appropriately.  Patient is using pacemaker only 8.2%.  No atrial fibrillation burden noted.  No change in programming    Patient is overall doing well.  Patient is still fairly active.  Patient denies any symptom of orthopnea PND no syncope or presyncope.  Occasional dizzy spell with change in posture.  No chest pain.    I advised the patient to monitor the blood pressure at the time when she feels dizziness.  Otherwise she is doing well.  I would not recommend aggressive blood pressure control in this patient because of risk of fall.  I will see the patient in 9 months.          Past Medical History:   Diagnosis  Date   • Arthritis    • Heart murmur    • Hyperlipidemia    • Hypertension    • Other specified hypothyroidism 8/15/2019   • Presence of cardiac pacemaker 7/9/2021    [facility]         Past Surgical History:   Procedure Laterality Date   • BLADDER REPAIR     • BREAST BIOPSY Right    • CARDIAC ELECTROPHYSIOLOGY PROCEDURE N/A 5/19/2021    Procedure: Pacemaker DC new;  Surgeon: Edmar Rubin MD;  Location: [facility] CATH INVASIVE LOCATION;  Service: Cardiovascular;  Laterality: N/A;   • ERCP N/A 9/14/2021    Procedure: ERCP WITH SPHINCTEROTOMY, sphincteroplasty, clearance of bile duct with balloon. brushing, placement of pancreatic stent and placement of metal biliary stent;  Surgeon: Adrienne Wilson MD;  Location: [facility] ENDOSCOPY;  Service: Gastroenterology;  Laterality: N/A;  post op: cystic mass compressing bile duct   • HYSTERECTOMY     • INSERT / REPLACE / REMOVE PACEMAKER     • OTHER SURGICAL HISTORY  2019    Tilt table    • OTHER SURGICAL HISTORY  2019    tilt table            Current Outpatient Medications:   •  amLODIPine (NORVASC) 5 MG tablet, TAKE ONE TABLET BY MOUTH ONCE DAILY FOR HEART AND FOR BLOOD PRESSURE (Patient taking differently: Take 5 mg by mouth Daily. TAKE ONE TABLET BY MOUTH ONCE DAILY FOR HEART AND FOR BLOOD PRESSURE), Disp: 90 tablet, Rfl: 2  •  calcium citrate-vitamin d (CITRACAL) 200-250 MG-UNIT tablet tablet, Take 1 tablet by mouth Daily., Disp: , Rfl:   •  coenzyme Q10 100 MG capsule, Take 100 mg by mouth Daily., Disp: , Rfl:   •  levothyroxine (SYNTHROID, LEVOTHROID) 75 MCG tablet, TAKE ONE TABLET BY MOUTH ONCE DAILY, Disp: 30 tablet, Rfl: 6  •  losartan (COZAAR) 50 MG tablet, Take 1 tablet by mouth Daily., Disp: 90 tablet, Rfl: 2  •  Omega-3 1000 MG capsule, Take 1 tablet by mouth Daily., Disp: , Rfl:   •  pantoprazole (PROTONIX) 40 MG EC tablet, Take 1 tablet by mouth Daily., Disp: 30 tablet, Rfl: 1      Social History     Socioeconomic History   • Marital status:   "  Tobacco Use   • Smoking status: Never Smoker   • Smokeless tobacco: Never Used   Vaping Use   • Vaping Use: Never used   Substance and Sexual Activity   • Alcohol use: No   • Drug use: No   • Sexual activity: Defer         Review of Systems   Constitutional: Negative for chills and fever.   HENT: Negative for ear discharge and nosebleeds.    Eyes: Negative for discharge and redness.   Cardiovascular: Negative for chest pain, orthopnea, palpitations, paroxysmal nocturnal dyspnea and syncope.   Respiratory: Negative for cough, shortness of breath and wheezing.    Endocrine: Negative for heat intolerance.   Skin: Negative for rash.   Musculoskeletal: Negative for arthritis and myalgias.   Gastrointestinal: Negative for abdominal pain, melena, nausea and vomiting.   Genitourinary: Negative for dysuria and hematuria.   Neurological: Negative for dizziness, light-headedness, numbness and tremors.   Psychiatric/Behavioral: Negative for depression. The patient is not nervous/anxious.        Procedures    Procedures    No orders to display           Objective:    /70   Pulse 80   Ht 160 cm (62.99\")   Wt 62.6 kg (138 lb)   LMP  (LMP Unknown)   BMI 24.45 kg/m²         Constitutional:       Appearance: Well-developed.   Eyes:      General: No scleral icterus.        Right eye: No discharge.   HENT:      Head: Normocephalic and atraumatic.   Neck:      Thyroid: No thyromegaly.      Lymphadenopathy: No cervical adenopathy.   Pulmonary:      Effort: Pulmonary effort is normal. No respiratory distress.      Breath sounds: Normal breath sounds. No wheezing. No rales.   Cardiovascular:      Normal rate. Regular rhythm.      No gallop.   Abdominal:      Tenderness: There is no abdominal tenderness.   Skin:     Findings: No erythema or rash.   Neurological:      Mental Status: Alert and oriented to person, place, and time.             Assessment:       Diagnosis Plan   1. Essential hypertension     2. Presence of cardiac " pacemaker     3. Heart murmur     4. Mixed hyperlipidemia     5. SSS (sick sinus syndrome) (HCC)     6. Cardiac arrhythmia, unspecified cardiac arrhythmia type              Plan:       MDM:    1.  S/p permanent pacemaker:    Patient is interrogated today for the pacemaker and it is functioning appropriately.  No change in programming    2.  Hypertension:    Patient blood pressure is slightly high.  I would accept that.  I would not recommend aggressive blood pressure control in this patient because of risk of fall.

## 2021-12-17 ENCOUNTER — OFFICE VISIT (OUTPATIENT)
Dept: CARDIOLOGY | Facility: CLINIC | Age: 86
End: 2021-12-17

## 2021-12-17 VITALS
HEART RATE: 80 BPM | HEIGHT: 63 IN | WEIGHT: 138 LBS | SYSTOLIC BLOOD PRESSURE: 145 MMHG | DIASTOLIC BLOOD PRESSURE: 70 MMHG | BODY MASS INDEX: 24.45 KG/M2

## 2021-12-17 DIAGNOSIS — Z95.0 PRESENCE OF CARDIAC PACEMAKER: ICD-10-CM

## 2021-12-17 DIAGNOSIS — I10 ESSENTIAL HYPERTENSION: Primary | ICD-10-CM

## 2021-12-17 DIAGNOSIS — I49.9 CARDIAC ARRHYTHMIA, UNSPECIFIED CARDIAC ARRHYTHMIA TYPE: ICD-10-CM

## 2021-12-17 DIAGNOSIS — E78.2 MIXED HYPERLIPIDEMIA: ICD-10-CM

## 2021-12-17 DIAGNOSIS — R01.1 HEART MURMUR: ICD-10-CM

## 2021-12-17 DIAGNOSIS — I49.5 SSS (SICK SINUS SYNDROME) (HCC): ICD-10-CM

## 2021-12-17 PROCEDURE — 99213 OFFICE O/P EST LOW 20 MIN: CPT | Performed by: INTERNAL MEDICINE

## 2021-12-21 ENCOUNTER — OFFICE VISIT (OUTPATIENT)
Dept: FAMILY MEDICINE CLINIC | Facility: CLINIC | Age: 86
End: 2021-12-21

## 2021-12-21 VITALS
BODY MASS INDEX: 24.98 KG/M2 | DIASTOLIC BLOOD PRESSURE: 74 MMHG | SYSTOLIC BLOOD PRESSURE: 136 MMHG | OXYGEN SATURATION: 98 % | WEIGHT: 141 LBS | TEMPERATURE: 97.7 F | HEIGHT: 63 IN | RESPIRATION RATE: 18 BRPM | HEART RATE: 83 BPM

## 2021-12-21 DIAGNOSIS — Z53.20 MAMMOGRAM DECLINED: ICD-10-CM

## 2021-12-21 DIAGNOSIS — I10 ESSENTIAL HYPERTENSION: ICD-10-CM

## 2021-12-21 DIAGNOSIS — E53.8 B12 DEFICIENCY: ICD-10-CM

## 2021-12-21 DIAGNOSIS — E03.9 ACQUIRED HYPOTHYROIDISM: ICD-10-CM

## 2021-12-21 DIAGNOSIS — D51.0 PERNICIOUS ANEMIA: ICD-10-CM

## 2021-12-21 DIAGNOSIS — Z91.81 AT LOW RISK FOR FALL: ICD-10-CM

## 2021-12-21 DIAGNOSIS — Z00.00 ENCOUNTER FOR SUBSEQUENT ANNUAL WELLNESS VISIT IN MEDICARE PATIENT: Primary | ICD-10-CM

## 2021-12-21 DIAGNOSIS — E78.2 MIXED HYPERLIPIDEMIA: ICD-10-CM

## 2021-12-21 DIAGNOSIS — Z95.0 PRESENCE OF CARDIAC PACEMAKER: ICD-10-CM

## 2021-12-21 DIAGNOSIS — N95.8 OTHER SPECIFIED MENOPAUSAL AND PERIMENOPAUSAL DISORDERS: ICD-10-CM

## 2021-12-21 DIAGNOSIS — E03.9 HYPOTHYROIDISM (ACQUIRED): ICD-10-CM

## 2021-12-21 PROCEDURE — 1160F RVW MEDS BY RX/DR IN RCRD: CPT | Performed by: FAMILY MEDICINE

## 2021-12-21 PROCEDURE — 96372 THER/PROPH/DIAG INJ SC/IM: CPT | Performed by: FAMILY MEDICINE

## 2021-12-21 PROCEDURE — G0439 PPPS, SUBSEQ VISIT: HCPCS | Performed by: FAMILY MEDICINE

## 2021-12-21 PROCEDURE — 99214 OFFICE O/P EST MOD 30 MIN: CPT | Performed by: FAMILY MEDICINE

## 2021-12-21 PROCEDURE — 1170F FXNL STATUS ASSESSED: CPT | Performed by: FAMILY MEDICINE

## 2021-12-21 RX ORDER — LEVOTHYROXINE SODIUM 0.07 MG/1
75 TABLET ORAL DAILY
Qty: 90 TABLET | Refills: 3 | Status: SHIPPED | OUTPATIENT
Start: 2021-12-21 | End: 2022-07-05

## 2021-12-21 RX ORDER — CYANOCOBALAMIN 1000 UG/ML
1000 INJECTION, SOLUTION INTRAMUSCULAR; SUBCUTANEOUS ONCE
Status: COMPLETED | OUTPATIENT
Start: 2021-12-21 | End: 2021-12-21

## 2021-12-21 RX ADMIN — CYANOCOBALAMIN 1000 MCG: 1000 INJECTION, SOLUTION INTRAMUSCULAR; SUBCUTANEOUS at 08:56

## 2021-12-21 NOTE — PATIENT INSTRUCTIONS

## 2021-12-21 NOTE — PROGRESS NOTES
Subsequent Medicare Wellness Visit    Chief Complaint   Patient presents with   • Medicare Wellness-subsequent   • Hyperlipidemia   • Hypertension       Subjective   History of Present Illness:  Stacy Monroy is a 89 y.o. female who presents for a Subsequent Medicare Wellness Visit. The patient is here: for coordination of medical care to discuss health maintenance and disease prevention to follow up on multiple medical problems. Overall has: minimal activity with work/home activities, exercises 1 time per week, good appetite, feels well with minor complaints, good energy level and is sleeping well. Nutrition: appropriate balanced diet and balanced diet. Last tetanus shot was 5-10 years ago.    Hyperlipidemia  This is a chronic problem. The current episode started more than 1 year ago. The problem is controlled. She has no history of diabetes. There are no known factors aggravating her hyperlipidemia. Pertinent negatives include no chest pain, leg pain or shortness of breath. Current antihyperlipidemic treatment includes herbal therapy. The current treatment provides moderate improvement of lipids. There are no compliance problems.  Risk factors for coronary artery disease include dyslipidemia, hypertension and post-menopausal.   Hypertension  This is a chronic problem. The current episode started more than 1 year ago. The problem is controlled. Pertinent negatives include no blurred vision, chest pain, headaches, malaise/fatigue, palpitations, shortness of breath or sweats. There are no associated agents to hypertension. Risk factors for coronary artery disease include dyslipidemia and post-menopausal state. Past treatments include nothing. Current antihypertension treatment includes calcium channel blockers and angiotensin blockers. The current treatment provides moderate improvement. There are no compliance problems.        HEALTH RISK ASSESSMENT    Recent Hospitalizations:  No hospitalization(s) within the last  year.    Current Medical Providers:  Patient Care Team:  Mita Moise MD as PCP - General  Mita Moise MD as PCP - Family Medicine    Smoking Status:  Social History     Tobacco Use   Smoking Status Never Smoker   Smokeless Tobacco Never Used       Alcohol Consumption:  Social History     Substance and Sexual Activity   Alcohol Use No       Depression Screen:   PHQ-2/PHQ-9 Depression Screening 12/21/2021   Little interest or pleasure in doing things 0   Feeling down, depressed, or hopeless 0   Trouble falling or staying asleep, or sleeping too much 0   Feeling tired or having little energy 0   Poor appetite or overeating 0   Feeling bad about yourself - or that you are a failure or have let yourself or your family down 0   Trouble concentrating on things, such as reading the newspaper or watching television 0   Moving or speaking so slowly that other people could have noticed. Or the opposite - being so fidgety or restless that you have been moving around a lot more than usual 0   Thoughts that you would be better off dead, or of hurting yourself in some way 0   Total Score 0   If you checked off any problems, how difficult have these problems made it for you to do your work, take care of things at home, or get along with other people? Not difficult at all     I spent more than 16 minutes asking patient questions, counseling and documenting in the chart    Fall Risk Screen:  STEADI Fall Risk Assessment was completed, and patient is at MODERATE risk for falls. Assessment completed on:12/21/2021    Health Habits and Functional and Cognitive Screening:  Functional & Cognitive Status 12/21/2021   Do you have difficulty preparing food and eating? No   Do you have difficulty bathing yourself, getting dressed or grooming yourself? No   Do you have difficulty using the toilet? No   Do you have difficulty moving around from place to place? No   Do you have trouble with steps or getting out of a bed or a chair? No    Current Diet Well Balanced Diet   Dental Exam Not up to date   Eye Exam Up to date   Exercise (times per week) 2 times per week   Current Exercises Include House Cleaning   Current Exercise Activities Include -   Do you need help using the phone?  No   Are you deaf or do you have serious difficulty hearing?  Yes   Do you need help with transportation? No   Do you need help shopping? No   Do you need help preparing meals?  No   Do you need help with housework?  No   Do you need help with laundry? No   Do you need help taking your medications? No   Do you need help managing money? No   Do you ever drive or ride in a car without wearing a seat belt? No   Have you felt unusual stress, anger or loneliness in the last month? No   Who do you live with? Alone   If you need help, do you have trouble finding someone available to you? No   Have you been bothered in the last four weeks by sexual problems? No   Do you have difficulty concentrating, remembering or making decisions? Yes       Does the patient have evidence of cognitive impairment? No    Asprin use counseling:Does not need ASA (and currently is not on it)    Recent Lab Results:  Lab Results   Component Value Date    CHLPL 226 (H) 12/21/2021    TRIG 60 12/21/2021    HDL 68 12/21/2021     (H) 12/21/2021    VLDL 10 12/21/2021        Brief Urine Lab Results  (Last result in the past 365 days)      Color   Clarity   Blood   Leuk Est   Nitrite   Protein   CREAT   Urine HCG        06/04/21 1327 Yellow   Clear   Negative   Negative   Negative   Negative                   Age-appropriate Screening Schedule:  Refer to the list below for future screening recommendations based on patient's age, sex and/or medical conditions. Orders for these recommended tests are listed in the plan section. The patient has been provided with a written plan.    Health Maintenance   Topic Date Due   • ZOSTER VACCINE (1 of 2) Never done   • DXA SCAN  12/04/2020   • INFLUENZA VACCINE   12/21/2022 (Originally 8/1/2021)   • LIPID PANEL  12/21/2022   • TDAP/TD VACCINES (2 - Td or Tdap) 08/05/2030          The following portions of the patient's history were reviewed and updated as appropriate: allergies, current medications, past family history, past medical history, past social history, past surgical history and problem list.      Outpatient Medications Prior to Visit   Medication Sig Dispense Refill   • amLODIPine (NORVASC) 5 MG tablet TAKE ONE TABLET BY MOUTH ONCE DAILY FOR HEART AND FOR BLOOD PRESSURE (Patient taking differently: Take 5 mg by mouth Daily. TAKE ONE TABLET BY MOUTH ONCE DAILY FOR HEART AND FOR BLOOD PRESSURE) 90 tablet 2   • calcium citrate-vitamin d (CITRACAL) 200-250 MG-UNIT tablet tablet Take 1 tablet by mouth Daily.     • coenzyme Q10 100 MG capsule Take 100 mg by mouth Daily.     • losartan (COZAAR) 50 MG tablet Take 1 tablet by mouth Daily. 90 tablet 2   • Omega-3 1000 MG capsule Take 1 tablet by mouth Daily.     • levothyroxine (SYNTHROID, LEVOTHROID) 75 MCG tablet TAKE ONE TABLET BY MOUTH ONCE DAILY 30 tablet 6   • pantoprazole (PROTONIX) 40 MG EC tablet Take 1 tablet by mouth Daily. 30 tablet 1     No facility-administered medications prior to visit.       Patient Active Problem List   Diagnosis   • Status post placement of implantable loop recorder   • Essential hypertension   • Hypothyroidism (acquired)   • Syncope   • Acute seasonal allergic rhinitis due to pollen   • Arthralgia of right foot   • Atherosclerosis   • B12 deficiency   • Bilateral carpal tunnel syndrome   • Cataracts, bilateral   • DNR (do not resuscitate)   • Heart murmur   • History of chicken pox   • Menopausal syndrome   • Mixed hyperlipidemia   • Pernicious anemia   • Right hip pain   • Acute right-sided low back pain with sciatica   • Influenza vaccine refused   • Liver function abnormality   • Acute UTI   • RUQ abdominal mass   • Cardiac arrhythmia   • Deleon-Wasserman syncope   • Sinus node dysfunction  ([facility])   • SSS (sick sinus syndrome) ([facility])   • Acute gastric ulcer without hemorrhage or perforation   • Presence of cardiac pacemaker   • Intraductal papillary mucinous neoplasm of pancreas   • Personal history of other infectious and parasitic diseases   • Presence of intraocular lens   • Puckering of macula, right eye   • Nonexudative age-related macular degeneration   • Posterior vitreous detachment   • Mammogram declined       Advanced Care Planning:  ACP discussion was held with the patient during this visit. Patient has an advance directive in EMR which is still valid.     A face-to-face visit was completed today with patient.  Counseling explanation, and discussion of advanced directives was performed.   The last advanced care visit was performed in 2020.  In a near life ending situation, from which she is not expected to recover functionally, and she is not able to speak for her, she does not want life sustaining measures. We discussed feeding tubes, ventilators and cardiac support as well as dialysis.    I spent less than 16 minutes discussing Advanced Care Planning information and documenting in the chart.    Review of Systems   Constitutional: Negative for activity change, fever and malaise/fatigue.   HENT: Negative for ear pain, rhinorrhea, sinus pressure and voice change.    Eyes: Negative for blurred vision and visual disturbance.   Respiratory: Negative for cough and shortness of breath.    Cardiovascular: Negative for chest pain and palpitations.   Gastrointestinal: Negative for abdominal pain, diarrhea, nausea and vomiting.   Endocrine: Negative for cold intolerance and heat intolerance.   Genitourinary: Negative for frequency and urgency.   Musculoskeletal: Negative for arthralgias.   Skin: Negative for rash.   Neurological: Negative for syncope.   Hematological: Does not bruise/bleed easily.   Psychiatric/Behavioral: Negative for depressed mood. The patient is not nervous/anxious.        I have  "reviewed and confirmed the accuracy of the HPI and ROS as documented by the MA/LPN/RN Mita Moise MD    Compared to one year ago, the patient feels her physical health is better.  Compared to one year ago, the patient feels her mental health is better.    Reviewed chart for potential of high risk medication in the elderly: yes  Reviewed chart for potential of harmful drug interactions in the elderly:yes    Objective      Vitals:    12/21/21 0814   BP: 136/74   BP Location: Right arm   Patient Position: Sitting   Pulse: 83   Resp: 18   Temp: 97.7 °F (36.5 °C)   TempSrc: Tympanic   SpO2: 98%   Weight: 64 kg (141 lb)   Height: 160 cm (62.99\")       Body mass index is 24.98 kg/m².  Discussed the patient's BMI with her. The BMI is in the acceptable range.    Physical Exam  Vitals and nursing note reviewed.   Constitutional:       General: She is not in acute distress.     Appearance: She is well-developed. She is not diaphoretic.   HENT:      Head: Normocephalic and atraumatic.      Right Ear: External ear normal.      Left Ear: External ear normal.      Nose: Nose normal.      Mouth/Throat:      Pharynx: No oropharyngeal exudate.   Eyes:      General: No scleral icterus.        Right eye: No discharge.         Left eye: No discharge.      Conjunctiva/sclera: Conjunctivae normal.      Pupils: Pupils are equal, round, and reactive to light.   Neck:      Thyroid: No thyromegaly.      Trachea: No tracheal deviation.   Cardiovascular:      Rate and Rhythm: Normal rate and regular rhythm.      Heart sounds: Normal heart sounds. No murmur heard.  No friction rub. No gallop.    Pulmonary:      Effort: Pulmonary effort is normal. No respiratory distress.      Breath sounds: Normal breath sounds. No stridor. No wheezing or rales.   Abdominal:      General: Bowel sounds are normal. There is no distension.      Palpations: Abdomen is soft. There is no mass.      Tenderness: There is no abdominal tenderness. There is no " guarding or rebound.   Musculoskeletal:         General: No tenderness or deformity. Normal range of motion.      Cervical back: Normal range of motion and neck supple.   Lymphadenopathy:      Cervical: No cervical adenopathy.   Skin:     General: Skin is warm and dry.      Capillary Refill: Capillary refill takes less than 2 seconds.      Coloration: Skin is not pale.      Findings: No erythema or rash.   Neurological:      Mental Status: She is alert and oriented to person, place, and time.      Cranial Nerves: No cranial nerve deficit.      Sensory: No sensory deficit.      Motor: No tremor, atrophy or abnormal muscle tone.      Coordination: Coordination normal.      Gait: Gait normal.      Deep Tendon Reflexes: Reflexes are normal and symmetric. Reflexes normal.   Psychiatric:         Behavior: Behavior normal.         Thought Content: Thought content normal.         Cognition and Memory: Memory is not impaired. She does not exhibit impaired recent memory or impaired remote memory.         Judgment: Judgment normal.         Assessment    Medicare Risks and Personalized Health Plan  CMS Preventative Services Quick Reference  Advance Directive Discussion    The above risks/problems have been discussed with the patient.  Pertinent information has been shared with the patient in the After Visit Summary.  Follow up plans and orders are seen below in the Assessment/Plan Section.    Medications were reviewed and deemed appropriate to continue. Refills sent in. Labs ordered for surveillance or reviewed in note.       Visit Diagnoses:    Problems Addressed this Visit        Advance Directives and General Issues    Mammogram declined     She declines further mammograms            Cardiac and Vasculature    Essential hypertension     Hypertension is unchanged.  Continue current treatment regimen.  Blood pressure will be reassessed in 3 months.         Relevant Orders    CBC & Differential (Completed)    Mixed  hyperlipidemia    Relevant Orders    Comprehensive Metabolic Panel (Completed)    Lipid Panel With / Chol / HDL Ratio (Completed)    Presence of cardiac pacemaker       Endocrine and Metabolic    Hypothyroidism (acquired)    Relevant Medications    levothyroxine (SYNTHROID, LEVOTHROID) 75 MCG tablet    Other Relevant Orders    TSH (Completed)    B12 deficiency    Relevant Orders    Vitamin B12 (Completed)       Hematology and Neoplasia    Pernicious anemia    Relevant Orders    CBC & Differential (Completed)      Other Visit Diagnoses     Encounter for subsequent annual wellness visit in Medicare patient    -  Primary    Other specified menopausal and perimenopausal disorders        Relevant Orders    DEXA Bone Density Axial    Acquired hypothyroidism        Relevant Medications    levothyroxine (SYNTHROID, LEVOTHROID) 75 MCG tablet    At low risk for fall          Diagnoses       Codes Comments    Encounter for subsequent annual wellness visit in Medicare patient    -  Primary ICD-10-CM: Z00.00  ICD-9-CM: V70.0     Essential hypertension     ICD-10-CM: I10  ICD-9-CM: 401.9     Mixed hyperlipidemia     ICD-10-CM: E78.2  ICD-9-CM: 272.2     Hypothyroidism (acquired)     ICD-10-CM: E03.9  ICD-9-CM: 244.9     B12 deficiency     ICD-10-CM: E53.8  ICD-9-CM: 266.2     Presence of cardiac pacemaker     ICD-10-CM: Z95.0  ICD-9-CM: V45.01     Pernicious anemia     ICD-10-CM: D51.0  ICD-9-CM: 281.0     Other specified menopausal and perimenopausal disorders     ICD-10-CM: N95.8  ICD-9-CM: 627.8     Acquired hypothyroidism     ICD-10-CM: E03.9  ICD-9-CM: 244.9     Mammogram declined     ICD-10-CM: Z53.20  ICD-9-CM: V64.2     At low risk for fall     ICD-10-CM: Z91.81  ICD-9-CM: V49.89                Follow Up:  No follow-ups on file.     An After Visit Summary and PPPS were given to the patient.    I wore protective equipment throughout this patient encounter to include mask. Hand hygiene was performed before donning  protective equipment and after removal when leaving the room.    Discussed injury prevention, diet and exercise, safe sexual practices, and screening for common diseases. Encouraged use of sunscreen and seatbelts. Discussed timing of  screenings. Avoidance of tobacco encouraged. Limitation or avoidance of alcohol encouraged. Recommend yearly dental and eye exams. Also discussed monitoring of blood pressure, lipids.

## 2021-12-22 LAB
ALBUMIN SERPL-MCNC: 3.8 G/DL (ref 3.5–4.6)
ALBUMIN/GLOB SERPL: 1.5 {RATIO} (ref 1.2–2.2)
ALP SERPL-CCNC: 73 IU/L (ref 44–121)
ALT SERPL-CCNC: 19 IU/L (ref 0–32)
AST SERPL-CCNC: 25 IU/L (ref 0–40)
BASOPHILS # BLD AUTO: 0.1 X10E3/UL (ref 0–0.2)
BASOPHILS NFR BLD AUTO: 1 %
BILIRUB SERPL-MCNC: 0.5 MG/DL (ref 0–1.2)
BUN SERPL-MCNC: 17 MG/DL (ref 10–36)
BUN/CREAT SERPL: 20 (ref 12–28)
CALCIUM SERPL-MCNC: 9.8 MG/DL (ref 8.7–10.3)
CHLORIDE SERPL-SCNC: 105 MMOL/L (ref 96–106)
CHOLEST SERPL-MCNC: 226 MG/DL (ref 100–199)
CHOLEST/HDLC SERPL: 3.3 RATIO (ref 0–4.4)
CO2 SERPL-SCNC: 22 MMOL/L (ref 20–29)
CREAT SERPL-MCNC: 0.84 MG/DL (ref 0.57–1)
EOSINOPHIL # BLD AUTO: 0.2 X10E3/UL (ref 0–0.4)
EOSINOPHIL NFR BLD AUTO: 2 %
ERYTHROCYTE [DISTWIDTH] IN BLOOD BY AUTOMATED COUNT: 15.1 % (ref 11.7–15.4)
GLOBULIN SER CALC-MCNC: 2.5 G/DL (ref 1.5–4.5)
GLUCOSE SERPL-MCNC: 98 MG/DL (ref 65–99)
HCT VFR BLD AUTO: 38.2 % (ref 34–46.6)
HDLC SERPL-MCNC: 68 MG/DL
HGB BLD-MCNC: 12.3 G/DL (ref 11.1–15.9)
IMM GRANULOCYTES # BLD AUTO: 0 X10E3/UL (ref 0–0.1)
IMM GRANULOCYTES NFR BLD AUTO: 0 %
LDLC SERPL CALC-MCNC: 148 MG/DL (ref 0–99)
LYMPHOCYTES # BLD AUTO: 2.4 X10E3/UL (ref 0.7–3.1)
LYMPHOCYTES NFR BLD AUTO: 35 %
MCH RBC QN AUTO: 27.3 PG (ref 26.6–33)
MCHC RBC AUTO-ENTMCNC: 32.2 G/DL (ref 31.5–35.7)
MCV RBC AUTO: 85 FL (ref 79–97)
MONOCYTES # BLD AUTO: 0.6 X10E3/UL (ref 0.1–0.9)
MONOCYTES NFR BLD AUTO: 9 %
NEUTROPHILS # BLD AUTO: 3.7 X10E3/UL (ref 1.4–7)
NEUTROPHILS NFR BLD AUTO: 53 %
PLATELET # BLD AUTO: 300 X10E3/UL (ref 150–450)
POTASSIUM SERPL-SCNC: 4.6 MMOL/L (ref 3.5–5.2)
PROT SERPL-MCNC: 6.3 G/DL (ref 6–8.5)
RBC # BLD AUTO: 4.51 X10E6/UL (ref 3.77–5.28)
SODIUM SERPL-SCNC: 141 MMOL/L (ref 134–144)
TRIGL SERPL-MCNC: 60 MG/DL (ref 0–149)
TSH SERPL DL<=0.005 MIU/L-ACNC: 2.73 UIU/ML (ref 0.45–4.5)
VIT B12 SERPL-MCNC: >2000 PG/ML (ref 232–1245)
VLDLC SERPL CALC-MCNC: 10 MG/DL (ref 5–40)
WBC # BLD AUTO: 6.9 X10E3/UL (ref 3.4–10.8)

## 2021-12-23 ENCOUNTER — TELEPHONE (OUTPATIENT)
Dept: FAMILY MEDICINE CLINIC | Facility: CLINIC | Age: 86
End: 2021-12-23

## 2021-12-23 DIAGNOSIS — I10 ESSENTIAL HYPERTENSION: ICD-10-CM

## 2021-12-23 NOTE — TELEPHONE ENCOUNTER
----- Message from Mita Moise MD sent at 12/23/2021 12:45 PM EST -----  The b 12 shot helped her b12 go up. Recheck B 12 in 3 months. Cholesterol did not change much

## 2021-12-26 PROBLEM — R53.83 OTHER FATIGUE: Status: RESOLVED | Noted: 2021-05-09 | Resolved: 2021-12-26

## 2021-12-27 RX ORDER — LOSARTAN POTASSIUM 50 MG/1
TABLET ORAL
Qty: 90 TABLET | Refills: 2 | Status: SHIPPED | OUTPATIENT
Start: 2021-12-27 | End: 2022-09-27

## 2022-01-10 RX ORDER — AMLODIPINE BESYLATE 5 MG/1
TABLET ORAL
Qty: 90 TABLET | Refills: 2 | Status: SHIPPED | OUTPATIENT
Start: 2022-01-10 | End: 2022-09-27

## 2022-01-28 ENCOUNTER — HOSPITAL ENCOUNTER (OUTPATIENT)
Dept: BONE DENSITY | Facility: HOSPITAL | Age: 87
Discharge: HOME | End: 2022-01-28
Admitting: FAMILY MEDICINE

## 2022-01-28 DIAGNOSIS — N95.8 OTHER SPECIFIED MENOPAUSAL AND PERIMENOPAUSAL DISORDERS: ICD-10-CM

## 2022-01-28 PROCEDURE — 77080 DXA BONE DENSITY AXIAL: CPT

## 2022-01-31 ENCOUNTER — TELEPHONE (OUTPATIENT)
Dept: FAMILY MEDICINE CLINIC | Facility: CLINIC | Age: 87
End: 2022-01-31

## 2022-02-21 PROCEDURE — 93296 REM INTERROG EVL PM/IDS: CPT | Performed by: NURSE PRACTITIONER

## 2022-02-21 PROCEDURE — 93294 REM INTERROG EVL PM/LDLS PM: CPT | Performed by: NURSE PRACTITIONER

## 2022-05-23 PROCEDURE — 93296 REM INTERROG EVL PM/IDS: CPT | Performed by: NURSE PRACTITIONER

## 2022-05-23 PROCEDURE — 93294 REM INTERROG EVL PM/LDLS PM: CPT | Performed by: NURSE PRACTITIONER

## 2022-06-20 NOTE — PROGRESS NOTES
Subjective   Stacy Monroy is a 89 y.o. female. Presents to [facility]    Chief Complaint   Patient presents with   • Hypertension   • Hyperlipidemia   • Hypothyroidism       Hypertension  This is a chronic problem. The current episode started more than 1 year ago. The problem is controlled. Pertinent negatives include no chest pain, headaches, malaise/fatigue or shortness of breath. Risk factors for coronary artery disease include dyslipidemia and post-menopausal state. Current antihypertension treatment includes angiotensin blockers and calcium channel blockers. The current treatment provides moderate improvement.   Hyperlipidemia  This is a chronic problem. The current episode started more than 1 year ago. The problem is uncontrolled. Recent lipid tests were reviewed and are high. Exacerbating diseases include hypothyroidism. She has no history of diabetes or obesity. Pertinent negatives include no chest pain or shortness of breath. Current antihyperlipidemic treatment includes herbal therapy. The current treatment provides moderate improvement of lipids. Risk factors for coronary artery disease include dyslipidemia, hypertension and post-menopausal.   Hypothyroidism  This is a chronic problem. The current episode started more than 1 year ago. Pertinent negatives include no abdominal pain, arthralgias, chest pain, coughing, fever, headaches, nausea, rash or vomiting. Treatments tried: Levothyroxine 75 MCG. The treatment provided moderate relief.        I personally reviewed and updated the patient's allergies, medications, problem list, and past medical, surgical, social, and family history. I have reviewed and confirmed the accuracy of the History of Present Illness and Review of Symptoms as documented by the MA/LPN/RN. Mita Moise MD    Allergies:  Allergies   Allergen Reactions   • Azithromycin Rash     ALL mycin drugs   • Erythromycin Rash     ALL mycins drugs   • Penicillin G Rash   •  Penicillins Rash     ALL mycin drugs       Social History:  Social History     Socioeconomic History   • Marital status:    Tobacco Use   • Smoking status: Never Smoker   • Smokeless tobacco: Never Used   Vaping Use   • Vaping Use: Never used   Substance and Sexual Activity   • Alcohol use: No   • Drug use: No   • Sexual activity: Defer       Family History:  Family History   Problem Relation Age of Onset   • Hypertension Mother    • Stroke Mother    • Hypertension Father    • Stroke Father    • Breast cancer Sister 50   • Ovarian cancer Daughter 57   • Breast cancer Niece 53       Past Medical History :  Patient Active Problem List   Diagnosis   • Status post placement of implantable loop recorder   • Essential hypertension   • Hypothyroidism (acquired)   • Syncope   • Acute seasonal allergic rhinitis due to pollen   • Arthralgia of right foot   • Atherosclerosis   • B12 deficiency   • Bilateral carpal tunnel syndrome   • Cataracts, bilateral   • DNR (do not resuscitate)   • Heart murmur   • History of chicken pox   • Menopausal syndrome   • Mixed hyperlipidemia   • Pernicious anemia   • Right hip pain   • Acute right-sided low back pain with sciatica   • Influenza vaccine refused   • Liver function abnormality   • Acute UTI   • RUQ abdominal mass   • Cardiac arrhythmia   • Deleon-Wasserman syncope   • Sinus node dysfunction (HCC)   • SSS (sick sinus syndrome) (HCC)   • Acute gastric ulcer without hemorrhage or perforation   • Presence of cardiac pacemaker   • Intraductal papillary mucinous neoplasm of pancreas   • Personal history of other infectious and parasitic diseases   • Presence of intraocular lens   • Puckering of macula, right eye   • Nonexudative age-related macular degeneration   • Posterior vitreous detachment   • Mammogram declined       Medication List:    Current Outpatient Medications:   •  amLODIPine (NORVASC) 5 MG tablet, TAKE ONE TABLET BY MOUTH ONCE DAILY FOR HEART AND BLOOD PRESSURE, Disp:  90 tablet, Rfl: 2  •  calcium citrate-vitamin d (CITRACAL) 200-250 MG-UNIT tablet tablet, Take 1 tablet by mouth Daily., Disp: , Rfl:   •  coenzyme Q10 100 MG capsule, Take 100 mg by mouth Daily., Disp: , Rfl:   •  levothyroxine (SYNTHROID, LEVOTHROID) 75 MCG tablet, Take 1 tablet by mouth Daily., Disp: 90 tablet, Rfl: 3  •  losartan (COZAAR) 50 MG tablet, TAKE ONE TABLET BY MOUTH ONCE DAILY FOR BLOOD PRESSURE, Disp: 90 tablet, Rfl: 2  •  Omega-3 1000 MG capsule, Take 1 tablet by mouth Daily., Disp: , Rfl:     Past Surgical History:  Past Surgical History:   Procedure Laterality Date   • BLADDER REPAIR     • BREAST BIOPSY Right    • CARDIAC ELECTROPHYSIOLOGY PROCEDURE N/A 5/19/2021    Procedure: Pacemaker DC new;  Surgeon: Edmar Rubin MD;  Location: [facility] CATH INVASIVE LOCATION;  Service: Cardiovascular;  Laterality: N/A;   • ERCP N/A 9/14/2021    Procedure: ERCP WITH SPHINCTEROTOMY, sphincteroplasty, clearance of bile duct with balloon. brushing, placement of pancreatic stent and placement of metal biliary stent;  Surgeon: Adrienne Wilson MD;  Location: [facility] ENDOSCOPY;  Service: Gastroenterology;  Laterality: N/A;  post op: cystic mass compressing bile duct   • HYSTERECTOMY     • INSERT / REPLACE / REMOVE PACEMAKER     • OTHER SURGICAL HISTORY  2019    Tilt table    • OTHER SURGICAL HISTORY  2019    tilt table        Review of Systems:  Review of Systems   Constitutional: Negative for activity change, fever and malaise/fatigue.   HENT: Negative for ear pain, rhinorrhea, sinus pressure and voice change.    Eyes: Negative for visual disturbance.   Respiratory: Negative for cough and shortness of breath.    Cardiovascular: Negative for chest pain.   Gastrointestinal: Negative for abdominal pain, diarrhea, nausea and vomiting.   Endocrine: Negative for cold intolerance and heat intolerance.   Genitourinary: Negative for frequency and urgency.   Musculoskeletal: Negative for arthralgias.   Skin:  "Negative for rash.   Neurological: Negative for syncope.   Hematological: Does not bruise/bleed easily.   Psychiatric/Behavioral: Negative for depressed mood. The patient is not nervous/anxious.        Physical Exam:  Vital Signs:  Vital Signs:   /80 (BP Location: Right arm, Patient Position: Sitting, Cuff Size: Adult)   Pulse 88   Temp 97.3 °F (36.3 °C) (Temporal)   Resp 18   Ht 160 cm (62.99\")   Wt 60.8 kg (134 lb)   SpO2 98% Comment: room air  BMI 23.74 kg/m²     Result Review :                Physical Exam  Vitals reviewed.   Constitutional:       Appearance: Normal appearance. She is well-developed.   HENT:      Head: Normocephalic and atraumatic.   Eyes:      General:         Right eye: No discharge.         Left eye: No discharge.   Cardiovascular:      Rate and Rhythm: Normal rate and regular rhythm.      Heart sounds: Normal heart sounds. No murmur heard.    No friction rub. No gallop.   Pulmonary:      Effort: Pulmonary effort is normal. No respiratory distress.      Breath sounds: Normal breath sounds. No wheezing or rales.   Skin:     General: Skin is warm and dry.      Findings: No rash.   Neurological:      Mental Status: She is alert and oriented to person, place, and time.      Coordination: Coordination normal.      Gait: Gait normal.   Psychiatric:         Behavior: Behavior is cooperative.         Assessment and Plan:  Problems Addressed this Visit        Cardiac and Vasculature    Essential hypertension - Primary     Hypertension is unchanged.  Continue current treatment regimen.  Dietary sodium restriction.  Weight loss.  Blood pressure will be reassessed in 3 months.           Relevant Orders    Comprehensive Metabolic Panel    Mixed hyperlipidemia       Endocrine and Metabolic    Hypothyroidism (acquired)      Diagnoses       Codes Comments    Essential hypertension    -  Primary ICD-10-CM: I10  ICD-9-CM: 401.9     Mixed hyperlipidemia     ICD-10-CM: E78.2  ICD-9-CM: 272.2     " Hypothyroidism (acquired)     ICD-10-CM: E03.9  ICD-9-CM: 244.9            BMI is within normal parameters. No other follow-up for BMI required.      An After Visit Summary and PPPS were given to the patient.       I wore protective equipment throughout this patient encounter to include mask and eyewear. Hand hygiene was performed before donning protective equipment and after removal when leaving the room.

## 2022-06-21 ENCOUNTER — OFFICE VISIT (OUTPATIENT)
Dept: FAMILY MEDICINE CLINIC | Facility: CLINIC | Age: 87
End: 2022-06-21

## 2022-06-21 VITALS
BODY MASS INDEX: 23.74 KG/M2 | WEIGHT: 134 LBS | HEIGHT: 63 IN | HEART RATE: 88 BPM | OXYGEN SATURATION: 98 % | TEMPERATURE: 97.3 F | SYSTOLIC BLOOD PRESSURE: 138 MMHG | RESPIRATION RATE: 18 BRPM | DIASTOLIC BLOOD PRESSURE: 66 MMHG

## 2022-06-21 DIAGNOSIS — I10 ESSENTIAL HYPERTENSION: Primary | ICD-10-CM

## 2022-06-21 DIAGNOSIS — E03.9 HYPOTHYROIDISM (ACQUIRED): ICD-10-CM

## 2022-06-21 DIAGNOSIS — E78.2 MIXED HYPERLIPIDEMIA: ICD-10-CM

## 2022-06-21 PROCEDURE — 99213 OFFICE O/P EST LOW 20 MIN: CPT | Performed by: FAMILY MEDICINE

## 2022-06-22 LAB
ALBUMIN SERPL-MCNC: 4.1 G/DL (ref 3.5–4.6)
ALBUMIN/GLOB SERPL: 1.5 {RATIO} (ref 1.2–2.2)
ALP SERPL-CCNC: 76 IU/L (ref 44–121)
ALT SERPL-CCNC: 17 IU/L (ref 0–32)
AST SERPL-CCNC: 23 IU/L (ref 0–40)
BILIRUB SERPL-MCNC: 0.7 MG/DL (ref 0–1.2)
BUN SERPL-MCNC: 20 MG/DL (ref 10–36)
BUN/CREAT SERPL: 20 (ref 12–28)
CALCIUM SERPL-MCNC: 10.2 MG/DL (ref 8.7–10.3)
CHLORIDE SERPL-SCNC: 103 MMOL/L (ref 96–106)
CO2 SERPL-SCNC: 25 MMOL/L (ref 20–29)
CREAT SERPL-MCNC: 0.99 MG/DL (ref 0.57–1)
EGFRCR SERPLBLD CKD-EPI 2021: 54 ML/MIN/1.73
GLOBULIN SER CALC-MCNC: 2.8 G/DL (ref 1.5–4.5)
GLUCOSE SERPL-MCNC: 91 MG/DL (ref 65–99)
POTASSIUM SERPL-SCNC: 4.8 MMOL/L (ref 3.5–5.2)
PROT SERPL-MCNC: 6.9 G/DL (ref 6–8.5)
SODIUM SERPL-SCNC: 141 MMOL/L (ref 134–144)

## 2022-07-04 DIAGNOSIS — E03.9 ACQUIRED HYPOTHYROIDISM: ICD-10-CM

## 2022-07-05 RX ORDER — LEVOTHYROXINE SODIUM 0.07 MG/1
TABLET ORAL
Qty: 30 TABLET | Refills: 6 | Status: SHIPPED | OUTPATIENT
Start: 2022-07-05

## 2022-08-22 PROCEDURE — 93294 REM INTERROG EVL PM/LDLS PM: CPT | Performed by: NURSE PRACTITIONER

## 2022-08-22 PROCEDURE — 93296 REM INTERROG EVL PM/IDS: CPT | Performed by: NURSE PRACTITIONER

## 2022-09-15 NOTE — PROGRESS NOTES
Subjective   Stacy Monroy is a 89 y.o. female. Presents to [facility]    Chief Complaint   Patient presents with   • Hypertension   • Hyperlipidemia       Hypertension  This is a chronic problem. The current episode started more than 1 year ago. The problem has been waxing and waning since onset. The problem is controlled. Pertinent negatives include no chest pain, headaches, malaise/fatigue or shortness of breath. Agents associated with hypertension include thyroid hormones. Risk factors for coronary artery disease include dyslipidemia and post-menopausal state. Past treatments include nothing. Current antihypertension treatment includes angiotensin blockers and calcium channel blockers. The current treatment provides moderate improvement. There are no compliance problems.    Hyperlipidemia  This is a chronic problem. The current episode started more than 1 year ago. The problem is controlled. Exacerbating diseases include hypothyroidism. She has no history of diabetes. There are no known factors aggravating her hyperlipidemia. Pertinent negatives include no chest pain or shortness of breath. Current antihyperlipidemic treatment includes herbal therapy, diet change and exercise. The current treatment provides moderate improvement of lipids. There are no compliance problems.  Risk factors for coronary artery disease include dyslipidemia, hypertension and post-menopausal.        I personally reviewed and updated the patient's allergies, medications, problem list, and past medical, surgical, social, and family history. I have reviewed and confirmed the accuracy of the History of Present Illness and Review of Symptoms as documented by the MA/LPN/RN. Mita Moise MD    Allergies:  Allergies   Allergen Reactions   • Azithromycin Rash     ALL mycin drugs   • Erythromycin Rash     ALL mycins drugs   • Penicillin G Rash   • Penicillins Rash     ALL mycin drugs       Social History:  Social History      Socioeconomic History   • Marital status:    Tobacco Use   • Smoking status: Never Smoker   • Smokeless tobacco: Never Used   Vaping Use   • Vaping Use: Never used   Substance and Sexual Activity   • Alcohol use: No   • Drug use: No   • Sexual activity: Defer       Family History:  Family History   Problem Relation Age of Onset   • Hypertension Mother    • Stroke Mother    • Hypertension Father    • Stroke Father    • Breast cancer Sister 50   • Ovarian cancer Daughter 57   • Breast cancer Niece 53       Past Medical History :  Patient Active Problem List   Diagnosis   • Status post placement of implantable loop recorder   • Essential hypertension   • Hypothyroidism (acquired)   • Syncope   • Acute seasonal allergic rhinitis due to pollen   • Arthralgia of right foot   • Atherosclerosis   • B12 deficiency   • Bilateral carpal tunnel syndrome   • Cataracts, bilateral   • DNR (do not resuscitate)   • Heart murmur   • History of chicken pox   • Menopausal syndrome   • Mixed hyperlipidemia   • Pernicious anemia   • Right hip pain   • Acute right-sided low back pain with sciatica   • Influenza vaccine refused   • Liver function abnormality   • Acute UTI   • RUQ abdominal mass   • Cardiac arrhythmia   • Deleon-Wasserman syncope   • Sinus node dysfunction (HCC)   • SSS (sick sinus syndrome) (HCC)   • Acute gastric ulcer without hemorrhage or perforation   • Presence of cardiac pacemaker   • Intraductal papillary mucinous neoplasm of pancreas   • Personal history of other infectious and parasitic diseases   • Presence of intraocular lens   • Puckering of macula, right eye   • Nonexudative age-related macular degeneration   • Posterior vitreous detachment   • Mammogram declined       Medication List:    Current Outpatient Medications:   •  amLODIPine (NORVASC) 5 MG tablet, TAKE ONE TABLET BY MOUTH ONCE DAILY FOR HEART AND BLOOD PRESSURE, Disp: 90 tablet, Rfl: 2  •  calcium citrate-vitamin d (CITRACAL) 200-250 MG-UNIT  tablet tablet, Take 1 tablet by mouth Daily., Disp: , Rfl:   •  coenzyme Q10 100 MG capsule, Take 100 mg by mouth Daily., Disp: , Rfl:   •  levothyroxine (SYNTHROID, LEVOTHROID) 75 MCG tablet, TAKE ONE TABLET BY MOUTH ONCE DAILY, Disp: 30 tablet, Rfl: 6  •  losartan (COZAAR) 50 MG tablet, TAKE ONE TABLET BY MOUTH ONCE DAILY FOR BLOOD PRESSURE, Disp: 90 tablet, Rfl: 2  •  Omega-3 1000 MG capsule, Take 1 tablet by mouth Daily., Disp: , Rfl:     Past Surgical History:  Past Surgical History:   Procedure Laterality Date   • BLADDER REPAIR     • BREAST BIOPSY Right    • CARDIAC ELECTROPHYSIOLOGY PROCEDURE N/A 5/19/2021    Procedure: Pacemaker DC new;  Surgeon: Edmar Rubin MD;  Location: [facility] CATH INVASIVE LOCATION;  Service: Cardiovascular;  Laterality: N/A;   • ERCP N/A 9/14/2021    Procedure: ERCP WITH SPHINCTEROTOMY, sphincteroplasty, clearance of bile duct with balloon. brushing, placement of pancreatic stent and placement of metal biliary stent;  Surgeon: Adrienne Wilson MD;  Location: [facility] ENDOSCOPY;  Service: Gastroenterology;  Laterality: N/A;  post op: cystic mass compressing bile duct   • HYSTERECTOMY     • INSERT / REPLACE / REMOVE PACEMAKER     • OTHER SURGICAL HISTORY  2019    Tilt table    • OTHER SURGICAL HISTORY  2019    tilt table          Physical Exam:      Vital Signs:    Vitals:    09/19/22 0933   BP: 130/80   Pulse: 94   Resp: 18   Temp: 97.3 °F (36.3 °C)   SpO2: 97%        Wt Readings from Last 3 Encounters:   09/19/22 60.9 kg (134 lb 3.2 oz)   06/21/22 60.8 kg (134 lb)   12/21/21 64 kg (141 lb)       Result Review :                Physical Exam  Vitals reviewed.   Constitutional:       Appearance: Normal appearance. She is well-developed.   HENT:      Head: Normocephalic and atraumatic.   Eyes:      General:         Right eye: No discharge.         Left eye: No discharge.   Cardiovascular:      Rate and Rhythm: Normal rate and regular rhythm.      Heart sounds: Normal heart  sounds. No murmur heard.    No friction rub. No gallop.   Pulmonary:      Effort: Pulmonary effort is normal. No respiratory distress.      Breath sounds: Normal breath sounds. No wheezing or rales.   Skin:     General: Skin is warm and dry.      Findings: No rash.   Neurological:      Mental Status: She is alert and oriented to person, place, and time.      Coordination: Coordination normal.      Gait: Gait normal.   Psychiatric:         Behavior: Behavior is cooperative.         Assessment and Plan:  Problems Addressed this Visit        Cardiac and Vasculature    Essential hypertension     Hypertension is unchanged.  Continue current treatment regimen.  Dietary sodium restriction.  Weight loss.  Blood pressure will be reassessed in 3 months.         Atherosclerosis    Mixed hyperlipidemia    Relevant Orders    Comprehensive Metabolic Panel (Completed)    Lipid Panel With / Chol / HDL Ratio (Completed)       Endocrine and Metabolic    B12 deficiency    Relevant Orders    Vitamin B12 (Completed)      Other Visit Diagnoses     Acquired hypothyroidism    -  Primary      Diagnoses       Codes Comments    Acquired hypothyroidism    -  Primary ICD-10-CM: E03.9  ICD-9-CM: 244.9     Essential hypertension     ICD-10-CM: I10  ICD-9-CM: 401.9     Mixed hyperlipidemia     ICD-10-CM: E78.2  ICD-9-CM: 272.2     Atherosclerosis     ICD-10-CM: I70.90  ICD-9-CM: 440.9     B12 deficiency     ICD-10-CM: E53.8  ICD-9-CM: 266.2            BMI is within normal parameters. No other follow-up for BMI required.      An After Visit Summary and PPPS were given to the patient.       I wore protective equipment throughout this patient encounter to include mask and eyewear. Hand hygiene was performed before donning protective equipment and after removal when leaving the room.

## 2022-09-19 ENCOUNTER — OFFICE VISIT (OUTPATIENT)
Dept: FAMILY MEDICINE CLINIC | Facility: CLINIC | Age: 87
End: 2022-09-19

## 2022-09-19 VITALS
BODY MASS INDEX: 23.78 KG/M2 | OXYGEN SATURATION: 97 % | HEART RATE: 94 BPM | SYSTOLIC BLOOD PRESSURE: 130 MMHG | HEIGHT: 63 IN | DIASTOLIC BLOOD PRESSURE: 80 MMHG | WEIGHT: 134.2 LBS | TEMPERATURE: 97.3 F | RESPIRATION RATE: 18 BRPM

## 2022-09-19 DIAGNOSIS — E53.8 B12 DEFICIENCY: ICD-10-CM

## 2022-09-19 DIAGNOSIS — I10 ESSENTIAL HYPERTENSION: ICD-10-CM

## 2022-09-19 DIAGNOSIS — I70.90 ATHEROSCLEROSIS: ICD-10-CM

## 2022-09-19 DIAGNOSIS — E78.2 MIXED HYPERLIPIDEMIA: ICD-10-CM

## 2022-09-19 DIAGNOSIS — E03.9 ACQUIRED HYPOTHYROIDISM: Primary | ICD-10-CM

## 2022-09-19 PROCEDURE — 99214 OFFICE O/P EST MOD 30 MIN: CPT | Performed by: FAMILY MEDICINE

## 2022-09-20 LAB
ALBUMIN SERPL-MCNC: 4.1 G/DL (ref 3.5–4.6)
ALBUMIN/GLOB SERPL: 1.5 {RATIO} (ref 1.2–2.2)
ALP SERPL-CCNC: 81 IU/L (ref 44–121)
ALT SERPL-CCNC: 17 IU/L (ref 0–32)
AST SERPL-CCNC: 26 IU/L (ref 0–40)
BILIRUB SERPL-MCNC: 0.6 MG/DL (ref 0–1.2)
BUN SERPL-MCNC: 18 MG/DL (ref 10–36)
BUN/CREAT SERPL: 21 (ref 12–28)
CALCIUM SERPL-MCNC: 10.1 MG/DL (ref 8.7–10.3)
CHLORIDE SERPL-SCNC: 104 MMOL/L (ref 96–106)
CHOLEST SERPL-MCNC: 240 MG/DL (ref 100–199)
CHOLEST/HDLC SERPL: 3.6 RATIO (ref 0–4.4)
CO2 SERPL-SCNC: 24 MMOL/L (ref 20–29)
CREAT SERPL-MCNC: 0.86 MG/DL (ref 0.57–1)
EGFRCR SERPLBLD CKD-EPI 2021: 63 ML/MIN/1.73
GLOBULIN SER CALC-MCNC: 2.7 G/DL (ref 1.5–4.5)
GLUCOSE SERPL-MCNC: 91 MG/DL (ref 65–99)
HDLC SERPL-MCNC: 67 MG/DL
LDLC SERPL CALC-MCNC: 161 MG/DL (ref 0–99)
POTASSIUM SERPL-SCNC: 4.8 MMOL/L (ref 3.5–5.2)
PROT SERPL-MCNC: 6.8 G/DL (ref 6–8.5)
SODIUM SERPL-SCNC: 140 MMOL/L (ref 134–144)
TRIGL SERPL-MCNC: 72 MG/DL (ref 0–149)
VIT B12 SERPL-MCNC: 197 PG/ML (ref 232–1245)
VLDLC SERPL CALC-MCNC: 12 MG/DL (ref 5–40)

## 2022-09-26 DIAGNOSIS — I10 ESSENTIAL HYPERTENSION: ICD-10-CM

## 2022-09-27 RX ORDER — AMLODIPINE BESYLATE 5 MG/1
TABLET ORAL
Qty: 90 TABLET | Refills: 2 | Status: SHIPPED | OUTPATIENT
Start: 2022-09-27

## 2022-09-27 RX ORDER — LOSARTAN POTASSIUM 50 MG/1
TABLET ORAL
Qty: 90 TABLET | Refills: 2 | Status: SHIPPED | OUTPATIENT
Start: 2022-09-27

## 2022-10-12 ENCOUNTER — CLINICAL SUPPORT (OUTPATIENT)
Dept: FAMILY MEDICINE CLINIC | Facility: CLINIC | Age: 87
End: 2022-10-12

## 2022-10-12 ENCOUNTER — TELEPHONE (OUTPATIENT)
Dept: FAMILY MEDICINE CLINIC | Facility: CLINIC | Age: 87
End: 2022-10-12

## 2022-10-12 DIAGNOSIS — E53.8 B12 DEFICIENCY: ICD-10-CM

## 2022-10-12 DIAGNOSIS — Z23 NEED FOR PNEUMOCOCCAL VACCINE: Primary | ICD-10-CM

## 2022-10-12 PROCEDURE — G0009 ADMIN PNEUMOCOCCAL VACCINE: HCPCS | Performed by: FAMILY MEDICINE

## 2022-10-12 PROCEDURE — 96372 THER/PROPH/DIAG INJ SC/IM: CPT | Performed by: FAMILY MEDICINE

## 2022-10-12 PROCEDURE — 90677 PCV20 VACCINE IM: CPT | Performed by: FAMILY MEDICINE

## 2022-10-12 RX ORDER — CYANOCOBALAMIN 1000 UG/ML
1000 INJECTION, SOLUTION INTRAMUSCULAR; SUBCUTANEOUS ONCE
Status: COMPLETED | OUTPATIENT
Start: 2022-10-12 | End: 2022-10-12

## 2022-10-12 RX ADMIN — CYANOCOBALAMIN 1000 MCG: 1000 INJECTION, SOLUTION INTRAMUSCULAR; SUBCUTANEOUS at 13:53

## 2022-10-27 NOTE — PROGRESS NOTES
Date of Office Visit: 10/28/2022  Encounter Provider: Dr. Ruslan Layne  Place of Service: [facility] CARDIOLOGY [place]  Patient Name: Stacy Monroy  :1930  Mita Moise MD    Chief Complaint   Patient presents with   • Heart Murmur   • Hypertension   • Hyperlipidemia   • Follow-up     History of Present Illness    I am pleased to see Mrs. Monroy in my office today as a follow-up.    As you know, patient is 89-year-old white female whose past medical history significant for hypertension, who came today for follow-up.     In 2019, patient was evaluated due to an episode of syncope. Patient underwent echocardiogram in 2019 which showed EF of 55-60%.  Patient had stress test in 2018 at [facility] which was reportedly unremarkable for ischemia.  Holter monitor showed no significant pause of bradycardia.  Tilt-table test showed hypotensive response with bradycardia during tilt-table test. Patient underwent loop recorder implant at [facility] on 2019.    In May 2021, patient had a syncopal episode and was admitted at [facility].  Patient loop recorder showed long pause.  Patient underwent dual-chamber pacemaker implantation.  Postprocedure patient did well.    Patient came today for routine follow-up.  She is overall doing well.  She is living independently.  She does not have any symptom of angina pectoris or congestive heart failure.  Patient denies any orthopnea, PND, syncope or presyncope.    Pacemaker is not interrogated today.  Pacemaker rep present patient did not show up.  At this stage I will recommend observation.  Pacemaker would be interrogated on next visit.  Blood pressure and heart rate is stable.    I reviewed remote interrogation in 2022 and it was functioning appropriately.  No change in programming was suggested.        Past Medical History:   Diagnosis Date   • Arthritis    • Heart murmur    • Hyperlipidemia    •  Hypertension    • Other specified hypothyroidism 8/15/2019   • Presence of cardiac pacemaker 7/9/2021    [facility]         Past Surgical History:   Procedure Laterality Date   • BLADDER REPAIR     • BREAST BIOPSY Right    • CARDIAC ELECTROPHYSIOLOGY PROCEDURE N/A 5/19/2021    Procedure: Pacemaker DC new;  Surgeon: Edmar Rubin MD;  Location: [facility] CATH INVASIVE LOCATION;  Service: Cardiovascular;  Laterality: N/A;   • ERCP N/A 9/14/2021    Procedure: ERCP WITH SPHINCTEROTOMY, sphincteroplasty, clearance of bile duct with balloon. brushing, placement of pancreatic stent and placement of metal biliary stent;  Surgeon: Adrienne Wilson MD;  Location: [facility] ENDOSCOPY;  Service: Gastroenterology;  Laterality: N/A;  post op: cystic mass compressing bile duct   • HYSTERECTOMY     • INSERT / REPLACE / REMOVE PACEMAKER     • OTHER SURGICAL HISTORY  2019    Tilt table    • OTHER SURGICAL HISTORY  2019    tilt table            Current Outpatient Medications:   •  amLODIPine (NORVASC) 5 MG tablet, TAKE ONE TABLET BY MOUTH ONCE DAILY FOR HEART AND BLOOD PRESSURE, Disp: 90 tablet, Rfl: 2  •  calcium citrate-vitamin d (CITRACAL) 200-250 MG-UNIT tablet tablet, Take 1 tablet by mouth Daily., Disp: , Rfl:   •  coenzyme Q10 100 MG capsule, Take 100 mg by mouth Daily., Disp: , Rfl:   •  levothyroxine (SYNTHROID, LEVOTHROID) 75 MCG tablet, TAKE ONE TABLET BY MOUTH ONCE DAILY, Disp: 30 tablet, Rfl: 6  •  losartan (COZAAR) 50 MG tablet, TAKE ONE TABLET BY MOUTH ONCE DAILY FOR BLOOD PRESSURE, Disp: 90 tablet, Rfl: 2  •  Omega-3 1000 MG capsule, Take 1 tablet by mouth Daily., Disp: , Rfl:       Social History     Socioeconomic History   • Marital status:    Tobacco Use   • Smoking status: Never   • Smokeless tobacco: Never   Vaping Use   • Vaping Use: Never used   Substance and Sexual Activity   • Alcohol use: No   • Drug use: No   • Sexual activity: Defer         Review of Systems   Constitutional: Negative for chills  "and fever.   HENT: Negative for ear discharge and nosebleeds.    Eyes: Negative for discharge and redness.   Cardiovascular: Negative for chest pain, orthopnea, palpitations, paroxysmal nocturnal dyspnea and syncope.   Respiratory: Negative for cough, shortness of breath and wheezing.    Endocrine: Negative for heat intolerance.   Skin: Negative for rash.   Musculoskeletal: Negative for arthritis and myalgias.   Gastrointestinal: Negative for abdominal pain, melena, nausea and vomiting.   Genitourinary: Negative for dysuria and hematuria.   Neurological: Negative for dizziness, light-headedness, numbness and tremors.   Psychiatric/Behavioral: Negative for depression. The patient is not nervous/anxious.        Procedures    Procedures    No orders to display           Objective:    /74 (BP Location: Right arm, Patient Position: Sitting, Cuff Size: Adult)   Pulse 80   Ht 160 cm (62.99\")   Wt 60.8 kg (134 lb)   LMP  (LMP Unknown)   BMI 23.74 kg/m²         Constitutional:       Appearance: Well-developed.   Eyes:      General: No scleral icterus.        Right eye: No discharge.   HENT:      Head: Normocephalic and atraumatic.   Neck:      Thyroid: No thyromegaly.      Lymphadenopathy: No cervical adenopathy.   Pulmonary:      Effort: Pulmonary effort is normal. No respiratory distress.      Breath sounds: Normal breath sounds. No wheezing. No rales.   Cardiovascular:      Normal rate. Regular rhythm.      No gallop.   Abdominal:      Tenderness: There is no abdominal tenderness.   Skin:     Findings: No erythema or rash.   Neurological:      Mental Status: Alert and oriented to person, place, and time.             Assessment:       Diagnosis Plan   1. Essential hypertension        2. Mixed hyperlipidemia        3. Presence of cardiac pacemaker        4. SSS (sick sinus syndrome) ([facility])        5. Cardiac arrhythmia, unspecified cardiac arrhythmia type                 Plan:       MDM:    1.  S/p " pacemaker:    Pacemaker is not interrogated today because of lack of the representative.  Previous interrogation was desirable recommend observation    2.  Hypertension:    Blood pressure is very well controlled continue amlodipine and losartan

## 2022-10-28 ENCOUNTER — OFFICE VISIT (OUTPATIENT)
Dept: CARDIOLOGY | Facility: CLINIC | Age: 87
End: 2022-10-28

## 2022-10-28 VITALS
HEART RATE: 80 BPM | HEIGHT: 63 IN | SYSTOLIC BLOOD PRESSURE: 135 MMHG | WEIGHT: 134 LBS | DIASTOLIC BLOOD PRESSURE: 74 MMHG | BODY MASS INDEX: 23.74 KG/M2

## 2022-10-28 DIAGNOSIS — I49.5 SSS (SICK SINUS SYNDROME): ICD-10-CM

## 2022-10-28 DIAGNOSIS — I10 ESSENTIAL HYPERTENSION: Primary | ICD-10-CM

## 2022-10-28 DIAGNOSIS — Z95.0 PRESENCE OF CARDIAC PACEMAKER: ICD-10-CM

## 2022-10-28 DIAGNOSIS — I49.9 CARDIAC ARRHYTHMIA, UNSPECIFIED CARDIAC ARRHYTHMIA TYPE: ICD-10-CM

## 2022-10-28 DIAGNOSIS — E78.2 MIXED HYPERLIPIDEMIA: ICD-10-CM

## 2022-10-28 PROCEDURE — 99213 OFFICE O/P EST LOW 20 MIN: CPT | Performed by: INTERNAL MEDICINE

## 2022-11-15 ENCOUNTER — CLINICAL SUPPORT (OUTPATIENT)
Dept: FAMILY MEDICINE CLINIC | Facility: CLINIC | Age: 87
End: 2022-11-15

## 2022-11-15 DIAGNOSIS — E53.8 B12 DEFICIENCY: Primary | ICD-10-CM

## 2022-11-15 PROCEDURE — 96372 THER/PROPH/DIAG INJ SC/IM: CPT | Performed by: FAMILY MEDICINE

## 2022-11-15 RX ORDER — CYANOCOBALAMIN 1000 UG/ML
1000 INJECTION, SOLUTION INTRAMUSCULAR; SUBCUTANEOUS ONCE
Status: COMPLETED | OUTPATIENT
Start: 2022-11-15 | End: 2022-11-15

## 2022-11-15 RX ADMIN — CYANOCOBALAMIN 1000 MCG: 1000 INJECTION, SOLUTION INTRAMUSCULAR; SUBCUTANEOUS at 13:45

## 2022-12-14 ENCOUNTER — CLINICAL SUPPORT (OUTPATIENT)
Dept: FAMILY MEDICINE CLINIC | Facility: CLINIC | Age: 87
End: 2022-12-14

## 2022-12-14 DIAGNOSIS — E53.8 B12 DEFICIENCY: Primary | ICD-10-CM

## 2022-12-14 PROCEDURE — 96372 THER/PROPH/DIAG INJ SC/IM: CPT | Performed by: FAMILY MEDICINE

## 2022-12-14 RX ORDER — CYANOCOBALAMIN 1000 UG/ML
1000 INJECTION, SOLUTION INTRAMUSCULAR; SUBCUTANEOUS ONCE
Status: COMPLETED | OUTPATIENT
Start: 2022-12-14 | End: 2022-12-14

## 2022-12-14 RX ADMIN — CYANOCOBALAMIN 1000 MCG: 1000 INJECTION, SOLUTION INTRAMUSCULAR; SUBCUTANEOUS at 13:38

## 2022-12-16 NOTE — PROGRESS NOTES
Subjective   Stacy Monroy is a 89 y.o. female. Presents to [facility]    Chief Complaint   Patient presents with   • Hypertension   • Hyperlipidemia       Hypertension  This is a chronic problem. The current episode started more than 1 year ago. The problem is controlled. Pertinent negatives include no chest pain, headaches, malaise/fatigue or shortness of breath. Risk factors for coronary artery disease include dyslipidemia, post-menopausal state and family history. Current antihypertension treatment includes angiotensin blockers and calcium channel blockers. The current treatment provides moderate improvement.   Hyperlipidemia  This is a chronic problem. The current episode started more than 1 year ago. She has no history of diabetes or obesity. Pertinent negatives include no chest pain or shortness of breath. Current antihyperlipidemic treatment includes herbal therapy. The current treatment provides moderate improvement of lipids. Risk factors for coronary artery disease include dyslipidemia, hypertension, post-menopausal and family history.   Abdominal Pain  This is a new problem. The current episode started more than 1 month ago. The onset quality is gradual. The problem occurs intermittently. The problem has been unchanged. The pain is located in the generalized abdominal region. The pain is mild. The quality of the pain is dull. The abdominal pain does not radiate. Associated symptoms include diarrhea. Pertinent negatives include no constipation, dysuria, flatus, headaches, nausea or vomiting. Nothing aggravates the pain. The pain is relieved by nothing. She has tried nothing for the symptoms.        I personally reviewed and updated the patient's allergies, medications, problem list, and past medical, surgical, social, and family history. I have reviewed and confirmed the accuracy of the History of Present Illness and Review of Symptoms as documented by the MA/LPN/RN. Mita Moise  MD    Allergies:  Allergies   Allergen Reactions   • Azithromycin Rash     ALL mycin drugs   • Erythromycin Rash     ALL mycins drugs   • Penicillin G Rash   • Penicillins Rash     ALL mycin drugs       Social History:  Social History     Socioeconomic History   • Marital status:    Tobacco Use   • Smoking status: Never   • Smokeless tobacco: Never   Vaping Use   • Vaping Use: Never used   Substance and Sexual Activity   • Alcohol use: No   • Drug use: No   • Sexual activity: Defer       Family History:  Family History   Problem Relation Age of Onset   • Hypertension Mother    • Stroke Mother    • Hypertension Father    • Stroke Father    • Breast cancer Sister 50   • Ovarian cancer Daughter 57   • Breast cancer Niece 53       Past Medical History :  Patient Active Problem List   Diagnosis   • Status post placement of implantable loop recorder   • Essential hypertension   • Hypothyroidism (acquired)   • Syncope   • Acute seasonal allergic rhinitis due to pollen   • Arthralgia of right foot   • Atherosclerosis   • B12 deficiency   • Bilateral carpal tunnel syndrome   • Cataracts, bilateral   • DNR (do not resuscitate)   • Heart murmur   • History of chicken pox   • Menopausal syndrome   • Mixed hyperlipidemia   • Pernicious anemia   • Right hip pain   • Acute right-sided low back pain with sciatica   • Influenza vaccine refused   • Liver function abnormality   • Acute UTI   • RUQ abdominal mass   • Cardiac arrhythmia   • Deleon-Wasserman syncope   • Sinus node dysfunction (HCC)   • SSS (sick sinus syndrome) (HCC)   • Acute gastric ulcer without hemorrhage or perforation   • Presence of cardiac pacemaker   • Intraductal papillary mucinous neoplasm of pancreas   • Personal history of other infectious and parasitic diseases   • Presence of intraocular lens   • Puckering of macula, right eye   • Nonexudative age-related macular degeneration   • Posterior vitreous detachment   • Mammogram declined   • Generalized  abdominal pain       Medication List:    Current Outpatient Medications:   •  amLODIPine (NORVASC) 5 MG tablet, TAKE ONE TABLET BY MOUTH ONCE DAILY FOR HEART AND BLOOD PRESSURE, Disp: 90 tablet, Rfl: 2  •  calcium citrate-vitamin d (CITRACAL) 200-250 MG-UNIT tablet tablet, Take 1 tablet by mouth Daily., Disp: , Rfl:   •  coenzyme Q10 100 MG capsule, Take 100 mg by mouth Daily., Disp: , Rfl:   •  levothyroxine (SYNTHROID, LEVOTHROID) 75 MCG tablet, TAKE ONE TABLET BY MOUTH ONCE DAILY, Disp: 30 tablet, Rfl: 6  •  losartan (COZAAR) 50 MG tablet, TAKE ONE TABLET BY MOUTH ONCE DAILY FOR BLOOD PRESSURE, Disp: 90 tablet, Rfl: 2  •  Omega-3 1000 MG capsule, Take 1 tablet by mouth Daily., Disp: , Rfl:     Past Surgical History:  Past Surgical History:   Procedure Laterality Date   • BLADDER REPAIR     • BREAST BIOPSY Right    • CARDIAC ELECTROPHYSIOLOGY PROCEDURE N/A 5/19/2021    Procedure: Pacemaker DC new;  Surgeon: Edmar Rubin MD;  [facility];  Service: Cardiovascular;  Laterality: N/A;   • ERCP N/A 9/14/2021    Procedure: ERCP WITH SPHINCTEROTOMY, sphincteroplasty, clearance of bile duct with balloon. brushing, placement of pancreatic stent and placement of metal biliary stent;  Surgeon: Adrienne Wilson MD;  [facility];  Service: Gastroenterology;  Laterality: N/A;  post op: cystic mass compressing bile duct   • HYSTERECTOMY     • INSERT / REPLACE / REMOVE PACEMAKER     • OTHER SURGICAL HISTORY  2019    Tilt table    • OTHER SURGICAL HISTORY  2019    tilt table          Physical Exam:      Vital Signs:    Vitals:    12/19/22 0944   BP: 134/80   Pulse: 95   Resp: 18   Temp: 97.3 °F (36.3 °C)   SpO2: 99%        Wt Readings from Last 3 Encounters:   12/19/22 62 kg (136 lb 9.6 oz)   10/28/22 60.8 kg (134 lb)   09/19/22 60.9 kg (134 lb 3.2 oz)       Result Review :                Physical Exam  Vitals reviewed.   Constitutional:       Appearance: Normal appearance.  She is well-developed.   HENT:      Head: Normocephalic and atraumatic.   Eyes:      General:         Right eye: No discharge.         Left eye: No discharge.   Cardiovascular:      Rate and Rhythm: Normal rate and regular rhythm.      Heart sounds: Normal heart sounds. No murmur heard.    No friction rub. No gallop.   Pulmonary:      Effort: Pulmonary effort is normal. No respiratory distress.      Breath sounds: Normal breath sounds. No wheezing or rales.   Abdominal:      General: Abdomen is flat. Bowel sounds are normal. There is no distension.      Palpations: Abdomen is soft. There is no mass.      Tenderness: There is no abdominal tenderness. There is no right CVA tenderness, left CVA tenderness, guarding or rebound.      Hernia: No hernia is present.   Skin:     General: Skin is warm and dry.      Findings: No rash.   Neurological:      Mental Status: She is alert and oriented to person, place, and time.      Coordination: Coordination normal.      Gait: Gait normal.   Psychiatric:         Behavior: Behavior is cooperative.       Orders Signed This Visit (6)                                        Amylase                                  Lab Collect, Specimen Types - Blood;, Resulting [facility] ([facility]) Collection Date-12/19/2022 Collection Time-10:17 AM, New collection                                                 CBC & Differential                                  Lab Collect, Specimen Types - Blood;, [facility] Collection Date-12/19/2022 Collection Time-10:17 AM, New collection                                         Comprehensive Metabolic Panel                                  Lab Collect, Specimen Types - Blood;, [facility] Collection Date-12/19/2022 Collection Time-10:17 AM, New collection                                 Lipase                                  Lab Collect, Specimen Types - Blood;,  Resulting [facility] (AMBULATORY) Collection Date-12/19/2022 Collection Time-10:17 AM, New collection                         Lipid Panel With / Chol / HDL Ratio                                  Lab Collect, Specimen Types - Blood;, Resulting [facility] (AMBULATORY) Collection Date-12/19/2022 Collection Time-10:17 AM, New collection                 TSH                                  Lab Collect, Specimen Types - Blood;, [facility]ing [facility] Collection Date-12/19/2022 Collection Time-10:17 AM, New collection         Assessment and Plan:  Problems Addressed this Visit        Cardiac and Vasculature    Essential hypertension - Primary     Hypertension is unchanged.  Continue current treatment regimen.  Dietary sodium restriction.  Weight loss.  Blood pressure will be reassessed in 3 months.         Relevant Orders    TSH (Completed)    Mixed hyperlipidemia    Relevant Orders    Lipid Panel With / Chol / HDL Ratio (Completed)       Gastrointestinal Abdominal     Generalized abdominal pain     Mild  Bloating  Will check labs  If worsens, she is to go to the ER         Relevant Orders    CBC & Differential (Completed)    Comprehensive Metabolic Panel (Completed)    Amylase (Completed)    Lipase (Completed)   Diagnoses       Codes Comments    Essential hypertension    -  Primary ICD-10-CM: I10  ICD-9-CM: 401.9     Mixed hyperlipidemia     ICD-10-CM: E78.2  ICD-9-CM: 272.2     Generalized abdominal pain     ICD-10-CM: R10.84  ICD-9-CM: 789.07            BMI is within normal parameters. No other follow-up for BMI required.      An After Visit Summary and PPPS were given to the patient.       I wore protective equipment throughout this patient encounter to include mask and eyewear. Hand hygiene was performed before donning protective equipment and after removal when leaving the room.

## 2022-12-19 ENCOUNTER — OFFICE VISIT (OUTPATIENT)
Dept: FAMILY MEDICINE CLINIC | Facility: CLINIC | Age: 87
End: 2022-12-19

## 2022-12-19 VITALS
BODY MASS INDEX: 24.2 KG/M2 | TEMPERATURE: 97.3 F | RESPIRATION RATE: 18 BRPM | HEART RATE: 95 BPM | SYSTOLIC BLOOD PRESSURE: 134 MMHG | OXYGEN SATURATION: 99 % | HEIGHT: 63 IN | DIASTOLIC BLOOD PRESSURE: 80 MMHG | WEIGHT: 136.6 LBS

## 2022-12-19 DIAGNOSIS — R10.84 GENERALIZED ABDOMINAL PAIN: ICD-10-CM

## 2022-12-19 DIAGNOSIS — I10 ESSENTIAL HYPERTENSION: Primary | ICD-10-CM

## 2022-12-19 DIAGNOSIS — E78.2 MIXED HYPERLIPIDEMIA: ICD-10-CM

## 2022-12-19 PROCEDURE — 99214 OFFICE O/P EST MOD 30 MIN: CPT | Performed by: FAMILY MEDICINE

## 2022-12-20 LAB
ALBUMIN SERPL-MCNC: 3.9 G/DL (ref 3.5–4.6)
ALBUMIN/GLOB SERPL: 1.6 {RATIO} (ref 1.2–2.2)
ALP SERPL-CCNC: 77 IU/L (ref 44–121)
ALT SERPL-CCNC: 16 IU/L (ref 0–32)
AMYLASE SERPL-CCNC: 60 U/L (ref 31–110)
AST SERPL-CCNC: 23 IU/L (ref 0–40)
BASOPHILS # BLD AUTO: 0.1 X10E3/UL (ref 0–0.2)
BASOPHILS NFR BLD AUTO: 1 %
BILIRUB SERPL-MCNC: 0.6 MG/DL (ref 0–1.2)
BUN SERPL-MCNC: 18 MG/DL (ref 10–36)
BUN/CREAT SERPL: 20 (ref 12–28)
CALCIUM SERPL-MCNC: 9.6 MG/DL (ref 8.7–10.3)
CHLORIDE SERPL-SCNC: 106 MMOL/L (ref 96–106)
CHOLEST SERPL-MCNC: 212 MG/DL (ref 100–199)
CHOLEST/HDLC SERPL: 2.9 RATIO (ref 0–4.4)
CO2 SERPL-SCNC: 23 MMOL/L (ref 20–29)
CREAT SERPL-MCNC: 0.91 MG/DL (ref 0.57–1)
EGFRCR SERPLBLD CKD-EPI 2021: 59 ML/MIN/1.73
EOSINOPHIL # BLD AUTO: 0.2 X10E3/UL (ref 0–0.4)
EOSINOPHIL NFR BLD AUTO: 3 %
ERYTHROCYTE [DISTWIDTH] IN BLOOD BY AUTOMATED COUNT: 15 % (ref 11.7–15.4)
GLOBULIN SER CALC-MCNC: 2.5 G/DL (ref 1.5–4.5)
GLUCOSE SERPL-MCNC: 93 MG/DL (ref 70–99)
HCT VFR BLD AUTO: 38 % (ref 34–46.6)
HDLC SERPL-MCNC: 72 MG/DL
HGB BLD-MCNC: 12.1 G/DL (ref 11.1–15.9)
IMM GRANULOCYTES # BLD AUTO: 0 X10E3/UL (ref 0–0.1)
IMM GRANULOCYTES NFR BLD AUTO: 0 %
LDLC SERPL CALC-MCNC: 128 MG/DL (ref 0–99)
LIPASE SERPL-CCNC: 24 U/L (ref 14–85)
LYMPHOCYTES # BLD AUTO: 2.3 X10E3/UL (ref 0.7–3.1)
LYMPHOCYTES NFR BLD AUTO: 35 %
MCH RBC QN AUTO: 26.4 PG (ref 26.6–33)
MCHC RBC AUTO-ENTMCNC: 31.8 G/DL (ref 31.5–35.7)
MCV RBC AUTO: 83 FL (ref 79–97)
MONOCYTES # BLD AUTO: 0.6 X10E3/UL (ref 0.1–0.9)
MONOCYTES NFR BLD AUTO: 9 %
NEUTROPHILS # BLD AUTO: 3.5 X10E3/UL (ref 1.4–7)
NEUTROPHILS NFR BLD AUTO: 52 %
PLATELET # BLD AUTO: 279 X10E3/UL (ref 150–450)
POTASSIUM SERPL-SCNC: 4.5 MMOL/L (ref 3.5–5.2)
PROT SERPL-MCNC: 6.4 G/DL (ref 6–8.5)
RBC # BLD AUTO: 4.58 X10E6/UL (ref 3.77–5.28)
SODIUM SERPL-SCNC: 140 MMOL/L (ref 134–144)
TRIGL SERPL-MCNC: 65 MG/DL (ref 0–149)
TSH SERPL DL<=0.005 MIU/L-ACNC: 2.23 UIU/ML (ref 0.45–4.5)
VLDLC SERPL CALC-MCNC: 12 MG/DL (ref 5–40)
WBC # BLD AUTO: 6.6 X10E3/UL (ref 3.4–10.8)

## 2023-01-13 ENCOUNTER — CLINICAL SUPPORT (OUTPATIENT)
Dept: FAMILY MEDICINE CLINIC | Facility: CLINIC | Age: 88
End: 2023-01-13
Payer: MEDICARE

## 2023-01-13 DIAGNOSIS — E53.8 B12 DEFICIENCY: Primary | ICD-10-CM

## 2023-01-13 PROCEDURE — 96372 THER/PROPH/DIAG INJ SC/IM: CPT | Performed by: FAMILY MEDICINE

## 2023-01-13 RX ORDER — CYANOCOBALAMIN 1000 UG/ML
1000 INJECTION, SOLUTION INTRAMUSCULAR; SUBCUTANEOUS
Status: SHIPPED | OUTPATIENT
Start: 2023-01-13

## 2023-01-13 RX ADMIN — CYANOCOBALAMIN 1000 MCG: 1000 INJECTION, SOLUTION INTRAMUSCULAR; SUBCUTANEOUS at 13:43

## 2023-01-16 NOTE — PROGRESS NOTES
Subjective   Stacy Monroy is a 89 y.o. female. Presents to [facility]    Chief Complaint   Patient presents with   • Abdominal Pain       Abdominal Pain  This is a recurrent problem. The current episode started more than 1 month ago. The onset quality is gradual. The problem occurs intermittently. The problem has been unchanged. The pain is located in the generalized abdominal region. The patient is experiencing no pain. The quality of the pain is dull. The abdominal pain does not radiate. Associated symptoms include diarrhea. Pertinent negatives include no constipation, dysuria, flatus, nausea or vomiting. Nothing aggravates the pain. The pain is relieved by nothing. She has tried nothing for the symptoms.      Work up has been negative. She had pain last week after eating a spinach salad for 2 days. Its gone now.     I personally reviewed and updated the patient's allergies, medications, problem list, and past medical, surgical, social, and family history. I have reviewed and confirmed the accuracy of the History of Present Illness and Review of Symptoms as documented by the MA/LPN/RN. Mita Moise MD    Allergies:  Allergies   Allergen Reactions   • Azithromycin Rash     ALL mycin drugs   • Erythromycin Rash     ALL mycins drugs   • Penicillin G Rash   • Penicillins Rash     ALL mycin drugs       Social History:  Social History     Socioeconomic History   • Marital status:    Tobacco Use   • Smoking status: Never   • Smokeless tobacco: Never   Vaping Use   • Vaping Use: Never used   Substance and Sexual Activity   • Alcohol use: No   • Drug use: No   • Sexual activity: Defer       Family History:  Family History   Problem Relation Age of Onset   • Hypertension Mother    • Stroke Mother    • Hypertension Father    • Stroke Father    • Breast cancer Sister 50   • Ovarian cancer Daughter 57   • Breast cancer Niece 53       Past Medical History :  Patient Active Problem List   Diagnosis    • Status post placement of implantable loop recorder   • Essential hypertension   • Hypothyroidism (acquired)   • Syncope   • Acute seasonal allergic rhinitis due to pollen   • Arthralgia of right foot   • Atherosclerosis   • B12 deficiency   • Bilateral carpal tunnel syndrome   • Cataracts, bilateral   • DNR (do not resuscitate)   • Heart murmur   • History of chicken pox   • Menopausal syndrome   • Mixed hyperlipidemia   • Pernicious anemia   • Right hip pain   • Acute right-sided low back pain with sciatica   • Influenza vaccine refused   • Liver function abnormality   • Acute UTI   • RUQ abdominal mass   • Cardiac arrhythmia   • Deleon-Wasserman syncope   • Sinus node dysfunction (HCC)   • SSS (sick sinus syndrome) (HCC)   • Acute gastric ulcer without hemorrhage or perforation   • Presence of cardiac pacemaker   • Intraductal papillary mucinous neoplasm of pancreas   • Personal history of other infectious and parasitic diseases   • Presence of intraocular lens   • Puckering of macula, right eye   • Nonexudative age-related macular degeneration   • Posterior vitreous detachment   • Mammogram declined   • Generalized abdominal pain       Medication List:    Current Outpatient Medications:   •  amLODIPine (NORVASC) 5 MG tablet, TAKE ONE TABLET BY MOUTH ONCE DAILY FOR HEART AND BLOOD PRESSURE, Disp: 90 tablet, Rfl: 2  •  calcium citrate-vitamin d (CITRACAL) 200-250 MG-UNIT tablet tablet, Take 1 tablet by mouth Daily., Disp: , Rfl:   •  coenzyme Q10 100 MG capsule, Take 100 mg by mouth Daily., Disp: , Rfl:   •  levothyroxine (SYNTHROID, LEVOTHROID) 75 MCG tablet, TAKE ONE TABLET BY MOUTH ONCE DAILY, Disp: 30 tablet, Rfl: 6  •  losartan (COZAAR) 50 MG tablet, TAKE ONE TABLET BY MOUTH ONCE DAILY FOR BLOOD PRESSURE, Disp: 90 tablet, Rfl: 2  •  Omega-3 1000 MG capsule, Take 1 tablet by mouth Daily., Disp: , Rfl:     Current Facility-Administered Medications:   •  cyanocobalamin injection 1,000 mcg, 1,000 mcg,  Intramuscular, Q28 Days, Mita Moise MD, 1,000 mcg at 01/13/23 1343    Past Surgical History:  Past Surgical History:   Procedure Laterality Date   • BLADDER REPAIR     • BREAST BIOPSY Right    • CARDIAC ELECTROPHYSIOLOGY PROCEDURE N/A 5/19/2021    Procedure: Pacemaker DC new;  Surgeon: Edmar Rubin MD;  Location: [facility] CATH INVASIVE LOCATION;  Service: Cardiovascular;  Laterality: N/A;   • ERCP N/A 9/14/2021    Procedure: ERCP WITH SPHINCTEROTOMY, sphincteroplasty, clearance of bile duct with balloon. brushing, placement of pancreatic stent and placement of metal biliary stent;  Surgeon: Adrienne Wilson MD;  Location: [facility] ENDOSCOPY;  Service: Gastroenterology;  Laterality: N/A;  post op: cystic mass compressing bile duct   • HYSTERECTOMY     • INSERT / REPLACE / REMOVE PACEMAKER     • OTHER SURGICAL HISTORY  2019    Tilt table    • OTHER SURGICAL HISTORY  2019    tilt table          Physical Exam:      Vital Signs:    Vitals:    01/19/23 0946   BP: 130/78   Pulse: 80   Resp: 18   Temp: 97.3 °F (36.3 °C)   SpO2: 98%        Wt Readings from Last 3 Encounters:   01/19/23 62.1 kg (137 lb)   12/19/22 62 kg (136 lb 9.6 oz)   10/28/22 60.8 kg (134 lb)       Result Review :                Physical Exam  Vitals reviewed.   Constitutional:       Appearance: Normal appearance. She is well-developed.   HENT:      Head: Normocephalic and atraumatic.   Eyes:      General:         Right eye: No discharge.         Left eye: No discharge.   Cardiovascular:      Rate and Rhythm: Normal rate and regular rhythm.      Heart sounds: Normal heart sounds. No murmur heard.    No friction rub. No gallop.   Pulmonary:      Effort: Pulmonary effort is normal. No respiratory distress.      Breath sounds: Normal breath sounds. No wheezing or rales.   Skin:     General: Skin is warm and dry.      Findings: No rash.   Neurological:      Mental Status: She is alert and oriented to person, place, and time.       Coordination: Coordination normal.      Gait: Gait normal.   Psychiatric:         Behavior: Behavior is cooperative.         Assessment and Plan:  Problems Addressed this Visit        Gastrointestinal Abdominal     Generalized abdominal pain - Primary     Work up has been negative. Discussed seeing GI. She has seen them before. She will be calling for an appointment.         Diagnoses       Codes Comments    Generalized abdominal pain    -  Primary ICD-10-CM: R10.84  ICD-9-CM: 789.07            BMI is within normal parameters. No other follow-up for BMI required.      An After Visit Summary and PPPS were given to the patient.       I wore protective equipment throughout this patient encounter to include mask and eyewear. Hand hygiene was performed before donning protective equipment and after removal when leaving the room.

## 2023-01-19 ENCOUNTER — OFFICE VISIT (OUTPATIENT)
Dept: FAMILY MEDICINE CLINIC | Facility: CLINIC | Age: 88
End: 2023-01-19
Payer: MEDICARE

## 2023-01-19 VITALS
RESPIRATION RATE: 18 BRPM | SYSTOLIC BLOOD PRESSURE: 130 MMHG | WEIGHT: 137 LBS | OXYGEN SATURATION: 98 % | DIASTOLIC BLOOD PRESSURE: 78 MMHG | HEART RATE: 80 BPM | TEMPERATURE: 97.3 F | HEIGHT: 63 IN | BODY MASS INDEX: 24.27 KG/M2

## 2023-01-19 DIAGNOSIS — R10.84 GENERALIZED ABDOMINAL PAIN: Primary | ICD-10-CM

## 2023-01-19 PROCEDURE — 99212 OFFICE O/P EST SF 10 MIN: CPT | Performed by: FAMILY MEDICINE

## 2023-01-22 NOTE — ASSESSMENT & PLAN NOTE
Work up has been negative. Discussed seeing GI. She has seen them before. She will be calling for an appointment.

## 2023-02-15 ENCOUNTER — CLINICAL SUPPORT (OUTPATIENT)
Dept: FAMILY MEDICINE CLINIC | Facility: CLINIC | Age: 88
End: 2023-02-15
Payer: MEDICARE

## 2023-02-15 DIAGNOSIS — E53.8 B12 DEFICIENCY: Primary | ICD-10-CM

## 2023-02-15 PROCEDURE — 96372 THER/PROPH/DIAG INJ SC/IM: CPT | Performed by: FAMILY MEDICINE

## 2023-02-15 RX ORDER — CYANOCOBALAMIN 1000 UG/ML
1000 INJECTION, SOLUTION INTRAMUSCULAR; SUBCUTANEOUS
Status: SHIPPED | OUTPATIENT
Start: 2023-02-15

## 2023-02-15 RX ADMIN — CYANOCOBALAMIN 1000 MCG: 1000 INJECTION, SOLUTION INTRAMUSCULAR; SUBCUTANEOUS at 13:24

## 2023-02-20 PROCEDURE — 93294 REM INTERROG EVL PM/LDLS PM: CPT | Performed by: NURSE PRACTITIONER

## 2023-02-20 PROCEDURE — 93296 REM INTERROG EVL PM/IDS: CPT | Performed by: NURSE PRACTITIONER

## 2023-03-15 ENCOUNTER — CLINICAL SUPPORT (OUTPATIENT)
Dept: FAMILY MEDICINE CLINIC | Facility: CLINIC | Age: 88
End: 2023-03-15
Payer: MEDICARE

## 2023-03-15 DIAGNOSIS — E53.8 B12 DEFICIENCY: ICD-10-CM

## 2023-03-15 PROCEDURE — 96372 THER/PROPH/DIAG INJ SC/IM: CPT | Performed by: FAMILY MEDICINE

## 2023-03-15 RX ADMIN — CYANOCOBALAMIN 1000 MCG: 1000 INJECTION, SOLUTION INTRAMUSCULAR; SUBCUTANEOUS at 13:29

## 2023-04-14 ENCOUNTER — CLINICAL SUPPORT (OUTPATIENT)
Dept: FAMILY MEDICINE CLINIC | Facility: CLINIC | Age: 88
End: 2023-04-14
Payer: MEDICARE

## 2023-04-14 DIAGNOSIS — E53.8 B12 DEFICIENCY: Primary | ICD-10-CM

## 2023-04-14 PROCEDURE — 96372 THER/PROPH/DIAG INJ SC/IM: CPT | Performed by: FAMILY MEDICINE

## 2023-04-14 RX ORDER — CYANOCOBALAMIN 1000 UG/ML
1000 INJECTION, SOLUTION INTRAMUSCULAR; SUBCUTANEOUS
Status: SHIPPED | OUTPATIENT
Start: 2023-04-14

## 2023-04-14 RX ADMIN — CYANOCOBALAMIN 1000 MCG: 1000 INJECTION, SOLUTION INTRAMUSCULAR; SUBCUTANEOUS at 13:38

## 2023-04-24 NOTE — PROGRESS NOTES
Subjective   Stacy Monroy is a 89 y.o. female. Presents to [facility]    Chief Complaint   Patient presents with   • Fatigue   • Back Pain        Fatigue  This is a new problem. The current episode started 1 to 4 weeks ago. Associated symptoms include fatigue. Pertinent negatives include no abdominal pain, chest pain, headaches, nausea, numbness or weakness. She has tried nothing for the symptoms. The treatment provided no relief.   Back Pain  This is a new problem. The current episode started in the past 7 days. The problem occurs daily. The problem is unchanged. The pain is present in the lumbar spine. The quality of the pain is described as aching. Radiates to: bilateral legs  The pain is mild. The pain is the same all the time. Associated symptoms include leg pain. Pertinent negatives include no abdominal pain, chest pain, dysuria, headaches, numbness, tingling or weakness. She has tried nothing for the symptoms. The treatment provided no relief.        I personally reviewed and updated the patient's allergies, medications, problem list, and past medical, surgical, social, and family history. I have reviewed and confirmed the accuracy of the History of Present Illness and Review of Symptoms as documented by the MA/LPN/RN. Mita Moise MD    Allergies:  Allergies   Allergen Reactions   • Azithromycin Rash     ALL mycin drugs   • Erythromycin Rash     ALL mycins drugs   • Penicillin G Rash   • Penicillins Rash     ALL mycin drugs       Social History:  Social History     Socioeconomic History   • Marital status:    Tobacco Use   • Smoking status: Never   • Smokeless tobacco: Never   Vaping Use   • Vaping Use: Never used   Substance and Sexual Activity   • Alcohol use: No   • Drug use: No   • Sexual activity: Defer       Family History:  Family History   Problem Relation Age of Onset   • Hypertension Mother    • Stroke Mother    • Hypertension Father    • Stroke Father    • Breast cancer  Sister 50   • Ovarian cancer Daughter 57   • Breast cancer Niece 53       Past Medical History :  Patient Active Problem List   Diagnosis   • Status post placement of implantable loop recorder   • Essential hypertension   • Hypothyroidism (acquired)   • Acute seasonal allergic rhinitis due to pollen   • Atherosclerosis   • B12 deficiency   • Bilateral carpal tunnel syndrome   • Cataracts, bilateral   • DNR (do not resuscitate)   • Heart murmur   • History of chicken pox   • Menopausal syndrome   • Mixed hyperlipidemia   • Pernicious anemia   • Influenza vaccine refused   • Liver function abnormality   • Other fatigue   • RUQ abdominal mass   • Cardiac arrhythmia   • Deleon-Wasserman syncope   • Sinus node dysfunction   • SSS (sick sinus syndrome)   • Acute gastric ulcer without hemorrhage or perforation   • Presence of cardiac pacemaker   • Intraductal papillary mucinous neoplasm of pancreas   • Personal history of other infectious and parasitic diseases   • Presence of intraocular lens   • Puckering of macula, right eye   • Nonexudative age-related macular degeneration   • Posterior vitreous detachment   • Mammogram declined   • Generalized abdominal pain   • Acute bilateral low back pain without sciatica   • Microhematuria   • Iron deficiency anemia secondary to inadequate dietary iron intake   • Chronic bilateral low back pain with right-sided sciatica       Medication List:    Current Outpatient Medications:   •  calcium citrate-vitamin d (CITRACAL) 200-250 MG-UNIT tablet tablet, Take 1 tablet by mouth Daily., Disp: , Rfl:   •  coenzyme Q10 100 MG capsule, Take 1 capsule by mouth Daily., Disp: , Rfl:   •  levothyroxine (SYNTHROID, LEVOTHROID) 75 MCG tablet, TAKE ONE TABLET BY MOUTH ONCE DAILY, Disp: 30 tablet, Rfl: 6  •  Omega-3 1000 MG capsule, Take 1 capsule by mouth Daily., Disp: , Rfl:   •  sulfamethoxazole-trimethoprim (BACTRIM DS,SEPTRA DS) 800-160 MG per tablet, Take 1 tablet by mouth 2 (Two) Times a Day.,  Disp: 14 tablet, Rfl: 0    Past Surgical History:  Past Surgical History:   Procedure Laterality Date   • BLADDER REPAIR     • BREAST BIOPSY Right    • CARDIAC ELECTROPHYSIOLOGY PROCEDURE N/A 5/19/2021    Procedure: Pacemaker DC new;  Surgeon: Edmar Rubin MD;  Location: [facility] CATH INVASIVE LOCATION;  Service: Cardiovascular;  Laterality: N/A;   • ERCP N/A 9/14/2021    Procedure: ERCP WITH SPHINCTEROTOMY, sphincteroplasty, clearance of bile duct with balloon. brushing, placement of pancreatic stent and placement of metal biliary stent;  Surgeon: Adrienne Wilson MD;  Location: [facility] ENDOSCOPY;  Service: Gastroenterology;  Laterality: N/A;  post op: cystic mass compressing bile duct   • HYSTERECTOMY     • INSERT / REPLACE / REMOVE PACEMAKER     • OTHER SURGICAL HISTORY  2019    Tilt table    • OTHER SURGICAL HISTORY  2019    tilt table          Physical Exam:      Vital Signs:    Vitals:    05/01/23 1437   BP: 92/64   Pulse:    Resp:    Temp:    SpO2:         Wt Readings from Last 3 Encounters:   05/05/23 61.1 kg (134 lb 9.6 oz)   05/01/23 61.1 kg (134 lb 12.8 oz)   04/28/23 61.2 kg (135 lb)       Result Review :                Physical Exam  Vitals reviewed.   Constitutional:       Appearance: Normal appearance. She is well-developed.   HENT:      Head: Normocephalic and atraumatic.   Eyes:      General:         Right eye: No discharge.         Left eye: No discharge.   Cardiovascular:      Rate and Rhythm: Normal rate and regular rhythm.      Heart sounds: Normal heart sounds. No murmur heard.    No friction rub. No gallop.   Pulmonary:      Effort: Pulmonary effort is normal. No respiratory distress.      Breath sounds: Normal breath sounds. No wheezing or rales.   Abdominal:      General: Abdomen is flat. Bowel sounds are normal. There is no distension.      Palpations: Abdomen is soft. There is no mass.      Tenderness: There is no abdominal tenderness. There is no right CVA tenderness, left CVA  tenderness, guarding or rebound.      Hernia: No hernia is present.   Skin:     General: Skin is warm and dry.      Findings: No rash.   Neurological:      Mental Status: She is alert and oriented to person, place, and time.      Coordination: Coordination normal.      Gait: Gait normal.   Psychiatric:         Behavior: Behavior is cooperative.         Assessment and Plan:  Problems Addressed this Visit        Cardiac and Vasculature    Essential hypertension     Blood pressure is too low. Making her dizzy.   Hold blood pressure medications and recheck in a few days            Genitourinary and Reproductive     Microhematuria     Will check culture  She is having back pain also. Will send for microscopic evaluation         Relevant Medications    sulfamethoxazole-trimethoprim (BACTRIM DS,SEPTRA DS) 800-160 MG per tablet    Other Relevant Orders    Urine Culture - Urine, Urine, Random Void (Completed)    Urinalysis With Microscopic - Urine, Random Void (Completed)       Hematology and Neoplasia    Pernicious anemia     Recheck         Relevant Orders    CBC & Differential (Completed)       Musculoskeletal and Injuries    Acute bilateral low back pain without sciatica     Diagnosis, treatment and and course discussed. Potential side effects discussed. Return if there is worsening or persistence of symptoms.     Ice three times a day for about 10-15 minutes for the first 1-2 days. Then may alternate heat and ice. Better body mechanics discussed. Home exercises discussed and hand out given. Discussed nsaids and if they can be taken. May need imaging and or PT if persists.  Discussed red flags, if there is severe pain, fever with pain, loss of movement in one or both legs pain, numbness in groin or both legs, trouble urinating or defecating on oneself, then patient is to go to the ER.            Relevant Orders    POC Urinalysis Dipstick, Multipro (Completed)       Symptoms and Signs    Other fatigue - Primary     Will  get labs  Uti?  Hold BP meds  If worsening she is to go to the ER         Relevant Orders    CBC & Differential (Completed)    Comprehensive Metabolic Panel (Completed)    TSH (Completed)   Diagnoses       Codes Comments    Other fatigue    -  Primary ICD-10-CM: R53.83  ICD-9-CM: 780.79     Acute bilateral low back pain without sciatica     ICD-10-CM: M54.50  ICD-9-CM: 724.2, 338.19     Essential hypertension     ICD-10-CM: I10  ICD-9-CM: 401.9     Pernicious anemia     ICD-10-CM: D51.0  ICD-9-CM: 281.0     Microhematuria     ICD-10-CM: R31.29  ICD-9-CM: 599.72            BMI is within normal parameters. No other follow-up for BMI required.      An After Visit Summary and PPPS were given to the patient.

## 2023-04-27 NOTE — PROGRESS NOTES
Date of Office Visit: 2023  Encounter Provider: Dr. Ruslan Layne  Place of Service: [facility] CARDIOLOGY [place]  Patient Name: Stacy Monroy  :1930  Mita Moise MD    Chief Complaint   Patient presents with   • Heart Murmur   • Hypertension   • Hyperlipidemia   • Follow-up   • Pacemaker Check     History of Present Illness    I am pleased to see Mrs. Monroy in my office today as a follow-up.    As you know, patient is 89-year-old white female whose past medical history significant for hypertension, who came today for follow-up.     In 2019, patient was evaluated due to an episode of syncope. Patient underwent echocardiogram in 2019 which showed EF of 55-60%.  Patient had stress test in 2018 at [facility] which was reportedly unremarkable for ischemia.  Holter monitor showed no significant pause of bradycardia.  Tilt-table test showed hypotensive response with bradycardia during tilt-table test. Patient underwent loop recorder implant at [facility] on 2019.    In May 2021, patient had a syncopal episode and was admitted at [facility].  Patient loop recorder showed long pause.  Patient underwent dual-chamber pacemaker implantation.  Postprocedure patient did well.    Patient came today for follow-up and patient is doing very well.  She denies any chest pain or tightness or heaviness.  No dizziness or lightheadedness no syncope or presyncope.  She does not report any fall.  Patient complain of numbness and tingling of both hands.  I suspect it is due to carpal tunnel syndrome.  Patient placed on computer her games quite long time.  Patient also complain of left hip pain.  Patient denies any orthopnea PND no syncope or presyncope.  No leg edema noted.    Pacemaker is interrogated today and it is functioning appropriately.  No change in programming except for prolonging AV delay.    I am overall pleased with the patient condition and  progress.  Current therapy would be continued blood pressure is controlled pacemaker is functioning appropriately.      Past Medical History:   Diagnosis Date   • Arthritis    • Heart murmur    • Hyperlipidemia    • Hypertension    • Other specified hypothyroidism 8/15/2019   • Presence of cardiac pacemaker 7/9/2021    [facility]         Past Surgical History:   Procedure Laterality Date   • BLADDER REPAIR     • BREAST BIOPSY Right    • CARDIAC ELECTROPHYSIOLOGY PROCEDURE N/A 5/19/2021    Procedure: Pacemaker DC new;  Surgeon: Edmar Rubin MD;  Location: [facility] CATH INVASIVE LOCATION;  Service: Cardiovascular;  Laterality: N/A;   • ERCP N/A 9/14/2021    Procedure: ERCP WITH SPHINCTEROTOMY, sphincteroplasty, clearance of bile duct with balloon. brushing, placement of pancreatic stent and placement of metal biliary stent;  Surgeon: Adrienne Wilson MD;  Location: [facility] ENDOSCOPY;  Service: Gastroenterology;  Laterality: N/A;  post op: cystic mass compressing bile duct   • HYSTERECTOMY     • INSERT / REPLACE / REMOVE PACEMAKER     • OTHER SURGICAL HISTORY  2019    Tilt table    • OTHER SURGICAL HISTORY  2019    tilt table            Current Outpatient Medications:   •  amLODIPine (NORVASC) 5 MG tablet, TAKE ONE TABLET BY MOUTH ONCE DAILY FOR HEART AND BLOOD PRESSURE, Disp: 90 tablet, Rfl: 2  •  calcium citrate-vitamin d (CITRACAL) 200-250 MG-UNIT tablet tablet, Take 1 tablet by mouth Daily., Disp: , Rfl:   •  coenzyme Q10 100 MG capsule, Take 1 capsule by mouth Daily., Disp: , Rfl:   •  levothyroxine (SYNTHROID, LEVOTHROID) 75 MCG tablet, TAKE ONE TABLET BY MOUTH ONCE DAILY, Disp: 30 tablet, Rfl: 6  •  losartan (COZAAR) 50 MG tablet, TAKE ONE TABLET BY MOUTH ONCE DAILY FOR BLOOD PRESSURE, Disp: 90 tablet, Rfl: 2  •  Omega-3 1000 MG capsule, Take 1 capsule by mouth Daily., Disp: , Rfl:   No current facility-administered medications for this visit.      Social History     Socioeconomic History   • Marital  "status:    Tobacco Use   • Smoking status: Never   • Smokeless tobacco: Never   Vaping Use   • Vaping Use: Never used   Substance and Sexual Activity   • Alcohol use: No   • Drug use: No   • Sexual activity: Defer         Review of Systems   Constitutional: Negative for chills and fever.   HENT: Negative for ear discharge and nosebleeds.    Eyes: Negative for discharge and redness.   Cardiovascular: Negative for chest pain, orthopnea, palpitations, paroxysmal nocturnal dyspnea and syncope.   Respiratory: Negative for cough, shortness of breath and wheezing.    Endocrine: Negative for heat intolerance.   Skin: Negative for rash.   Musculoskeletal: Positive for arthritis, back pain and joint pain. Negative for myalgias.   Gastrointestinal: Negative for abdominal pain, melena, nausea and vomiting.   Genitourinary: Negative for dysuria and hematuria.   Neurological: Negative for dizziness, light-headedness, numbness and tremors.   Psychiatric/Behavioral: Negative for depression. The patient is not nervous/anxious.        Procedures    Procedures    No orders to display           Objective:    /63 (BP Location: Right arm, Patient Position: Sitting, Cuff Size: Adult)   Pulse 76   Ht 160 cm (62.99\")   Wt 61.2 kg (135 lb)   LMP  (LMP Unknown)   SpO2 98%   BMI 23.92 kg/m²         Constitutional:       Appearance: Well-developed.   Eyes:      General: No scleral icterus.        Right eye: No discharge.   HENT:      Head: Normocephalic and atraumatic.   Neck:      Thyroid: No thyromegaly.      Lymphadenopathy: No cervical adenopathy.   Pulmonary:      Effort: Pulmonary effort is normal. No respiratory distress.      Breath sounds: Normal breath sounds. No wheezing. No rales.   Cardiovascular:      Normal rate. Regular rhythm.      No gallop.   Abdominal:      Tenderness: There is no abdominal tenderness.   Skin:     Findings: No erythema or rash.   Neurological:      Mental Status: Alert and oriented to " person, place, and time.             Assessment:       Diagnosis Plan   1. Essential hypertension        2. Heart murmur        3. Mixed hyperlipidemia        4. Presence of cardiac pacemaker        5. SSS (sick sinus syndrome)                 Plan:       MDM:    1.  S/p pacemaker:    Pacemaker is functioning appropriately.  No change in programming    2.  Hypertension:    Blood pressure is very well controlled continue amlodipine    3.  Numbness and tingling of both hands:    I suspect it is probably due to underlying carpal tunnel syndrome.  Continue to observe.

## 2023-04-28 ENCOUNTER — OFFICE VISIT (OUTPATIENT)
Dept: CARDIOLOGY | Facility: CLINIC | Age: 88
End: 2023-04-28
Payer: MEDICARE

## 2023-04-28 VITALS
OXYGEN SATURATION: 98 % | WEIGHT: 135 LBS | DIASTOLIC BLOOD PRESSURE: 63 MMHG | BODY MASS INDEX: 23.92 KG/M2 | HEART RATE: 76 BPM | HEIGHT: 63 IN | SYSTOLIC BLOOD PRESSURE: 117 MMHG

## 2023-04-28 DIAGNOSIS — I10 ESSENTIAL HYPERTENSION: Primary | ICD-10-CM

## 2023-04-28 DIAGNOSIS — E78.2 MIXED HYPERLIPIDEMIA: ICD-10-CM

## 2023-04-28 DIAGNOSIS — I49.5 SSS (SICK SINUS SYNDROME): ICD-10-CM

## 2023-04-28 DIAGNOSIS — R01.1 HEART MURMUR: ICD-10-CM

## 2023-04-28 DIAGNOSIS — Z95.0 PRESENCE OF CARDIAC PACEMAKER: ICD-10-CM

## 2023-05-01 ENCOUNTER — OFFICE VISIT (OUTPATIENT)
Dept: FAMILY MEDICINE CLINIC | Facility: CLINIC | Age: 88
End: 2023-05-01
Payer: MEDICARE

## 2023-05-01 VITALS
HEART RATE: 96 BPM | TEMPERATURE: 97.3 F | BODY MASS INDEX: 23.88 KG/M2 | WEIGHT: 134.8 LBS | HEIGHT: 63 IN | DIASTOLIC BLOOD PRESSURE: 64 MMHG | OXYGEN SATURATION: 96 % | SYSTOLIC BLOOD PRESSURE: 92 MMHG | RESPIRATION RATE: 18 BRPM

## 2023-05-01 DIAGNOSIS — R53.83 OTHER FATIGUE: Primary | ICD-10-CM

## 2023-05-01 DIAGNOSIS — R31.29 MICROHEMATURIA: ICD-10-CM

## 2023-05-01 DIAGNOSIS — M54.50 ACUTE BILATERAL LOW BACK PAIN WITHOUT SCIATICA: ICD-10-CM

## 2023-05-01 DIAGNOSIS — D51.0 PERNICIOUS ANEMIA: ICD-10-CM

## 2023-05-01 DIAGNOSIS — I10 ESSENTIAL HYPERTENSION: ICD-10-CM

## 2023-05-01 PROBLEM — R55 SYNCOPE: Status: RESOLVED | Noted: 2019-09-30 | Resolved: 2023-05-01

## 2023-05-01 PROBLEM — M54.40 ACUTE RIGHT-SIDED LOW BACK PAIN WITH SCIATICA: Status: RESOLVED | Noted: 2020-08-17 | Resolved: 2023-05-01

## 2023-05-01 PROBLEM — M25.571 ARTHRALGIA OF RIGHT FOOT: Status: RESOLVED | Noted: 2020-02-17 | Resolved: 2023-05-01

## 2023-05-01 PROBLEM — M25.551 RIGHT HIP PAIN: Status: RESOLVED | Noted: 2020-08-17 | Resolved: 2023-05-01

## 2023-05-01 PROBLEM — N39.0 ACUTE UTI: Status: RESOLVED | Noted: 2021-05-17 | Resolved: 2023-05-01

## 2023-05-01 LAB
BILIRUB BLD-MCNC: NEGATIVE MG/DL
CLARITY, POC: CLEAR
COLOR UR: YELLOW
GLUCOSE UR STRIP-MCNC: NEGATIVE MG/DL
KETONES UR QL: NEGATIVE
LEUKOCYTE EST, POC: NEGATIVE
NITRITE UR-MCNC: NEGATIVE MG/ML
PH UR: 5 [PH] (ref 5–8)
PROT UR STRIP-MCNC: ABNORMAL MG/DL
RBC # UR STRIP: ABNORMAL /UL
SP GR UR: 1.01 (ref 1–1.03)
UROBILINOGEN UR QL: NORMAL

## 2023-05-01 RX ORDER — SULFAMETHOXAZOLE AND TRIMETHOPRIM 800; 160 MG/1; MG/1
1 TABLET ORAL 2 TIMES DAILY
Qty: 14 TABLET | Refills: 0 | Status: SHIPPED | OUTPATIENT
Start: 2023-05-01

## 2023-05-02 LAB
ALBUMIN SERPL-MCNC: 4 G/DL (ref 3.5–4.6)
ALBUMIN/GLOB SERPL: 1.5 {RATIO} (ref 1.2–2.2)
ALP SERPL-CCNC: 81 IU/L (ref 44–121)
ALT SERPL-CCNC: 22 IU/L (ref 0–32)
APPEARANCE UR: CLEAR
AST SERPL-CCNC: 26 IU/L (ref 0–40)
BACTERIA #/AREA URNS HPF: NORMAL /[HPF]
BASOPHILS # BLD AUTO: 0 X10E3/UL (ref 0–0.2)
BASOPHILS NFR BLD AUTO: 1 %
BILIRUB SERPL-MCNC: 0.3 MG/DL (ref 0–1.2)
BILIRUB UR QL STRIP: NEGATIVE
BUN SERPL-MCNC: 19 MG/DL (ref 10–36)
BUN/CREAT SERPL: 19 (ref 12–28)
CALCIUM SERPL-MCNC: 9.7 MG/DL (ref 8.7–10.3)
CASTS URNS QL MICRO: NORMAL /LPF
CHLORIDE SERPL-SCNC: 100 MMOL/L (ref 96–106)
CO2 SERPL-SCNC: 22 MMOL/L (ref 20–29)
COLOR UR: YELLOW
CREAT SERPL-MCNC: 0.99 MG/DL (ref 0.57–1)
EGFRCR SERPLBLD CKD-EPI 2021: 53 ML/MIN/1.73
EOSINOPHIL # BLD AUTO: 0 X10E3/UL (ref 0–0.4)
EOSINOPHIL NFR BLD AUTO: 1 %
EPI CELLS #/AREA URNS HPF: NORMAL /HPF (ref 0–10)
ERYTHROCYTE [DISTWIDTH] IN BLOOD BY AUTOMATED COUNT: 16.6 % (ref 11.7–15.4)
GLOBULIN SER CALC-MCNC: 2.7 G/DL (ref 1.5–4.5)
GLUCOSE SERPL-MCNC: 158 MG/DL (ref 70–99)
GLUCOSE UR QL STRIP: NEGATIVE
HCT VFR BLD AUTO: 40.1 % (ref 34–46.6)
HGB BLD-MCNC: 12.5 G/DL (ref 11.1–15.9)
HGB UR QL STRIP: NEGATIVE
IMM GRANULOCYTES # BLD AUTO: 0 X10E3/UL (ref 0–0.1)
IMM GRANULOCYTES NFR BLD AUTO: 0 %
KETONES UR QL STRIP: NEGATIVE
LEUKOCYTE ESTERASE UR QL STRIP: NEGATIVE
LYMPHOCYTES # BLD AUTO: 1.9 X10E3/UL (ref 0.7–3.1)
LYMPHOCYTES NFR BLD AUTO: 33 %
MCH RBC QN AUTO: 24.3 PG (ref 26.6–33)
MCHC RBC AUTO-ENTMCNC: 31.2 G/DL (ref 31.5–35.7)
MCV RBC AUTO: 78 FL (ref 79–97)
MICRO URNS: NORMAL
MICRO URNS: NORMAL
MONOCYTES # BLD AUTO: 0.5 X10E3/UL (ref 0.1–0.9)
MONOCYTES NFR BLD AUTO: 9 %
NEUTROPHILS # BLD AUTO: 3.4 X10E3/UL (ref 1.4–7)
NEUTROPHILS NFR BLD AUTO: 56 %
NITRITE UR QL STRIP: NEGATIVE
PH UR STRIP: 5.5 [PH] (ref 5–7.5)
PLATELET # BLD AUTO: 257 X10E3/UL (ref 150–450)
POTASSIUM SERPL-SCNC: 4.3 MMOL/L (ref 3.5–5.2)
PROT SERPL-MCNC: 6.7 G/DL (ref 6–8.5)
PROT UR QL STRIP: NEGATIVE
RBC # BLD AUTO: 5.14 X10E6/UL (ref 3.77–5.28)
RBC #/AREA URNS HPF: NORMAL /HPF (ref 0–2)
SODIUM SERPL-SCNC: 136 MMOL/L (ref 134–144)
SP GR UR STRIP: 1.01 (ref 1–1.03)
TSH SERPL DL<=0.005 MIU/L-ACNC: 1.79 UIU/ML (ref 0.45–4.5)
UROBILINOGEN UR STRIP-MCNC: 0.2 MG/DL (ref 0.2–1)
WBC # BLD AUTO: 5.9 X10E3/UL (ref 3.4–10.8)
WBC #/AREA URNS HPF: NORMAL /HPF (ref 0–5)

## 2023-05-02 NOTE — PROGRESS NOTES
Subjective   Stacy Monroy is a 89 y.o. female. Presents to [facility]    Chief Complaint   Patient presents with   • Hypertension   • Hyperlipidemia   • Hypothyroidism       Hypertension  This is a chronic problem. The current episode started more than 1 year ago. Pertinent negatives include no chest pain, headaches, malaise/fatigue, palpitations or shortness of breath. Risk factors for coronary artery disease include dyslipidemia, post-menopausal state and family history. Past treatments include ACE inhibitors and calcium channel blockers. Current antihypertension treatment includes nothing. The current treatment provides moderate improvement.   Hyperlipidemia  This is a chronic problem. The current episode started more than 1 year ago. Exacerbating diseases include hypothyroidism. She has no history of diabetes or obesity. Pertinent negatives include no chest pain or shortness of breath. Current antihyperlipidemic treatment includes statins and herbal therapy. Risk factors for coronary artery disease include dyslipidemia, hypertension, post-menopausal and family history.   Hypothyroidism  This is a chronic problem. The current episode started more than 1 year ago. Pertinent negatives include no chest pain or headaches. Treatments tried: levothyroxine 75 MCG. The treatment provided moderate relief.        I personally reviewed and updated the patient's allergies, medications, problem list, and past medical, surgical, social, and family history. I have reviewed and confirmed the accuracy of the History of Present Illness and Review of Symptoms as documented by the MA/LPN/RN. Mita Moise MD    Allergies:  Allergies   Allergen Reactions   • Azithromycin Rash     ALL mycin drugs   • Erythromycin Rash     ALL mycins drugs   • Penicillin G Rash   • Penicillins Rash     ALL mycin drugs       Social History:  Social History     Socioeconomic History   • Marital status:    Tobacco Use   •  Smoking status: Never   • Smokeless tobacco: Never   Vaping Use   • Vaping Use: Never used   Substance and Sexual Activity   • Alcohol use: No   • Drug use: No   • Sexual activity: Defer       Family History:  Family History   Problem Relation Age of Onset   • Hypertension Mother    • Stroke Mother    • Hypertension Father    • Stroke Father    • Breast cancer Sister 50   • Ovarian cancer Daughter 57   • Breast cancer Niece 53       Past Medical History :  Patient Active Problem List   Diagnosis   • Status post placement of implantable loop recorder   • Essential hypertension   • Hypothyroidism (acquired)   • Acute seasonal allergic rhinitis due to pollen   • Atherosclerosis   • B12 deficiency   • Bilateral carpal tunnel syndrome   • Cataracts, bilateral   • DNR (do not resuscitate)   • Heart murmur   • History of chicken pox   • Menopausal syndrome   • Mixed hyperlipidemia   • Pernicious anemia   • Influenza vaccine refused   • Liver function abnormality   • Other fatigue   • RUQ abdominal mass   • Cardiac arrhythmia   • Deleon-Wasserman syncope   • Sinus node dysfunction   • SSS (sick sinus syndrome)   • Acute gastric ulcer without hemorrhage or perforation   • Presence of cardiac pacemaker   • Intraductal papillary mucinous neoplasm of pancreas   • Personal history of other infectious and parasitic diseases   • Presence of intraocular lens   • Puckering of macula, right eye   • Nonexudative age-related macular degeneration   • Posterior vitreous detachment   • Mammogram declined   • Generalized abdominal pain   • Acute bilateral low back pain without sciatica   • Microhematuria   • Iron deficiency anemia secondary to inadequate dietary iron intake   • Chronic bilateral low back pain with right-sided sciatica   • CRF (chronic renal failure), stage 2 (mild)       Medication List:    Current Outpatient Medications:   •  calcium citrate-vitamin d (CITRACAL) 200-250 MG-UNIT tablet tablet, Take 1 tablet by mouth Daily.,  Disp: , Rfl:   •  coenzyme Q10 100 MG capsule, Take 1 capsule by mouth Daily., Disp: , Rfl:   •  levothyroxine (SYNTHROID, LEVOTHROID) 75 MCG tablet, TAKE ONE TABLET BY MOUTH ONCE DAILY, Disp: 30 tablet, Rfl: 6  •  Omega-3 1000 MG capsule, Take 1 capsule by mouth Daily., Disp: , Rfl:   •  sulfamethoxazole-trimethoprim (BACTRIM DS,SEPTRA DS) 800-160 MG per tablet, Take 1 tablet by mouth 2 (Two) Times a Day., Disp: 14 tablet, Rfl: 0    Past Surgical History:  Past Surgical History:   Procedure Laterality Date   • BLADDER REPAIR     • BREAST BIOPSY Right    • CARDIAC ELECTROPHYSIOLOGY PROCEDURE N/A 5/19/2021    Procedure: Pacemaker DC new;  Surgeon: Edmar Rubin MD;  Location: [facility] CATH INVASIVE LOCATION;  Service: Cardiovascular;  Laterality: N/A;   • ERCP N/A 9/14/2021    Procedure: ERCP WITH SPHINCTEROTOMY, sphincteroplasty, clearance of bile duct with balloon. brushing, placement of pancreatic stent and placement of metal biliary stent;  Surgeon: Adrienne Wilson MD;  Location: [facility] ENDOSCOPY;  Service: Gastroenterology;  Laterality: N/A;  post op: cystic mass compressing bile duct   • HYSTERECTOMY     • INSERT / REPLACE / REMOVE PACEMAKER     • OTHER SURGICAL HISTORY  2019    Tilt table    • OTHER SURGICAL HISTORY  2019    tilt table          Physical Exam:      Vital Signs:    Vitals:    05/05/23 1042   BP: 116/70   Pulse: 83   Resp: 19   Temp: 97.8 °F (36.6 °C)   SpO2: 97%        Wt Readings from Last 3 Encounters:   05/05/23 61.1 kg (134 lb 9.6 oz)   05/01/23 61.1 kg (134 lb 12.8 oz)   04/28/23 61.2 kg (135 lb)       Result Review :   The following data was reviewed by: Mita Moise MD on 05/05/2023:            Latest Reference Range & Units 05/01/23 15:00   Glucose 70 - 99 mg/dL 158 (H)   Sodium 134 - 144 mmol/L 136   Potassium 3.5 - 5.2 mmol/L 4.3   CO2 20 - 29 mmol/L 22   Chloride 96 - 106 mmol/L 100   Creatinine 0.57 - 1.00 mg/dL 0.99   BUN 10 - 36 mg/dL 19   BUN/Creatinine Ratio 12 -  28  19   Calcium 8.7 - 10.3 mg/dL 9.7   EGFR Result >59 mL/min/1.73 53 (L)   Alkaline Phosphatase 44 - 121 IU/L 81   Total Protein 6.0 - 8.5 g/dL 6.7   ALT (SGPT) 0 - 32 IU/L 22   AST (SGOT) 0 - 40 IU/L 26   Total Bilirubin 0.0 - 1.2 mg/dL 0.3   Albumin 3.5 - 4.6 g/dL 4.0   A/G Ratio 1.2 - 2.2  1.5   TSH Baseline 0.450 - 4.500 uIU/mL 1.790   Iron 27 - 139 ug/dL 27   Ferritin 15 - 150 ng/mL 17   Iron Saturation 15 - 55 % 7 (L)   TIBC 250 - 450 ug/dL 411   UIBC 118 - 369 ug/dL 384 (H)   Globulin 1.5 - 4.5 g/dL 2.7   WBC 3.4 - 10.8 x10E3/uL 5.9   RBC 3.77 - 5.28 x10E6/uL 5.14   Hemoglobin 11.1 - 15.9 g/dL 12.5   Hematocrit 34.0 - 46.6 % 40.1   RDW 11.7 - 15.4 % 16.6 (H)   MCV 79 - 97 fL 78 (L)   MCH 26.6 - 33.0 pg 24.3 (L)   MCHC 31.5 - 35.7 g/dL 31.2 (L)   Platelets 150 - 450 x10E3/uL 257   Neutrophil Rel % Not Estab. % 56   Lymphocyte Rel % Not Estab. % 33   Monocyte Rel % Not Estab. % 9   Eosinophil Rel % Not Estab. % 1   Basophil Rel % Not Estab. % 1   Immature Granulocyte Rel % Not Estab. % 0   Neutrophils Absolute 1.4 - 7.0 x10E3/uL 3.4   Lymphocytes Absolute 0.7 - 3.1 x10E3/uL 1.9   Monocytes Absolute 0.1 - 0.9 x10E3/uL 0.5   Eosinophils Absolute 0.0 - 0.4 x10E3/uL 0.0   Basophils Absolute 0.0 - 0.2 x10E3/uL 0.0   Immature Grans, Absolute 0.0 - 0.1 x10E3/uL 0.0   (H): Data is abnormally high  (L): Data is abnormally low    Physical Exam  Vitals reviewed.   Constitutional:       Appearance: Normal appearance. She is well-developed.   HENT:      Head: Normocephalic and atraumatic.   Eyes:      General:         Right eye: No discharge.         Left eye: No discharge.   Cardiovascular:      Rate and Rhythm: Normal rate and regular rhythm.      Heart sounds: Normal heart sounds. No murmur heard.    No friction rub. No gallop.   Pulmonary:      Effort: Pulmonary effort is normal. No respiratory distress.      Breath sounds: Normal breath sounds. No wheezing or rales.   Skin:     General: Skin is warm and dry.       Findings: No rash.   Neurological:      Mental Status: She is alert and oriented to person, place, and time.      Coordination: Coordination normal.      Gait: Gait normal.   Psychiatric:         Behavior: Behavior is cooperative.         Assessment and Plan:  Problems Addressed this Visit        Cardiac and Vasculature    Essential hypertension - Primary     Hypertension is improved with stopping her medications.  Continue current treatment regimen.  Blood pressure will be reassessed at the next regular appointment.            Endocrine and Metabolic    Hypothyroidism (acquired)       Musculoskeletal and Injuries    Chronic bilateral low back pain with right-sided sciatica     Acute on chronic  Will get xray and have her see pain management.   No nsaids  Use tylenol for now         Relevant Orders    XR Spine Lumbar 2 or 3 View (Completed)    Ambulatory Referral to [facility]   Diagnoses       Codes Comments    Essential hypertension    -  Primary ICD-10-CM: I10  ICD-9-CM: 401.9     Hypothyroidism (acquired)     ICD-10-CM: E03.9  ICD-9-CM: 244.9     Chronic bilateral low back pain with right-sided sciatica     ICD-10-CM: M54.41, G89.29  ICD-9-CM: 724.2, 724.3, 338.29            BMI is within normal parameters. No other follow-up for BMI required.      An After Visit Summary and PPPS were given to the patient.

## 2023-05-03 LAB
BACTERIA UR CULT: NORMAL
BACTERIA UR CULT: NORMAL

## 2023-05-04 ENCOUNTER — TELEPHONE (OUTPATIENT)
Dept: FAMILY MEDICINE CLINIC | Facility: CLINIC | Age: 88
End: 2023-05-04
Payer: MEDICARE

## 2023-05-04 PROBLEM — D50.8 IRON DEFICIENCY ANEMIA SECONDARY TO INADEQUATE DIETARY IRON INTAKE: Status: ACTIVE | Noted: 2023-05-04

## 2023-05-04 NOTE — TELEPHONE ENCOUNTER
----- Message from Mita Moise MD sent at 5/2/2023  6:10 PM EDT -----  Her labs show her glucose is up but she wasn't fasting so that is ok. Her kidney function is down a little. That can be bc her BP is low. I need an iron panel. She looks like she is low in iron. I would like a low back xray. Microscopic is negative. Waiting on culture

## 2023-05-05 ENCOUNTER — OFFICE VISIT (OUTPATIENT)
Dept: FAMILY MEDICINE CLINIC | Facility: CLINIC | Age: 88
End: 2023-05-05
Payer: MEDICARE

## 2023-05-05 ENCOUNTER — HOSPITAL ENCOUNTER (OUTPATIENT)
Dept: GENERAL RADIOLOGY | Facility: HOSPITAL | Age: 88
Discharge: HOME | End: 2023-05-05
Payer: MEDICARE

## 2023-05-05 VITALS
WEIGHT: 134.6 LBS | RESPIRATION RATE: 19 BRPM | SYSTOLIC BLOOD PRESSURE: 116 MMHG | HEART RATE: 83 BPM | HEIGHT: 63 IN | DIASTOLIC BLOOD PRESSURE: 70 MMHG | BODY MASS INDEX: 23.85 KG/M2 | TEMPERATURE: 97.8 F | OXYGEN SATURATION: 97 %

## 2023-05-05 DIAGNOSIS — M54.41 CHRONIC BILATERAL LOW BACK PAIN WITH RIGHT-SIDED SCIATICA: ICD-10-CM

## 2023-05-05 DIAGNOSIS — E03.9 HYPOTHYROIDISM (ACQUIRED): ICD-10-CM

## 2023-05-05 DIAGNOSIS — G89.29 CHRONIC BILATERAL LOW BACK PAIN WITH RIGHT-SIDED SCIATICA: ICD-10-CM

## 2023-05-05 DIAGNOSIS — I10 ESSENTIAL HYPERTENSION: Primary | ICD-10-CM

## 2023-05-05 LAB
FERRITIN SERPL-MCNC: 17 NG/ML (ref 15–150)
IRON SATN MFR SERPL: 7 % (ref 15–55)
IRON SERPL-MCNC: 27 UG/DL (ref 27–139)
SPECIMEN STATUS: NORMAL
TIBC SERPL-MCNC: 411 UG/DL (ref 250–450)
UIBC SERPL-MCNC: 384 UG/DL (ref 118–369)

## 2023-05-05 PROCEDURE — 1160F RVW MEDS BY RX/DR IN RCRD: CPT | Performed by: FAMILY MEDICINE

## 2023-05-05 PROCEDURE — 72100 X-RAY EXAM L-S SPINE 2/3 VWS: CPT

## 2023-05-05 PROCEDURE — 99214 OFFICE O/P EST MOD 30 MIN: CPT | Performed by: FAMILY MEDICINE

## 2023-05-05 PROCEDURE — 1159F MED LIST DOCD IN RCRD: CPT | Performed by: FAMILY MEDICINE

## 2023-05-05 NOTE — ASSESSMENT & PLAN NOTE
Acute on chronic  Will get xray and have her see pain management.   No nsaids  Use tylenol for now

## 2023-05-08 DIAGNOSIS — G89.29 CHRONIC BILATERAL LOW BACK PAIN WITH RIGHT-SIDED SCIATICA: Primary | ICD-10-CM

## 2023-05-08 DIAGNOSIS — M54.41 CHRONIC BILATERAL LOW BACK PAIN WITH RIGHT-SIDED SCIATICA: Primary | ICD-10-CM

## 2023-05-09 NOTE — ASSESSMENT & PLAN NOTE
Blood pressure is too low. Making her dizzy.   Hold blood pressure medications and recheck in a few days

## 2023-05-09 NOTE — ASSESSMENT & PLAN NOTE
Diagnosis, treatment and and course discussed. Potential side effects discussed. Return if there is worsening or persistence of symptoms.     Ice three times a day for about 10-15 minutes for the first 1-2 days. Then may alternate heat and ice. Better body mechanics discussed. Home exercises discussed and hand out given. Discussed nsaids and if they can be taken. May need imaging and or PT if persists.  Discussed red flags, if there is severe pain, fever with pain, loss of movement in one or both legs pain, numbness in groin or both legs, trouble urinating or defecating on oneself, then patient is to go to the ER.

## 2023-05-14 PROBLEM — N18.2 CRF (CHRONIC RENAL FAILURE), STAGE 2 (MILD): Status: ACTIVE | Noted: 2023-05-14

## 2023-05-15 ENCOUNTER — TELEPHONE (OUTPATIENT)
Dept: FAMILY MEDICINE CLINIC | Facility: CLINIC | Age: 88
End: 2023-05-15

## 2023-05-15 ENCOUNTER — CLINICAL SUPPORT (OUTPATIENT)
Dept: FAMILY MEDICINE CLINIC | Facility: CLINIC | Age: 88
End: 2023-05-15
Payer: MEDICARE

## 2023-05-15 DIAGNOSIS — D51.0 PERNICIOUS ANEMIA: Primary | ICD-10-CM

## 2023-05-15 PROCEDURE — 96372 THER/PROPH/DIAG INJ SC/IM: CPT | Performed by: FAMILY MEDICINE

## 2023-05-15 RX ORDER — CYANOCOBALAMIN 1000 UG/ML
1000 INJECTION, SOLUTION INTRAMUSCULAR; SUBCUTANEOUS
Status: SHIPPED | OUTPATIENT
Start: 2023-05-15

## 2023-05-15 RX ADMIN — CYANOCOBALAMIN 1000 MCG: 1000 INJECTION, SOLUTION INTRAMUSCULAR; SUBCUTANEOUS at 13:37

## 2023-05-15 NOTE — TELEPHONE ENCOUNTER
Patient called and said that she hasn't heard anything regarding her CT being scheduled downstairs. Please contact her at . Thanks Emilie

## 2023-05-15 NOTE — ASSESSMENT & PLAN NOTE
Hypertension is improved with stopping her medications.  Continue current treatment regimen.  Blood pressure will be reassessed at the next regular appointment.

## 2023-05-29 ENCOUNTER — TELEPHONE (OUTPATIENT)
Dept: FAMILY MEDICINE CLINIC | Facility: CLINIC | Age: 88
End: 2023-05-29

## 2023-05-29 DIAGNOSIS — M54.41 CHRONIC BILATERAL LOW BACK PAIN WITH RIGHT-SIDED SCIATICA: Primary | ICD-10-CM

## 2023-05-29 DIAGNOSIS — G89.29 CHRONIC BILATERAL LOW BACK PAIN WITH RIGHT-SIDED SCIATICA: Primary | ICD-10-CM

## 2023-05-30 NOTE — TELEPHONE ENCOUNTER
Insurance will not allow me to get an MRI or CT even though this is chronic back pain. Can you see if pain management will see her please?       Still having hip pain. I know that you also recieved a letter from the insurance company denying my coverage for a CT scan. What do you feel that my next step should be.

## 2023-05-30 NOTE — TELEPHONE ENCOUNTER
Looks like we referred her to pain mgmt they tried to schedule her and per referral que said: 3X - PT DECLINED TO SCHEDULE AT THIS TIME DUE TO CT SCAN NOT BEING APPROVED YET FOR SCHEDULING, SHE TOOK PRACTICE NUMBER AND WILL CALL BACK ONCE CT SCAN IS SCHEDULED.  ROUTING BACK TO REFERRING PRACTICE    Please advise what to do next thxs

## 2023-06-02 ENCOUNTER — TELEPHONE (OUTPATIENT)
Dept: FAMILY MEDICINE CLINIC | Facility: CLINIC | Age: 88
End: 2023-06-02

## 2023-06-02 DIAGNOSIS — E78.2 MIXED HYPERLIPIDEMIA: ICD-10-CM

## 2023-06-02 DIAGNOSIS — E03.9 HYPOTHYROIDISM (ACQUIRED): ICD-10-CM

## 2023-06-02 DIAGNOSIS — D51.0 PERNICIOUS ANEMIA: Primary | ICD-10-CM

## 2023-06-02 NOTE — TELEPHONE ENCOUNTER
----- Message from Abby Reno MA sent at 1/19/2023 10:07 AM EST -----  Order patients labs for wellness

## 2023-06-08 LAB
ALBUMIN SERPL-MCNC: 4.2 G/DL (ref 3.5–4.6)
ALBUMIN/GLOB SERPL: 1.9 {RATIO} (ref 1.2–2.2)
ALP SERPL-CCNC: 80 IU/L (ref 44–121)
ALT SERPL-CCNC: 21 IU/L (ref 0–32)
AST SERPL-CCNC: 29 IU/L (ref 0–40)
BASOPHILS # BLD AUTO: 0.1 X10E3/UL (ref 0–0.2)
BASOPHILS NFR BLD AUTO: 1 %
BILIRUB SERPL-MCNC: 0.7 MG/DL (ref 0–1.2)
BUN SERPL-MCNC: 16 MG/DL (ref 10–36)
BUN/CREAT SERPL: 19 (ref 12–28)
CALCIUM SERPL-MCNC: 10.1 MG/DL (ref 8.7–10.3)
CHLORIDE SERPL-SCNC: 101 MMOL/L (ref 96–106)
CHOLEST SERPL-MCNC: 210 MG/DL (ref 100–199)
CHOLEST/HDLC SERPL: 2.8 RATIO (ref 0–4.4)
CO2 SERPL-SCNC: 23 MMOL/L (ref 20–29)
CREAT SERPL-MCNC: 0.86 MG/DL (ref 0.57–1)
EGFRCR SERPLBLD CKD-EPI 2021: 63 ML/MIN/1.73
EOSINOPHIL # BLD AUTO: 0.1 X10E3/UL (ref 0–0.4)
EOSINOPHIL NFR BLD AUTO: 1 %
ERYTHROCYTE [DISTWIDTH] IN BLOOD BY AUTOMATED COUNT: 17.4 % (ref 11.7–15.4)
GLOBULIN SER CALC-MCNC: 2.2 G/DL (ref 1.5–4.5)
GLUCOSE SERPL-MCNC: 88 MG/DL (ref 70–99)
HCT VFR BLD AUTO: 39.5 % (ref 34–46.6)
HDLC SERPL-MCNC: 74 MG/DL
HGB BLD-MCNC: 12 G/DL (ref 11.1–15.9)
IMM GRANULOCYTES # BLD AUTO: 0 X10E3/UL (ref 0–0.1)
IMM GRANULOCYTES NFR BLD AUTO: 0 %
LDLC SERPL CALC-MCNC: 125 MG/DL (ref 0–99)
LYMPHOCYTES # BLD AUTO: 2.1 X10E3/UL (ref 0.7–3.1)
LYMPHOCYTES NFR BLD AUTO: 36 %
MCH RBC QN AUTO: 24.7 PG (ref 26.6–33)
MCHC RBC AUTO-ENTMCNC: 30.4 G/DL (ref 31.5–35.7)
MCV RBC AUTO: 81 FL (ref 79–97)
MONOCYTES # BLD AUTO: 0.5 X10E3/UL (ref 0.1–0.9)
MONOCYTES NFR BLD AUTO: 9 %
NEUTROPHILS # BLD AUTO: 3 X10E3/UL (ref 1.4–7)
NEUTROPHILS NFR BLD AUTO: 53 %
PLATELET # BLD AUTO: 308 X10E3/UL (ref 150–450)
POTASSIUM SERPL-SCNC: 4.4 MMOL/L (ref 3.5–5.2)
PROT SERPL-MCNC: 6.4 G/DL (ref 6–8.5)
RBC # BLD AUTO: 4.85 X10E6/UL (ref 3.77–5.28)
SODIUM SERPL-SCNC: 138 MMOL/L (ref 134–144)
TRIGL SERPL-MCNC: 59 MG/DL (ref 0–149)
TSH SERPL DL<=0.005 MIU/L-ACNC: 3.06 UIU/ML (ref 0.45–4.5)
VLDLC SERPL CALC-MCNC: 11 MG/DL (ref 5–40)
WBC # BLD AUTO: 5.9 X10E3/UL (ref 3.4–10.8)

## 2023-06-22 PROBLEM — K52.9 GASTROENTERITIS: Status: ACTIVE | Noted: 2023-06-22

## 2023-06-22 PROBLEM — K86.2 CYST OF PANCREAS: Status: ACTIVE | Noted: 2023-06-22

## 2023-06-22 PROBLEM — R19.7 DIARRHEA: Status: ACTIVE | Noted: 2023-06-22

## 2023-06-22 PROBLEM — R10.32 LEFT LOWER QUADRANT ABDOMINAL PAIN: Status: ACTIVE | Noted: 2023-06-22

## 2023-06-22 PROBLEM — R10.30 LOWER ABDOMINAL PAIN: Status: ACTIVE | Noted: 2023-06-22

## 2023-07-20 ENCOUNTER — OFFICE VISIT (OUTPATIENT)
Dept: FAMILY MEDICINE CLINIC | Facility: CLINIC | Age: 88
End: 2023-07-20
Payer: MEDICARE

## 2023-07-20 VITALS
BODY MASS INDEX: 23.57 KG/M2 | HEART RATE: 63 BPM | OXYGEN SATURATION: 94 % | RESPIRATION RATE: 18 BRPM | HEIGHT: 63 IN | SYSTOLIC BLOOD PRESSURE: 128 MMHG | DIASTOLIC BLOOD PRESSURE: 76 MMHG | TEMPERATURE: 97.3 F

## 2023-07-20 DIAGNOSIS — I10 ESSENTIAL HYPERTENSION: Primary | ICD-10-CM

## 2023-07-20 NOTE — PROGRESS NOTES
Subjective   Stacy Monroy is a 89 y.o. female. Presents to [facility]    Chief Complaint   Patient presents with    Hypertension       Hypertension  This is a chronic problem. The current episode started more than 1 year ago. Pertinent negatives include no blurred vision, chest pain, headaches, malaise/fatigue, palpitations, shortness of breath or sweats. (Pt said she feels jittery  ) There are no associated agents to hypertension. Risk factors for coronary artery disease include dyslipidemia, post-menopausal state and family history. Current antihypertension treatment includes nothing.      I personally reviewed and updated the patient's allergies, medications, problem list, and past medical, surgical, social, and family history. I have reviewed and confirmed the accuracy of the History of Present Illness and Review of Symptoms as documented by the MA/LPN/RN. Mita Moise MD    Allergies:  Allergies   Allergen Reactions    Azithromycin Rash     ALL mycin drugs    Erythromycin Rash     ALL mycins drugs    Penicillin G Rash    Penicillins Rash     ALL mycin drugs       Social History:  Social History     Socioeconomic History    Marital status:    Tobacco Use    Smoking status: Never    Smokeless tobacco: Never   Vaping Use    Vaping Use: Never used   Substance and Sexual Activity    Alcohol use: No    Drug use: No    Sexual activity: Not Currently       Family History:  Family History   Problem Relation Age of Onset    Hypertension Mother     Stroke Mother     Hypertension Father     Stroke Father     Breast cancer Sister 50    Ovarian cancer Daughter 57    Breast cancer Niece 53       Past Medical History :  Patient Active Problem List   Diagnosis    Status post placement of implantable loop recorder    Essential hypertension    Hypothyroidism (acquired)    Acute seasonal allergic rhinitis due to pollen    Atherosclerosis    B12 deficiency    Bilateral carpal tunnel syndrome     Cataracts, bilateral    DNR (do not resuscitate)    Heart murmur    History of chicken pox    Menopausal syndrome    Mixed hyperlipidemia    Pernicious anemia    Influenza vaccine refused    Liver function abnormality    Other fatigue    RUQ abdominal mass    Cardiac arrhythmia    Deleon-Wasserman syncope    Sinus node dysfunction    SSS (sick sinus syndrome)    Acute gastric ulcer without hemorrhage or perforation    Presence of cardiac pacemaker    Intraductal papillary mucinous neoplasm of pancreas    Personal history of other infectious and parasitic diseases    Presence of intraocular lens    Puckering of macula, right eye    Nonexudative age-related macular degeneration    Posterior vitreous detachment    Mammogram declined    Generalized abdominal pain    Acute bilateral low back pain without sciatica    Microhematuria    Iron deficiency anemia secondary to inadequate dietary iron intake    Chronic bilateral low back pain with right-sided sciatica    CRF (chronic renal failure), stage 2 (mild)    Left lower quadrant abdominal pain    Diarrhea    Cyst of pancreas       Medication List:    Current Outpatient Medications:     calcium citrate-vitamin d (CITRACAL) 200-250 MG-UNIT tablet tablet, Take 1 tablet by mouth Daily., Disp: , Rfl:     coenzyme Q10 100 MG capsule, Take 1 capsule by mouth Daily., Disp: , Rfl:     levothyroxine (SYNTHROID, LEVOTHROID) 75 MCG tablet, Take 1 tablet by mouth Daily., Disp: 30 tablet, Rfl: 12    Omega-3 1000 MG capsule, Take 1 capsule by mouth Daily., Disp: , Rfl:     Current Facility-Administered Medications:     cyanocobalamin injection 1,000 mcg, 1,000 mcg, Intramuscular, Q28 Days, Mita Moise MD, 1,000 mcg at 05/15/23 1337    Past Surgical History:  Past Surgical History:   Procedure Laterality Date    BLADDER REPAIR      BREAST BIOPSY Right     CARDIAC ELECTROPHYSIOLOGY PROCEDURE N/A 05/19/2021    Procedure: Pacemaker DC new;  Surgeon: Edmar Rubin MD;   "Location: [facility] CATH INVASIVE LOCATION;  Service: Cardiovascular;  Laterality: N/A;    ERCP N/A 09/14/2021    Procedure: ERCP WITH SPHINCTEROTOMY, sphincteroplasty, clearance of bile duct with balloon. brushing, placement of pancreatic stent and placement of metal biliary stent;  Surgeon: Adrienne Wilson MD;  Location: [facility] ENDOSCOPY;  Service: Gastroenterology;  Laterality: N/A;  post op: cystic mass compressing bile duct    HYSTERECTOMY      INSERT / REPLACE / REMOVE PACEMAKER      OTHER SURGICAL HISTORY  2019    Tilt table     OTHER SURGICAL HISTORY  2019    tilt table          Physical Exam:      Vital Signs:    Vitals:    07/20/23 1644   BP: 128/76   Pulse:    Resp:    Temp:    SpO2:         /76   Pulse 63   Temp 97.3 °F (36.3 °C)   Resp 18   Ht 160 cm (62.99\")   LMP  (LMP Unknown)   SpO2 94%   BMI 23.57 kg/m²     Wt Readings from Last 3 Encounters:   06/22/23 60.3 kg (133 lb)   05/05/23 61.1 kg (134 lb 9.6 oz)   05/01/23 61.1 kg (134 lb 12.8 oz)       Result Review :                Physical Exam  Vitals reviewed.   Constitutional:       Appearance: Normal appearance. She is well-developed.   HENT:      Head: Normocephalic and atraumatic.   Eyes:      General:         Right eye: No discharge.         Left eye: No discharge.      Extraocular Movements: Extraocular movements intact.      Pupils: Pupils are equal, round, and reactive to light.   Cardiovascular:      Rate and Rhythm: Normal rate and regular rhythm.      Heart sounds: Normal heart sounds. No murmur heard.    No friction rub. No gallop.   Pulmonary:      Effort: Pulmonary effort is normal. No respiratory distress.      Breath sounds: Normal breath sounds. No wheezing or rales.   Musculoskeletal:         General: Normal range of motion.   Skin:     General: Skin is warm and dry.      Findings: No rash.   Neurological:      General: No focal deficit present.      Mental Status: She is alert and oriented to person, place, and time.     "  Cranial Nerves: No cranial nerve deficit.      Sensory: No sensory deficit.      Motor: No weakness.      Coordination: Coordination normal.      Gait: Gait normal.      Deep Tendon Reflexes: Reflexes normal.   Psychiatric:         Mood and Affect: Mood normal.         Behavior: Behavior is cooperative.       Assessment and Plan:  Problems Addressed this Visit          Cardiac and Vasculature    Essential hypertension - Primary     Kept her in office and continually rechecked BP. After 35 minutes, her BP came down on its own to 128/76. It started at 182/76 and quickly came down on its own. Possibly related to procedure she had.           Diagnoses         Codes Comments    Essential hypertension    -  Primary ICD-10-CM: I10  ICD-9-CM: 401.9              BMI is within normal parameters. No other follow-up for BMI required.      An After Visit Summary and PPPS were given to the patient.

## 2023-07-29 NOTE — ASSESSMENT & PLAN NOTE
Kept her in office and continually rechecked BP. After 35 minutes, her BP came down on its own to 128/76. It started at 182/76 and quickly came down on its own. Possibly related to procedure she had.

## 2023-08-01 NOTE — PROGRESS NOTES
Subjective   Stacy Monroy is a 89 y.o. female. Presents to [facility]    Chief Complaint   Patient presents with    Hypertension    Arm Pain       Hypertension  This is a chronic problem. The current episode started more than 1 year ago. The problem is uncontrolled. Pertinent negatives include no chest pain, headaches, malaise/fatigue, shortness of breath or sweats. Risk factors for coronary artery disease include post-menopausal state and family history. Current antihypertension treatment includes nothing. The current treatment provides moderate improvement.   Arm Pain   The incident occurred more than 1 week ago (2 weeks). The incident occurred at home. The injury mechanism is unknown. The pain is present in the upper right arm. The pain does not radiate. The pain is at a severity of 0/10. The patient is experiencing no pain. Pertinent negatives include no chest pain, numbness or tingling. Associated symptoms comments: Bruising . Exacerbated by: taking BP at home. She has tried nothing for the symptoms. The treatment provided no relief.      She was turning her 's wheelchair. She had trouble and she lifted it and she felt something in her right upper arm. She heard something. She has bruising.     I personally reviewed and updated the patient's allergies, medications, problem list, and past medical, surgical, social, and family history. I have reviewed and confirmed the accuracy of the History of Present Illness and Review of Symptoms as documented by the MA/LPN/RN. Mita Moise MD    Allergies:  Allergies   Allergen Reactions    Azithromycin Rash     ALL mycin drugs    Erythromycin Rash     ALL mycins drugs    Penicillin G Rash    Penicillins Rash     ALL mycin drugs       Social History:  Social History     Socioeconomic History    Marital status:    Tobacco Use    Smoking status: Never    Smokeless tobacco: Never   Vaping Use    Vaping Use: Never used   Substance and Sexual  Activity    Alcohol use: No    Drug use: No    Sexual activity: Not Currently       Family History:  Family History   Problem Relation Age of Onset    Hypertension Mother     Stroke Mother     Hypertension Father     Stroke Father     Breast cancer Sister 50    Ovarian cancer Daughter 57    Breast cancer Niece 53       Past Medical History :  Patient Active Problem List   Diagnosis    Status post placement of implantable loop recorder    Essential hypertension    Hypothyroidism (acquired)    Acute seasonal allergic rhinitis due to pollen    Atherosclerosis    B12 deficiency    Bilateral carpal tunnel syndrome    Cataracts, bilateral    DNR (do not resuscitate)    Heart murmur    History of chicken pox    Menopausal syndrome    Mixed hyperlipidemia    Pernicious anemia    Influenza vaccine refused    Liver function abnormality    Other fatigue    RUQ abdominal mass    Cardiac arrhythmia    Deleon-Wasserman syncope    Sinus node dysfunction    SSS (sick sinus syndrome)    Acute gastric ulcer without hemorrhage or perforation    Presence of cardiac pacemaker    Intraductal papillary mucinous neoplasm of pancreas    Personal history of other infectious and parasitic diseases    Presence of intraocular lens    Puckering of macula, right eye    Nonexudative age-related macular degeneration    Posterior vitreous detachment    Mammogram declined    Generalized abdominal pain    Acute bilateral low back pain without sciatica    Microhematuria    Iron deficiency anemia secondary to inadequate dietary iron intake    Chronic bilateral low back pain with right-sided sciatica    CRF (chronic renal failure), stage 2 (mild)    Left lower quadrant abdominal pain    Diarrhea    Cyst of pancreas    Right arm pain    Tear of biceps muscle, initial encounter       Medication List:    Current Outpatient Medications:     calcium citrate-vitamin d (CITRACAL) 200-250 MG-UNIT tablet tablet, Take 1 tablet by mouth Daily., Disp: , Rfl:      "coenzyme Q10 100 MG capsule, Take 1 capsule by mouth Daily., Disp: , Rfl:     levothyroxine (SYNTHROID, LEVOTHROID) 75 MCG tablet, Take 1 tablet by mouth Daily., Disp: 30 tablet, Rfl: 12    Omega-3 1000 MG capsule, Take 1 capsule by mouth Daily., Disp: , Rfl:     Current Facility-Administered Medications:     cyanocobalamin injection 1,000 mcg, 1,000 mcg, Intramuscular, Q28 Days, Mita Moise MD, 1,000 mcg at 05/15/23 1337    Past Surgical History:  Past Surgical History:   Procedure Laterality Date    BLADDER REPAIR      BREAST BIOPSY Right     CARDIAC ELECTROPHYSIOLOGY PROCEDURE N/A 05/19/2021    Procedure: Pacemaker DC new;  Surgeon: Edmar Rubin MD;  [facility];  Service: Cardiovascular;  Laterality: N/A;    ERCP N/A 09/14/2021    Procedure: ERCP WITH SPHINCTEROTOMY, sphincteroplasty, clearance of bile duct with balloon. brushing, placement of pancreatic stent and placement of metal biliary stent;  Surgeon: Adrienne Wilson MD;  [facility];  Service: Gastroenterology;  Laterality: N/A;  post op: cystic mass compressing bile duct    HYSTERECTOMY      INSERT / REPLACE / REMOVE PACEMAKER      OTHER SURGICAL HISTORY  2019    Tilt table     OTHER SURGICAL HISTORY  2019    tilt table          Physical Exam:      Vital Signs:    Vitals:    08/07/23 1342   BP: 140/88   Pulse: 80   Resp: 18   Temp: 97.3 øF (36.3 øC)   SpO2: 98%        /88 (BP Location: Right arm, Patient Position: Sitting, Cuff Size: Adult)   Pulse 80   Temp 97.3 øF (36.3 øC) (Temporal)   Resp 18   Ht 160 cm (62.99\")   Wt 58.2 kg (128 lb 6.4 oz)   LMP  (LMP Unknown)   SpO2 98% Comment: room air  BMI 22.75 kg/mý     Wt Readings from Last 3 Encounters:   08/07/23 58.2 kg (128 lb 6.4 oz)   06/22/23 60.3 kg (133 lb)   05/05/23 61.1 kg (134 lb 9.6 oz)       Result Review :                Physical Exam  Vitals reviewed.   Constitutional:       Appearance: Normal appearance. She is " well-developed.   HENT:      Head: Normocephalic and atraumatic.   Eyes:      General:         Right eye: No discharge.         Left eye: No discharge.   Cardiovascular:      Rate and Rhythm: Normal rate and regular rhythm.      Heart sounds: Normal heart sounds. No murmur heard.    No friction rub. No gallop.   Pulmonary:      Effort: Pulmonary effort is normal. No respiratory distress.      Breath sounds: Normal breath sounds. No wheezing or rales.   Musculoskeletal:      Right upper arm: Swelling present.        Arms:    Skin:     General: Skin is warm and dry.      Findings: No rash.   Neurological:      Mental Status: She is alert and oriented to person, place, and time.      Coordination: Coordination normal.      Gait: Gait normal.   Psychiatric:         Behavior: Behavior is cooperative.     Assessment and Plan:  Problems Addressed this Visit          Cardiac and Vasculature    Essential hypertension - Primary     Hypertension is  elevated today but it was better at home .  Continue current treatment regimen.  Blood pressure will be reassessed at the next regular appointment.                Musculoskeletal and Injuries    Right arm pain       Other    Tear of biceps muscle, initial encounter     She declines seeing orthopedist. Ice arm as needed and rest it. No heavy lifting          Diagnoses         Codes Comments    Essential hypertension    -  Primary ICD-10-CM: I10  ICD-9-CM: 401.9     Right arm pain     ICD-10-CM: M79.601  ICD-9-CM: 729.5     Tear of right biceps muscle, initial encounter     ICD-10-CM: S46.211A  ICD-9-CM: 840.8              BMI is within normal parameters. No other follow-up for BMI required.      An After Visit Summary and PPPS were given to the patient.

## 2023-08-07 ENCOUNTER — OFFICE VISIT (OUTPATIENT)
Dept: FAMILY MEDICINE CLINIC | Facility: CLINIC | Age: 88
End: 2023-08-07
Payer: MEDICARE

## 2023-08-07 VITALS
TEMPERATURE: 97.3 F | RESPIRATION RATE: 18 BRPM | DIASTOLIC BLOOD PRESSURE: 88 MMHG | BODY MASS INDEX: 22.75 KG/M2 | HEART RATE: 80 BPM | HEIGHT: 63 IN | SYSTOLIC BLOOD PRESSURE: 140 MMHG | WEIGHT: 128.4 LBS | OXYGEN SATURATION: 98 %

## 2023-08-07 DIAGNOSIS — M79.601 RIGHT ARM PAIN: ICD-10-CM

## 2023-08-07 DIAGNOSIS — I10 ESSENTIAL HYPERTENSION: Primary | ICD-10-CM

## 2023-08-07 DIAGNOSIS — S46.211A TEAR OF RIGHT BICEPS MUSCLE, INITIAL ENCOUNTER: ICD-10-CM

## 2023-08-07 PROBLEM — S46.219A: Status: ACTIVE | Noted: 2023-08-07

## 2023-08-14 NOTE — ASSESSMENT & PLAN NOTE
Hypertension is  elevated today but it was better at home .  Continue current treatment regimen.  Blood pressure will be reassessed at the next regular appointment.

## 2023-08-18 ENCOUNTER — OFFICE VISIT (OUTPATIENT)
Dept: PAIN MEDICINE | Facility: CLINIC | Age: 88
End: 2023-08-18
Payer: MEDICARE

## 2023-08-18 VITALS
HEART RATE: 73 BPM | OXYGEN SATURATION: 100 % | DIASTOLIC BLOOD PRESSURE: 71 MMHG | RESPIRATION RATE: 16 BRPM | SYSTOLIC BLOOD PRESSURE: 142 MMHG

## 2023-08-18 DIAGNOSIS — M47.817 LUMBOSACRAL SPONDYLOSIS WITHOUT MYELOPATHY: Primary | ICD-10-CM

## 2023-08-18 DIAGNOSIS — M54.16 LUMBAR RADICULOPATHY: ICD-10-CM

## 2023-08-18 PROCEDURE — G0463 HOSPITAL OUTPT CLINIC VISIT: HCPCS | Performed by: STUDENT IN AN ORGANIZED HEALTH CARE EDUCATION/TRAINING PROGRAM

## 2023-08-18 PROCEDURE — 1159F MED LIST DOCD IN RCRD: CPT | Performed by: STUDENT IN AN ORGANIZED HEALTH CARE EDUCATION/TRAINING PROGRAM

## 2023-08-18 PROCEDURE — 1160F RVW MEDS BY RX/DR IN RCRD: CPT | Performed by: STUDENT IN AN ORGANIZED HEALTH CARE EDUCATION/TRAINING PROGRAM

## 2023-08-18 PROCEDURE — 1125F AMNT PAIN NOTED PAIN PRSNT: CPT | Performed by: STUDENT IN AN ORGANIZED HEALTH CARE EDUCATION/TRAINING PROGRAM

## 2023-08-18 PROCEDURE — 99213 OFFICE O/P EST LOW 20 MIN: CPT | Performed by: STUDENT IN AN ORGANIZED HEALTH CARE EDUCATION/TRAINING PROGRAM

## 2023-08-18 NOTE — PROGRESS NOTES
CHIEF COMPLAINT  Chief Complaint   Patient presents with    Back Pain     No narcotics       Primary Care  GlassMita MD    Subjective   Stacy Monroy is a 89 y.o. female  who presents for low back pain with right lower extremity radiculopathy.  She reports the pain began approximately 3 to 4 months ago.  She describes the pain primarily in the posterior aspect of the right buttock and hip radiating into the right leg and right posterior thigh.  She reports that the pain is worse with any type of movement and physical activity and slightly improved with rest.  She describes the pain as a burning, shooting sensation.  She denies any red flag symptoms such as loss of bowel or bladder.  She does have plain films which show very significant degenerative change with associated anterolisthesis of up to 9 mm.    History of Present Illness     Location: Low back with radiation to the right buttock and right leg  Onset: Several months ago  Duration: Fairly constant  Timing: Constant throughout the day  Quality: Sharp, stabbing, shooting  Severity: Today: 8       Last Week: 8       Worst: 10  Modifying Factors: Pain is worse with movement and physical activity and slightly improved with rest  Functional Deficit: The pain makes it difficult for him to perform his activities of daily living    Physical Therapy: no    Interval Update 08/18/2023: It appears that she has had a resolution of her radicular pain.  She does not have any pain on the right side.  She does complain of some pain into the left buttock primarily the left posterior thigh.  She does not have any pain extending beyond the knee.  It appears that the majority of her pain is likely referred pain from her facet joints.    The following portions of the patient's history were reviewed and updated as appropriate: allergies, current medications, past family history, past medical history, past social history, past surgical history, and problem  list.    Procedures:        Current Outpatient Medications:     calcium citrate-vitamin d (CITRACAL) 200-250 MG-UNIT tablet tablet, Take 1 tablet by mouth Daily., Disp: , Rfl:     coenzyme Q10 100 MG capsule, Take 1 capsule by mouth Daily., Disp: , Rfl:     levothyroxine (SYNTHROID, LEVOTHROID) 75 MCG tablet, Take 1 tablet by mouth Daily., Disp: 30 tablet, Rfl: 12    Omega-3 1000 MG capsule, Take 1 capsule by mouth Daily., Disp: , Rfl:     Current Facility-Administered Medications:     cyanocobalamin injection 1,000 mcg, 1,000 mcg, Intramuscular, Q28 Days, Mita Moise MD, 1,000 mcg at 05/15/23 1337    Review of Systems   Musculoskeletal:  Positive for arthralgias, back pain, gait problem and myalgias. Negative for joint swelling, neck pain and neck stiffness.   Neurological:  Positive for numbness. Negative for weakness.     Vitals:    08/18/23 1438   BP: 142/71   Pulse: 73   Resp: 16   SpO2: 100%   PainSc:   8       Objective   Physical Exam  Vitals and nursing note reviewed. Exam conducted with a chaperone present.   Constitutional:       General: She is not in acute distress.     Appearance: Normal appearance. She is well-developed and normal weight.   Neck:      Trachea: No tracheal deviation.   Musculoskeletal:      Comments: Lumbar Spine Exam:  Tender to palpation over the lumbar paraspinal musculature No  Limited range of motion secondary to pain No  Facet loading positive: Negative  Facets tender to palpation: Negative  Straight leg raise test positive: right    SI Joint Exam:  Katina positive: Negative  PSIS tender: right   SI joint palpation: right  SI joint compression: Negative     Neurological:      Mental Status: She is alert.      Sensory: Sensory deficit present.      Motor: Weakness present.         Assessment & Plan   Problems Addressed this Visit    None  Visit Diagnoses       Lumbosacral spondylosis without myelopathy    -  Primary    Relevant Orders    Facet    Lumbar radiculopathy               Diagnoses         Codes Comments    Lumbosacral spondylosis without myelopathy    -  Primary ICD-10-CM: M47.817  ICD-9-CM: 721.3     Lumbar radiculopathy     ICD-10-CM: M54.16  ICD-9-CM: 724.4             Plan:  Excellent benefit from GIOVANNY in terms of her radicular pain  We will plan for diagnostic bilateral L4-5, L5-S1 medial branch blocks.  If she gets significant benefit, can plan for RFA for her remaining back pain.  --- Follow-up next available for bilateral diagnostic L4-5, 5 S1 medial branch blocks           INSPECT REPORT    As part of the patient's treatment plan, I may be prescribing controlled substances. The patient has been made aware of appropriate use of such medications, including potential risk of somnolence, limited ability to drive and/or work safely, and the potential for dependence or overdose. It has also bee made clear that these medications are for use by this patient only, without concomitant use of alcohol or other substances unless prescribed.     Patient has completed prescribing agreement detailing terms of continued prescribing of controlled substances, including monitoring VALERIA reports, urine drug screening, and pill counts if necessary. The patient is aware that inappropriate use will results in cessation of prescribing such medications.    INSPECT report has been reviewed and scanned into the patient's chart.    As the clinician, I personally reviewed the INSPECT from 8/15/2023.    History and physical exam exhibit continued safe and appropriate use of controlled substances.      EMR Dragon/Transcription disclaimer:   Much of this encounter note is an electronic transcription/translation of spoken language to printed text. The electronic translation of spoken language may permit erroneous, or at times, nonsensical words or phrases to be inadvertently transcribed; Although I have reviewed the note for such errors, some may still exist.

## 2023-08-24 ENCOUNTER — HOSPITAL ENCOUNTER (OUTPATIENT)
Dept: PAIN MEDICINE | Facility: HOSPITAL | Age: 88
Discharge: HOME | End: 2023-08-24
Payer: MEDICARE

## 2023-08-24 ENCOUNTER — HOSPITAL ENCOUNTER (OUTPATIENT)
Dept: GENERAL RADIOLOGY | Facility: HOSPITAL | Age: 88
Discharge: HOME | End: 2023-08-24
Payer: MEDICARE

## 2023-08-24 VITALS
HEART RATE: 65 BPM | RESPIRATION RATE: 16 BRPM | DIASTOLIC BLOOD PRESSURE: 82 MMHG | SYSTOLIC BLOOD PRESSURE: 166 MMHG | TEMPERATURE: 96.9 F | OXYGEN SATURATION: 98 %

## 2023-08-24 DIAGNOSIS — M47.817 LUMBOSACRAL SPONDYLOSIS WITHOUT MYELOPATHY: Primary | ICD-10-CM

## 2023-08-24 DIAGNOSIS — R52 PAIN: ICD-10-CM

## 2023-08-24 PROCEDURE — 25510000001 IOPAMIDOL 41 % SOLUTION: Performed by: STUDENT IN AN ORGANIZED HEALTH CARE EDUCATION/TRAINING PROGRAM

## 2023-08-24 PROCEDURE — 25010000002 BUPIVACAINE (PF) 0.25 % SOLUTION: Performed by: STUDENT IN AN ORGANIZED HEALTH CARE EDUCATION/TRAINING PROGRAM

## 2023-08-24 PROCEDURE — 77003 FLUOROGUIDE FOR SPINE INJECT: CPT

## 2023-08-24 PROCEDURE — 25010000002 METHYLPREDNISOLONE PER 40 MG: Performed by: STUDENT IN AN ORGANIZED HEALTH CARE EDUCATION/TRAINING PROGRAM

## 2023-08-24 RX ORDER — BUPIVACAINE HYDROCHLORIDE 2.5 MG/ML
10 INJECTION, SOLUTION EPIDURAL; INFILTRATION; INTRACAUDAL ONCE
Status: COMPLETED | OUTPATIENT
Start: 2023-08-24 | End: 2023-08-24

## 2023-08-24 RX ORDER — METHYLPREDNISOLONE ACETATE 40 MG/ML
40 INJECTION, SUSPENSION INTRA-ARTICULAR; INTRALESIONAL; INTRAMUSCULAR; SOFT TISSUE ONCE
Status: COMPLETED | OUTPATIENT
Start: 2023-08-24 | End: 2023-08-24

## 2023-08-24 RX ADMIN — METHYLPREDNISOLONE ACETATE 40 MG: 40 INJECTION, SUSPENSION INTRA-ARTICULAR; INTRALESIONAL; INTRAMUSCULAR; SOFT TISSUE at 15:01

## 2023-08-24 RX ADMIN — IOPAMIDOL 3 ML: 408 INJECTION, SOLUTION INTRATHECAL at 15:01

## 2023-08-24 RX ADMIN — BUPIVACAINE HYDROCHLORIDE 10 ML: 2.5 INJECTION, SOLUTION EPIDURAL; INFILTRATION; INTRACAUDAL; PERINEURAL at 15:01

## 2023-09-05 NOTE — PROGRESS NOTES
Subjective   Stacy Monroy is a 89 y.o. female. Presents to [facility]    Chief Complaint   Patient presents with    Arm Pain       Arm Pain   The incident occurred more than 1 week ago. The incident occurred at home. The injury mechanism is unknown. The pain is present in the upper right arm. The pain does not radiate. The pain is at a severity of 0/10. The patient is experiencing no pain. Pertinent negatives include no numbness or tingling. She has tried nothing for the symptoms. The treatment provided moderate relief.      I personally reviewed and updated the patient's allergies, medications, problem list, and past medical, surgical, social, and family history. I have reviewed and confirmed the accuracy of the History of Present Illness and Review of Symptoms as documented by the MA/LPN/RN. Mita Miose MD    Allergies:  Allergies   Allergen Reactions    Azithromycin Rash     ALL mycin drugs    Erythromycin Rash     ALL mycins drugs    Penicillin G Rash    Penicillins Rash     ALL mycin drugs       Social History:  Social History     Socioeconomic History    Marital status:    Tobacco Use    Smoking status: Never    Smokeless tobacco: Never   Vaping Use    Vaping Use: Never used   Substance and Sexual Activity    Alcohol use: No    Drug use: No    Sexual activity: Not Currently       Family History:  Family History   Problem Relation Age of Onset    Hypertension Mother     Stroke Mother     Hypertension Father     Stroke Father     Breast cancer Sister 50    Ovarian cancer Daughter 57    Breast cancer Niece 53       Past Medical History :  Patient Active Problem List   Diagnosis    Status post placement of implantable loop recorder    Essential hypertension    Hypothyroidism (acquired)    Acute seasonal allergic rhinitis due to pollen    Atherosclerosis    B12 deficiency    Bilateral carpal tunnel syndrome    Cataracts, bilateral    DNR (do not resuscitate)    Heart murmur    History  of chicken pox    Menopausal syndrome    Mixed hyperlipidemia    Pernicious anemia    Influenza vaccine refused    Liver function abnormality    Other fatigue    RUQ abdominal mass    Cardiac arrhythmia    Deleon-Wasserman syncope    Sinus node dysfunction    SSS (sick sinus syndrome)    Acute gastric ulcer without hemorrhage or perforation    Presence of cardiac pacemaker    Intraductal papillary mucinous neoplasm of pancreas    Personal history of other infectious and parasitic diseases    Presence of intraocular lens    Puckering of macula, right eye    Nonexudative age-related macular degeneration    Posterior vitreous detachment    Mammogram declined    Generalized abdominal pain    Acute bilateral low back pain without sciatica    Microhematuria    Iron deficiency anemia secondary to inadequate dietary iron intake    Chronic bilateral low back pain with right-sided sciatica    CRF (chronic renal failure), stage 2 (mild)    Left lower quadrant abdominal pain    Diarrhea    Cyst of pancreas    Right arm pain    Tear of biceps muscle, initial encounter       Medication List:    Current Outpatient Medications:     calcium citrate-vitamin d (CITRACAL) 200-250 MG-UNIT tablet tablet, Take 1 tablet by mouth Daily., Disp: , Rfl:     coenzyme Q10 100 MG capsule, Take 1 capsule by mouth Daily., Disp: , Rfl:     levothyroxine (SYNTHROID, LEVOTHROID) 75 MCG tablet, Take 1 tablet by mouth Daily., Disp: 30 tablet, Rfl: 12    Omega-3 1000 MG capsule, Take 1 capsule by mouth Daily., Disp: , Rfl:     Current Facility-Administered Medications:     cyanocobalamin injection 1,000 mcg, 1,000 mcg, Intramuscular, Q28 Days, Mita Moise MD, 1,000 mcg at 05/15/23 1337    Past Surgical History:  Past Surgical History:   Procedure Laterality Date    BLADDER REPAIR      BREAST BIOPSY Right     CARDIAC ELECTROPHYSIOLOGY PROCEDURE N/A 05/19/2021    Procedure: Pacemaker DC new;  Surgeon: Edmar Rubin MD;  Location: [facility]  "INVASIVE LOCATION;  Service: Cardiovascular;  Laterality: N/A;    ERCP N/A 09/14/2021    Procedure: ERCP WITH SPHINCTEROTOMY, sphincteroplasty, clearance of bile duct with balloon. brushing, placement of pancreatic stent and placement of metal biliary stent;  Surgeon: Adrienne Wilson MD;  Location: [facility] ENDOSCOPY;  Service: Gastroenterology;  Laterality: N/A;  post op: cystic mass compressing bile duct    HYSTERECTOMY      INSERT / REPLACE / REMOVE PACEMAKER      OTHER SURGICAL HISTORY  2019    Tilt table     OTHER SURGICAL HISTORY  2019    tilt table          Physical Exam:      Vital Signs:    Vitals:    09/07/23 0841   BP: 132/80   Pulse: 83   Resp: 18   Temp: 97.7 °F (36.5 °C)   SpO2: 98%        /80 (BP Location: Right arm, Patient Position: Sitting, Cuff Size: Adult)   Pulse 83   Temp 97.7 °F (36.5 °C) (Temporal)   Resp 18   Ht 160 cm (62.99\")   Wt 57.4 kg (126 lb 9.6 oz)   LMP  (LMP Unknown)   SpO2 98% Comment: room air  BMI 22.43 kg/m²     Wt Readings from Last 3 Encounters:   09/07/23 57.4 kg (126 lb 9.6 oz)   08/07/23 58.2 kg (128 lb 6.4 oz)   06/22/23 60.3 kg (133 lb)       Result Review :                Physical Exam  Vitals reviewed.   Constitutional:       Appearance: Normal appearance. She is well-developed.   HENT:      Head: Normocephalic and atraumatic.   Eyes:      General:         Right eye: No discharge.         Left eye: No discharge.   Cardiovascular:      Rate and Rhythm: Normal rate and regular rhythm.      Heart sounds: Normal heart sounds. No murmur heard.    No friction rub. No gallop.   Pulmonary:      Effort: Pulmonary effort is normal. No respiratory distress.      Breath sounds: Normal breath sounds. No wheezing or rales.   Musculoskeletal:        Arms:    Skin:     General: Skin is warm and dry.      Findings: No rash.   Neurological:      Mental Status: She is alert and oriented to person, place, and time.      Coordination: Coordination normal.      Gait: Gait " normal.   Psychiatric:         Behavior: Behavior is cooperative.       Assessment and Plan:  Problems Addressed this Visit          Cardiac and Vasculature    Essential hypertension     Hypertension is improving with treatment.  Continue current treatment regimen.  Dietary sodium restriction.  Weight loss.  Blood pressure will be reassessed at the next regular appointment.         Relevant Orders    Comprehensive Metabolic Panel       Endocrine and Metabolic    B12 deficiency    Relevant Orders    Vitamin B12       Musculoskeletal and Injuries    Right arm pain - Primary     Much better          Other Visit Diagnoses       Tear of right biceps muscle, subsequent encounter        healing well. continue with home treatment          Diagnoses         Codes Comments    Right arm pain    -  Primary ICD-10-CM: M79.601  ICD-9-CM: 729.5     Tear of right biceps muscle, subsequent encounter     ICD-10-CM: S46.211D  ICD-9-CM: V58.89, 840.8 healing well. continue with home treatment    B12 deficiency     ICD-10-CM: E53.8  ICD-9-CM: 266.2     Essential hypertension     ICD-10-CM: I10  ICD-9-CM: 401.9              BMI is within normal parameters. No other follow-up for BMI required.      An After Visit Summary and PPPS were given to the patient.

## 2023-09-07 ENCOUNTER — OFFICE VISIT (OUTPATIENT)
Dept: FAMILY MEDICINE CLINIC | Facility: CLINIC | Age: 88
End: 2023-09-07
Payer: MEDICARE

## 2023-09-07 VITALS
BODY MASS INDEX: 22.43 KG/M2 | OXYGEN SATURATION: 98 % | HEART RATE: 83 BPM | HEIGHT: 63 IN | RESPIRATION RATE: 18 BRPM | WEIGHT: 126.6 LBS | TEMPERATURE: 97.7 F | SYSTOLIC BLOOD PRESSURE: 132 MMHG | DIASTOLIC BLOOD PRESSURE: 80 MMHG

## 2023-09-07 DIAGNOSIS — S46.211D TEAR OF RIGHT BICEPS MUSCLE, SUBSEQUENT ENCOUNTER: ICD-10-CM

## 2023-09-07 DIAGNOSIS — I10 ESSENTIAL HYPERTENSION: ICD-10-CM

## 2023-09-07 DIAGNOSIS — M79.601 RIGHT ARM PAIN: Primary | ICD-10-CM

## 2023-09-07 DIAGNOSIS — E53.8 B12 DEFICIENCY: ICD-10-CM

## 2023-09-07 RX ORDER — CYANOCOBALAMIN 1000 UG/ML
1000 INJECTION, SOLUTION INTRAMUSCULAR; SUBCUTANEOUS ONCE
Status: COMPLETED | OUTPATIENT
Start: 2023-09-07 | End: 2023-09-07

## 2023-09-07 RX ADMIN — CYANOCOBALAMIN 1000 MCG: 1000 INJECTION, SOLUTION INTRAMUSCULAR; SUBCUTANEOUS at 08:56

## 2023-09-21 ENCOUNTER — HOSPITAL ENCOUNTER (OUTPATIENT)
Dept: CT IMAGING | Facility: HOSPITAL | Age: 88
Discharge: HOME | End: 2023-09-21
Payer: MEDICARE

## 2023-09-21 ENCOUNTER — OFFICE VISIT (OUTPATIENT)
Dept: FAMILY MEDICINE CLINIC | Facility: CLINIC | Age: 88
End: 2023-09-21
Payer: MEDICARE

## 2023-09-21 VITALS
RESPIRATION RATE: 18 BRPM | BODY MASS INDEX: 22.68 KG/M2 | OXYGEN SATURATION: 98 % | HEART RATE: 78 BPM | DIASTOLIC BLOOD PRESSURE: 82 MMHG | WEIGHT: 128 LBS | HEIGHT: 63 IN | SYSTOLIC BLOOD PRESSURE: 174 MMHG

## 2023-09-21 DIAGNOSIS — K86.2 PANCREATIC CYST: ICD-10-CM

## 2023-09-21 DIAGNOSIS — M54.50 CHRONIC MIDLINE LOW BACK PAIN WITHOUT SCIATICA: ICD-10-CM

## 2023-09-21 DIAGNOSIS — G89.29 CHRONIC MIDLINE LOW BACK PAIN WITHOUT SCIATICA: ICD-10-CM

## 2023-09-21 DIAGNOSIS — E03.9 ACQUIRED HYPOTHYROIDISM: ICD-10-CM

## 2023-09-21 DIAGNOSIS — I10 ESSENTIAL HYPERTENSION: ICD-10-CM

## 2023-09-21 DIAGNOSIS — D51.0 PERNICIOUS ANEMIA: ICD-10-CM

## 2023-09-21 DIAGNOSIS — R10.12 LEFT UPPER QUADRANT PAIN: Primary | ICD-10-CM

## 2023-09-21 LAB
BILIRUB BLD-MCNC: NEGATIVE MG/DL
CLARITY, POC: CLEAR
COLOR UR: YELLOW
EXPIRATION DATE: NORMAL
GLUCOSE UR STRIP-MCNC: NEGATIVE MG/DL
KETONES UR QL: NEGATIVE
LEUKOCYTE EST, POC: NEGATIVE
Lab: NORMAL
NITRITE UR-MCNC: NEGATIVE MG/ML
PH UR: 6.5 [PH] (ref 5–8)
PROT UR STRIP-MCNC: NEGATIVE MG/DL
RBC # UR STRIP: NEGATIVE /UL
SP GR UR: 1.03 (ref 1–1.03)
UROBILINOGEN UR QL: NORMAL

## 2023-09-21 PROCEDURE — 74176 CT ABD & PELVIS W/O CONTRAST: CPT

## 2023-09-21 NOTE — PROGRESS NOTES
Office Note     Name: Stacy Monroy    : 1930     MRN: [id number]     Chief Complaint  Back Pain    Subjective     Stacy Monroy presents to [facility] for an acute complaint of Back pain. She sees Dr. Lemuel Hallman for pain management. Her most recent visit was 2023. She had a spinal X ray 2023 and the results are below.   She is here with Ashley Espinal (SP?) Consent given to speak with her present.       History of Present Illness  Stacy reports always having back pain, but this pain is in a new location and worse than what she has had in the last. This started approx. One week ago   Back Pain  This is a chronic problem. The current episode started more than 1 year ago. The problem occurs constantly. The problem has been gradually worsening since onset. The pain does not radiate. The pain is at a severity of 8/10. The pain is moderate. The pain is The same all the time. Associated symptoms include abdominal pain. Pertinent negatives include no chest pain, dysuria or fever. Risk factors include history of cancer. She has tried NSAIDs, heat and bed rest for the symptoms. The treatment provided mild relief.     No alcohol. No dehydration.   No change in bladder hqabits  Occasional diarrhea  No N/V.     Current Outpatient Medications:     calcium citrate-vitamin d (CITRACAL) 200-250 MG-UNIT tablet tablet, Take 1 tablet by mouth Daily., Disp: , Rfl:     coenzyme Q10 100 MG capsule, Take 1 capsule by mouth Daily., Disp: , Rfl:     levothyroxine (SYNTHROID, LEVOTHROID) 75 MCG tablet, Take 1 tablet by mouth Daily., Disp: 30 tablet, Rfl: 12    Omega-3 1000 MG capsule, Take 1 capsule by mouth Daily., Disp: , Rfl:     Current Facility-Administered Medications:     cyanocobalamin injection 1,000 mcg, 1,000 mcg, Intramuscular, Q28 Days, Mita Moise MD, 1,000 mcg at 05/15/23 1337    Allergies   Allergen Reactions    Azithromycin Rash     ALL mycin drugs    Erythromycin Rash  "    ALL mycins drugs    Penicillin G Rash    Penicillins Rash     ALL mycin drugs       Past Surgical History:   Procedure Laterality Date    BLADDER REPAIR      BREAST BIOPSY Right     CARDIAC ELECTROPHYSIOLOGY PROCEDURE N/A 05/19/2021    Procedure: Pacemaker DC new;  Surgeon: Edmar Rubin MD;  Location: [facility] CATH INVASIVE LOCATION;  Service: Cardiovascular;  Laterality: N/A;    ERCP N/A 09/14/2021    Procedure: ERCP WITH SPHINCTEROTOMY, sphincteroplasty, clearance of bile duct with balloon. brushing, placement of pancreatic stent and placement of metal biliary stent;  Surgeon: Adrienne Wilson MD;  Location: [facility] ENDOSCOPY;  Service: Gastroenterology;  Laterality: N/A;  post op: cystic mass compressing bile duct    HYSTERECTOMY      INSERT / REPLACE / REMOVE PACEMAKER      OTHER SURGICAL HISTORY  2019    Tilt table     OTHER SURGICAL HISTORY  2019    tilt table         Family History   Problem Relation Age of Onset    Hypertension Mother     Stroke Mother     Hypertension Father     Stroke Father     Breast cancer Sister 50    Ovarian cancer Daughter 57    Breast cancer Niece 53            9/22/2023     3:13 PM   PHQ-2/PHQ-9 Depression Screening   Little Interest or Pleasure in Doing Things 0-->not at all   Feeling Down, Depressed or Hopeless 0-->not at all   PHQ-9: Brief Depression Severity Measure Score 0       Review of Systems   Constitutional:  Negative for fever.   Respiratory:  Negative for shortness of breath.    Cardiovascular:  Negative for chest pain.   Gastrointestinal:  Positive for abdominal pain. Negative for constipation, diarrhea, nausea, vomiting and GERD.   Genitourinary:  Negative for dysuria, frequency and hematuria.   Musculoskeletal:  Positive for back pain.     Objective     /82 (BP Location: Right arm, Patient Position: Sitting, Cuff Size: Adult)   Pulse 78   Resp 18   Ht 160 cm (62.99\")   Wt 58.1 kg (128 lb)   LMP  (LMP Unknown)   SpO2 98%   BMI 22.68 kg/m² "     BP Readings from Last 2 Encounters:   09/22/23 128/80   09/21/23 174/82       Wt Readings from Last 2 Encounters:   09/21/23 58.1 kg (128 lb)   09/07/23 57.4 kg (126 lb 9.6 oz)       BMI is within normal parameters. No other follow-up for BMI required.         Physical Exam  Vitals reviewed.   Constitutional:       Appearance: Normal appearance. She is well-groomed and normal weight.   HENT:      Head: Normocephalic and atraumatic.   Neck:      Vascular: No carotid bruit.   Cardiovascular:      Rate and Rhythm: Normal rate and regular rhythm.      Heart sounds: Normal heart sounds. No murmur heard.    No friction rub. No gallop.   Pulmonary:      Effort: Pulmonary effort is normal. No respiratory distress.      Breath sounds: Normal breath sounds.   Abdominal:      General: Abdomen is flat.      Palpations: Abdomen is soft.      Tenderness: There is abdominal tenderness in the left upper quadrant. There is no right CVA tenderness or left CVA tenderness.      Hernia: No hernia is present.          Comments: Tenderness with palpation and with inspiration.    Musculoskeletal:      Lumbar back: Tenderness present. Normal range of motion. Negative right straight leg raise test and negative left straight leg raise test.        Back:       Comments: Tender   Skin:     General: Skin is warm and dry.      Findings: No rash.   Neurological:      Mental Status: She is alert and oriented to person, place, and time. Mental status is at baseline.      Coordination: Coordination normal.      Gait: Gait normal.   Psychiatric:         Attention and Perception: Attention normal.         Mood and Affect: Mood normal.         Behavior: Behavior normal. Behavior is cooperative.     Physical Exam   Back       Abdomen   Abdomen comments: Tenderness with palpation and with inspiration.     Result Review :     Assessment and Plan         Diagnoses and all orders for this visit:    1. Left upper quadrant pain (Primary)  Comments:  pain  with inspiration.   Sharp  No rash, no recent shingles.  Orders:  -     POCT urinalysis dipstick, automated  -     CT Abdomen Pelvis Without Contrast    2. Chronic midline low back pain without sciatica  -     POCT urinalysis dipstick, automated    3. Essential hypertension  Comments:  174/82 in office. Not treated due to low b/p.   Does not check home b/p.  Orders:  -     CBC w AUTO Differential  -     Comprehensive metabolic panel    4. Pancreatic cyst  Comments:  noted on CT 6/2023.  Orders:  -     Amylase  -     Lipase  -     CBC w AUTO Differential  -     CT Abdomen Pelvis Without Contrast    5. Acquired hypothyroidism  -     TSH Rfx On Abnormal To Free T4    6. Pernicious anemia  -     Vitamin B12    Other orders  -     T4F       Procedures    Problem List Items Addressed This Visit          Active Problems    Essential hypertension    Relevant Orders    CBC w AUTO Differential (Completed)    Comprehensive metabolic panel (Completed)    Pernicious anemia    Overview     Recheck cbc         Relevant Orders    Vitamin B12 (Completed)     Other Visit Diagnoses       Left upper quadrant pain    -  Primary    pain with inspiration.   Sharp  No rash, no recent shingles.    Relevant Orders    POCT urinalysis dipstick, automated (Completed)    CT Abdomen Pelvis Without Contrast (Completed)    Chronic midline low back pain without sciatica        Relevant Orders    POCT urinalysis dipstick, automated (Completed)    Pancreatic cyst        noted on CT 6/2023.    Relevant Orders    Amylase (Completed)    Lipase (Completed)    CBC w AUTO Differential (Completed)    CT Abdomen Pelvis Without Contrast (Completed)    Acquired hypothyroidism        Relevant Orders    TSH Rfx On Abnormal To Free T4 (Completed)                 Wrapup Tab  No follow-ups on file.     Patient Instructions   Follow up with Dr. Moise and Gastroenterology for further work up and management.      Patient was given instructions and counseling regarding  her condition or for health maintenance advice. Please see specific information pulled into the AVS if appropriate.  Hand hygiene was performed during entrance to exam room and following assessment of patient. This document is intended for medical expert use only.     EMR Dragon/Transcription disclaimer:   Much of this encounter note is an electronic transcription/translation of spoken language to printed text. The electronic translation of spoken language may permit erroneous, or at times, nonsensical words or phrases to be inadvertently transcribed.      WILDER Tristan, FNP-C  [address]  [facility]  [address] [place] [address] [address]  [place] [address] [phone]

## 2023-09-22 ENCOUNTER — HOSPITAL ENCOUNTER (OUTPATIENT)
Dept: GENERAL RADIOLOGY | Facility: HOSPITAL | Age: 88
Discharge: HOME | End: 2023-09-22
Admitting: STUDENT IN AN ORGANIZED HEALTH CARE EDUCATION/TRAINING PROGRAM
Payer: MEDICARE

## 2023-09-22 ENCOUNTER — OFFICE VISIT (OUTPATIENT)
Dept: PAIN MEDICINE | Facility: CLINIC | Age: 88
End: 2023-09-22
Payer: MEDICARE

## 2023-09-22 VITALS
RESPIRATION RATE: 16 BRPM | HEART RATE: 75 BPM | DIASTOLIC BLOOD PRESSURE: 80 MMHG | SYSTOLIC BLOOD PRESSURE: 128 MMHG | OXYGEN SATURATION: 98 %

## 2023-09-22 DIAGNOSIS — M54.14 THORACIC RADICULOPATHY: ICD-10-CM

## 2023-09-22 DIAGNOSIS — M54.14 THORACIC RADICULOPATHY: Primary | ICD-10-CM

## 2023-09-22 LAB
ALBUMIN SERPL-MCNC: 4.3 G/DL (ref 3.6–4.6)
ALBUMIN/GLOB SERPL: 1.6 {RATIO} (ref 1.2–2.2)
ALP SERPL-CCNC: 128 IU/L (ref 44–121)
ALT SERPL-CCNC: 28 IU/L (ref 0–32)
AMYLASE SERPL-CCNC: 62 U/L (ref 31–110)
AST SERPL-CCNC: 35 IU/L (ref 0–40)
BASOPHILS # BLD AUTO: 0.1 X10E3/UL (ref 0–0.2)
BASOPHILS NFR BLD AUTO: 1 %
BILIRUB SERPL-MCNC: 0.6 MG/DL (ref 0–1.2)
BUN SERPL-MCNC: 15 MG/DL (ref 10–36)
BUN/CREAT SERPL: 18 (ref 12–28)
CALCIUM SERPL-MCNC: 10.5 MG/DL (ref 8.7–10.3)
CHLORIDE SERPL-SCNC: 99 MMOL/L (ref 96–106)
CO2 SERPL-SCNC: 25 MMOL/L (ref 20–29)
CREAT SERPL-MCNC: 0.84 MG/DL (ref 0.57–1)
EGFRCR SERPLBLD CKD-EPI 2021: 65 ML/MIN/1.73
EOSINOPHIL # BLD AUTO: 0.1 X10E3/UL (ref 0–0.4)
EOSINOPHIL NFR BLD AUTO: 1 %
ERYTHROCYTE [DISTWIDTH] IN BLOOD BY AUTOMATED COUNT: 15.8 % (ref 11.7–15.4)
GLOBULIN SER CALC-MCNC: 2.7 G/DL (ref 1.5–4.5)
GLUCOSE SERPL-MCNC: 88 MG/DL (ref 70–99)
HCT VFR BLD AUTO: 43.1 % (ref 34–46.6)
HGB BLD-MCNC: 13.3 G/DL (ref 11.1–15.9)
IMM GRANULOCYTES # BLD AUTO: 0 X10E3/UL (ref 0–0.1)
IMM GRANULOCYTES NFR BLD AUTO: 0 %
LIPASE SERPL-CCNC: 26 U/L (ref 14–85)
LYMPHOCYTES # BLD AUTO: 2.1 X10E3/UL (ref 0.7–3.1)
LYMPHOCYTES NFR BLD AUTO: 33 %
MCH RBC QN AUTO: 25.2 PG (ref 26.6–33)
MCHC RBC AUTO-ENTMCNC: 30.9 G/DL (ref 31.5–35.7)
MCV RBC AUTO: 82 FL (ref 79–97)
MONOCYTES # BLD AUTO: 0.4 X10E3/UL (ref 0.1–0.9)
MONOCYTES NFR BLD AUTO: 7 %
NEUTROPHILS # BLD AUTO: 3.7 X10E3/UL (ref 1.4–7)
NEUTROPHILS NFR BLD AUTO: 58 %
PLATELET # BLD AUTO: 286 X10E3/UL (ref 150–450)
POTASSIUM SERPL-SCNC: 4.8 MMOL/L (ref 3.5–5.2)
PROT SERPL-MCNC: 7 G/DL (ref 6–8.5)
RBC # BLD AUTO: 5.28 X10E6/UL (ref 3.77–5.28)
SODIUM SERPL-SCNC: 138 MMOL/L (ref 134–144)
T4 FREE SERPL-MCNC: 1.4 NG/DL (ref 0.82–1.77)
TSH SERPL DL<=0.005 MIU/L-ACNC: 0.44 UIU/ML (ref 0.45–4.5)
VIT B12 SERPL-MCNC: 684 PG/ML (ref 232–1245)
WBC # BLD AUTO: 6.3 X10E3/UL (ref 3.4–10.8)

## 2023-09-22 PROCEDURE — 1160F RVW MEDS BY RX/DR IN RCRD: CPT | Performed by: STUDENT IN AN ORGANIZED HEALTH CARE EDUCATION/TRAINING PROGRAM

## 2023-09-22 PROCEDURE — G0463 HOSPITAL OUTPT CLINIC VISIT: HCPCS | Performed by: STUDENT IN AN ORGANIZED HEALTH CARE EDUCATION/TRAINING PROGRAM

## 2023-09-22 PROCEDURE — 1159F MED LIST DOCD IN RCRD: CPT | Performed by: STUDENT IN AN ORGANIZED HEALTH CARE EDUCATION/TRAINING PROGRAM

## 2023-09-22 PROCEDURE — 99213 OFFICE O/P EST LOW 20 MIN: CPT | Performed by: STUDENT IN AN ORGANIZED HEALTH CARE EDUCATION/TRAINING PROGRAM

## 2023-09-22 PROCEDURE — 72070 X-RAY EXAM THORAC SPINE 2VWS: CPT

## 2023-09-22 PROCEDURE — 1125F AMNT PAIN NOTED PAIN PRSNT: CPT | Performed by: STUDENT IN AN ORGANIZED HEALTH CARE EDUCATION/TRAINING PROGRAM

## 2023-09-22 NOTE — PROGRESS NOTES
CHIEF COMPLAINT  Chief Complaint   Patient presents with    Back Pain     4-6 wk f/u from Medial Branch Block  CC  Back Pain  No Opoid use        Primary Care  Mita Moise MD    Subjective   Stacy Monroy is a 89 y.o. female  who presents for low back pain with right lower extremity radiculopathy.  She reports the pain began approximately 3 to 4 months ago.  She describes the pain primarily in the posterior aspect of the right buttock and hip radiating into the right leg and right posterior thigh.  She reports that the pain is worse with any type of movement and physical activity and slightly improved with rest.  She describes the pain as a burning, shooting sensation.  She denies any red flag symptoms such as loss of bowel or bladder.  She does have plain films which show very significant degenerative change with associated anterolisthesis of up to 9 mm.    History of Present Illness     Location: Low back with radiation to the right buttock and right leg  Onset: Several months ago  Duration: Fairly constant  Timing: Constant throughout the day  Quality: Sharp, stabbing, shooting  Severity: Today: 5       Last Week: 8       Worst: 10  Modifying Factors: Pain is worse with movement and physical activity and slightly improved with rest  Functional Deficit: The pain makes it difficult for him to perform his activities of daily living    Physical Therapy: no    Interval Update 09/22/2023: It appears that she did get excellent benefit from her medial branch blocks as well as the epidural.  She is complaining of some pain in the upper lumbar spine more consistent with intercostal neuritis but she is really not complaining of any back pain and she reports to me today that her pain relief is been very significant    The following portions of the patient's history were reviewed and updated as appropriate: allergies, current medications, past family history, past medical history, past social history, past surgical  history, and problem list.    Procedures:        Current Outpatient Medications:     calcium citrate-vitamin d (CITRACAL) 200-250 MG-UNIT tablet tablet, Take 1 tablet by mouth Daily., Disp: , Rfl:     coenzyme Q10 100 MG capsule, Take 1 capsule by mouth Daily., Disp: , Rfl:     levothyroxine (SYNTHROID, LEVOTHROID) 75 MCG tablet, Take 1 tablet by mouth Daily., Disp: 30 tablet, Rfl: 12    Omega-3 1000 MG capsule, Take 1 capsule by mouth Daily., Disp: , Rfl:     Current Facility-Administered Medications:     cyanocobalamin injection 1,000 mcg, 1,000 mcg, Intramuscular, Q28 Days, Mita Moise MD, 1,000 mcg at 05/15/23 1337    Review of Systems   Musculoskeletal:  Positive for arthralgias, back pain, gait problem and myalgias. Negative for joint swelling, neck pain and neck stiffness.   Neurological:  Positive for numbness. Negative for weakness.     Vitals:    09/22/23 1512   BP: 128/80   BP Location: Left arm   Patient Position: Sitting   Cuff Size: Adult   Pulse: 75   Resp: 16   SpO2: 98%   PainSc:   5   PainLoc: Back       Objective   Physical Exam  Vitals and nursing note reviewed. Exam conducted with a chaperone present.   Constitutional:       General: She is not in acute distress.     Appearance: Normal appearance. She is well-developed and normal weight.   Neck:      Trachea: No tracheal deviation.   Musculoskeletal:      Comments: Lumbar Spine Exam:  Tender to palpation over the lumbar paraspinal musculature No  Limited range of motion secondary to pain No  Facet loading positive: Negative  Facets tender to palpation: Negative  Straight leg raise test positive: right    SI Joint Exam:  Katina positive: Negative  PSIS tender: right   SI joint palpation: right  SI joint compression: Negative     Neurological:      Mental Status: She is alert.      Sensory: Sensory deficit present.      Motor: Weakness present.         Assessment & Plan   Problems Addressed this Visit    None  Visit Diagnoses       Thoracic  radiculopathy    -  Primary    Relevant Orders    XR Spine Thoracic 2 View          Diagnoses         Codes Comments    Thoracic radiculopathy    -  Primary ICD-10-CM: M54.14  ICD-9-CM: 724.4             Plan:  Excellent benefit from LESI in terms of her radicular pain  She appears to have gotten good benefit from her medial branch block  I will send for thoracic plain film.  If she does have any evidence of rib fractures or severe arthritis, may consider intercostal nerve block especially on the 10th and 11th rib where she is describing pain across into the abdomen  --- Follow-up 2 weeks           INSPECT REPORT    As part of the patient's treatment plan, I may be prescribing controlled substances. The patient has been made aware of appropriate use of such medications, including potential risk of somnolence, limited ability to drive and/or work safely, and the potential for dependence or overdose. It has also bee made clear that these medications are for use by this patient only, without concomitant use of alcohol or other substances unless prescribed.     Patient has completed prescribing agreement detailing terms of continued prescribing of controlled substances, including monitoring VALERIA reports, urine drug screening, and pill counts if necessary. The patient is aware that inappropriate use will results in cessation of prescribing such medications.    INSPECT report has been reviewed and scanned into the patient's chart.    As the clinician, I personally reviewed the INSPECT from 9/20/2023.    History and physical exam exhibit continued safe and appropriate use of controlled substances.      EMR Dragon/Transcription disclaimer:   Much of this encounter note is an electronic transcription/translation of spoken language to printed text. The electronic translation of spoken language may permit erroneous, or at times, nonsensical words or phrases to be inadvertently transcribed; Although I have reviewed the note  for such errors, some may still exist.

## 2023-09-25 NOTE — PROGRESS NOTES
Subjective   Stacy Monroy is a 89 y.o. female. Presents to [facility]    Chief Complaint   Patient presents with    Back Pain       Back Pain  This is a chronic problem. The current episode started more than 1 year ago. The problem occurs constantly. The problem is unchanged. The quality of the pain is described as aching. The pain does not radiate. The pain is at a severity of 3/10. The pain is mild. The pain is The same all the time. Associated symptoms include abdominal pain (unchanged LUQ pain). Pertinent negatives include no chest pain, dysuria, fever, pelvic pain, tingling or weakness. Risk factors include history of cancer. She has tried NSAIDs, heat and bed rest for the symptoms. The treatment provided mild relief.      I personally reviewed and updated the patient's allergies, medications, problem list, and past medical, surgical, social, and family history. I have reviewed and confirmed the accuracy of the History of Present Illness and Review of Symptoms as documented by the MA/LPN/RN. Mita Moise MD    Allergies:  Allergies   Allergen Reactions    Azithromycin Rash     ALL mycin drugs    Erythromycin Rash     ALL mycins drugs    Penicillin G Rash    Penicillins Rash     ALL mycin drugs       Social History:  Social History     Socioeconomic History    Marital status:    Tobacco Use    Smoking status: Never    Smokeless tobacco: Never   Vaping Use    Vaping Use: Never used   Substance and Sexual Activity    Alcohol use: No    Drug use: No    Sexual activity: Not Currently       Family History:  Family History   Problem Relation Age of Onset    Hypertension Mother     Stroke Mother     Hypertension Father     Stroke Father     Breast cancer Sister 50    Ovarian cancer Daughter 57    Breast cancer Niece 53       Past Medical History :  Patient Active Problem List   Diagnosis    Status post placement of implantable loop recorder    Essential hypertension    Hypothyroidism  (acquired)    Acute seasonal allergic rhinitis due to pollen    Atherosclerosis    B12 deficiency    Bilateral carpal tunnel syndrome    Cataracts, bilateral    DNR (do not resuscitate)    Heart murmur    History of chicken pox    Menopausal syndrome    Mixed hyperlipidemia    Pernicious anemia    Influenza vaccine refused    Liver function abnormality    Other fatigue    RUQ abdominal mass    Cardiac arrhythmia    Deleon-Wasserman syncope    Sinus node dysfunction    SSS (sick sinus syndrome)    Acute gastric ulcer without hemorrhage or perforation    Presence of cardiac pacemaker    Intraductal papillary mucinous neoplasm of pancreas    Personal history of other infectious and parasitic diseases    Presence of intraocular lens    Puckering of macula, right eye    Nonexudative age-related macular degeneration    Posterior vitreous detachment    Mammogram declined    Generalized abdominal pain    Acute bilateral low back pain without sciatica    Microhematuria    Iron deficiency anemia secondary to inadequate dietary iron intake    Chronic bilateral low back pain with right-sided sciatica    CRF (chronic renal failure), stage 2 (mild)    Left lower quadrant abdominal pain    Diarrhea    Cyst of pancreas    Right arm pain    Tear of biceps muscle, initial encounter    Rib pain on left side       Medication List:    Current Outpatient Medications:     calcium citrate-vitamin d (CITRACAL) 200-250 MG-UNIT tablet tablet, Take 1 tablet by mouth Daily., Disp: , Rfl:     coenzyme Q10 100 MG capsule, Take 1 capsule by mouth Daily., Disp: , Rfl:     levothyroxine (SYNTHROID, LEVOTHROID) 50 MCG tablet, Take 1 tablet by mouth Daily., Disp: 30 tablet, Rfl: 12    Omega-3 1000 MG capsule, Take 1 capsule by mouth Daily., Disp: , Rfl:     Current Facility-Administered Medications:     cyanocobalamin injection 1,000 mcg, 1,000 mcg, Intramuscular, Q28 Days, Mita Moise MD, 1,000 mcg at 05/15/23 1337    Past Surgical History:  Past  "Surgical History:   Procedure Laterality Date    BLADDER REPAIR      BREAST BIOPSY Right     CARDIAC ELECTROPHYSIOLOGY PROCEDURE N/A 05/19/2021    Procedure: Pacemaker DC new;  Surgeon: Edmar Rubin MD;  Location: [facility] CATH INVASIVE LOCATION;  Service: Cardiovascular;  Laterality: N/A;    ERCP N/A 09/14/2021    Procedure: ERCP WITH SPHINCTEROTOMY, sphincteroplasty, clearance of bile duct with balloon. brushing, placement of pancreatic stent and placement of metal biliary stent;  Surgeon: Adrienne Wilson MD;  Location: [facility] ENDOSCOPY;  Service: Gastroenterology;  Laterality: N/A;  post op: cystic mass compressing bile duct    HYSTERECTOMY      INSERT / REPLACE / REMOVE PACEMAKER      OTHER SURGICAL HISTORY  2019    Tilt table     OTHER SURGICAL HISTORY  2019    tilt table          Physical Exam:      Vital Signs:    Vitals:    09/28/23 1133   BP: 130/80   Pulse: 94   Resp: 19   Temp: 97.1 °F (36.2 °C)   SpO2: 97%        /80 (BP Location: Right arm, Patient Position: Sitting, Cuff Size: Adult)   Pulse 94   Temp 97.1 °F (36.2 °C) (Temporal)   Resp 19   Ht 160 cm (62.99\")   Wt 58.2 kg (128 lb 6.4 oz)   LMP  (LMP Unknown)   SpO2 97% Comment: room air  BMI 22.75 kg/m²     Wt Readings from Last 3 Encounters:   09/28/23 58.2 kg (128 lb 6.4 oz)   09/21/23 58.1 kg (128 lb)   09/07/23 57.4 kg (126 lb 9.6 oz)       Result Review :   The following data was reviewed by: Miat Moise MD on 09/28/2023:            Latest Reference Range & Units 09/21/23 14:21   Sodium 134 - 144 mmol/L 138   Potassium 3.5 - 5.2 mmol/L 4.8   Chloride 96 - 106 mmol/L 99   CO2 20 - 29 mmol/L 25   BUN 10 - 36 mg/dL 15   Creatinine 0.57 - 1.00 mg/dL 0.84   BUN/Creatinine Ratio 12 - 28  18   EGFR Result >59 mL/min/1.73 65   Glucose 70 - 99 mg/dL 88   Calcium 8.7 - 10.3 mg/dL 10.5 (H)   Alkaline Phosphatase 44 - 121 IU/L 128 (H)   Total Protein 6.0 - 8.5 g/dL 7.0   Albumin 3.6 - 4.6 g/dL 4.3   A/G Ratio 1.2 - 2.2  1.6 "   AST (SGOT) 0 - 40 IU/L 35   ALT (SGPT) 0 - 32 IU/L 28   Total Bilirubin 0.0 - 1.2 mg/dL 0.6   TSH Baseline 0.450 - 4.500 uIU/mL 0.443 (L)   Free T4 0.82 - 1.77 ng/dL 1.40   Vitamin B-12 232 - 1,245 pg/mL 684   Amylase 31 - 110 U/L 62   Lipase 14 - 85 U/L 26   Globulin 1.5 - 4.5 g/dL 2.7   WBC 3.4 - 10.8 x10E3/uL 6.3   RBC 3.77 - 5.28 x10E6/uL 5.28   Hemoglobin 11.1 - 15.9 g/dL 13.3   Hematocrit 34.0 - 46.6 % 43.1   Platelets 150 - 450 x10E3/uL 286   RDW 11.7 - 15.4 % 15.8 (H)   MCV 79 - 97 fL 82   MCH 26.6 - 33.0 pg 25.2 (L)   MCHC 31.5 - 35.7 g/dL 30.9 (L)   Neutrophil Rel % Not Estab. % 58   Lymphocyte Rel % Not Estab. % 33   Monocyte Rel % Not Estab. % 7   Eosinophil Rel % Not Estab. % 1   Basophil Rel % Not Estab. % 1   Immature Granulocyte Rel % Not Estab. % 0   Neutrophils Absolute 1.4 - 7.0 x10E3/uL 3.7   Lymphocytes Absolute 0.7 - 3.1 x10E3/uL 2.1   Monocytes Absolute 0.1 - 0.9 x10E3/uL 0.4   Eosinophils Absolute 0.0 - 0.4 x10E3/uL 0.1   Basophils Absolute 0.0 - 0.2 x10E3/uL 0.1   Immature Grans, Absolute 0.0 - 0.1 x10E3/uL 0.0   (H): Data is abnormally high  (L): Data is abnormally low    CT Abdomen Pelvis Without Contrast    Result Date: 9/21/2023  Impression: 1. No acute abnormality in the abdomen or pelvis. 2. Cholelithiasis. 3. Stable stent in the common bile duct. 4. Stable septated cystic lesion at the pancreatic head measuring 4.5 cm, unchanged from MRI in May 2021. 5. Several mildly enlarged central mesenteric lymph nodes which are nonspecific but unchanged from prior study. 6. Additional chronic findings above. Electronically Signed: Lee Dickens MD  9/21/2023 2:51 PM EDT  Workstation ID: [id number]    XR Spine Thoracic 2 View    Result Date: 9/25/2023  Impression: Thoracic kyphosis and spondylosis Demineralized bones Electronically Signed: Moe Alexandre MD  9/25/2023 9:52 AM EDT  Workstation ID: [id number]       Physical Exam  Vitals reviewed.   Constitutional:       Appearance: Normal appearance.  She is well-developed.   HENT:      Head: Normocephalic and atraumatic.   Eyes:      General:         Right eye: No discharge.         Left eye: No discharge.   Cardiovascular:      Rate and Rhythm: Normal rate and regular rhythm.      Heart sounds: Normal heart sounds. No murmur heard.    No friction rub. No gallop.   Pulmonary:      Effort: Pulmonary effort is normal. No respiratory distress.      Breath sounds: Normal breath sounds. No wheezing or rales.   Chest:       Skin:     General: Skin is warm and dry.      Findings: No rash.   Neurological:      Mental Status: She is alert and oriented to person, place, and time.      Coordination: Coordination normal.      Gait: Gait normal.   Psychiatric:         Behavior: Behavior is cooperative.       Assessment and Plan:  Problems Addressed this Visit          Cardiac and Vasculature    Essential hypertension     Hypertension is unchanged.  Continue current treatment regimen.  Blood pressure will be reassessed in 3 months.            Musculoskeletal and Injuries    Chronic bilateral low back pain with right-sided sciatica - Primary     She is seeing pain management              Pulmonary and Pneumonias    Rib pain on left side     She declines xray today  Will monitor          Other Visit Diagnoses       Acquired hypothyroidism        Relevant Medications    levothyroxine (SYNTHROID, LEVOTHROID) 50 MCG tablet          Diagnoses         Codes Comments    Chronic bilateral low back pain with right-sided sciatica    -  Primary ICD-10-CM: M54.41, G89.29  ICD-9-CM: 724.2, 724.3, 338.29     Acquired hypothyroidism     ICD-10-CM: E03.9  ICD-9-CM: 244.9     Rib pain on left side     ICD-10-CM: R07.81  ICD-9-CM: 786.50     Essential hypertension     ICD-10-CM: I10  ICD-9-CM: 401.9              BMI is within normal parameters. No other follow-up for BMI required.      An After Visit Summary and PPPS were given to the patient.

## 2023-09-28 ENCOUNTER — OFFICE VISIT (OUTPATIENT)
Dept: FAMILY MEDICINE CLINIC | Facility: CLINIC | Age: 88
End: 2023-09-28
Payer: MEDICARE

## 2023-09-28 VITALS
OXYGEN SATURATION: 97 % | WEIGHT: 128.4 LBS | BODY MASS INDEX: 22.75 KG/M2 | SYSTOLIC BLOOD PRESSURE: 130 MMHG | HEIGHT: 63 IN | DIASTOLIC BLOOD PRESSURE: 80 MMHG | RESPIRATION RATE: 19 BRPM | HEART RATE: 94 BPM | TEMPERATURE: 97.1 F

## 2023-09-28 DIAGNOSIS — G89.29 CHRONIC BILATERAL LOW BACK PAIN WITH RIGHT-SIDED SCIATICA: Primary | ICD-10-CM

## 2023-09-28 DIAGNOSIS — M54.41 CHRONIC BILATERAL LOW BACK PAIN WITH RIGHT-SIDED SCIATICA: Primary | ICD-10-CM

## 2023-09-28 DIAGNOSIS — E03.9 ACQUIRED HYPOTHYROIDISM: ICD-10-CM

## 2023-09-28 DIAGNOSIS — R07.81 RIB PAIN ON LEFT SIDE: ICD-10-CM

## 2023-09-28 DIAGNOSIS — I10 ESSENTIAL HYPERTENSION: ICD-10-CM

## 2023-09-28 PROCEDURE — 99214 OFFICE O/P EST MOD 30 MIN: CPT | Performed by: FAMILY MEDICINE

## 2023-09-28 RX ORDER — LEVOTHYROXINE SODIUM 0.05 MG/1
50 TABLET ORAL DAILY
Qty: 30 TABLET | Refills: 12 | Status: SHIPPED | OUTPATIENT
Start: 2023-09-28

## 2023-10-05 ENCOUNTER — OFFICE VISIT (OUTPATIENT)
Dept: PAIN MEDICINE | Facility: CLINIC | Age: 88
End: 2023-10-05
Payer: MEDICARE

## 2023-10-05 VITALS
HEART RATE: 70 BPM | RESPIRATION RATE: 16 BRPM | DIASTOLIC BLOOD PRESSURE: 75 MMHG | OXYGEN SATURATION: 98 % | SYSTOLIC BLOOD PRESSURE: 160 MMHG

## 2023-10-05 DIAGNOSIS — M54.16 LUMBAR RADICULOPATHY: ICD-10-CM

## 2023-10-05 DIAGNOSIS — M54.14 THORACIC RADICULOPATHY: Primary | ICD-10-CM

## 2023-10-05 DIAGNOSIS — M47.817 LUMBOSACRAL SPONDYLOSIS WITHOUT MYELOPATHY: ICD-10-CM

## 2023-10-05 PROCEDURE — 1125F AMNT PAIN NOTED PAIN PRSNT: CPT | Performed by: STUDENT IN AN ORGANIZED HEALTH CARE EDUCATION/TRAINING PROGRAM

## 2023-10-05 PROCEDURE — 99213 OFFICE O/P EST LOW 20 MIN: CPT | Performed by: STUDENT IN AN ORGANIZED HEALTH CARE EDUCATION/TRAINING PROGRAM

## 2023-10-05 PROCEDURE — G0463 HOSPITAL OUTPT CLINIC VISIT: HCPCS | Performed by: STUDENT IN AN ORGANIZED HEALTH CARE EDUCATION/TRAINING PROGRAM

## 2023-10-05 NOTE — PROGRESS NOTES
CHIEF COMPLAINT  Chief Complaint   Patient presents with    Back Pain     No Narcotics       Primary Care  GlassMita MD    Subjective   Stacy Monroy is a 89 y.o. female  who presents for low back pain with right lower extremity radiculopathy.  She reports the pain began approximately 3 to 4 months ago.  She describes the pain primarily in the posterior aspect of the right buttock and hip radiating into the right leg and right posterior thigh.  She reports that the pain is worse with any type of movement and physical activity and slightly improved with rest.  She describes the pain as a burning, shooting sensation.  She denies any red flag symptoms such as loss of bowel or bladder.  She does have plain films which show very significant degenerative change with associated anterolisthesis of up to 9 mm.    History of Present Illness     Location: Low back with radiation to the right buttock and right leg  Onset: Several months ago  Duration: Fairly constant  Timing: Constant throughout the day  Quality: Sharp, stabbing, shooting  Severity: Today: 1       Last Week: 8       Worst: 10  Modifying Factors: Pain is worse with movement and physical activity and slightly improved with rest  Functional Deficit: The pain makes it difficult for him to perform his activities of daily living    Physical Therapy: no    Interval Update 10/05/2023: She reports essentially no pain today.  Overall, she is much improved and very happy    The following portions of the patient's history were reviewed and updated as appropriate: allergies, current medications, past family history, past medical history, past social history, past surgical history, and problem list.    Procedures:        Current Outpatient Medications:     calcium citrate-vitamin d (CITRACAL) 200-250 MG-UNIT tablet tablet, Take 1 tablet by mouth Daily., Disp: , Rfl:     coenzyme Q10 100 MG capsule, Take 1 capsule by mouth Daily., Disp: , Rfl:     levothyroxine  (SYNTHROID, LEVOTHROID) 50 MCG tablet, Take 1 tablet by mouth Daily., Disp: 30 tablet, Rfl: 12    Omega-3 1000 MG capsule, Take 1 capsule by mouth Daily., Disp: , Rfl:     Current Facility-Administered Medications:     cyanocobalamin injection 1,000 mcg, 1,000 mcg, Intramuscular, Q28 Days, Mita Moise MD, 1,000 mcg at 05/15/23 1337    Review of Systems   Musculoskeletal:  Positive for arthralgias, back pain, gait problem and myalgias. Negative for joint swelling, neck pain and neck stiffness.   Neurological:  Positive for numbness. Negative for weakness.     Vitals:    10/05/23 1440   BP: 160/75   Pulse: 70   Resp: 16   SpO2: 98%   PainSc:   1       Objective   Physical Exam  Vitals and nursing note reviewed. Exam conducted with a chaperone present.   Constitutional:       General: She is not in acute distress.     Appearance: Normal appearance. She is well-developed and normal weight.   Neck:      Trachea: No tracheal deviation.   Musculoskeletal:      Comments: Lumbar Spine Exam:  Tender to palpation over the lumbar paraspinal musculature No  Limited range of motion secondary to pain No  Facet loading positive: Negative  Facets tender to palpation: Negative  Straight leg raise test positive: right    SI Joint Exam:  Katina positive: Negative  PSIS tender: right   SI joint palpation: right  SI joint compression: Negative     Neurological:      Mental Status: She is alert.      Sensory: Sensory deficit present.      Motor: Weakness present.         Assessment & Plan   Problems Addressed this Visit    None  Visit Diagnoses       Thoracic radiculopathy    -  Primary    Lumbosacral spondylosis without myelopathy        Lumbar radiculopathy              Diagnoses         Codes Comments    Thoracic radiculopathy    -  Primary ICD-10-CM: M54.14  ICD-9-CM: 724.4     Lumbosacral spondylosis without myelopathy     ICD-10-CM: M47.817  ICD-9-CM: 721.3     Lumbar radiculopathy     ICD-10-CM: M54.16  ICD-9-CM: 724.4              Plan:  Currently not experiencing any pain  No obvious pathology seen on thoracic films  She can follow-up as needed  --- Follow-up as needed           INSPECT REPORT    As part of the patient's treatment plan, I may be prescribing controlled substances. The patient has been made aware of appropriate use of such medications, including potential risk of somnolence, limited ability to drive and/or work safely, and the potential for dependence or overdose. It has also bee made clear that these medications are for use by this patient only, without concomitant use of alcohol or other substances unless prescribed.     Patient has completed prescribing agreement detailing terms of continued prescribing of controlled substances, including monitoring VALERIA reports, urine drug screening, and pill counts if necessary. The patient is aware that inappropriate use will results in cessation of prescribing such medications.    INSPECT report has been reviewed and scanned into the patient's chart.    As the clinician, I personally reviewed the INSPECT from 10/4/2023.    History and physical exam exhibit continued safe and appropriate use of controlled substances.      EMR Dragon/Transcription disclaimer:   Much of this encounter note is an electronic transcription/translation of spoken language to printed text. The electronic translation of spoken language may permit erroneous, or at times, nonsensical words or phrases to be inadvertently transcribed; Although I have reviewed the note for such errors, some may still exist.

## 2023-10-11 ENCOUNTER — CLINICAL SUPPORT (OUTPATIENT)
Dept: FAMILY MEDICINE CLINIC | Facility: CLINIC | Age: 88
End: 2023-10-11
Payer: MEDICARE

## 2023-10-11 DIAGNOSIS — E53.8 B12 DEFICIENCY: Primary | ICD-10-CM

## 2023-10-11 DIAGNOSIS — D51.0 PERNICIOUS ANEMIA: ICD-10-CM

## 2023-10-11 PROCEDURE — 96372 THER/PROPH/DIAG INJ SC/IM: CPT | Performed by: FAMILY MEDICINE

## 2023-10-11 RX ORDER — CYANOCOBALAMIN 1000 UG/ML
1000 INJECTION, SOLUTION INTRAMUSCULAR; SUBCUTANEOUS
Status: SHIPPED | OUTPATIENT
Start: 2023-10-11

## 2023-10-11 RX ADMIN — CYANOCOBALAMIN 1000 MCG: 1000 INJECTION, SOLUTION INTRAMUSCULAR; SUBCUTANEOUS at 13:38

## 2023-10-24 DIAGNOSIS — E03.9 ACQUIRED HYPOTHYROIDISM: ICD-10-CM

## 2023-10-24 NOTE — TELEPHONE ENCOUNTER
Caller: Stacy Monroy    Relationship: Self    Best call back number: [phone]    Requested Prescriptions:   Requested Prescriptions     Pending Prescriptions Disp Refills    levothyroxine (SYNTHROID, LEVOTHROID) 50 MCG tablet 30 tablet 12     Sig: Take 1 tablet by mouth Daily.        Pharmacy where request should be sent: [facility][id number] - [place], [place] - [address] - [phone]  - [phone] FX     Last office visit with prescribing clinician: 9/28/2023   Last telemedicine visit with prescribing clinician: Visit date not found   Next office visit with prescribing clinician: 11/10/2023     Additional details provided by patient: PATIENT HAS 6 LEFT.  SHE IS CONCERNED THAT SHE WILL GO DAYS WITHOUT THIS MEDICATION, NEXT OFFICE VISIT IS 11/10/23    Does the patient have less than a 3 day supply:  [] Yes  [x] No    Would you like a call back once the refill request has been completed: [x] Yes [] No    If the office needs to give you a call back, can they leave a voicemail: [x] Yes [] No    MARILYN Tejada   10/24/23 09:26 EDT

## 2023-10-25 RX ORDER — LEVOTHYROXINE SODIUM 0.05 MG/1
50 TABLET ORAL DAILY
Qty: 30 TABLET | Refills: 0 | Status: SHIPPED | OUTPATIENT
Start: 2023-10-25 | End: 2023-10-30 | Stop reason: SDUPTHER

## 2023-10-25 NOTE — TELEPHONE ENCOUNTER
LVM to call back, to let patient know that labs have been ordered she needs to have them done this week they are ready for [facility] downstairs

## 2023-10-27 LAB
T3 SERPL-MCNC: 81 NG/DL (ref 71–180)
T4 FREE SERPL-MCNC: 1.07 NG/DL (ref 0.82–1.77)
TSH SERPL DL<=0.005 MIU/L-ACNC: 3.46 UIU/ML (ref 0.45–4.5)

## 2023-10-30 ENCOUNTER — TELEPHONE (OUTPATIENT)
Dept: CARDIOLOGY | Facility: CLINIC | Age: 88
End: 2023-10-30
Payer: MEDICARE

## 2023-10-30 DIAGNOSIS — E03.9 ACQUIRED HYPOTHYROIDISM: ICD-10-CM

## 2023-10-30 RX ORDER — LEVOTHYROXINE SODIUM 0.05 MG/1
50 TABLET ORAL DAILY
Qty: 30 TABLET | Refills: 12 | Status: SHIPPED | OUTPATIENT
Start: 2023-10-30

## 2023-10-30 NOTE — TELEPHONE ENCOUNTER
Remote device transmission received.    Patient had an episode of nonsustained tachycardia on October 27 for 14 beats in duration.  We will review with primary cardiologist

## 2023-11-01 RX ORDER — METOPROLOL SUCCINATE 25 MG/1
25 TABLET, EXTENDED RELEASE ORAL DAILY
Qty: 90 TABLET | Refills: 1 | Status: SHIPPED | OUTPATIENT
Start: 2023-11-01

## 2023-11-02 NOTE — TELEPHONE ENCOUNTER
ON THE 27TH WHEN THE PATIENT HAD HER HIGH HEART RATE EPISODE SHE WAS OUTSIDE RAKING LEAVE AGAINST THE ADVISE OF HER NEPHEW, NIMESH.  HE CALLED IN TO TALK ABOUT STARTING THE PATIENT ON THE NEW MEDICATION.  THEY HAVE BEEN MONITORING HER HEART RATE AND YESTERDAY IT WAS ANYWHERE BETWEEN 67 AND 72.  HE IS CONCERNED THAT THE NEW MEDICATION MAY TAKE HER HEART RATE TOO LOW.    SAMINA AND POA  [phone]

## 2023-11-07 NOTE — PROGRESS NOTES
Subjective   Stacy Monroy is a 89 y.o. female. Presents to [facility]    Chief Complaint   Patient presents with    rib pain    Hypothyroidism       Hypothyroidism  This is a chronic problem. The current episode started more than 1 month ago. Pertinent negatives include no abdominal pain, fatigue or weakness. Treatments tried: levothyroxine 50 MCG. The treatment provided moderate relief.   Rib Injury  This is a chronic problem. The current episode started more than 1 month ago (2 months). The problem has been gradually improving. Pertinent negatives include no abdominal pain, fatigue or weakness. Nothing aggravates the symptoms.      She is feeling better.      I personally reviewed and updated the patient's allergies, medications, problem list, and past medical, surgical, social, and family history. I have reviewed and confirmed the accuracy of the History of Present Illness and Review of Symptoms as documented by the MA/LPN/RN. Mita Moise MD    Allergies:  Allergies   Allergen Reactions    Azithromycin Rash     ALL mycin drugs    Erythromycin Rash     ALL mycins drugs    Penicillin G Rash    Penicillins Rash     ALL mycin drugs       Social History:  Social History     Socioeconomic History    Marital status:    Tobacco Use    Smoking status: Never    Smokeless tobacco: Never   Vaping Use    Vaping Use: Never used   Substance and Sexual Activity    Alcohol use: No    Drug use: No    Sexual activity: Not Currently       Family History:  Family History   Problem Relation Age of Onset    Hypertension Mother     Stroke Mother     Hypertension Father     Stroke Father     Breast cancer Sister 50    Ovarian cancer Daughter 57    Breast cancer Niece 53       Past Medical History :  Patient Active Problem List   Diagnosis    Status post placement of implantable loop recorder    Essential hypertension    Hypothyroidism (acquired)    Acute seasonal allergic rhinitis due to pollen     Atherosclerosis    B12 deficiency    Bilateral carpal tunnel syndrome    Cataracts, bilateral    DNR (do not resuscitate)    Heart murmur    History of chicken pox    Menopausal syndrome    Mixed hyperlipidemia    Pernicious anemia    Influenza vaccine refused    Liver function abnormality    Other fatigue    RUQ abdominal mass    Cardiac arrhythmia    Deleon-Wasserman syncope    Sinus node dysfunction    SSS (sick sinus syndrome)    Acute gastric ulcer without hemorrhage or perforation    Presence of cardiac pacemaker    Intraductal papillary mucinous neoplasm of pancreas    Personal history of other infectious and parasitic diseases    Presence of intraocular lens    Puckering of macula, right eye    Nonexudative age-related macular degeneration    Posterior vitreous detachment    Mammogram declined    Generalized abdominal pain    Acute bilateral low back pain without sciatica    Microhematuria    Iron deficiency anemia secondary to inadequate dietary iron intake    Chronic bilateral low back pain with right-sided sciatica    CRF (chronic renal failure), stage 2 (mild)    Left lower quadrant abdominal pain    Diarrhea    Cyst of pancreas    Right arm pain    Tear of biceps muscle, initial encounter    Rib pain on left side       Medication List:    Current Outpatient Medications:     calcium citrate-vitamin d (CITRACAL) 200-250 MG-UNIT tablet tablet, Take 1 tablet by mouth Daily., Disp: , Rfl:     coenzyme Q10 100 MG capsule, Take 1 capsule by mouth Daily., Disp: , Rfl:     levothyroxine (SYNTHROID, LEVOTHROID) 50 MCG tablet, Take 1 tablet by mouth Daily., Disp: 30 tablet, Rfl: 12    metoprolol succinate XL (TOPROL-XL) 25 MG 24 hr tablet, Take 1 tablet by mouth Daily., Disp: 90 tablet, Rfl: 1    Omega-3 1000 MG capsule, Take 1 capsule by mouth Daily., Disp: , Rfl:     Current Facility-Administered Medications:     cyanocobalamin injection 1,000 mcg, 1,000 mcg, Intramuscular, Q28 Days, Mita Moise MD, 1,000 mcg  "at 05/15/23 1337    cyanocobalamin injection 1,000 mcg, 1,000 mcg, Intramuscular, Q28 Days, Mita Moise MD, 1,000 mcg at 10/11/23 1338    cyanocobalamin injection 1,000 mcg, 1,000 mcg, Intramuscular, Q28 Days, Mita Moise MD, 1,000 mcg at 11/10/23 0903    Past Surgical History:  Past Surgical History:   Procedure Laterality Date    BLADDER REPAIR      BREAST BIOPSY Right     CARDIAC ELECTROPHYSIOLOGY PROCEDURE N/A 05/19/2021    Procedure: Pacemaker DC new;  Surgeon: Edmar Rubin MD;  Location: [facility] CATH INVASIVE LOCATION;  Service: Cardiovascular;  Laterality: N/A;    ERCP N/A 09/14/2021    Procedure: ERCP WITH SPHINCTEROTOMY, sphincteroplasty, clearance of bile duct with balloon. brushing, placement of pancreatic stent and placement of metal biliary stent;  Surgeon: Adrienne Wilson MD;  Location: [facility] ENDOSCOPY;  Service: Gastroenterology;  Laterality: N/A;  post op: cystic mass compressing bile duct    HYSTERECTOMY      INSERT / REPLACE / REMOVE PACEMAKER      OTHER SURGICAL HISTORY  2019    Tilt table     OTHER SURGICAL HISTORY  2019    tilt table          Physical Exam:      Vital Signs:    Vitals:    11/10/23 0845   BP: 118/72   Pulse: 68   Resp: 18   Temp: 97.3 °F (36.3 °C)   SpO2: 99%        /72 (BP Location: Right arm, Patient Position: Sitting, Cuff Size: Adult)   Pulse 68   Temp 97.3 °F (36.3 °C) (Temporal)   Resp 18   Ht 160 cm (62.99\")   Wt 60 kg (132 lb 3.2 oz)   LMP  (LMP Unknown)   SpO2 99% Comment: room air  BMI 23.43 kg/m²     Wt Readings from Last 3 Encounters:   11/10/23 60 kg (132 lb 3.2 oz)   09/28/23 58.2 kg (128 lb 6.4 oz)   09/21/23 58.1 kg (128 lb)       Result Review :   The following data was reviewed by: Mita Moise MD on 11/10/2023:            Latest Reference Range & Units 10/26/23 15:33   TSH Baseline 0.450 - 4.500 uIU/mL 3.460   Free T4 0.82 - 1.77 ng/dL 1.07   T3, Total 71 - 180 ng/dL 81       Physical Exam  Vitals reviewed. "   Constitutional:       Appearance: Normal appearance. She is well-developed.   HENT:      Head: Normocephalic and atraumatic.   Eyes:      General:         Right eye: No discharge.         Left eye: No discharge.   Cardiovascular:      Rate and Rhythm: Normal rate and regular rhythm.      Heart sounds: Normal heart sounds. No murmur heard.     No friction rub. No gallop.   Pulmonary:      Effort: Pulmonary effort is normal. No respiratory distress.      Breath sounds: Normal breath sounds. No wheezing or rales.   Skin:     General: Skin is warm and dry.      Findings: No rash.   Neurological:      Mental Status: She is alert and oriented to person, place, and time.      Coordination: Coordination normal.      Gait: Gait normal.   Psychiatric:         Behavior: Behavior is cooperative.         Assessment and Plan:  Problems Addressed this Visit          Endocrine and Metabolic    Hypothyroidism (acquired) - Primary     Much better  Continue current treatment         B12 deficiency    Relevant Medications    cyanocobalamin injection 1,000 mcg       Pulmonary and Pneumonias    Rib pain on left side     resolved          Diagnoses         Codes Comments    Hypothyroidism (acquired)    -  Primary ICD-10-CM: E03.9  ICD-9-CM: 244.9     Rib pain on left side     ICD-10-CM: R07.81  ICD-9-CM: 786.50     B12 deficiency     ICD-10-CM: E53.8  ICD-9-CM: 266.2              BMI is within normal parameters. No other follow-up for BMI required.      An After Visit Summary and PPPS were given to the patient.

## 2023-11-10 ENCOUNTER — OFFICE VISIT (OUTPATIENT)
Dept: FAMILY MEDICINE CLINIC | Facility: CLINIC | Age: 88
End: 2023-11-10
Payer: MEDICARE

## 2023-11-10 VITALS
TEMPERATURE: 97.3 F | WEIGHT: 132.2 LBS | BODY MASS INDEX: 23.42 KG/M2 | OXYGEN SATURATION: 99 % | RESPIRATION RATE: 18 BRPM | HEIGHT: 63 IN | SYSTOLIC BLOOD PRESSURE: 118 MMHG | HEART RATE: 68 BPM | DIASTOLIC BLOOD PRESSURE: 72 MMHG

## 2023-11-10 DIAGNOSIS — R07.81 RIB PAIN ON LEFT SIDE: ICD-10-CM

## 2023-11-10 DIAGNOSIS — E03.9 HYPOTHYROIDISM (ACQUIRED): Primary | ICD-10-CM

## 2023-11-10 DIAGNOSIS — E53.8 B12 DEFICIENCY: ICD-10-CM

## 2023-11-10 RX ORDER — CYANOCOBALAMIN 1000 UG/ML
1000 INJECTION, SOLUTION INTRAMUSCULAR; SUBCUTANEOUS
Status: SHIPPED | OUTPATIENT
Start: 2023-11-10

## 2023-11-10 RX ADMIN — CYANOCOBALAMIN 1000 MCG: 1000 INJECTION, SOLUTION INTRAMUSCULAR; SUBCUTANEOUS at 09:03

## 2023-12-14 ENCOUNTER — CLINICAL SUPPORT (OUTPATIENT)
Dept: FAMILY MEDICINE CLINIC | Facility: CLINIC | Age: 88
End: 2023-12-14
Payer: MEDICARE

## 2023-12-14 DIAGNOSIS — E53.8 B12 DEFICIENCY: Primary | ICD-10-CM

## 2023-12-14 PROCEDURE — 96372 THER/PROPH/DIAG INJ SC/IM: CPT | Performed by: FAMILY MEDICINE

## 2023-12-14 RX ADMIN — CYANOCOBALAMIN 1000 MCG: 1000 INJECTION, SOLUTION INTRAMUSCULAR; SUBCUTANEOUS at 13:15

## 2023-12-28 ENCOUNTER — TELEPHONE (OUTPATIENT)
Dept: PAIN MEDICINE | Facility: CLINIC | Age: 88
End: 2023-12-28
Payer: MEDICARE

## 2023-12-28 DIAGNOSIS — M47.817 LUMBOSACRAL SPONDYLOSIS WITHOUT MYELOPATHY: Primary | ICD-10-CM

## 2023-12-28 DIAGNOSIS — M51.36 DDD (DEGENERATIVE DISC DISEASE), LUMBAR: ICD-10-CM

## 2023-12-28 NOTE — TELEPHONE ENCOUNTER
Caller: Stacy Monroy    Relationship to patient: Self    Best call back number: [phone]    Chief complaint: MID BACK PAIN    Type of visit: INJECTIONS    Requested date: ANY DAY IN THE AFTERNOON                  f/u with pediatrician in 1 d

## 2024-01-11 ENCOUNTER — HOSPITAL ENCOUNTER (OUTPATIENT)
Dept: PAIN MEDICINE | Facility: HOSPITAL | Age: 89
Discharge: HOME | End: 2024-01-11
Payer: MEDICARE

## 2024-01-11 ENCOUNTER — HOSPITAL ENCOUNTER (OUTPATIENT)
Dept: GENERAL RADIOLOGY | Facility: HOSPITAL | Age: 89
Discharge: HOME | End: 2024-01-11
Payer: MEDICARE

## 2024-01-11 VITALS
HEART RATE: 60 BPM | RESPIRATION RATE: 16 BRPM | OXYGEN SATURATION: 97 % | TEMPERATURE: 97.4 F | DIASTOLIC BLOOD PRESSURE: 73 MMHG | SYSTOLIC BLOOD PRESSURE: 154 MMHG

## 2024-01-11 DIAGNOSIS — R52 PAIN: ICD-10-CM

## 2024-01-11 DIAGNOSIS — M51.36 DDD (DEGENERATIVE DISC DISEASE), LUMBAR: ICD-10-CM

## 2024-01-11 DIAGNOSIS — M47.817 LUMBOSACRAL SPONDYLOSIS WITHOUT MYELOPATHY: Primary | ICD-10-CM

## 2024-01-11 PROCEDURE — 77003 FLUOROGUIDE FOR SPINE INJECT: CPT

## 2024-01-11 PROCEDURE — 25010000002 METHYLPREDNISOLONE PER 40 MG: Performed by: STUDENT IN AN ORGANIZED HEALTH CARE EDUCATION/TRAINING PROGRAM

## 2024-01-11 PROCEDURE — 25010000002 BUPIVACAINE (PF) 0.25 % SOLUTION: Performed by: STUDENT IN AN ORGANIZED HEALTH CARE EDUCATION/TRAINING PROGRAM

## 2024-01-11 PROCEDURE — 25510000001 IOPAMIDOL 41 % SOLUTION: Performed by: STUDENT IN AN ORGANIZED HEALTH CARE EDUCATION/TRAINING PROGRAM

## 2024-01-11 RX ORDER — METHYLPREDNISOLONE ACETATE 40 MG/ML
40 INJECTION, SUSPENSION INTRA-ARTICULAR; INTRALESIONAL; INTRAMUSCULAR; SOFT TISSUE ONCE
Status: COMPLETED | OUTPATIENT
Start: 2024-01-11 | End: 2024-01-11

## 2024-01-11 RX ORDER — IOPAMIDOL 408 MG/ML
3 INJECTION, SOLUTION INTRATHECAL
Status: COMPLETED | OUTPATIENT
Start: 2024-01-11 | End: 2024-01-11

## 2024-01-11 RX ORDER — BUPIVACAINE HYDROCHLORIDE 2.5 MG/ML
10 INJECTION, SOLUTION EPIDURAL; INFILTRATION; INTRACAUDAL ONCE
Status: COMPLETED | OUTPATIENT
Start: 2024-01-11 | End: 2024-01-11

## 2024-01-11 RX ADMIN — BUPIVACAINE HYDROCHLORIDE 10 ML: 2.5 INJECTION, SOLUTION EPIDURAL; INFILTRATION; INTRACAUDAL; PERINEURAL at 08:32

## 2024-01-11 RX ADMIN — IOPAMIDOL 3 ML: 408 INJECTION, SOLUTION INTRATHECAL at 08:32

## 2024-01-11 RX ADMIN — METHYLPREDNISOLONE ACETATE 40 MG: 40 INJECTION, SUSPENSION INTRA-ARTICULAR; INTRALESIONAL; INTRAMUSCULAR; SOFT TISSUE at 08:33

## 2024-01-11 NOTE — PROCEDURES
Bilateral L3-5 Lumbar Medial Branch Blockade  [facility]      PREOPERATIVE DIAGNOSIS:  Lumbar spondylosis without myelopathy    POSTOPERATIVE DIAGNOSIS:  Lumbar spondylosis without myelopathy    PROCEDURE:   Diagnostic Bilateral Lumbar Medial Branch Nerve Blockades, with fluoroscopy:  L3, L4, and L5 nerves (at the L4 & L5 transverse processes and the sacral alar groove) to block facet joints L4-5, and L5-S1  22505-43 -- Bilateral Lumbar Facet blocks, 1st Level  21537-11 -- Bilateral Lumbar Facet blocks, 2nd  Level    PRE-PROCEDURE DISCUSSION WITH PATIENT:    Risks and complications were discussed with the patient prior to starting the procedure and informed consent was obtained.      SURGEON:   Lemuel Hallman MD    REASON FOR PROCEDURE:    The patient complains of pain that seems to have a significant axial component, Increased back pain on range of motion exams, Pain on extension of the lumbar spine, and Positive lumbar facet loading maneuver    SEDATION:  Patient declined administration of moderate sedation    ANESTHETIC:  Marcaine 0.25%  STEROID:  Methylprednisolone (DEPO MEDROL) 40mg/ml  TOTAL VOLUME OF SOLUTION:  6ml    DESCRIPTON OF PROCEDURE:  After obtaining informed consent, IV access was not obtained in the preoperative area.   The patient was taken to the operating room.  The patient was placed in the prone position with a pillow under the abdomen. All pressure points were well padded.  The lumbosacral area was prepped with Chloraprep and draped in a sterile fashion. Under fluoroscopic guidance the transverse processes of the L4 and L5 vertebrae at the junctions of the superior articular processes were identified on the right. Also identified was the groove between the ala and the superior articular process of the sacrum on the ipsilateral side.  Skin and subcutaneous tissue were anesthetized with 1% lidocaine above each of these points. A 25-gauge spinal needle was introduced under  fluoroscopic guidance at the above junctions. Aspiration was negative for blood and CSF.  After confirming the position of the needle with fluoroscope in all views, 0.25 mL of Omnipaque was injected, and after seeing the proper spread a total of 1 mL of the anesthetic solution noted above was injected at each of these points.  Needles were removed intact from each of the areas.  A similar procedure was repeated to block the L3, L4, and L5 nerves on the contralateral side.   Onset of analgesia was noted.  Vital signs remained stable throughout.      ESTIMATED BLOOD LOSS:  <5 mL  SPECIMENS:  none    COMPLICATIONS:   No complications were noted., There was no indication of vascular uptake on live injection of contrast dye., There was no indication of intrathecal uptake on live injection of contrast dye., and There was not any evidence of dural puncture.      TOLERANCE & DISCHARGE CONDITION:    The patient tolerated the procedure well.  The patient was transported to the recovery area without difficulties.  The patient was discharged to home under the care of family in stable and satisfactory condition.    PLAN OF CARE:  The patient was given our standard instruction sheet.  We discussed that Lumbar Medial Branch Blockade is a diagnostic procedure in consideration for radiofrequency ablation if two diagnostic procedures prove to be positive for significant benefit.  If sustained relief of 6 to eight weeks is obtained, then an alternative plan could be therapeutic lumbar branch blockades.  The patient is asked to keep a pain log each hour for 8 hours after the procedure today.  The patient will  Return to clinic 4-6 wks.  The patient will resume all medications as per the medication reconciliation sheet.

## 2024-01-26 ENCOUNTER — OFFICE VISIT (OUTPATIENT)
Dept: CARDIOLOGY | Facility: CLINIC | Age: 89
End: 2024-01-26
Payer: MEDICARE

## 2024-01-26 VITALS
OXYGEN SATURATION: 95 % | WEIGHT: 131 LBS | SYSTOLIC BLOOD PRESSURE: 132 MMHG | HEART RATE: 60 BPM | RESPIRATION RATE: 18 BRPM | DIASTOLIC BLOOD PRESSURE: 78 MMHG | HEIGHT: 62 IN | BODY MASS INDEX: 24.11 KG/M2

## 2024-01-26 DIAGNOSIS — I49.5 SSS (SICK SINUS SYNDROME): ICD-10-CM

## 2024-01-26 DIAGNOSIS — Z45.010 ELECTIVE REPLACEMENT INDICATED FOR CARDIAC PACEMAKER BATTERY AT END OF LIFESPAN: ICD-10-CM

## 2024-01-26 DIAGNOSIS — I10 ESSENTIAL HYPERTENSION: Primary | ICD-10-CM

## 2024-01-26 DIAGNOSIS — Z95.0 PRESENCE OF CARDIAC PACEMAKER: ICD-10-CM

## 2024-01-26 RX ORDER — METOPROLOL SUCCINATE 25 MG/1
25 TABLET, EXTENDED RELEASE ORAL DAILY
Qty: 90 TABLET | Refills: 3 | Status: SHIPPED | OUTPATIENT
Start: 2024-01-26

## 2024-02-07 ENCOUNTER — CLINICAL SUPPORT (OUTPATIENT)
Dept: FAMILY MEDICINE CLINIC | Facility: CLINIC | Age: 89
End: 2024-02-07
Payer: MEDICARE

## 2024-02-07 DIAGNOSIS — E53.8 B12 DEFICIENCY: Primary | ICD-10-CM

## 2024-02-07 PROCEDURE — 96372 THER/PROPH/DIAG INJ SC/IM: CPT | Performed by: FAMILY MEDICINE

## 2024-02-07 RX ORDER — CYANOCOBALAMIN 1000 UG/ML
1000 INJECTION, SOLUTION INTRAMUSCULAR; SUBCUTANEOUS ONCE
Status: COMPLETED | OUTPATIENT
Start: 2024-02-07 | End: 2024-02-07

## 2024-02-07 RX ADMIN — CYANOCOBALAMIN 1000 MCG: 1000 INJECTION, SOLUTION INTRAMUSCULAR; SUBCUTANEOUS at 14:45

## 2024-02-23 ENCOUNTER — TELEPHONE (OUTPATIENT)
Dept: PAIN MEDICINE | Facility: CLINIC | Age: 89
End: 2024-02-23

## 2024-02-23 ENCOUNTER — OFFICE VISIT (OUTPATIENT)
Dept: PAIN MEDICINE | Facility: CLINIC | Age: 89
End: 2024-02-23
Payer: MEDICARE

## 2024-02-23 VITALS
OXYGEN SATURATION: 94 % | RESPIRATION RATE: 16 BRPM | DIASTOLIC BLOOD PRESSURE: 70 MMHG | SYSTOLIC BLOOD PRESSURE: 157 MMHG | HEART RATE: 61 BPM

## 2024-02-23 DIAGNOSIS — M51.36 DDD (DEGENERATIVE DISC DISEASE), LUMBAR: ICD-10-CM

## 2024-02-23 DIAGNOSIS — M47.817 LUMBOSACRAL SPONDYLOSIS WITHOUT MYELOPATHY: Primary | ICD-10-CM

## 2024-02-23 PROCEDURE — 1160F RVW MEDS BY RX/DR IN RCRD: CPT | Performed by: STUDENT IN AN ORGANIZED HEALTH CARE EDUCATION/TRAINING PROGRAM

## 2024-02-23 PROCEDURE — 99214 OFFICE O/P EST MOD 30 MIN: CPT | Performed by: STUDENT IN AN ORGANIZED HEALTH CARE EDUCATION/TRAINING PROGRAM

## 2024-02-23 PROCEDURE — 1125F AMNT PAIN NOTED PAIN PRSNT: CPT | Performed by: STUDENT IN AN ORGANIZED HEALTH CARE EDUCATION/TRAINING PROGRAM

## 2024-02-23 PROCEDURE — 1159F MED LIST DOCD IN RCRD: CPT | Performed by: STUDENT IN AN ORGANIZED HEALTH CARE EDUCATION/TRAINING PROGRAM

## 2024-02-23 PROCEDURE — G0463 HOSPITAL OUTPT CLINIC VISIT: HCPCS | Performed by: STUDENT IN AN ORGANIZED HEALTH CARE EDUCATION/TRAINING PROGRAM

## 2024-02-23 NOTE — PROGRESS NOTES
CHIEF COMPLAINT  Chief Complaint   Patient presents with    Back Pain     No Narcotics       Primary Care  Mita Moise MD    Subjective   Stacy Monroy is a 89 y.o. female  who presents for low back pain with right lower extremity radiculopathy.  She reports the pain began approximately 3 to 4 months ago.  She describes the pain primarily in the posterior aspect of the right buttock and hip radiating into the right leg and right posterior thigh.  She reports that the pain is worse with any type of movement and physical activity and slightly improved with rest.  She describes the pain as a burning, shooting sensation.  She denies any red flag symptoms such as loss of bowel or bladder.  She does have plain films which show very significant degenerative change with associated anterolisthesis of up to 9 mm.    History of Present Illness     Location: Low back with radiation to the right buttock and right leg  Onset: Several months ago  Duration: Fairly constant  Timing: Constant throughout the day  Quality: Sharp, stabbing, shooting  Severity: Today: 1       Last Week: 8       Worst: 10  Modifying Factors: Pain is worse with movement and physical activity and slightly improved with rest  Functional Deficit: The pain makes it difficult for him to perform his activities of daily living    Physical Therapy: no    Interval Update 02/23/2024: We had recently done a repeat medial branch block at L4-5 and L5-S1.  It appears that she has gotten pain relief from this.  She really has no significant pain with sitting.  Describes her pain as a 5/10 immediately upon standing.  Worse with extension of the lumbar spine.  She also reports that she did experience a significant decrease in the axial back pain and posterior thigh referred pain for several hours after most recent medial branch block    The following portions of the patient's history were reviewed and updated as appropriate: allergies, current medications, past family  history, past medical history, past social history, past surgical history, and problem list.    Procedures:        Current Outpatient Medications:     calcium citrate-vitamin d (CITRACAL) 200-250 MG-UNIT tablet tablet, Take 1 tablet by mouth Daily., Disp: , Rfl:     coenzyme Q10 100 MG capsule, Take 1 capsule by mouth Daily., Disp: , Rfl:     levothyroxine (SYNTHROID, LEVOTHROID) 50 MCG tablet, Take 1 tablet by mouth Daily., Disp: 30 tablet, Rfl: 12    metoprolol succinate XL (TOPROL-XL) 25 MG 24 hr tablet, Take 1 tablet by mouth Daily., Disp: 90 tablet, Rfl: 3    Omega-3 1000 MG capsule, Take 1 capsule by mouth Daily., Disp: , Rfl:     Current Facility-Administered Medications:     cyanocobalamin injection 1,000 mcg, 1,000 mcg, Intramuscular, Q28 Days, Mita Moise MD, 1,000 mcg at 05/15/23 1337    cyanocobalamin injection 1,000 mcg, 1,000 mcg, Intramuscular, Q28 Days, Mita Moise MD, 1,000 mcg at 10/11/23 1338    cyanocobalamin injection 1,000 mcg, 1,000 mcg, Intramuscular, Q28 Days, Mita Moise MD, 1,000 mcg at 12/14/23 1315    Review of Systems   Musculoskeletal:  Positive for arthralgias, back pain, gait problem and myalgias. Negative for joint swelling, neck pain and neck stiffness.   Neurological:  Positive for numbness. Negative for weakness.       Vitals:    02/23/24 0812   BP: 157/70   Pulse: 61   Resp: 16   SpO2: 94%   PainSc:   5       Objective   Physical Exam  Vitals and nursing note reviewed. Exam conducted with a chaperone present.   Constitutional:       General: She is not in acute distress.     Appearance: Normal appearance. She is well-developed and normal weight.   Neck:      Trachea: No tracheal deviation.   Musculoskeletal:      Comments: Lumbar Spine Exam:  Tender to palpation over the lumbar paraspinal musculature No  Limited range of motion secondary to pain No  Facet loading positive: Negative  Facets tender to palpation: Negative  Straight leg raise test positive:  right    SI Joint Exam:  Katina positive: Negative  PSIS tender: right   SI joint palpation: right  SI joint compression: Negative     Neurological:      Mental Status: She is alert.      Sensory: Sensory deficit present.      Motor: Weakness present.           Assessment & Plan   Problems Addressed this Visit    None  Visit Diagnoses       Lumbosacral spondylosis without myelopathy    -  Primary    Relevant Orders    Facet    DDD (degenerative disc disease), lumbar        Relevant Orders    Facet          Diagnoses         Codes Comments    Lumbosacral spondylosis without myelopathy    -  Primary ICD-10-CM: M47.817  ICD-9-CM: 721.3     DDD (degenerative disc disease), lumbar     ICD-10-CM: M51.36  ICD-9-CM: 722.52             Plan:  We can plan for lumbar RFA at L4-5 and L5-S1  She appears to have gotten positive diagnostic benefit meaning greater than 80% relief of her axial back pain as well as the referred pain into the posterior left thigh    --- Follow-up next available for bilateral L4-5 and L5-S1 lumbar RFA with sedation           INSPECT REPORT    As part of the patient's treatment plan, I may be prescribing controlled substances. The patient has been made aware of appropriate use of such medications, including potential risk of somnolence, limited ability to drive and/or work safely, and the potential for dependence or overdose. It has also bee made clear that these medications are for use by this patient only, without concomitant use of alcohol or other substances unless prescribed.     Patient has completed prescribing agreement detailing terms of continued prescribing of controlled substances, including monitoring VALERIA reports, urine drug screening, and pill counts if necessary. The patient is aware that inappropriate use will results in cessation of prescribing such medications.    INSPECT report has been reviewed and scanned into the patient's chart.    As the clinician, I personally reviewed the  INSPECT from 2/21/2024.    History and physical exam exhibit continued safe and appropriate use of controlled substances.      EMR Dragon/Transcription disclaimer:   Much of this encounter note is an electronic transcription/translation of spoken language to printed text. The electronic translation of spoken language may permit erroneous, or at times, nonsensical words or phrases to be inadvertently transcribed; Although I have reviewed the note for such errors, some may still exist.

## 2024-02-27 ENCOUNTER — TELEPHONE (OUTPATIENT)
Dept: PAIN MEDICINE | Facility: HOSPITAL | Age: 89
End: 2024-02-27
Payer: MEDICARE

## 2024-02-27 NOTE — TELEPHONE ENCOUNTER
Pre procedure call made, LMOM asking patient to arrive by 0900 and to have a . NPO status also reviewed.

## 2024-02-28 ENCOUNTER — HOSPITAL ENCOUNTER (OUTPATIENT)
Dept: PAIN MEDICINE | Facility: HOSPITAL | Age: 89
Discharge: HOME | End: 2024-02-28
Payer: MEDICARE

## 2024-02-28 VITALS
BODY MASS INDEX: 24.11 KG/M2 | DIASTOLIC BLOOD PRESSURE: 77 MMHG | RESPIRATION RATE: 12 BRPM | OXYGEN SATURATION: 95 % | TEMPERATURE: 97.3 F | SYSTOLIC BLOOD PRESSURE: 194 MMHG | WEIGHT: 131 LBS | HEIGHT: 62 IN | HEART RATE: 60 BPM

## 2024-02-28 DIAGNOSIS — M51.36 DDD (DEGENERATIVE DISC DISEASE), LUMBAR: ICD-10-CM

## 2024-02-28 DIAGNOSIS — R52 PAIN: ICD-10-CM

## 2024-02-28 DIAGNOSIS — M47.817 LUMBOSACRAL SPONDYLOSIS WITHOUT MYELOPATHY: Primary | ICD-10-CM

## 2024-02-28 PROCEDURE — 25010000002 METHYLPREDNISOLONE PER 40 MG: Performed by: STUDENT IN AN ORGANIZED HEALTH CARE EDUCATION/TRAINING PROGRAM

## 2024-02-28 PROCEDURE — 25010000002 FENTANYL CITRATE (PF) 50 MCG/ML SOLUTION

## 2024-02-28 PROCEDURE — 77003 FLUOROGUIDE FOR SPINE INJECT: CPT

## 2024-02-28 RX ORDER — LIDOCAINE HYDROCHLORIDE 20 MG/ML
5 INJECTION, SOLUTION EPIDURAL; INFILTRATION; INTRACAUDAL; PERINEURAL ONCE
Status: COMPLETED | OUTPATIENT
Start: 2024-02-28 | End: 2024-02-28

## 2024-02-28 RX ORDER — MIDAZOLAM HYDROCHLORIDE 1 MG/ML
2 INJECTION INTRAMUSCULAR; INTRAVENOUS ONCE
Status: DISCONTINUED | OUTPATIENT
Start: 2024-02-28 | End: 2024-02-29 | Stop reason: HOSPADM

## 2024-02-28 RX ORDER — MIDAZOLAM HYDROCHLORIDE 1 MG/ML
INJECTION INTRAMUSCULAR; INTRAVENOUS
Status: DISCONTINUED
Start: 2024-02-28 | End: 2024-02-28 | Stop reason: WASHOUT

## 2024-02-28 RX ORDER — FENTANYL CITRATE 50 UG/ML
100 INJECTION, SOLUTION INTRAMUSCULAR; INTRAVENOUS
Status: DISCONTINUED | OUTPATIENT
Start: 2024-02-28 | End: 2024-02-29 | Stop reason: HOSPADM

## 2024-02-28 RX ORDER — FENTANYL CITRATE 50 UG/ML
INJECTION, SOLUTION INTRAMUSCULAR; INTRAVENOUS
Status: COMPLETED
Start: 2024-02-28 | End: 2024-02-28

## 2024-02-28 RX ORDER — METHYLPREDNISOLONE ACETATE 40 MG/ML
40 INJECTION, SUSPENSION INTRA-ARTICULAR; INTRALESIONAL; INTRAMUSCULAR; SOFT TISSUE ONCE
Status: COMPLETED | OUTPATIENT
Start: 2024-02-28 | End: 2024-02-28

## 2024-02-28 RX ADMIN — FENTANYL CITRATE 50 MCG: 50 INJECTION, SOLUTION INTRAMUSCULAR; INTRAVENOUS at 09:34

## 2024-02-28 RX ADMIN — LIDOCAINE HYDROCHLORIDE 5 ML: 20 INJECTION, SOLUTION EPIDURAL; INFILTRATION; INTRACAUDAL; PERINEURAL at 09:37

## 2024-02-28 RX ADMIN — METHYLPREDNISOLONE ACETATE 40 MG: 40 INJECTION, SUSPENSION INTRA-ARTICULAR; INTRALESIONAL; INTRAMUSCULAR; INTRASYNOVIAL; SOFT TISSUE at 09:50

## 2024-02-28 NOTE — PROCEDURES
Bilateral L3-5 Lumbar Medial Branch RADIOFREQUENCY  [facility]      PREOPERATIVE DIAGNOSIS:  Lumbar spondylosis without myelopathy    POSTOPERATIVE DIAGNOSIS:  Lumbar spondylosis without myelopathy    PROCEDURE:   Diagnostic Bilateral Lumbar Medial Branch Nerve thermal radiofrequency lesioning, with fluoroscopy:  L3, L4, and L5 nerves (at the L4 and L5 transverse processes and the sacral alar groove) to thermally treat the innervation to facet joints L4-5 and L5-S1  39478-72 -- Bilateral L/S facet neuro destr., 1st Level  11060-95 -- Bilateral L/S facet neuro destr., 2nd  Level    PRE-PROCEDURE DISCUSSION WITH PATIENT:    Risks and complications were discussed with the patient prior to starting the procedure and informed consent was obtained.      SURGEON:  Jame Hallman MD    REASON FOR PROCEDURE:    The patient complains of pain that seems to have a significant axial component, The patient admits to 80% or more pain relief diagnostically from medial branch blockades., Increased back pain on range of motion exams, Pain on extension of the lumbar spine, and Positive lumbar facet loading maneuver    SEDATION:  Fentanyl 50 mcg IV  TIME OF PROCEDURE:   The intraoperative procedure time after administration of the sedative was 18 minutes.     TO: 0938  Proc End: 0956    ANESTHETIC:  Lidocaine 2%  STEROID:  Methylprednisolone (DEPO MEDROL) 40mg/ml      DESCRIPTON OF PROCEDURE:  After obtaining informed consent, IV access  was obtained in the preoperative area.   The patient was taken to the operating room.  The patient was placed in the prone position with a pillow under the abdomen. All pressure points were well padded.  EKG, blood pressure, and pulse oximeter were monitored.  The patient was monitored and sedated by the RN under my direction. The lumbosacral area was prepped with Chloraprep and draped in a sterile fashion.     Under fluoroscopic guidance the transverse processes of the L4 and L5 vertebrae at  the junctions of the superior articular processes were identified on the right.  Also identified was the groove between the ala and the superior articular process of the sacrum on the ipsilateral side.  Skin and subcutaneous tissue were anesthetized with 1ml of 1% lidocaine above each of these points. Then, radiofrequency probe needles were advanced in this fluoro view to the above junctions.  Aspiration was negative for blood and CSF.  After confirming the position of the needle with fluoroscope in all views, testing was initiated.  Motor testing was confirmed to be negative at 3V and 2Hz for any radicular stimulation.  Then 1mL of the local anesthetic was instilled in each needle.  Two minutes elapsed, and during this time a lateral fluoroscopic view was confirmed again to ensure the needles had not advanced nor retracted.  Then, Radiofrequency Lesioning was initiated for 2.5 minutes at 80 degrees Celsius, rotating the needle tips 90 degrees half-way through.  Needles were removed intact from each of the areas.     A similar procedure was repeated to address the L3, L4, and L5 nerves on the contralateral side.   Onset of analgesia was noted.  Vital signs remained stable throughout.      ESTIMATED BLOOD LOSS:  <5 mL  SPECIMENS:  none    COMPLICATIONS:   No complications were noted.    TOLERANCE & DISCHARGE CONDITION:    The patient tolerated the procedure well.  The patient was transported to the recovery area without difficulties.  The patient was discharged to home under the care of family in stable and satisfactory condition.    PLAN OF CARE:  The patient was given our standard instruction sheet.  The patient will  Return to clinic in 8 wks.  The patient will resume all medications as per the medication reconciliation sheet.

## 2024-02-28 NOTE — DISCHARGE INSTRUCTIONS
Radiofrequency Lesioning, Care After    Refer to this sheet in the next few weeks. These instructions provide you with information about caring for yourself after your procedure. Your health care provider may also give you more specific instructions. Your treatment has been planned according to current medical practices, but problems sometimes occur. Call your health care provider if you have any problems or questions after your procedure.  What can I expect after the procedure?  After the procedure, it is common to have:  Pain from the burned nerve.  You may feel a burning sensation for up to 1-2 weeks after the procedure  Temporary numbness at the site  Your leg/legs may be weak or feel numb after the procedure until the numbing medication wears off.  When you are up and walking, have assistance to prevent falling.     Follow these instructions at home:  Take over-the-counter and prescription medicines only as told by your health care provider.  Return to your normal activities as told by your health care provider. Ask your health care provider what activities are safe for you.  Pay close attention to how you feel after the procedure. If you start to have pain, write down when it hurts and how it feels. This will help you and your health care provider to know if you need an additional treatment.  Check your needle insertion site every day for signs of infection. Watch for:  Redness, swelling, or pain.  Fluid, blood, or pus.  Keep all follow-up visits as told by your health care provider. This is important.  No driving for 24 hrs-make sure you have full sensation in your legs prior to driving.  Avoid using heat on the injection site for 24 hours. You may use ice intermittently if needed by placing a               towel between your skin and the ice bag and using the ice for 20 minutes 2-3 times a day.  Do not take baths, swim or use a hot tub for 24 hours.  Leave your band-aids on for 24 hours and keep them  dry.    Contact a health care provider if:  Your pain does not get better.  You have redness, swelling, or pain at the needle insertion site.  You have fluid, blood, or pus coming from the needle insertion site.  You have a fever over 101 degrees.  You have new numb.ness in your arm or leg after the procedure    Get help right away if:  You develop sudden, severe pain.  You develop numbness or tingling near the procedure site that does not go away.  This information is not intended to replace advice given to you by your health care provider. Make sure you discuss any questions you have with your health care provider.  Document Released: 08/16/2012 Document Revised: 05/25/2017 Document Reviewed: 01/25/2016  [employer] Interactive Patient Education © 2019 [employer]  Moderate Conscious Sedation, Adult, Care After    This sheet gives you information about how to care for yourself after your procedure. Your health care provider may also give you more specific instructions. If you have problems or questions, contact your health care provider.    What can I expect after the procedure?  After the procedure, it is common to have:  Sleepiness for several hours.  Impaired judgment for several hours.  Difficulty with balance.  Vomiting if you eat too soon.    Follow these instructions at home:  For the next 24 hours:         Rest.  Do not participate in activities where you could fall or become injured.  Do not drive or use machinery.  Do not drink alcohol.  Do not take sleeping pills or medicines that cause drowsiness.  Do not make important decisions or sign legal documents.  Do not take care of children on your own.    Eating and drinking    Follow the diet recommended by your health care provider.  Drink enough fluid to keep your urine pale yellow.  If you vomit:  Drink water, juice, or soup when you can drink without vomiting.  Make sure you have little or no nausea before eating solid foods.     General  instructions  Take over-the-counter and prescription medicines only as told by your health care provider.  Have a responsible adult stay with you for 24 hours. It is important to have someone help care for you until you are awake and alert.  Do not smoke.  Keep all follow-up visits as told by your health care provider. This is important.    Contact a health care provider if:  You are still sleepy or having trouble with balance after 24 hours.  You feel light-headed.  You keep feeling nauseous or you keep vomiting.  You develop a rash.  You have a fever.  You have redness or swelling around the IV site.    Get help right away if:  You have trouble breathing.  You have new-onset confusion at home.    Summary  After the procedure, it is common to feel sleepy, have impaired judgment, or feel nauseous if you eat too soon.  Rest after you get home. Know the things you should not do after the procedure.  Follow the diet recommended by your health care provider and drink enough fluid to keep your urine pale yellow.  Get help right away if you have trouble breathing or new-onset confusion at home.    This information is not intended to replace advice given to you by your health care provider. Make sure you discuss any questions you have with your health care provider.    Document Revised: 04/16/2021 Document Reviewed: 11/12/2020  Elsevier Patient Education © 2021 [employer]

## 2024-02-29 ENCOUNTER — TELEPHONE (OUTPATIENT)
Dept: PAIN MEDICINE | Facility: HOSPITAL | Age: 89
End: 2024-02-29
Payer: MEDICARE

## 2024-02-29 NOTE — TELEPHONE ENCOUNTER
Patient blood pressure has been running high last night was 188/88 this morning at 9 am was 176/82   Informed to re-take through out the day and call me back in the afternoon. Informed its likely due to the steroid injection and blood pressure should go down. Informed to let me know later today.

## 2024-03-01 ENCOUNTER — TELEPHONE (OUTPATIENT)
Dept: FAMILY MEDICINE CLINIC | Facility: CLINIC | Age: 89
End: 2024-03-01
Payer: MEDICARE

## 2024-03-01 NOTE — TELEPHONE ENCOUNTER
Patient been checking B/P and has been going down if goes up needs to go to the ED or fu with pcp.    Pt with moderate leukocytosis on admission. Likely reactive secondary to GI bleed. Afebrile on admission, no signs of acute infection or inflammation  Continue to trend

## 2024-03-01 NOTE — TELEPHONE ENCOUNTER
Son called stating that patient's bp has been running 190/90s. Patient had a procedure with Dr. Hallman and was advised to call PCP if BP kept running high. I scheduled pt with Dr. Moise for Monday. Patient is not having chest pain or headache is very asymptomatic. Per  if she is not having any symptoms please have pt checked out at urgent care. Son said that he would take her to urgent care to be evaluated. They have also taken her bp with two different cuffs and it was running about the same.

## 2024-03-04 ENCOUNTER — OFFICE VISIT (OUTPATIENT)
Dept: FAMILY MEDICINE CLINIC | Facility: CLINIC | Age: 89
End: 2024-03-04
Payer: MEDICARE

## 2024-03-04 VITALS
SYSTOLIC BLOOD PRESSURE: 122 MMHG | OXYGEN SATURATION: 98 % | RESPIRATION RATE: 18 BRPM | HEIGHT: 62 IN | TEMPERATURE: 97.3 F | BODY MASS INDEX: 24 KG/M2 | DIASTOLIC BLOOD PRESSURE: 80 MMHG | WEIGHT: 130.4 LBS | HEART RATE: 90 BPM

## 2024-03-04 DIAGNOSIS — E53.8 B12 DEFICIENCY: ICD-10-CM

## 2024-03-04 DIAGNOSIS — R10.2 SUPRAPUBIC PAIN: ICD-10-CM

## 2024-03-04 DIAGNOSIS — R31.29 MICROHEMATURIA: ICD-10-CM

## 2024-03-04 DIAGNOSIS — I10 ESSENTIAL HYPERTENSION: Primary | ICD-10-CM

## 2024-03-04 LAB
BILIRUB BLD-MCNC: NEGATIVE MG/DL
CLARITY, POC: ABNORMAL
COLOR UR: ABNORMAL
GLUCOSE UR STRIP-MCNC: NEGATIVE MG/DL
KETONES UR QL: NEGATIVE
LEUKOCYTE EST, POC: NEGATIVE
NITRITE UR-MCNC: NEGATIVE MG/ML
PH UR: 5 [PH] (ref 5–8)
PROT UR STRIP-MCNC: ABNORMAL MG/DL
RBC # UR STRIP: ABNORMAL /UL
SP GR UR: 1.01 (ref 1–1.03)
UROBILINOGEN UR QL: NORMAL

## 2024-03-04 RX ORDER — SULFAMETHOXAZOLE AND TRIMETHOPRIM 400; 80 MG/1; MG/1
1 TABLET ORAL 2 TIMES DAILY
Qty: 14 TABLET | Refills: 0 | Status: SHIPPED | OUTPATIENT
Start: 2024-03-04 | End: 2024-03-11 | Stop reason: HOSPADM

## 2024-03-04 RX ADMIN — CYANOCOBALAMIN 1000 MCG: 1000 INJECTION, SOLUTION INTRAMUSCULAR; SUBCUTANEOUS at 09:47

## 2024-03-04 NOTE — PROGRESS NOTES
Subjective   Stacy Monroy is a 89 y.o. female. Presents to [facility] [email or web address]]    Chief Complaint   Patient presents with    Hypertension       Hypertension  This is a chronic problem. Associated symptoms include malaise/fatigue. Pertinent negatives include no chest pain, headaches, palpitations or shortness of breath. Risk factors for coronary artery disease include post-menopausal state and family history. Current antihypertension treatment includes beta blockers. The current treatment provides moderate improvement.      High blood pressure readings started soon after her radioablation. She has not had any chest pain or headaches. No stroke like symptoms    I personally reviewed and updated the patient's allergies, medications, problem list, and past medical, surgical, social, and family history. I have reviewed and confirmed the accuracy of the History of Present Illness and Review of Symptoms as documented by the MA/LPN/RN. Mita Moise MD    Allergies:  Allergies   Allergen Reactions    Azithromycin Rash     ALL mycin drugs    Erythromycin Rash     ALL mycins drugs    Penicillin G Rash    Penicillins Rash     ALL mycin drugs       Social History:  Social History     Socioeconomic History    Marital status:    Tobacco Use    Smoking status: Never    Smokeless tobacco: Never    Tobacco comments:     None   Vaping Use    Vaping status: Never Used   Substance and Sexual Activity    Alcohol use: No    Drug use: No    Sexual activity: Not Currently       Family History:  Family History   Problem Relation Age of Onset    Hypertension Mother     Stroke Mother     Hypertension Father     Stroke Father     Breast cancer Sister 50    Ovarian cancer Daughter 57    Breast cancer Niece 53       Past Medical History :  Patient Active Problem List   Diagnosis    Status post placement of implantable loop recorder    Essential hypertension    Hypothyroidism (acquired)    Acute seasonal allergic rhinitis due to  pollen    Atherosclerosis    B12 deficiency    Bilateral carpal tunnel syndrome    Cataracts, bilateral    DNR (do not resuscitate)    Heart murmur    History of chicken pox    Menopausal syndrome    Mixed hyperlipidemia    Pernicious anemia    Influenza vaccine refused    Liver function abnormality    Other fatigue    RUQ abdominal mass    Cardiac arrhythmia    Deleon-Wasserman syncope    Sinus node dysfunction    SSS (sick sinus syndrome)    Acute gastric ulcer without hemorrhage or perforation    Presence of cardiac pacemaker    Intraductal papillary mucinous neoplasm of pancreas    Personal history of other infectious and parasitic diseases    Presence of intraocular lens    Puckering of macula, right eye    Nonexudative age-related macular degeneration    Posterior vitreous detachment    Mammogram declined    Generalized abdominal pain    Acute bilateral low back pain without sciatica    Microhematuria    Iron deficiency anemia secondary to inadequate dietary iron intake    Chronic bilateral low back pain with right-sided sciatica    CRF (chronic renal failure), stage 2 (mild)    Left lower quadrant abdominal pain    Diarrhea    Cyst of pancreas    Right arm pain    Tear of biceps muscle, initial encounter    Rib pain on left side    Suprapubic pain       Medication List:    Current Outpatient Medications:     calcium citrate-vitamin d (CITRACAL) 200-250 MG-UNIT tablet tablet, Take 1 tablet by mouth Daily., Disp: , Rfl:     coenzyme Q10 100 MG capsule, Take 1 capsule by mouth Daily., Disp: , Rfl:     levothyroxine (SYNTHROID, LEVOTHROID) 50 MCG tablet, Take 1 tablet by mouth Daily., Disp: 30 tablet, Rfl: 12    metoprolol succinate XL (TOPROL-XL) 25 MG 24 hr tablet, Take 1 tablet by mouth Daily., Disp: 90 tablet, Rfl: 3    Omega-3 1000 MG capsule, Take 1 capsule by mouth Daily., Disp: , Rfl:     sulfamethoxazole-trimethoprim (BACTRIM,SEPTRA) 400-80 MG tablet, Take 1 tablet by mouth 2 (Two) Times a Day., Disp: 14  "tablet, Rfl: 0    Current Facility-Administered Medications:     cyanocobalamin injection 1,000 mcg, 1,000 mcg, Intramuscular, Q28 Days, Mita Moise MD, 1,000 mcg at 05/15/23 1337    cyanocobalamin injection 1,000 mcg, 1,000 mcg, Intramuscular, Q28 Days, Mita Moise MD, 1,000 mcg at 10/11/23 1338    cyanocobalamin injection 1,000 mcg, 1,000 mcg, Intramuscular, Q28 Days, Mita Moise MD, 1,000 mcg at 03/04/24 0947    Past Surgical History:  Past Surgical History:   Procedure Laterality Date    BLADDER REPAIR      BREAST BIOPSY Right     CARDIAC ELECTROPHYSIOLOGY PROCEDURE N/A 05/19/2021    Procedure: Pacemaker DC new;  Surgeon: Edmar Rubin MD;  Location: [facility] CATH INVASIVE LOCATION;  Service: Cardiovascular;  Laterality: N/A;    ERCP N/A 09/14/2021    Procedure: ERCP WITH SPHINCTEROTOMY, sphincteroplasty, clearance of bile duct with balloon. brushing, placement of pancreatic stent and placement of metal biliary stent;  Surgeon: Adrienne Wilson MD;  Location: [facility] ENDOSCOPY;  Service: Gastroenterology;  Laterality: N/A;  post op: cystic mass compressing bile duct    HYSTERECTOMY      INSERT / REPLACE / REMOVE PACEMAKER      OTHER SURGICAL HISTORY  2019    Tilt table     OTHER SURGICAL HISTORY  2019    tilt table          Physical Exam:      Vital Signs:    Vitals:    03/04/24 0938   BP: 122/80   Pulse: 90   Resp: 18   Temp: 97.3 °F (36.3 °C)   SpO2: 98%        /80 (BP Location: Right arm, Patient Position: Sitting, Cuff Size: Adult)   Pulse 90   Temp 97.3 °F (36.3 °C) (Temporal)   Resp 18   Ht 157.5 cm (62\")   Wt 59.1 kg (130 lb 6.4 oz)   LMP  (LMP Unknown)   SpO2 98% Comment: room air  BMI 23.85 kg/m²     Wt Readings from Last 3 Encounters:   03/04/24 59.1 kg (130 lb 6.4 oz)   02/28/24 59.4 kg (131 lb)   01/26/24 59.4 kg (131 lb)       Result Review :   The following data was reviewed by: Mita Moise MD on 03/04/2024:            Latest Reference Range & Units " 03/04/24 09:51   Color, UA Yellow, Straw, Dark Yellow, Aurelia  Dark Yellow   Appearance, UA Clear  Cloudy !   Specific Gravity, UA 1.005 - 1.030  1.010   pH, UA 5.0 - 8.0  5.0   Glucose Negative mg/dL Negative   Ketones, UA Negative  Negative   Blood, UA Negative  Small !   Nitrite, UA Negative  Negative   Leukocytes, UA Negative  Negative   Protein, UA Negative mg/dL 2+ !   Bilirubin, UA Negative  Negative   Urobilinogen, UA Normal, 0.2 E.U./dL  Normal   !: Data is abnormal      Physical Exam  Vitals reviewed.   Constitutional:       Appearance: Normal appearance. She is well-developed.   HENT:      Head: Normocephalic and atraumatic.   Eyes:      General:         Right eye: No discharge.         Left eye: No discharge.   Cardiovascular:      Rate and Rhythm: Normal rate and regular rhythm.      Heart sounds: Normal heart sounds. No murmur heard.     No friction rub. No gallop.   Pulmonary:      Effort: Pulmonary effort is normal. No respiratory distress.      Breath sounds: Normal breath sounds. No wheezing or rales.   Abdominal:      General: Abdomen is flat. Bowel sounds are normal. There is no distension.      Palpations: Abdomen is soft. There is no mass.      Tenderness: There is no abdominal tenderness. There is no right CVA tenderness, left CVA tenderness, guarding or rebound.      Hernia: No hernia is present.   Skin:     General: Skin is warm and dry.      Findings: No rash.   Neurological:      Mental Status: She is alert and oriented to person, place, and time.      Coordination: Coordination normal.      Gait: Gait normal.   Psychiatric:         Behavior: Behavior is cooperative.       Assessment and Plan:  Problems Addressed this Visit          Cardiac and Vasculature    Essential hypertension - Primary       Endocrine and Metabolic    B12 deficiency       Gastrointestinal Abdominal     Suprapubic pain    Relevant Orders    Comprehensive Metabolic Panel (Completed)    CBC & Differential (Completed)        Genitourinary and Reproductive     Microhematuria    Relevant Medications    sulfamethoxazole-trimethoprim (BACTRIM,SEPTRA) 400-80 MG tablet    Other Relevant Orders    POCT urinalysis dipstick, multipro (Completed)    Urinalysis With Microscopic - Urine, Clean Catch (Completed)    Urine Culture - Urine, Urine, Clean Catch (Completed)     Diagnoses         Codes Comments    Essential hypertension    -  Primary ICD-10-CM: I10  ICD-9-CM: 401.9     B12 deficiency     ICD-10-CM: E53.8  ICD-9-CM: 266.2     Suprapubic pain     ICD-10-CM: R10.2  ICD-9-CM: 789.09     Microhematuria     ICD-10-CM: R31.29  ICD-9-CM: 599.72              BMI is within normal parameters. No other follow-up for BMI required.      An After Visit Summary and PPPS were given to the patient.

## 2024-03-05 ENCOUNTER — HOSPITAL ENCOUNTER (OUTPATIENT)
Dept: CT IMAGING | Facility: HOSPITAL | Age: 89
Discharge: HOME | End: 2024-03-05
Admitting: FAMILY MEDICINE
Payer: MEDICARE

## 2024-03-05 DIAGNOSIS — D49.0 INTRADUCTAL PAPILLARY MUCINOUS NEOPLASM OF PANCREAS: Primary | ICD-10-CM

## 2024-03-05 DIAGNOSIS — R74.01 ELEVATED TRANSAMINASE LEVEL: Primary | ICD-10-CM

## 2024-03-05 LAB
ALBUMIN SERPL-MCNC: 3.8 G/DL (ref 3.6–4.6)
ALBUMIN/GLOB SERPL: 1.6 {RATIO} (ref 1.2–2.2)
ALP SERPL-CCNC: 365 IU/L (ref 44–121)
ALT SERPL-CCNC: 252 IU/L (ref 0–32)
APPEARANCE UR: ABNORMAL
AST SERPL-CCNC: 381 IU/L (ref 0–40)
BACTERIA #/AREA URNS HPF: NORMAL /[HPF]
BASOPHILS # BLD AUTO: 0.1 X10E3/UL (ref 0–0.2)
BASOPHILS NFR BLD AUTO: 1 %
BILIRUB SERPL-MCNC: 1 MG/DL (ref 0–1.2)
BUN SERPL-MCNC: 21 MG/DL (ref 10–36)
BUN/CREAT SERPL: 21 (ref 12–28)
CALCIUM SERPL-MCNC: 9.7 MG/DL (ref 8.7–10.3)
CASTS URNS QL MICRO: NORMAL /LPF
CHLORIDE SERPL-SCNC: 103 MMOL/L (ref 96–106)
CO2 SERPL-SCNC: 21 MMOL/L (ref 20–29)
COLOR UR: YELLOW
CREAT SERPL-MCNC: 1.01 MG/DL (ref 0.57–1)
EGFRCR SERPLBLD CKD-EPI 2021: 52 ML/MIN/1.73
EOSINOPHIL # BLD AUTO: 0 X10E3/UL (ref 0–0.4)
EOSINOPHIL NFR BLD AUTO: 0 %
EPI CELLS #/AREA URNS HPF: NORMAL /HPF (ref 0–10)
ERYTHROCYTE [DISTWIDTH] IN BLOOD BY AUTOMATED COUNT: 16.6 % (ref 11.7–15.4)
GLOBULIN SER CALC-MCNC: 2.4 G/DL (ref 1.5–4.5)
GLUCOSE SERPL-MCNC: 142 MG/DL (ref 70–99)
GLUCOSE UR QL STRIP: ABNORMAL
HCT VFR BLD AUTO: 39 % (ref 34–46.6)
HGB BLD-MCNC: 12.3 G/DL (ref 11.1–15.9)
IMM GRANULOCYTES # BLD AUTO: 0 X10E3/UL (ref 0–0.1)
IMM GRANULOCYTES NFR BLD AUTO: 1 %
KETONES UR QL STRIP: ABNORMAL
LYMPHOCYTES # BLD AUTO: 2 X10E3/UL (ref 0.7–3.1)
LYMPHOCYTES NFR BLD AUTO: 22 %
MCH RBC QN AUTO: 25.7 PG (ref 26.6–33)
MCHC RBC AUTO-ENTMCNC: 31.5 G/DL (ref 31.5–35.7)
MCV RBC AUTO: 81 FL (ref 79–97)
MICRO URNS: ABNORMAL
MICRO URNS: ABNORMAL
MONOCYTES # BLD AUTO: 0.7 X10E3/UL (ref 0.1–0.9)
MONOCYTES NFR BLD AUTO: 8 %
NEUTROPHILS # BLD AUTO: 6.1 X10E3/UL (ref 1.4–7)
NEUTROPHILS NFR BLD AUTO: 68 %
PH UR STRIP: ABNORMAL [PH]
PLATELET # BLD AUTO: 292 X10E3/UL (ref 150–450)
POTASSIUM SERPL-SCNC: 4.8 MMOL/L (ref 3.5–5.2)
PROT SERPL-MCNC: 6.2 G/DL (ref 6–8.5)
PROT UR QL STRIP: ABNORMAL
RBC # BLD AUTO: 4.79 X10E6/UL (ref 3.77–5.28)
RBC #/AREA URNS HPF: NORMAL /HPF (ref 0–2)
SODIUM SERPL-SCNC: 137 MMOL/L (ref 134–144)
SP GR UR STRIP: ABNORMAL
WBC # BLD AUTO: 8.9 X10E3/UL (ref 3.4–10.8)
WBC #/AREA URNS HPF: NORMAL /HPF (ref 0–5)

## 2024-03-05 PROCEDURE — 74150 CT ABDOMEN W/O CONTRAST: CPT

## 2024-03-06 ENCOUNTER — CLINICAL SUPPORT (OUTPATIENT)
Dept: FAMILY MEDICINE CLINIC | Facility: CLINIC | Age: 89
End: 2024-03-06
Payer: MEDICARE

## 2024-03-06 DIAGNOSIS — R74.01 ELEVATED TRANSAMINASE LEVEL: Primary | ICD-10-CM

## 2024-03-06 LAB
BACTERIA UR CULT: NORMAL
BACTERIA UR CULT: NORMAL

## 2024-03-06 PROCEDURE — 36415 COLL VENOUS BLD VENIPUNCTURE: CPT | Performed by: FAMILY MEDICINE

## 2024-03-06 NOTE — PROGRESS NOTES
Site care done- cleaned with alcohol swab, procedure tolerated well, dressing applied. Venipuncture was obtained after 1 time(s). 1 tubes were drawn. Needle gauge used was 23-butterfly.

## 2024-03-07 ENCOUNTER — HOSPITAL ENCOUNTER (INPATIENT)
Facility: HOSPITAL | Age: 89
LOS: 2 days | Discharge: HOME | DRG: 445 | End: 2024-03-09
Attending: INTERNAL MEDICINE | Admitting: INTERNAL MEDICINE
Payer: MEDICARE

## 2024-03-07 ENCOUNTER — TELEPHONE (OUTPATIENT)
Dept: FAMILY MEDICINE CLINIC | Facility: CLINIC | Age: 89
End: 2024-03-07
Payer: MEDICARE

## 2024-03-07 ENCOUNTER — INPATIENT HOSPITAL (OUTPATIENT)
Dept: URBAN - METROPOLITAN AREA HOSPITAL 84 | Facility: HOSPITAL | Age: 89
End: 2024-03-07
Payer: MEDICARE

## 2024-03-07 DIAGNOSIS — Z85.07 PERSONAL HISTORY OF MALIGNANT NEOPLASM OF PANCREAS: ICD-10-CM

## 2024-03-07 DIAGNOSIS — R74.01 TRANSAMINITIS: Primary | ICD-10-CM

## 2024-03-07 DIAGNOSIS — K86.2 PANCREATIC CYST: ICD-10-CM

## 2024-03-07 DIAGNOSIS — R94.5 ABNORMAL RESULTS OF LIVER FUNCTION STUDIES: ICD-10-CM

## 2024-03-07 DIAGNOSIS — E53.8 B12 DEFICIENCY: ICD-10-CM

## 2024-03-07 DIAGNOSIS — R10.9 UNSPECIFIED ABDOMINAL PAIN: ICD-10-CM

## 2024-03-07 LAB
ALBUMIN SERPL-MCNC: 3.7 G/DL (ref 3.6–4.6)
ALBUMIN/GLOB SERPL: 1.4 {RATIO} (ref 1.2–2.2)
ALP SERPL-CCNC: 414 IU/L (ref 44–121)
ALT SERPL-CCNC: 323 IU/L (ref 0–32)
AST SERPL-CCNC: 339 IU/L (ref 0–40)
BILIRUB SERPL-MCNC: 0.9 MG/DL (ref 0–1.2)
BUN SERPL-MCNC: 17 MG/DL (ref 10–36)
BUN/CREAT SERPL: 14 (ref 12–28)
CALCIUM SERPL-MCNC: 9.5 MG/DL (ref 8.7–10.3)
CHLORIDE SERPL-SCNC: 101 MMOL/L (ref 96–106)
CO2 SERPL-SCNC: 22 MMOL/L (ref 20–29)
CREAT SERPL-MCNC: 1.2 MG/DL (ref 0.57–1)
D-LACTATE SERPL-SCNC: 1.5 MMOL/L (ref 0.5–2)
EGFRCR SERPLBLD CKD-EPI 2021: 42 ML/MIN/1.73
GLOBULIN SER CALC-MCNC: 2.6 G/DL (ref 1.5–4.5)
GLUCOSE SERPL-MCNC: 123 MG/DL (ref 70–99)
POTASSIUM SERPL-SCNC: 5 MMOL/L (ref 3.5–5.2)
PROT SERPL-MCNC: 6.3 G/DL (ref 6–8.5)
SODIUM SERPL-SCNC: 137 MMOL/L (ref 134–144)

## 2024-03-07 PROCEDURE — 25010000002 CEFTRIAXONE PER 250 MG: Performed by: INTERNAL MEDICINE

## 2024-03-07 PROCEDURE — 25010000002 METRONIDAZOLE 500 MG/100ML SOLUTION: Performed by: INTERNAL MEDICINE

## 2024-03-07 PROCEDURE — 25010000002 ENOXAPARIN PER 10 MG: Performed by: INTERNAL MEDICINE

## 2024-03-07 PROCEDURE — 83605 ASSAY OF LACTIC ACID: CPT | Performed by: INTERNAL MEDICINE

## 2024-03-07 PROCEDURE — 87040 BLOOD CULTURE FOR BACTERIA: CPT | Performed by: INTERNAL MEDICINE

## 2024-03-07 PROCEDURE — 99222 1ST HOSP IP/OBS MODERATE 55: CPT | Performed by: NURSE PRACTITIONER

## 2024-03-07 PROCEDURE — 97161 PT EVAL LOW COMPLEX 20 MIN: CPT

## 2024-03-07 RX ORDER — AMOXICILLIN 250 MG
2 CAPSULE ORAL 2 TIMES DAILY
Status: DISCONTINUED | OUTPATIENT
Start: 2024-03-07 | End: 2024-03-09 | Stop reason: HOSPADM

## 2024-03-07 RX ORDER — METRONIDAZOLE 500 MG/100ML
250 INJECTION, SOLUTION INTRAVENOUS EVERY 8 HOURS
Qty: 1050 ML | Refills: 0 | Status: DISCONTINUED | OUTPATIENT
Start: 2024-03-07 | End: 2024-03-07

## 2024-03-07 RX ORDER — BISACODYL 5 MG/1
5 TABLET, DELAYED RELEASE ORAL DAILY PRN
Status: DISCONTINUED | OUTPATIENT
Start: 2024-03-07 | End: 2024-03-09 | Stop reason: HOSPADM

## 2024-03-07 RX ORDER — BISACODYL 10 MG
10 SUPPOSITORY, RECTAL RECTAL DAILY PRN
Status: DISCONTINUED | OUTPATIENT
Start: 2024-03-07 | End: 2024-03-09 | Stop reason: HOSPADM

## 2024-03-07 RX ORDER — METRONIDAZOLE 500 MG/100ML
500 INJECTION, SOLUTION INTRAVENOUS EVERY 8 HOURS
Status: DISCONTINUED | OUTPATIENT
Start: 2024-03-07 | End: 2024-03-09 | Stop reason: HOSPADM

## 2024-03-07 RX ORDER — SODIUM CHLORIDE 9 MG/ML
40 INJECTION, SOLUTION INTRAVENOUS AS NEEDED
Status: DISCONTINUED | OUTPATIENT
Start: 2024-03-07 | End: 2024-03-09 | Stop reason: HOSPADM

## 2024-03-07 RX ORDER — ENOXAPARIN SODIUM 100 MG/ML
40 INJECTION SUBCUTANEOUS DAILY
Status: DISCONTINUED | OUTPATIENT
Start: 2024-03-07 | End: 2024-03-09 | Stop reason: HOSPADM

## 2024-03-07 RX ORDER — SODIUM CHLORIDE 0.9 % (FLUSH) 0.9 %
10 SYRINGE (ML) INJECTION EVERY 12 HOURS SCHEDULED
Status: DISCONTINUED | OUTPATIENT
Start: 2024-03-07 | End: 2024-03-09 | Stop reason: HOSPADM

## 2024-03-07 RX ORDER — SODIUM CHLORIDE 0.9 % (FLUSH) 0.9 %
10 SYRINGE (ML) INJECTION AS NEEDED
Status: DISCONTINUED | OUTPATIENT
Start: 2024-03-07 | End: 2024-03-09 | Stop reason: HOSPADM

## 2024-03-07 RX ORDER — POLYETHYLENE GLYCOL 3350 17 G/17G
17 POWDER, FOR SOLUTION ORAL DAILY PRN
Status: DISCONTINUED | OUTPATIENT
Start: 2024-03-07 | End: 2024-03-09 | Stop reason: HOSPADM

## 2024-03-07 RX ORDER — CARVEDILOL 6.25 MG/1
6.25 TABLET ORAL 3 TIMES DAILY
Status: DISCONTINUED | OUTPATIENT
Start: 2024-03-07 | End: 2024-03-08

## 2024-03-07 RX ADMIN — Medication 10 ML: at 14:25

## 2024-03-07 RX ADMIN — METRONIDAZOLE 500 MG: 500 INJECTION, SOLUTION INTRAVENOUS at 20:59

## 2024-03-07 RX ADMIN — Medication 10 ML: at 21:00

## 2024-03-07 RX ADMIN — CEFTRIAXONE 1000 MG: 1 INJECTION, POWDER, FOR SOLUTION INTRAMUSCULAR; INTRAVENOUS at 20:59

## 2024-03-07 RX ADMIN — CARVEDILOL 6.25 MG: 6.25 TABLET, FILM COATED ORAL at 18:41

## 2024-03-07 RX ADMIN — ENOXAPARIN SODIUM 40 MG: 100 INJECTION SUBCUTANEOUS at 18:41

## 2024-03-07 NOTE — PLAN OF CARE
Goal Outcome Evaluation:      A/o x4, NPO at 0000, GI on board

## 2024-03-07 NOTE — THERAPY EVALUATION
Patient Name: Stacy Monroy  : 1930    MRN: [id number]                              Today's Date: 3/7/2024       Admit Date: 3/7/2024    Visit Dx: No diagnosis found.  Patient Active Problem List   Diagnosis    Status post placement of implantable loop recorder    Essential hypertension    Hypothyroidism (acquired)    Acute seasonal allergic rhinitis due to pollen    Atherosclerosis    B12 deficiency    Bilateral carpal tunnel syndrome    Cataracts, bilateral    DNR (do not resuscitate)    Heart murmur    History of chicken pox    Menopausal syndrome    Mixed hyperlipidemia    Pernicious anemia    Influenza vaccine refused    Liver function abnormality    Other fatigue    RUQ abdominal mass    Cardiac arrhythmia    Deleon-Wasserman syncope    Sinus node dysfunction    SSS (sick sinus syndrome)    Acute gastric ulcer without hemorrhage or perforation    Presence of cardiac pacemaker    Intraductal papillary mucinous neoplasm of pancreas    Personal history of other infectious and parasitic diseases    Presence of intraocular lens    Puckering of macula, right eye    Nonexudative age-related macular degeneration    Posterior vitreous detachment    Mammogram declined    Generalized abdominal pain    Acute bilateral low back pain without sciatica    Microhematuria    Iron deficiency anemia secondary to inadequate dietary iron intake    Chronic bilateral low back pain with right-sided sciatica    CRF (chronic renal failure), stage 2 (mild)    Left lower quadrant abdominal pain    Diarrhea    Cyst of pancreas    Right arm pain    Tear of biceps muscle, initial encounter    Rib pain on left side    Suprapubic pain    Transaminitis     Past Medical History:   Diagnosis Date    Arthritis     Chronic bilateral low back pain with right-sided sciatica 2023    Chronic pain disorder     Heart murmur     Hyperlipidemia     Hypertension     Irritable bowel syndrome pain in belly an gas    Joint pain     Other specified  hypothyroidism 08/15/2019    Presence of cardiac pacemaker 07/09/2021    [facility]     Past Surgical History:   Procedure Laterality Date    BLADDER REPAIR      BREAST BIOPSY Right     CARDIAC ELECTROPHYSIOLOGY PROCEDURE N/A 05/19/2021    Procedure: Pacemaker DC new;  Surgeon: Edmar Rubin MD;  Location: [facility] CATH INVASIVE LOCATION;  Service: Cardiovascular;  Laterality: N/A;    ERCP N/A 09/14/2021    Procedure: ERCP WITH SPHINCTEROTOMY, sphincteroplasty, clearance of bile duct with balloon. brushing, placement of pancreatic stent and placement of metal biliary stent;  Surgeon: Adrienne Wilson MD;  Location: [facility] ENDOSCOPY;  Service: Gastroenterology;  Laterality: N/A;  post op: cystic mass compressing bile duct    HYSTERECTOMY      INSERT / REPLACE / REMOVE PACEMAKER      OTHER SURGICAL HISTORY  2019    Tilt table     OTHER SURGICAL HISTORY  2019    tilt table       General Information       Row Name 03/07/24 1602          Physical Therapy Time and Intention    Document Type evaluation  -AO     Mode of Treatment physical therapy  -AO       Row Name 03/07/24 1602          General Information    Patient Profile Reviewed yes  -AO     Prior Level of Function --  Independent all household mobility w/ no AD, uses SPC for community mobility, independent ADLs ,still drives, denies falls  -AO     Existing Precautions/Restrictions no known precautions/restrictions  -AO     Barriers to Rehab none identified  -AO       Row Name 03/07/24 1602          Living Environment    People in Home alone  supportive family that check in frequently  -AO       Row Name 03/07/24 1602          Home Main Entrance    Number of Stairs, Main Entrance one  -AO     Stair Railings, Main Entrance none  -AO       Row Name 03/07/24 1602          Stairs Within Home, Primary    Number of Stairs, Within Home, Primary none  -AO       Row Name 03/07/24 1602          Cognition    Orientation Status (Cognition) oriented x 4  -AO       Row  Name 03/07/24 1602          Safety Issues, Functional Mobility    Impairments Affecting Function (Mobility) balance;endurance/activity tolerance;range of motion (ROM)  -AO               User Key  (r) = Recorded By, (t) = Taken By, (c) = Cosigned By      Initials Name Provider Type    Jesenia Walsh, PT Physical Therapist                   Mobility       Row Name 03/07/24 1602          Bed Mobility    Bed Mobility bed mobility (all) activities  -AO     All Activities, [place] (Bed Mobility) independent  -AO       Row Name 03/07/24 1602          Bed-Chair Transfer    Bed-Chair [place] (Transfers) independent  -AO       Row Name 03/07/24 1602          Sit-Stand Transfer    Sit-Stand [place] (Transfers) independent  -AO       Row Name 03/07/24 1602          Gait/Stairs (Locomotion)    [place] Level (Gait) independent  -AO     Distance in Feet (Gait) 100 ft  -AO     Deviations/Abnormal Patterns (Gait) german decreased;bilateral deviations;gait speed decreased;base of support, narrow  -AO     Bilateral Gait Deviations forward flexed posture;heel strike decreased  -AO               User Key  (r) = Recorded By, (t) = Taken By, (c) = Cosigned By      Initials Name Provider Type    Jesenia Walsh, PT Physical Therapist                   Obj/Interventions       Row Name 03/07/24 1602          Range of Motion Comprehensive    General Range of Motion bilateral upper extremity ROM WFL;bilateral lower extremity ROM WFL  -AO       Row Name 03/07/24 1602          Strength Comprehensive (MMT)    Comment, General Manual Muscle Testing (MMT) Assessment BLEs: 4/5  -AO       Row Name 03/07/24 1602          Balance    Balance Assessment sitting static balance;sitting dynamic balance;sit to stand dynamic balance;standing static balance;standing dynamic balance  -AO     Static Sitting Balance independent  -AO     Dynamic Sitting Balance independent  -AO     Position, Sitting Balance unsupported;sitting edge of  bed  -AO     Sit to Stand Dynamic Balance independent  -AO     Static Standing Balance independent  -AO     Dynamic Standing Balance independent  -AO       Row Name 03/07/24 1602          Sensory Assessment (Somatosensory)    Sensory Assessment (Somatosensory) other (see comments)  Parasthesia B hands  -AO               User Key  (r) = Recorded By, (t) = Taken By, (c) = Cosigned By      Initials Name Provider Type    AO Jesenia Scott, PT Physical Therapist                   Goals/Plan    No documentation.                  Clinical Impression       Row Name 03/07/24 1602          Pain    Pretreatment Pain Rating 0/10 - no pain  -AO     Posttreatment Pain Rating 0/10 - no pain  -AO       Row Name 03/07/24 1602          Plan of Care Review    Plan of Care Reviewed With patient  -AO     Progress no change  -AO     Outcome Evaluation Pt is a 89 y/o F with transaminitis, hx pancreatic cystic lesion w/ adenocarcinoma of pancreas s/p stent placement, and UTI. At baseline, pt lives alone in a H with 1 HOMERO (no HR) and was independent w/ all household mobility w/ no AD, uses SPC for community mobility, still drives, independent all ADLs, denies fall. During eval, pt demonstrates independence with bed mobility, transfers, and gait training 100 ft w/ no AD and appears to be functioning at baseline. Pt with no skilled PT needs at this time and anticipate routine d/c home. Eval only. PPE: gloves  -AO       Row Name 03/07/24 1602          Therapy Assessment/Plan (PT)    Criteria for Skilled Interventions Met (PT) no;no problems identified which require skilled intervention  -AO     Therapy Frequency (PT) evaluation only  -AO       Row Name 03/07/24 1602          Vital Signs    Recovery Time vitals stable and WNL  -AO       Row Name 03/07/24 1602          Positioning and Restraints    Pre-Treatment Position in bed  -AO     Post Treatment Position bed  -AO     In Bed notified nsg;with family/caregiver;call light within reach   -AO               User Key  (r) = Recorded By, (t) = Taken By, (c) = Cosigned By      Initials Name Provider Type    AO Jesenia Scott, PT Physical Therapist                   Outcome Measures       Row Name 03/07/24 1203          How much help from another person do you currently need...    Turning from your back to your side while in flat bed without using bedrails? 4  -KR     Moving from lying on back to sitting on the side of a flat bed without bedrails? 4  -KR     Moving to and from a bed to a chair (including a wheelchair)? 4  -KR     Standing up from a chair using your arms (e.g., wheelchair, bedside chair)? 4  -KR     Climbing 3-5 steps with a railing? 3  -KR     To walk in hospital room? 4  -KR     AM-PAC 6 Clicks Score (PT) 23  -KR     Highest Level of Mobility Goal 7 --> Walk 25 feet or more  -KR               User Key  (r) = Recorded By, (t) = Taken By, (c) = Cosigned By      Initials Name Provider Type    Nicole Pratt, RN Registered Nurse                                 Physical Therapy Education       Title: PT OT SLP Therapies (Done)       Topic: Physical Therapy (Done)       Point: Mobility training (Done)       Learning Progress Summary             Patient Acceptance, E,TB, VU by AO at 3/7/2024 1621                                         User Key       Initials Effective Dates Name Provider Type Discipline    AO 06/16/21 -  Jesenia Scott, PT Physical Therapist PT                  PT Recommendation and Plan     Plan of Care Reviewed With: patient  Progress: no change  Outcome Evaluation: Pt is a 89 y/o F with transaminitis, hx pancreatic cystic lesion w/ adenocarcinoma of pancreas s/p stent placement, and UTI. At baseline, pt lives alone in a H with 1 HOMERO (no HR) and was independent w/ all household mobility w/ no AD, uses SPC for community mobility, still drives, independent all ADLs, denies fall. During eval, pt demonstrates independence with bed mobility, transfers, and gait training  100 ft w/ no AD and appears to be functioning at baseline. Pt with no skilled PT needs at this time and anticipate routine d/c home. Eval only. PPE: gloves     Time Calculation:   PT Evaluation Complexity  History, PT Evaluation Complexity: 3 or more personal factors and/or comorbidities  Examination of Body Systems (PT Eval Complexity): 1-2 elements  Clinical Presentation (PT Evaluation Complexity): stable  Clinical Decision Making (PT Evaluation Complexity): low complexity  Overall Complexity (PT Evaluation Complexity): low complexity     PT Charges       Row Name 03/07/24 1622             Time Calculation    Start Time 1602  -AO      Stop Time 1622  -AO      Time Calculation (min) 20 min  -AO      PT Received On 03/07/24  -AO         Time Calculation- PT    Total Timed Code Minutes- PT 0 minute(s)  -AO                User Key  (r) = Recorded By, (t) = Taken By, (c) = Cosigned By      Initials Name Provider Type    AO Jesenia Scott, PT Physical Therapist                  Therapy Charges for Today       Code Description Service Date Service Provider Modifiers Qty    [id number]  PT EVAL LOW COMPLEXITY 3 3/7/2024 Jesenia Scott, PT GP 1            PT G-Codes  AM-PAC 6 Clicks Score (PT): 23       Jesenia Scott PT  3/7/2024

## 2024-03-07 NOTE — H&P
[facility]   HISTORY AND PHYSICAL    Patient Name: Stacy Monroy  : 1930  MRN: [id number]  Primary Care Physician:  Mita Moise MD  Date of admission: 3/7/2024    Subjective   Subjective     Chief Complaint: Patient was referred from her primary care physician office for direct admit for transaminitis with liver function test demarcated faded PCBs Dr. Mendez drGlass      HPI:    Stacy Monroy is a 89 y.o. female with a history of pacemaker  Patient was sent from her primary care physician as she had outpatient labs showing her LFTs to be in the 300 range  Patient had previous pancreatic stent for slowly growing pancreatic tumor  Patient with history of hypertension on medication  Had CAT scan as an outpatient  Could not have MRI due to her pacemaker  Patient with chronic back pain status post radiofrequency intervention by Dr. Fraser's recently    Review of Systems  Significant for low back pain for which she had radiofrequency ablation per Dr. Barrios  Denied any abdominal pain right now she did have some abdominal pain last week  No fever no chills no nausea vomiting or diarrhea respiratory system reviewed and negative  Personal History     Past Medical History:   Diagnosis Date    Arthritis     Chronic bilateral low back pain with right-sided sciatica 2023    Chronic pain disorder     Heart murmur     Hyperlipidemia     Hypertension     Irritable bowel syndrome pain in belly an gas    Joint pain     Other specified hypothyroidism 08/15/2019    Presence of cardiac pacemaker 2021    [facility]       Past Surgical History:   Procedure Laterality Date    BLADDER REPAIR      BREAST BIOPSY Right     CARDIAC ELECTROPHYSIOLOGY PROCEDURE N/A 2021    Procedure: Pacemaker DC new;  Surgeon: Edmar Rubin MD;  Location: [facility] INVASIVE LOCATION;  Service: Cardiovascular;  Laterality: N/A;    ERCP N/A 2021    Procedure: ERCP WITH SPHINCTEROTOMY, sphincteroplasty, clearance of  bile duct with balloon. brushing, placement of pancreatic stent and placement of metal biliary stent;  Surgeon: Adrienne Wilson MD;  Location: [facility] ENDOSCOPY;  Service: Gastroenterology;  Laterality: N/A;  post op: cystic mass compressing bile duct    HYSTERECTOMY      INSERT / REPLACE / REMOVE PACEMAKER      OTHER SURGICAL HISTORY  2019    Tilt table     OTHER SURGICAL HISTORY  2019    tilt table        Family History: family history includes Breast cancer (age of onset: 50) in her sister; Breast cancer (age of onset: 53) in her niece; Hypertension in her father and mother; Ovarian cancer (age of onset: 57) in her daughter; Stroke in her father and mother. Otherwise pertinent FHx was reviewed and not pertinent to current issue.    Social History:  reports that she has never smoked. She has never used smokeless tobacco. She reports that she does not drink alcohol and does not use drugs.    Home Medications:  Omega-3, calcium citrate-vitamin d, coenzyme Q10, levothyroxine, metoprolol succinate XL, and sulfamethoxazole-trimethoprim    Allergies:  Allergies   Allergen Reactions    Azithromycin Rash     ALL mycin drugs    Erythromycin Rash     ALL mycins drugs    Penicillin G Rash    Penicillins Rash     ALL mycin drugs       Objective   Objective     Vitals:   Temp:  [97 °F (36.1 °C)] 97 °F (36.1 °C)  Heart Rate:  [60] 60  Resp:  [13] 13  BP: (131)/(65) 131/65  Physical Exam    Constitutional: Awake, alert   Eyes: PERRLA, sclerae anicteric, no conjunctival injection   HENT: NCAT, mucous membranes moist   Neck: Supple, no thyromegaly, no lymphadenopathy, trachea midline   Respiratory: Clear to auscultation bilaterally, nonlabored respirations    Cardiovascular: RRR, no murmurs, rubs, or gallops, palpable pedal pulses bilaterally   Gastrointestinal: Positive bowel sounds, soft, nontender, nondistended   Musculoskeletal: No bilateral ankle edema, no clubbing or cyanosis to extremities   Psychiatric: Appropriate  affect, cooperative   Neurologic: Oriented x 3, strength symmetric in all extremities, Cranial Nerves grossly intact to confrontation, speech clear   Skin: No rashes     Result Review    Result Review:  I have personally reviewed the results from the time of this admission to 3/7/2024 11:57 EST and agree with these findings:  [x]  Laboratory list / accordion  []  Microbiology  []  Radiology  []  EKG/Telemetry   []  Cardiology/Vascular   []  Pathology  []  Old records  []  Other:  Most notable findings include:       Assessment & Plan   Assessment / Plan     Brief Patient Summary:  Stacy Monroy is a 89 y.o. female who was admitted with transaminitis with elevated liver function test  Patient with history of hypertension  History of previous stroke  Status post pacemaker  History of slow-growing pancreatic tumor status post stent    Active Hospital Problems:  Active Hospital Problems    Diagnosis     **Transaminitis      Plan:   Patient be admitted  Unable to do an MRCP due to her pacemaker till confirmed if the pacemaker is compatible  GI will be consulted  Repeat her liver function tests today  Awaiting further recommendation per GI and will  Will hold her home meds for now    DVT prophylaxis:  Medical DVT prophylaxis orders are present.        CODE STATUS:    Code Status (Patient has no pulse and is not breathing): CPR (Attempt to Resuscitate)  Medical Interventions (Patient has pulse or is breathing): Full Support    Admission Status:  I believe this patient meets ip status.    Mikey Sandhu MD

## 2024-03-07 NOTE — PLAN OF CARE
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 At baseline, pt lives alone in a SSH with 1 HOMERO (no HR) and was independent w/ all household mobility w/ no AD, uses SPC for community mobility, still drives, independent all ADLs, denies fall. During eval, pt demonstrates independence with bed mobility, transfers, and gait training 100 ft w/ no AD and appears to be functioning at baseline. Pt with no skilled PT needs at this time and anticipate routine d/c home. Eval only. PPE: gloves

## 2024-03-07 NOTE — CONSULTS
"GI CONSULT  NOTE:    Referring Provider:  Dr. Sandhu    Chief complaint: Transaminitis    Subjective . \"I was told my labs were worsening\"    History of present illness: Stacy Monroy is a 89 y.o. 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  She presents with abnormal outpatient labs suggesting elevated LFTs.  8 days ago, she had an ablation for back pain which significantly improved her pain but she struggled with some hypertension after the procedure.  She was seen by PCP and reports imaging and labs showed a UTI for which she was given Bactrim.  She reports a CT showing \"colitis\" and abnormal LFTs.  She reports abnormal LFTs prior to starting Bactrim.  She initially had some lower abdominal pain last week that she attributed to the UTI which has since resolved.  She denies any constipation typically has a daily soft formed bowel movement but occasional diarrhea.  No melena, rectal bleeding or acholic stool.  No jaundice or tea colored urine.  No unintentional weight loss and appetite is good.  No nausea or vomiting.  No heartburn or fevers.  No new medications otherwise.  Occasional NSAID use but not routinely.    Endo History:  9/2021 ERCP by Dr. Wilson -extensive cystic masses of pancreatic head with CBD extrinsic compression with obstruction s/p balloon dilation and placement of CBD metal stent 10 x 16 mm, CBD brushings benign, PD stent placed, periampullary diverticulum  6/2021 EGD/EUS by Dr. Mcdaniels is-esophageal ulcer with biopsy showing esophagitis, small esophageal nodule, gastric ulcer/erosive gastritis with biopsies H. pylori negative, gastric polyp s/p polypectomy with pathology showing well-differentiated neuroendocrine tumor, small to medium hiatal hernia, 4.1 cm x 3.1 cm " multiloculated cystic mass in the pancreatic head/neck with CBD compression, , pancreatic body cyst, cholelithiasis    Past Medical History:  Past Medical History:   Diagnosis Date    Arthritis     Chronic bilateral low back pain with right-sided sciatica 05/05/2023    Chronic pain disorder     Heart murmur     Hyperlipidemia     Hypertension     Irritable bowel syndrome pain in belly an gas    Joint pain     Other specified hypothyroidism 08/15/2019    Presence of cardiac pacemaker 07/09/2021    [facility]       Past Surgical History:  Past Surgical History:   Procedure Laterality Date    BLADDER REPAIR      BREAST BIOPSY Right     CARDIAC ELECTROPHYSIOLOGY PROCEDURE N/A 05/19/2021    Procedure: Pacemaker DC new;  Surgeon: Edmar Rubin MD;  Location: [facility] CATH INVASIVE LOCATION;  Service: Cardiovascular;  Laterality: N/A;    ERCP N/A 09/14/2021    Procedure: ERCP WITH SPHINCTEROTOMY, sphincteroplasty, clearance of bile duct with balloon. brushing, placement of pancreatic stent and placement of metal biliary stent;  Surgeon: Adrienne Wilson MD;  Location: [facility] ENDOSCOPY;  Service: Gastroenterology;  Laterality: N/A;  post op: cystic mass compressing bile duct    HYSTERECTOMY      INSERT / REPLACE / REMOVE PACEMAKER      OTHER SURGICAL HISTORY  2019    Tilt table     OTHER SURGICAL HISTORY  2019    tilt table        Social History:  Social History     Tobacco Use    Smoking status: Never    Smokeless tobacco: Never    Tobacco comments:     None   Vaping Use    Vaping status: Never Used   Substance Use Topics    Alcohol use: No    Drug use: No       Family History:  Family History   Problem Relation Age of Onset    Hypertension Mother     Stroke Mother     Hypertension Father     Stroke Father     Breast cancer Sister 50    Ovarian cancer Daughter 57    Breast cancer Niece 53       Medications:  Facility-Administered Medications Prior to Admission   Medication Dose Route Frequency Provider Last  Rate Last Admin    cyanocobalamin injection 1,000 mcg  1,000 mcg Intramuscular Q28 Days Mita Moise MD   1,000 mcg at 03/04/24 0947    [DISCONTINUED] cyanocobalamin injection 1,000 mcg  1,000 mcg Intramuscular Q28 Days Mita Moise MD   1,000 mcg at 05/15/23 1337    [DISCONTINUED] cyanocobalamin injection 1,000 mcg  1,000 mcg Intramuscular Q28 Days Mita Moise MD   1,000 mcg at 10/11/23 1338     Medications Prior to Admission   Medication Sig Dispense Refill Last Dose    calcium citrate-vitamin d (CITRACAL) 200-250 MG-UNIT tablet tablet Take 1 tablet by mouth Daily.   3/6/2024    coenzyme Q10 100 MG capsule Take 1 capsule by mouth Daily.   3/7/2024    levothyroxine (SYNTHROID, LEVOTHROID) 50 MCG tablet Take 1 tablet by mouth Daily. 30 tablet 12 3/7/2024    metoprolol succinate XL (TOPROL-XL) 25 MG 24 hr tablet Take 1 tablet by mouth Daily. 90 tablet 3 3/7/2024    Omega-3 1000 MG capsule Take 1 capsule by mouth Daily.   3/7/2024    sulfamethoxazole-trimethoprim (BACTRIM,SEPTRA) 400-80 MG tablet Take 1 tablet by mouth 2 (Two) Times a Day. 14 tablet 0 3/7/2024       Scheduled Meds:enoxaparin, 40 mg, Subcutaneous, Daily  senna-docusate sodium, 2 tablet, Oral, BID  sodium chloride, 10 mL, Intravenous, Q12H      Continuous Infusions:   PRN Meds:.  senna-docusate sodium **AND** polyethylene glycol **AND** bisacodyl **AND** bisacodyl    sodium chloride    sodium chloride    ALLERGIES:  Azithromycin, Erythromycin, Penicillin g, and Penicillins    ROS:  The following systems were reviewed   Constitution:  No fevers, chills, no unintentional weight loss  Skin: no rash, no jaundice  Eyes:  No blurry vision, no eye pain  HENT:  No change in hearing or smell  Resp:  No dyspnea or cough  CV:  No chest pain or palpitations  :  No dysuria, hematuria  Musculoskeletal:  No leg cramps or arthralgias, + back pain  Neuro:  No tremor, no numbness  Psych:  No depression or confusion    Objective resting on side of bed,  "no acute distress, family at bedside    Vital Signs:   Vitals:    03/07/24 1100   BP: 131/65   BP Location: Left arm   Patient Position: Sitting   Pulse: 60   Resp: 13   Temp: 97 °F (36.1 °C)   TempSrc: Axillary   SpO2: 99%   Weight: 62 kg (136 lb 11 oz)   Height: 160 cm (62.99\")       Physical Exam:     General Appearance:    Awake and alert, in no acute distress   Head:    Normocephalic, without obvious abnormality, atraumatic   Throat:   No oral lesions, no thrush, oral mucosa moist   Lungs:     Respirations regular, even and unlabored   Chest Wall:    No abnormalities observed   Abdomen:     Soft, non-tender, nondistended   Rectal:     Deferred   Extremities:   Moves all extremities, no edema, no cyanosis       Skin:   No rash, no jaundice, normal palpation       Neurologic:   Cranial nerves 2 - 12 grossly intact       Results Review:   I reviewed the patient's labs and imaging.  CBC    Results from last 7 days   Lab Units 03/04/24  1000   WBC x10E3/uL 8.9   HEMOGLOBIN g/dL 12.3   PLATELETS x10E3/uL 292     CMP   Results from last 7 days   Lab Units 03/06/24  0922 03/04/24  1000   SODIUM mmol/L 137 137   POTASSIUM mmol/L 5.0 4.8   CHLORIDE mmol/L 101 103   CO2 mmol/L 22 21   BUN mg/dL 17 21   CREATININE mg/dL 1.20* 1.01*   GLUCOSE mg/dL 123* 142*   ALBUMIN g/dL 3.7 3.8   BILIRUBIN mg/dL 0.9 1.0   ALK PHOS IU/L 414* 365*   AST (SGOT) IU/L 339* 381*   ALT (SGPT) IU/L 323* 252*     Cr Clearance Estimated Creatinine Clearance: 28.7 mL/min (A) (by C-G formula based on SCr of 1.2 mg/dL (H)).  Coag     HbA1C   Lab Results   Component Value Date    HGBA1C 5.8 (H) 05/07/2021     Blood Glucose No results found for: \"POCGLU\"  Infection   Results from last 7 days   Lab Units 03/04/24  0952   URINE CULTURE  Final report     UA    Results from last 7 days   Lab Units 03/04/24  0952   BACTERIA UA  Few   URINE CULTURE  Final report     Imaging Results (Last 72 Hours)       ** No results found for the last 72 hours. 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  She presents with outpatient labs suggesting worsening LFTs.  Recent UTI on antibiotics but reports elevated LFTs prior to starting this medication.     Review of records show almost completely normal liver enzymes in September.  Alk phos 414, ,  and total bilirubin 0.9.  Denies abdominal pain, nausea or vomiting.  Creatinine 1.01.  Noncontrasted CT of the abdomen showed no acute process or significant change but there is significant respiratory motion artifact which limits imaging.  Cystic lesion of the pancreas head appears to be similar in size at 2.5 cm.  This is along the right lateral margin of the biliary stent.  She will likely need repeat ERCP with clearance of metal CBD stent.     I discussed the patients findings and my recommendations with the patient.    We appreciate the referral    Electronically signed by WILDER Porter, 03/07/24, 2:34 PM EST.

## 2024-03-07 NOTE — ASSESSMENT & PLAN NOTE
Hypertension is stable and controlled  Continue current treatment regimen.  Blood pressure will be reassessed  next week .    Blood pressure is back in range without intervention.   Will monitor and recheck her in a week

## 2024-03-07 NOTE — TELEPHONE ENCOUNTER
Called Gastro and spoke to Yasmeen, explained that patient's ALT and AST have increased from 300 to 400. Per Dr. Moise she may have an obstruction I mentioned to Yasmeen. I asked what the office advises she suggests patient go to the ER. Dr. Moise said she would direct admit her.

## 2024-03-08 ENCOUNTER — ANESTHESIA EVENT (OUTPATIENT)
Dept: GASTROENTEROLOGY | Facility: HOSPITAL | Age: 89
End: 2024-03-08
Payer: MEDICARE

## 2024-03-08 ENCOUNTER — ANESTHESIA (OUTPATIENT)
Dept: GASTROENTEROLOGY | Facility: HOSPITAL | Age: 89
End: 2024-03-08
Payer: MEDICARE

## 2024-03-08 ENCOUNTER — INPATIENT HOSPITAL (OUTPATIENT)
Dept: URBAN - METROPOLITAN AREA HOSPITAL 84 | Facility: HOSPITAL | Age: 89
End: 2024-03-08
Payer: MEDICARE

## 2024-03-08 ENCOUNTER — APPOINTMENT (OUTPATIENT)
Dept: GENERAL RADIOLOGY | Facility: HOSPITAL | Age: 89
DRG: 445 | End: 2024-03-08
Payer: MEDICARE

## 2024-03-08 DIAGNOSIS — R10.11 RIGHT UPPER QUADRANT PAIN: ICD-10-CM

## 2024-03-08 DIAGNOSIS — R94.5 ABNORMAL RESULTS OF LIVER FUNCTION STUDIES: ICD-10-CM

## 2024-03-08 DIAGNOSIS — K80.51 CALCULUS OF BILE DUCT WITHOUT CHOLANGITIS OR CHOLECYSTITIS W: ICD-10-CM

## 2024-03-08 DIAGNOSIS — Z96.89 PRESENCE OF OTHER SPECIFIED FUNCTIONAL IMPLANTS: ICD-10-CM

## 2024-03-08 DIAGNOSIS — T85.590A OTHER MECHANICAL COMPLICATION OF BILE DUCT PROSTHESIS, INITI: ICD-10-CM

## 2024-03-08 LAB
ALBUMIN SERPL-MCNC: 3.2 G/DL (ref 3.5–5.2)
ALBUMIN/GLOB SERPL: 1.2 G/DL
ALP SERPL-CCNC: 349 U/L (ref 39–117)
ALT SERPL W P-5'-P-CCNC: 189 U/L (ref 1–33)
ANION GAP SERPL CALCULATED.3IONS-SCNC: 10 MMOL/L (ref 5–15)
AST SERPL-CCNC: 127 U/L (ref 1–32)
BASOPHILS # BLD AUTO: 0 10*3/MM3 (ref 0–0.2)
BASOPHILS NFR BLD AUTO: 0.7 % (ref 0–1.5)
BILIRUB SERPL-MCNC: 0.5 MG/DL (ref 0–1.2)
BUN SERPL-MCNC: 18 MG/DL (ref 8–23)
BUN/CREAT SERPL: 15.8 (ref 7–25)
CALCIUM SPEC-SCNC: 9.1 MG/DL (ref 8.2–9.6)
CHLORIDE SERPL-SCNC: 103 MMOL/L (ref 98–107)
CO2 SERPL-SCNC: 22 MMOL/L (ref 22–29)
CREAT SERPL-MCNC: 1.14 MG/DL (ref 0.57–1)
DEPRECATED RDW RBC AUTO: 54.3 FL (ref 37–54)
EGFRCR SERPLBLD CKD-EPI 2021: 45 ML/MIN/1.73
EOSINOPHIL # BLD AUTO: 0.1 10*3/MM3 (ref 0–0.4)
EOSINOPHIL NFR BLD AUTO: 0.8 % (ref 0.3–6.2)
ERYTHROCYTE [DISTWIDTH] IN BLOOD BY AUTOMATED COUNT: 18.3 % (ref 12.3–15.4)
GLOBULIN UR ELPH-MCNC: 2.6 GM/DL
GLUCOSE SERPL-MCNC: 89 MG/DL (ref 65–99)
HCT VFR BLD AUTO: 37.2 % (ref 34–46.6)
HGB BLD-MCNC: 11.7 G/DL (ref 12–15.9)
INR PPP: 0.94 (ref 0.93–1.1)
LYMPHOCYTES # BLD AUTO: 1.8 10*3/MM3 (ref 0.7–3.1)
LYMPHOCYTES NFR BLD AUTO: 25.8 % (ref 19.6–45.3)
MCH RBC QN AUTO: 25 PG (ref 26.6–33)
MCHC RBC AUTO-ENTMCNC: 31.4 G/DL (ref 31.5–35.7)
MCV RBC AUTO: 79.7 FL (ref 79–97)
MONOCYTES # BLD AUTO: 0.8 10*3/MM3 (ref 0.1–0.9)
MONOCYTES NFR BLD AUTO: 10.7 % (ref 5–12)
NEUTROPHILS NFR BLD AUTO: 4.3 10*3/MM3 (ref 1.7–7)
NEUTROPHILS NFR BLD AUTO: 62 % (ref 42.7–76)
NRBC BLD AUTO-RTO: 0.1 /100 WBC (ref 0–0.2)
PLATELET # BLD AUTO: 203 10*3/MM3 (ref 140–450)
PMV BLD AUTO: 8.8 FL (ref 6–12)
POTASSIUM SERPL-SCNC: 4.9 MMOL/L (ref 3.5–5.2)
PROT SERPL-MCNC: 5.8 G/DL (ref 6–8.5)
PROTHROMBIN TIME: 10.3 SECONDS (ref 9.6–11.7)
RBC # BLD AUTO: 4.66 10*6/MM3 (ref 3.77–5.28)
SODIUM SERPL-SCNC: 135 MMOL/L (ref 136–145)
WBC NRBC COR # BLD AUTO: 7 10*3/MM3 (ref 3.4–10.8)

## 2024-03-08 PROCEDURE — C1769 GUIDE WIRE: HCPCS | Performed by: INTERNAL MEDICINE

## 2024-03-08 PROCEDURE — 25510000001 IOPAMIDOL 61 % SOLUTION 30 ML VIAL: Performed by: INTERNAL MEDICINE

## 2024-03-08 PROCEDURE — 74330 X-RAY BILE/PANC ENDOSCOPY: CPT

## 2024-03-08 PROCEDURE — 25010000002 DEXAMETHASONE PER 1 MG: Performed by: NURSE ANESTHETIST, CERTIFIED REGISTERED

## 2024-03-08 PROCEDURE — 80053 COMPREHEN METABOLIC PANEL: CPT | Performed by: INTERNAL MEDICINE

## 2024-03-08 PROCEDURE — 25010000002 METRONIDAZOLE 500 MG/100ML SOLUTION: Performed by: INTERNAL MEDICINE

## 2024-03-08 PROCEDURE — 85610 PROTHROMBIN TIME: CPT | Performed by: NURSE PRACTITIONER

## 2024-03-08 PROCEDURE — 25010000002 ONDANSETRON PER 1 MG: Performed by: NURSE ANESTHETIST, CERTIFIED REGISTERED

## 2024-03-08 PROCEDURE — 85025 COMPLETE CBC W/AUTO DIFF WBC: CPT | Performed by: NURSE PRACTITIONER

## 2024-03-08 PROCEDURE — 0FC98ZZ EXTIRPATION OF MATTER FROM COMMON BILE DUCT, VIA NATURAL OR ARTIFICIAL OPENING ENDOSCOPIC: ICD-10-PCS | Performed by: INTERNAL MEDICINE

## 2024-03-08 PROCEDURE — BF111ZZ FLUOROSCOPY OF BILIARY AND PANCREATIC DUCTS USING LOW OSMOLAR CONTRAST: ICD-10-PCS | Performed by: INTERNAL MEDICINE

## 2024-03-08 PROCEDURE — 25010000002 ENOXAPARIN PER 10 MG: Performed by: INTERNAL MEDICINE

## 2024-03-08 PROCEDURE — 25010000002 SUGAMMADEX 200 MG/2ML SOLUTION: Performed by: NURSE ANESTHETIST, CERTIFIED REGISTERED

## 2024-03-08 PROCEDURE — 25810000003 SODIUM CHLORIDE 0.9 % SOLUTION: Performed by: NURSE ANESTHETIST, CERTIFIED REGISTERED

## 2024-03-08 PROCEDURE — 25010000002 PROPOFOL 200 MG/20ML EMULSION: Performed by: NURSE ANESTHETIST, CERTIFIED REGISTERED

## 2024-03-08 PROCEDURE — 25010000002 CEFTRIAXONE PER 250 MG: Performed by: INTERNAL MEDICINE

## 2024-03-08 PROCEDURE — 43264 ERCP REMOVE DUCT CALCULI: CPT | Performed by: INTERNAL MEDICINE

## 2024-03-08 RX ORDER — SODIUM CHLORIDE 9 MG/ML
INJECTION, SOLUTION INTRAVENOUS CONTINUOUS PRN
Status: DISCONTINUED | OUTPATIENT
Start: 2024-03-08 | End: 2024-03-08 | Stop reason: SURG

## 2024-03-08 RX ORDER — ONDANSETRON 4 MG/1
4 TABLET, ORALLY DISINTEGRATING ORAL EVERY 6 HOURS PRN
Status: DISCONTINUED | OUTPATIENT
Start: 2024-03-08 | End: 2024-03-09 | Stop reason: HOSPADM

## 2024-03-08 RX ORDER — INDOMETHACIN 100 MG
SUPPOSITORY, RECTAL RECTAL
Status: DISCONTINUED
Start: 2024-03-08 | End: 2024-03-08 | Stop reason: WASHOUT

## 2024-03-08 RX ORDER — DEXAMETHASONE SODIUM PHOSPHATE 4 MG/ML
INJECTION, SOLUTION INTRA-ARTICULAR; INTRALESIONAL; INTRAMUSCULAR; INTRAVENOUS; SOFT TISSUE AS NEEDED
Status: DISCONTINUED | OUTPATIENT
Start: 2024-03-08 | End: 2024-03-08 | Stop reason: SURG

## 2024-03-08 RX ORDER — LIDOCAINE HYDROCHLORIDE 20 MG/ML
INJECTION, SOLUTION INFILTRATION; PERINEURAL AS NEEDED
Status: DISCONTINUED | OUTPATIENT
Start: 2024-03-08 | End: 2024-03-08 | Stop reason: SURG

## 2024-03-08 RX ORDER — ONDANSETRON 2 MG/ML
INJECTION INTRAMUSCULAR; INTRAVENOUS AS NEEDED
Status: DISCONTINUED | OUTPATIENT
Start: 2024-03-08 | End: 2024-03-08 | Stop reason: SURG

## 2024-03-08 RX ORDER — ONDANSETRON 2 MG/ML
4 INJECTION INTRAMUSCULAR; INTRAVENOUS EVERY 6 HOURS PRN
Status: DISCONTINUED | OUTPATIENT
Start: 2024-03-08 | End: 2024-03-09 | Stop reason: HOSPADM

## 2024-03-08 RX ORDER — URSODIOL 300 MG/1
300 CAPSULE ORAL 2 TIMES DAILY
Status: DISCONTINUED | OUTPATIENT
Start: 2024-03-08 | End: 2024-03-09 | Stop reason: HOSPADM

## 2024-03-08 RX ORDER — LEVOTHYROXINE SODIUM 0.05 MG/1
50 TABLET ORAL
Status: DISCONTINUED | OUTPATIENT
Start: 2024-03-08 | End: 2024-03-09 | Stop reason: HOSPADM

## 2024-03-08 RX ORDER — METOPROLOL SUCCINATE 25 MG/1
25 TABLET, EXTENDED RELEASE ORAL DAILY
Status: DISCONTINUED | OUTPATIENT
Start: 2024-03-08 | End: 2024-03-09 | Stop reason: HOSPADM

## 2024-03-08 RX ORDER — METOPROLOL TARTRATE 1 MG/ML
INJECTION, SOLUTION INTRAVENOUS AS NEEDED
Status: DISCONTINUED | OUTPATIENT
Start: 2024-03-08 | End: 2024-03-08 | Stop reason: SURG

## 2024-03-08 RX ORDER — ROCURONIUM BROMIDE 10 MG/ML
INJECTION, SOLUTION INTRAVENOUS AS NEEDED
Status: DISCONTINUED | OUTPATIENT
Start: 2024-03-08 | End: 2024-03-08 | Stop reason: SURG

## 2024-03-08 RX ORDER — PROPOFOL 10 MG/ML
INJECTION, EMULSION INTRAVENOUS AS NEEDED
Status: DISCONTINUED | OUTPATIENT
Start: 2024-03-08 | End: 2024-03-08 | Stop reason: SURG

## 2024-03-08 RX ORDER — SODIUM CHLORIDE 9 MG/ML
INJECTION, SOLUTION INTRAVENOUS
Status: COMPLETED
Start: 2024-03-08 | End: 2024-03-08

## 2024-03-08 RX ADMIN — METRONIDAZOLE 500 MG: 500 INJECTION, SOLUTION INTRAVENOUS at 13:56

## 2024-03-08 RX ADMIN — DEXAMETHASONE SODIUM PHOSPHATE 4 MG: 4 INJECTION, SOLUTION INTRAMUSCULAR; INTRAVENOUS at 12:07

## 2024-03-08 RX ADMIN — METOPROLOL TARTRATE 2 MG: 1 INJECTION, SOLUTION INTRAVENOUS at 12:10

## 2024-03-08 RX ADMIN — CEFTRIAXONE 1000 MG: 1 INJECTION, POWDER, FOR SOLUTION INTRAMUSCULAR; INTRAVENOUS at 20:50

## 2024-03-08 RX ADMIN — METOPROLOL SUCCINATE 25 MG: 25 TABLET, FILM COATED, EXTENDED RELEASE ORAL at 13:56

## 2024-03-08 RX ADMIN — PROPOFOL 100 MG: 10 INJECTION, EMULSION INTRAVENOUS at 11:47

## 2024-03-08 RX ADMIN — LEVOTHYROXINE SODIUM 50 MCG: 0.05 TABLET ORAL at 13:56

## 2024-03-08 RX ADMIN — Medication 10 ML: at 08:03

## 2024-03-08 RX ADMIN — ROCURONIUM BROMIDE 25 MG: 10 INJECTION, SOLUTION INTRAVENOUS at 11:47

## 2024-03-08 RX ADMIN — Medication 1 TABLET: at 13:56

## 2024-03-08 RX ADMIN — ENOXAPARIN SODIUM 40 MG: 100 INJECTION SUBCUTANEOUS at 16:20

## 2024-03-08 RX ADMIN — METRONIDAZOLE 500 MG: 500 INJECTION, SOLUTION INTRAVENOUS at 20:51

## 2024-03-08 RX ADMIN — SUGAMMADEX 100 MG: 100 INJECTION, SOLUTION INTRAVENOUS at 12:28

## 2024-03-08 RX ADMIN — LIDOCAINE HYDROCHLORIDE 50 MG: 20 INJECTION, SOLUTION INFILTRATION; PERINEURAL at 11:47

## 2024-03-08 RX ADMIN — ONDANSETRON 4 MG: 2 INJECTION INTRAMUSCULAR; INTRAVENOUS at 12:07

## 2024-03-08 RX ADMIN — URSODIOL 300 MG: 300 CAPSULE ORAL at 20:51

## 2024-03-08 RX ADMIN — Medication 10 ML: at 20:52

## 2024-03-08 RX ADMIN — SODIUM CHLORIDE: 9 INJECTION, SOLUTION INTRAVENOUS at 11:43

## 2024-03-08 RX ADMIN — METRONIDAZOLE 500 MG: 500 INJECTION, SOLUTION INTRAVENOUS at 03:38

## 2024-03-08 NOTE — ANESTHESIA POSTPROCEDURE EVALUATION
Patient: Stacy Monroy    Procedure Summary       Date: 03/08/24 Room / Location: [facility] / [facility]    Anesthesia Start: 1143 Anesthesia Stop: 1238    Procedure: ENDOSCOPIC RETROGRADE CHOLANGIOPANCREATOGRAPHY WITH BALLOON CLEARANCE OF CLOGGED METAL BILIARY STENT, BALLOON STONE EXTRACTION Diagnosis:       Transaminitis      Pancreatic cyst      (Transaminitis [R74.01])      (Pancreatic cyst [K86.2])    Surgeons: aSmy Bryant MD Provider: Dima River MD    Anesthesia Type: general ASA Status: 3            Anesthesia Type: general    Vitals  Vitals Value Taken Time   /70 03/08/24 1310   Temp 97.7 °F (36.5 °C) 03/08/24 1250   Pulse 60 03/08/24 1314   Resp 10 03/08/24 1250   SpO2 94 % 03/08/24 1314   Vitals shown include unfiled device data.        Post Anesthesia Care and Evaluation    Patient location during evaluation: PACU  Patient participation: complete - patient participated  Level of consciousness: awake  Pain score: 0  Pain management: adequate  Anesthetic complications: No anesthetic complications  PONV Status: none  Cardiovascular status: acceptable  Respiratory status: acceptable  Hydration status: acceptable

## 2024-03-08 NOTE — OP NOTE
ENDOSCOPIC RETROGRADE CHOLANGIOPANCREATOGRAPHY Procedure Report    Patient Name:  Stacy Monroy  YOB: 1930    Date of Surgery:  3/8/2024     Preoperative diagnosis:  Elevated liver enzymes  Right upper quadrant pain    Postoperative diagnosis:  Choledocholithiasis with obstruction  Clogged metal stent           Procedure(s):  ENDOSCOPIC RETROGRADE CHOLANGIOPANCREATOGRAPHY WITH BALLOON CLEARANCE OF CLOGGED METAL BILIARY STENT, BALLOON STONE EXTRACTION     Staff:  Surgeon(s):  Samy Bryant MD      Anesthesia: General      Implants:    Nothing was implanted during the procedure    Specimen:        See Below    Estimated blood loss: Minimal     Complications:  None    Description of Procedure:  Informed consent was obtained for the procedure, including sedation.  Risks of perforation, hemorrhage, adverse drug reaction and aspiration and pancreatitis were discussed.    The patient was brought into the endoscopy suite. Continuous cardiopulmonary monitoring was performed.  After general anesthesia was administered and endotracheal intubation was achieved, the bite block was inserted into the patient's mouth. The patient was placed in the right semiprone position on the fluoroscopy table. Indocin suppository was inserted into the patient's rectum for prophylaxis.   A  film was obtained which showed a metal biliary stent in good position.  The duodenoscope was inserted into the patient's mouth and advanced to the second portion of the duodenum without difficulty.   Limited examination of the patient's esophagus, stomach, and duodenum were performed and were normal.  The duodenoscope was brought into the short position with the major papilla in direct visualization which showed the metal stent in good position but completely clogged with stone and debris.  There was some ingrowth of tissue at the opening of the bile duct.    A wire and extraction balloon catheter were used to deeply cannulate  the bile ducts.  Cholangiogram showed numerous filling defects throughout the intra and extrahepatic bile duct within the stent.  Numerous 12 mm balloon sweeps were performed and the largest 3 intrahepatic bile ducts and the entire extrahepatic duct through the clogged stent with a large amount of stone and debris removed.  Final cholangiogram was negative for any further debris and the stent drained promptly.      On completion of the exam, the bowel was decompressed, the scope was removed from the patient, the patient tolerated the procedure well, there were no immediate post-operative complications.  The pancreatic duct was not investigated since it was not the duct of interest.        Impression:  ERCP shows occluded metal stent which was cleared with an extraction balloon successfully    Recommendations:  Monitor for postoperative complications  Low-fat diet  Continue antibiotics x 5 days and then stop  Ursodiol 300 mg p.o. twice daily to prevent future stone formation  Keep follow-up appointment with GI nurse practitioner next week to check liver tests  GI will be available over the weekend, please call with questions    We appreciate the referral    Electronically signed by Samy Bryant MD, 03/08/24, 12:36 PM EST.

## 2024-03-08 NOTE — PLAN OF CARE
Problem: Adult Inpatient Plan of Care  Goal: Plan of Care Review  Outcome: Ongoing, Progressing  Goal: Patient-Specific Goal (Individualized)  Outcome: Ongoing, Progressing  Goal: Absence of Hospital-Acquired Illness or Injury  Outcome: Ongoing, Progressing  Intervention: Identify and Manage Fall Risk  Description: Perform standard risk assessment on admission using a validated tool or comprehensive approach appropriate to the patient; reassess fall risk frequently, with change in status or transfer to another level of care.  Communicate fall injury risk to interprofessional healthcare team.  Determine need for increased observation, equipment and environmental modification, such as low bed, signage and supportive, nonskid footwear.  Adjust safety measures to individual developmental age, stage and identified risk factors.  Reinforce the importance of safety and physical activity with patient and family.  Perform regular intentional rounding to assess need for position change, pain assessment and personal needs, including assistance with toileting.  Recent Flowsheet Documentation  Taken 3/8/2024 0532 by Aurelia Sharma RN  Safety Promotion/Fall Prevention:   activity supervised   fall prevention program maintained   clutter free environment maintained   lighting adjusted   room organization consistent   safety round/check completed  Taken 3/8/2024 0400 by Aurelia Sharma RN  Safety Promotion/Fall Prevention:   activity supervised   fall prevention program maintained   clutter free environment maintained   lighting adjusted   room organization consistent   safety round/check completed  Taken 3/8/2024 0200 by Aurelia Sharma RN  Safety Promotion/Fall Prevention:   activity supervised   gait belt   clutter free environment maintained   lighting adjusted   room organization consistent  Taken 3/8/2024 0000 by Aurelia Sharma RN  Safety Promotion/Fall Prevention:   activity supervised   fall prevention program  maintained   clutter free environment maintained   lighting adjusted   room organization consistent   safety round/check completed  Taken 3/7/2024 2200 by Aurelia Sharma RN  Safety Promotion/Fall Prevention:   activity supervised   fall prevention program maintained   clutter free environment maintained   lighting adjusted   room organization consistent   safety round/check completed  Taken 3/7/2024 2000 by Aurelia Sharma RN  Safety Promotion/Fall Prevention:   activity supervised   clutter free environment maintained   fall prevention program maintained   lighting adjusted   room organization consistent   safety round/check completed  Intervention: Prevent Skin Injury  Description: Perform a screening for skin injury risk, such as pressure or moisture associated skin damage on admission and at regular intervals throughout hospital stay.  Keep all areas of skin (especially folds) clean and dry.  Maintain adequate skin hydration.  Relieve and redistribute pressure and protect bony prominences; implement measures based on patient-specific risk factors.  Match turning and repositioning schedule to clinical condition.  Encourage weight shift frequently; assist with reposition if unable to complete independently.  Float heels off bed; avoid pressure on the Achilles tendon.  Keep skin free from extended contact with medical devices.  Encourage functional activity and mobility, as early as tolerated.  Use aids (e.g., slide boards, mechanical lift) during transfer.  Recent Flowsheet Documentation  Taken 3/8/2024 0532 by Aurelia Sharma RN  Skin Protection: adhesive use limited  Taken 3/7/2024 2000 by Aurelia Sharma RN  Skin Protection: tubing/devices free from skin contact  Intervention: Prevent Infection  Description: Maintain skin and mucous membrane integrity; promote hand, oral and pulmonary hygiene.  Optimize fluid balance, nutrition, sleep and glycemic control to maximize infection resistance.  Identify  potential sources of infection early to prevent or mitigate progression of infection (e.g., wound, lines, devices).  Evaluate ongoing need for invasive devices; remove promptly when no longer indicated.  Recent Flowsheet Documentation  Taken 3/8/2024 0532 by Aurelia Sharma RN  Infection Prevention:   hand hygiene promoted   rest/sleep promoted  Taken 3/8/2024 0400 by Aurelia Sharma RN  Infection Prevention: hand hygiene promoted  Taken 3/8/2024 0200 by Aurelia Sharma RN  Infection Prevention: hand hygiene promoted  Taken 3/8/2024 0000 by Aurelia Sharma RN  Infection Prevention: hand hygiene promoted  Taken 3/7/2024 2200 by Aurelia Sharma RN  Infection Prevention: hand hygiene promoted  Taken 3/7/2024 2000 by Aurelia Sharma RN  Infection Prevention:   rest/sleep promoted   hand hygiene promoted  Goal: Optimal Comfort and Wellbeing  Outcome: Ongoing, Progressing  Intervention: Monitor Pain and Promote Comfort  Description: Assess pain level, treatment efficacy and patient response at regular intervals using a consistent pain scale.  Consider the presence and impact of preexisting chronic pain.  Encourage patient and caregiver involvement in pain assessment, interventions and safety measures.  Recent Flowsheet Documentation  Taken 3/8/2024 0532 by Aurelia Sharma RN  Pain Management Interventions:   quiet environment facilitated   no interventions per patient request  Taken 3/7/2024 2000 by Aurelia Sharma RN  Pain Management Interventions:   quiet environment facilitated   no interventions per patient request  Intervention: Provide Person-Centered Care  Description: Use a family-focused approach to care.  Develop trust and rapport by proactively providing information, encouraging questions, addressing concerns and offering reassurance.  Acknowledge emotional response to hospitalization.  Recognize and utilize personal coping strategies.  Honor spiritual and cultural preferences.  Recent Flowsheet  Documentation  Taken 3/8/2024 0532 by Aurelia Sharma RN  Trust Relationship/Rapport:   care explained   choices provided  Taken 3/7/2024 2000 by Aurelia Sharma RN  Trust Relationship/Rapport:   choices provided   care explained  Goal: Readiness for Transition of Care  Outcome: Ongoing, Progressing     Problem: Skin Injury Risk Increased  Goal: Skin Health and Integrity  Outcome: Ongoing, Progressing  Intervention: Optimize Skin Protection  Description: Perform a full pressure injury risk assessment, as indicated by screening, upon admission to care unit.  Reassess skin (injury risk, full inspection) frequently (e.g., scheduled interval, with change in condition) to provide optimal early detection and prevention.  Maintain adequate tissue perfusion (e.g., encourage fluid balance; avoid crossing legs, constrictive clothing or devices) to promote tissue oxygenation.  Maintain head of bed at lowest degree of elevation tolerated, considering medical condition and other restrictions.  Avoid positioning onto an area that remains reddened.  Minimize incontinence and moisture (e.g., toileting schedule; moisture-wicking pad, diaper or incontinence collection device; skin moisture barrier).  Cleanse skin promptly and gently when soiled utilizing a pH-balanced cleanser.  Relieve and redistribute pressure (e.g., scheduled position changes, weight shifts, use of support surface, medical device repositioning, protective dressing application, use of positioning device, microclimate control, use of pressure-injury-monitor  Encourage increased activity, such as sitting in a chair at the bedside or early mobilization, when able to tolerate.  Recent Flowsheet Documentation  Taken 3/8/2024 0532 by Aurelia Sharma RN  Pressure Reduction Techniques: frequent weight shift encouraged  Pressure Reduction Devices: pressure-redistributing mattress utilized  Skin Protection: adhesive use limited  Taken 3/7/2024 2000 by Aurelia Sharma  RN  Pressure Reduction Techniques: frequent weight shift encouraged  Pressure Reduction Devices: specialty bed utilized  Skin Protection: tubing/devices free from skin contact     Problem: Fall Injury Risk  Goal: Absence of Fall and Fall-Related Injury  Outcome: Ongoing, Progressing  Intervention: Identify and Manage Contributors  Description: Develop a fall prevention plan with the patient and caregiver/family.  Provide reorientation, appropriate sensory stimulation and routines with changes in mental status to decrease risk of fall.  Promote use of personal vision and auditory aids.  Assess assistance level required for safe and effective self-care; provide support as needed, such as toileting, mobilization. For age 65 and older, implement timed toileting with assistance.  Encourage physical activity, such as performance of mobility and self-care at highest level of patient ability, multicomponent exercise program and provision of appropriate assistive devices.  If fall occurs, assess the severity of injury; implement fall injury protocol. Determine the cause and revise fall injury prevention plan.  Regularly review medication contribution to fall risk; adjust medication administration times to minimize risk of falling.  Consider risk related to polypharmacy and age.  Balance adequate pain management with potential for oversedation.  Recent Flowsheet Documentation  Taken 3/8/2024 0532 by Aurelia Sharma RN  Medication Review/Management: medications reviewed  Taken 3/8/2024 0400 by Aurelia Sharma RN  Medication Review/Management: medications reviewed  Taken 3/8/2024 0200 by Aurelia Sharma RN  Medication Review/Management: medications reviewed  Taken 3/8/2024 0000 by Aurelia Sharma RN  Medication Review/Management: medications reviewed  Taken 3/7/2024 2200 by Aurelia Sharma RN  Medication Review/Management: medications reviewed  Taken 3/7/2024 2000 by Aurelia Sharma RN  Medication Review/Management:  medications reviewed  Intervention: Promote Injury-Free Environment  Description: Provide a safe, barrier-free environment that encourages independent activity.  Keep care area uncluttered and well-lighted.  Determine need for increased observation or monitoring.  Avoid use of devices that minimize mobility, such as restraints or indwelling urinary catheter.  Recent Flowsheet Documentation  Taken 3/8/2024 0532 by Aurelia Sharma RN  Safety Promotion/Fall Prevention:   activity supervised   fall prevention program maintained   clutter free environment maintained   lighting adjusted   room organization consistent   safety round/check completed  Taken 3/8/2024 0400 by Aurelia Sharma RN  Safety Promotion/Fall Prevention:   activity supervised   fall prevention program maintained   clutter free environment maintained   lighting adjusted   room organization consistent   safety round/check completed  Taken 3/8/2024 0200 by Aurelia Sharma RN  Safety Promotion/Fall Prevention:   activity supervised   gait belt   clutter free environment maintained   lighting adjusted   room organization consistent  Taken 3/8/2024 0000 by Aurelia Sharma RN  Safety Promotion/Fall Prevention:   activity supervised   fall prevention program maintained   clutter free environment maintained   lighting adjusted   room organization consistent   safety round/check completed  Taken 3/7/2024 2200 by Aurelia Sharma RN  Safety Promotion/Fall Prevention:   activity supervised   fall prevention program maintained   clutter free environment maintained   lighting adjusted   room organization consistent   safety round/check completed  Taken 3/7/2024 2000 by Aurelia Sharma RN  Safety Promotion/Fall Prevention:   activity supervised   clutter free environment maintained   fall prevention program maintained   lighting adjusted   room organization consistent   safety round/check completed   Goal Outcome Evaluation:

## 2024-03-08 NOTE — ANESTHESIA PREPROCEDURE EVALUATION
Anesthesia Evaluation     Patient summary reviewed and Nursing notes reviewed   NPO Solid Status: > 8 hours             Airway   Mallampati: II  TM distance: >3 FB  Neck ROM: full  No difficulty expected  Dental - normal exam     Pulmonary - negative pulmonary ROS and normal exam   (-) not a smoker  Cardiovascular - normal exam    ECG reviewed    (+) pacemaker pacemaker, hypertension  (-) angina, TAYLOR      Neuro/Psych- negative ROS  GI/Hepatic/Renal/Endo    (+) PUD, renal disease- CRI, thyroid problem     Musculoskeletal (-) negative ROS    Abdominal  - normal exam    Bowel sounds: normal.   Substance History - negative use     OB/GYN negative ob/gyn ROS         Other                    Anesthesia Plan    ASA 3     general       Anesthetic plan, risks, benefits, and alternatives have been provided, discussed and informed consent has been obtained with: patient.    CODE STATUS:    Code Status (Patient has no pulse and is not breathing): CPR (Attempt to Resuscitate)  Medical Interventions (Patient has pulse or is breathing): Full Support

## 2024-03-08 NOTE — PROGRESS NOTES
The patient feels ok currently. The patient has no complaints of abdominal pain. No nausea or vomiting. No light headedness or dizziness. No chest pain or SOB. The patient is able to follow commands without issue. The patient is having bowel movements daily. No other complaints over the course of the night. The patient is scheduled for ERCP today    Vitals are 127/63. Pulse is 60. Temperature is 97.9 R - 17  O2 sats 97 percent on RA    Physical Exam                         Constitutional: Awake, alert              Eyes: PERRLA, sclerae anicteric, no conjunctival injection              HENT: NCAT, mucous membranes moist              Neck: Supple, no thyromegaly, no lymphadenopathy, trachea midline              Respiratory: Clear to auscultation bilaterally, nonlabored respirations               Cardiovascular: RRR, no murmurs, rubs, or gallops, palpable pedal pulses bilaterally              Gastrointestinal: Positive bowel sounds, soft, nontender, nondistended              Musculoskeletal: No bilateral ankle edema, no clubbing or cyanosis to extremities              Psychiatric: Appropriate affect, cooperative              Neurologic: Oriented x 3, strength symmetric in all extremities, Cranial Nerves grossly intact to confrontation, speech clear              Skin: No rashes     As per GI attending Dr Bryant    89-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  At the time of her presentation 3 days ago she was placed on Bactrim for urinary tract infection.  Pain resolved but liver enzymes were elevated at that time.  Liver enzymes were previously normal.  She denies fever, acholic stool, or tea colored urine.  On exam her abdomen is soft and nontender without rebound or guarding.  CR 1.2, , , , , TB 0.9  CT reviewed by me again shows  the pancreatic head mass with stent in apparent good position and no other abnormalities.  Impression:  Elevated liver enzymes with abdominal pain that improved with antibiotics for UTI suspicious for cholangitis and occluded biliary stent.  Urinary tract infection  Plan:  N.p.o. after midnight  Hold blood thinners  Check lactic acid and blood cultures  Start Rocephin and metronidazole  ERCP with balloon clearance of stent tomorrow    03/08/2024 - patient is doing well currently. NPO for procedure this morning. Labs show significant improvement in LFTS overnight. . Bactrim is one possible etiology as well though it would be a rare cause. No other medications I can see on her MAR would lead to this. I will leave it to GI about the next step in the process.

## 2024-03-08 NOTE — PAYOR COMM NOTE
"This is clinical for Stacy Monroy   Reference/Auth#: [id number]    AUTHORIZATION PENDING:     Please call or fax determination to contact below.   Thank you.    MELIDA Westfall, RN, CCM  Utilization Review Nurse  [facility]  Direct & confidential phone # [phone]  Fax # [phone]    Stacy Monroy (89 y.o. Female)       Date of Birth   08/01/1930    Social Security Number       Address   [address] [place] [address] [place] [facility]    Home Phone   [phone]    MRN   [id number]       Hindu   Taoist    Marital Status                               Admission Date   3/7/24    Admission Type   Elective    Admitting Provider   Mikey Sandhu MD    Attending Provider   Tim Silverman DO    Department, Room/Bed   [facility] 2E MEDICAL INPATIENT, 211/1       Discharge Date       Discharge Disposition       Discharge Destination                                 Attending Provider: Tim Silverman DO    Allergies: Azithromycin, Erythromycin, Penicillin G, Penicillins    Isolation: None   Infection: None   Code Status: CPR    Ht: 160 cm (62.99\")   Wt: 62 kg (136 lb 11 oz)    Admission Cmt: None   Principal Problem: Transaminitis [R74.01]                   Active Insurance as of 3/7/2024       Primary Coverage       Payor Plan Insurance Group Employer/Plan Group    AETNA MEDICARE REPLACEMENT AETNA MEDICARE REPLACEMENT [id number]       Payor Plan Address Payor Plan Phone Number Payor Plan Fax Number Effective Dates    [address] [phone]  1/1/2020 - None Entered    [facility] [id number]         Subscriber Name Subscriber Birth Date Member ID       STACY MONROY 8/1/1930 [id number]                     Emergency Contacts        (Rel.) Home Phone Work Phone Mobile Phone    GAIL REZA (Friend) [phone] -- --    zane reza (Relative) -- -- [phone]                 History & Physical        Mieky Sandhu MD at 03/07/24 1148           " [facility]   HISTORY AND PHYSICAL    Patient Name: Stacy Monroy  : 1930  MRN: [id number]  Primary Care Physician:  Mita Moise MD  Date of admission: 3/7/2024    Subjective  Subjective     Chief Complaint: Patient was referred from her primary care physician office for direct admit for transaminitis with liver function test demarcated faded PCBs Dr. Mendez drGlass      HPI:    Stacy Monroy is a 89 y.o. female with a history of pacemaker  Patient was sent from her primary care physician as she had outpatient labs showing her LFTs to be in the 300 range  Patient had previous pancreatic stent for slowly growing pancreatic tumor  Patient with history of hypertension on medication  Had CAT scan as an outpatient  Could not have MRI due to her pacemaker  Patient with chronic back pain status post radiofrequency intervention by Dr. Fraser's recently    Review of Systems  Significant for low back pain for which she had radiofrequency ablation per Dr. Barrios  Denied any abdominal pain right now she did have some abdominal pain last week  No fever no chills no nausea vomiting or diarrhea respiratory system reviewed and negative  Personal History     Past Medical History:   Diagnosis Date    Arthritis     Chronic bilateral low back pain with right-sided sciatica 2023    Chronic pain disorder     Heart murmur     Hyperlipidemia     Hypertension     Irritable bowel syndrome pain in belly an gas    Joint pain     Other specified hypothyroidism 08/15/2019    Presence of cardiac pacemaker 2021    [facility]       Past Surgical History:   Procedure Laterality Date    BLADDER REPAIR      BREAST BIOPSY Right     CARDIAC ELECTROPHYSIOLOGY PROCEDURE N/A 2021    Procedure: Pacemaker DC new;  Surgeon: Edmar Rubin MD;  Location: [facility] INVASIVE LOCATION;  Service: Cardiovascular;  Laterality: N/A;    ERCP N/A 2021    Procedure: ERCP WITH SPHINCTEROTOMY, sphincteroplasty, clearance of  bile duct with balloon. brushing, placement of pancreatic stent and placement of metal biliary stent;  Surgeon: Adrienne Wilson MD;  Location: [facility] ENDOSCOPY;  Service: Gastroenterology;  Laterality: N/A;  post op: cystic mass compressing bile duct    HYSTERECTOMY      INSERT / REPLACE / REMOVE PACEMAKER      OTHER SURGICAL HISTORY  2019    Tilt table     OTHER SURGICAL HISTORY  2019    tilt table        Family History: family history includes Breast cancer (age of onset: 50) in her sister; Breast cancer (age of onset: 53) in her niece; Hypertension in her father and mother; Ovarian cancer (age of onset: 57) in her daughter; Stroke in her father and mother. Otherwise pertinent FHx was reviewed and not pertinent to current issue.    Social History:  reports that she has never smoked. She has never used smokeless tobacco. She reports that she does not drink alcohol and does not use drugs.    Home Medications:  Omega-3, calcium citrate-vitamin d, coenzyme Q10, levothyroxine, metoprolol succinate XL, and sulfamethoxazole-trimethoprim    Allergies:  Allergies   Allergen Reactions    Azithromycin Rash     ALL mycin drugs    Erythromycin Rash     ALL mycins drugs    Penicillin G Rash    Penicillins Rash     ALL mycin drugs       Objective  Objective     Vitals:   Temp:  [97 °F (36.1 °C)] 97 °F (36.1 °C)  Heart Rate:  [60] 60  Resp:  [13] 13  BP: (131)/(65) 131/65  Physical Exam    Constitutional: Awake, alert   Eyes: PERRLA, sclerae anicteric, no conjunctival injection   HENT: NCAT, mucous membranes moist   Neck: Supple, no thyromegaly, no lymphadenopathy, trachea midline   Respiratory: Clear to auscultation bilaterally, nonlabored respirations    Cardiovascular: RRR, no murmurs, rubs, or gallops, palpable pedal pulses bilaterally   Gastrointestinal: Positive bowel sounds, soft, nontender, nondistended   Musculoskeletal: No bilateral ankle edema, no clubbing or cyanosis to extremities   Psychiatric: Appropriate affect,  cooperative   Neurologic: Oriented x 3, strength symmetric in all extremities, Cranial Nerves grossly intact to confrontation, speech clear   Skin: No rashes     Result Review   Result Review:  I have personally reviewed the results from the time of this admission to 3/7/2024 11:57 EST and agree with these findings:  [x]  Laboratory list / accordion  []  Microbiology  []  Radiology  []  EKG/Telemetry   []  Cardiology/Vascular   []  Pathology  []  Old records  []  Other:  Most notable findings include:       Assessment & Plan  Assessment / Plan     Brief Patient Summary:  Stacy Monroy is a 89 y.o. female who was admitted with transaminitis with elevated liver function test  Patient with history of hypertension  History of previous stroke  Status post pacemaker  History of slow-growing pancreatic tumor status post stent    Active Hospital Problems:  Active Hospital Problems    Diagnosis     **Transaminitis      Plan:   Patient be admitted  Unable to do an MRCP due to her pacemaker till confirmed if the pacemaker is compatible  GI will be consulted  Repeat her liver function tests today  Awaiting further recommendation per GI and will  Will hold her home meds for now    DVT prophylaxis:  Medical DVT prophylaxis orders are present.        CODE STATUS:    Code Status (Patient has no pulse and is not breathing): CPR (Attempt to Resuscitate)  Medical Interventions (Patient has pulse or is breathing): Full Support    Admission Status:  I believe this patient meets ip status.    Mikey Sandhu MD             Electronically signed by Mikey Sandhu MD at 03/07/24 1200       Operative/Procedure Notes (last 48 hours)  Notes from 03/06/24 1030 through 03/08/24 1030   No notes of this type exist for this encounter.          Physician Progress Notes (last 48 hours)        Tim Silverman DO at 03/08/24 0802          The patient feels ok currently. The patient has no complaints of abdominal pain. No nausea or vomiting. No light  headedness or dizziness. No chest pain or SOB. The patient is able to follow commands without issue. The patient is having bowel movements daily. No other complaints over the course of the night. The patient is scheduled for ERCP today    Vitals are 127/63. Pulse is 60. Temperature is 97.9 R - 17  O2 sats 97 percent on RA    Physical Exam                         Constitutional: Awake, alert              Eyes: PERRLA, sclerae anicteric, no conjunctival injection              HENT: NCAT, mucous membranes moist              Neck: Supple, no thyromegaly, no lymphadenopathy, trachea midline              Respiratory: Clear to auscultation bilaterally, nonlabored respirations               Cardiovascular: RRR, no murmurs, rubs, or gallops, palpable pedal pulses bilaterally              Gastrointestinal: Positive bowel sounds, soft, nontender, nondistended              Musculoskeletal: No bilateral ankle edema, no clubbing or cyanosis to extremities              Psychiatric: Appropriate affect, cooperative              Neurologic: Oriented x 3, strength symmetric in all extremities, Cranial Nerves grossly intact to confrontation, speech clear              Skin: No rashes     As per GI attending Dr Bryant    89-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  At the time of her presentation 3 days ago she was placed on Bactrim for urinary tract infection.  Pain resolved but liver enzymes were elevated at that time.  Liver enzymes were previously normal.  She denies fever, acholic stool, or tea colored urine.  On exam her abdomen is soft and nontender without rebound or guarding.  CR 1.2, , , , , TB 0.9  CT reviewed by me again shows the pancreatic head mass with stent in apparent good position and no other abnormalities.  Impression:  Elevated  liver enzymes with abdominal pain that improved with antibiotics for UTI suspicious for cholangitis and occluded biliary stent.  Urinary tract infection  Plan:  N.p.o. after midnight  Hold blood thinners  Check lactic acid and blood cultures  Start Rocephin and metronidazole  ERCP with balloon clearance of stent tomorrow    03/08/2024 - patient is doing well currently. NPO for procedure this morning. Labs show significant improvement in LFTS overnight. . Bactrim is one possible etiology as well though it would be a rare cause. No other medications I can see on her MAR would lead to this. I will leave it to GI about the next step in the process.     Electronically signed by Tim Silverman DO at 03/08/24 0811          Consult Notes (last 48 hours)        Yane Meneses, APRN at 03/07/24 1434        Consult Orders    1. Inpatient Gastroenterology Consult [[id number]] ordered by Mikey Sandhu MD at 03/07/24 1150              Attestation signed by Samy Bryant MD at 03/07/24 175    The patient was seen and examined with an APRN. I personally performed the substantive portion of the history of presenting illness.  I also performed the entire physical exam and medical decision making.    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  At the time of her presentation 3 days ago she was placed on Bactrim for urinary tract infection.  Pain resolved but liver enzymes were elevated at that time.  Liver enzymes were previously normal.  She denies fever, acholic stool, or tea colored urine.  On exam her abdomen is soft and nontender without rebound or guarding.  CR 1.2, , , , , TB 0.9  CT reviewed by me again shows the pancreatic head mass with stent in apparent good position and no other abnormalities.  Impression:  Elevated liver  "enzymes with abdominal pain that improved with antibiotics for UTI suspicious for cholangitis and occluded biliary stent.  Urinary tract infection  Plan:  N.p.o. after midnight  Hold blood thinners  Check lactic acid and blood cultures  Start Rocephin and metronidazole  ERCP with balloon clearance of stent tomorrow        Electronically signed by Samy Bryant MD, 03/07/24, 5:49 PM EST.                       GI CONSULT  NOTE:    Referring Provider:  Dr. Sandhu    Chief complaint: Transaminitis    Subjective . \"I was told my labs were worsening\"    History of present illness: Stacy Monroy is a 89 y.o. 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  She presents with abnormal outpatient labs suggesting elevated LFTs.  8 days ago, she had an ablation for back pain which significantly improved her pain but she struggled with some hypertension after the procedure.  She was seen by PCP and reports imaging and labs showed a UTI for which she was given Bactrim.  She reports a CT showing \"colitis\" and abnormal LFTs.  She reports abnormal LFTs prior to starting Bactrim.  She initially had some lower abdominal pain last week that she attributed to the UTI which has since resolved.  She denies any constipation typically has a daily soft formed bowel movement but occasional diarrhea.  No melena, rectal bleeding or acholic stool.  No jaundice or tea colored urine.  No unintentional weight loss and appetite is good.  No nausea or vomiting.  No heartburn or fevers.  No new medications otherwise.  Occasional NSAID use but not routinely.    Endo History:  9/2021 ERCP by Dr. Wilson -extensive cystic masses of pancreatic head with CBD extrinsic compression with obstruction s/p balloon dilation and " placement of CBD metal stent 10 x 16 mm, CBD brushings benign, PD stent placed, periampullary diverticulum  6/2021 EGD/EUS by Dr. Mcdaniels is-esophageal ulcer with biopsy showing esophagitis, small esophageal nodule, gastric ulcer/erosive gastritis with biopsies H. pylori negative, gastric polyp s/p polypectomy with pathology showing well-differentiated neuroendocrine tumor, small to medium hiatal hernia, 4.1 cm x 3.1 cm multiloculated cystic mass in the pancreatic head/neck with CBD compression, , pancreatic body cyst, cholelithiasis    Past Medical History:  Past Medical History:   Diagnosis Date    Arthritis     Chronic bilateral low back pain with right-sided sciatica 05/05/2023    Chronic pain disorder     Heart murmur     Hyperlipidemia     Hypertension     Irritable bowel syndrome pain in belly an gas    Joint pain     Other specified hypothyroidism 08/15/2019    Presence of cardiac pacemaker 07/09/2021    [facility]       Past Surgical History:  Past Surgical History:   Procedure Laterality Date    BLADDER REPAIR      BREAST BIOPSY Right     CARDIAC ELECTROPHYSIOLOGY PROCEDURE N/A 05/19/2021    Procedure: Pacemaker DC new;  Surgeon: Edmar Rubin MD;  Location: [facility] CATH INVASIVE LOCATION;  Service: Cardiovascular;  Laterality: N/A;    ERCP N/A 09/14/2021    Procedure: ERCP WITH SPHINCTEROTOMY, sphincteroplasty, clearance of bile duct with balloon. brushing, placement of pancreatic stent and placement of metal biliary stent;  Surgeon: Adrienne Wilson MD;  Location: [facility] ENDOSCOPY;  Service: Gastroenterology;  Laterality: N/A;  post op: cystic mass compressing bile duct    HYSTERECTOMY      INSERT / REPLACE / REMOVE PACEMAKER      OTHER SURGICAL HISTORY  2019    Tilt table     OTHER SURGICAL HISTORY  2019    tilt table        Social History:  Social History     Tobacco Use    Smoking status: Never    Smokeless tobacco: Never    Tobacco comments:     None   Vaping Use    Vaping status:  Never Used   Substance Use Topics    Alcohol use: No    Drug use: No       Family History:  Family History   Problem Relation Age of Onset    Hypertension Mother     Stroke Mother     Hypertension Father     Stroke Father     Breast cancer Sister 50    Ovarian cancer Daughter 57    Breast cancer Niece 53       Medications:  Facility-Administered Medications Prior to Admission   Medication Dose Route Frequency Provider Last Rate Last Admin    cyanocobalamin injection 1,000 mcg  1,000 mcg Intramuscular Q28 Days Mita Moise MD   1,000 mcg at 03/04/24 0947    [DISCONTINUED] cyanocobalamin injection 1,000 mcg  1,000 mcg Intramuscular Q28 Days Mita Moise MD   1,000 mcg at 05/15/23 1337    [DISCONTINUED] cyanocobalamin injection 1,000 mcg  1,000 mcg Intramuscular Q28 Days Mita Moise MD   1,000 mcg at 10/11/23 1338     Medications Prior to Admission   Medication Sig Dispense Refill Last Dose    calcium citrate-vitamin d (CITRACAL) 200-250 MG-UNIT tablet tablet Take 1 tablet by mouth Daily.   3/6/2024    coenzyme Q10 100 MG capsule Take 1 capsule by mouth Daily.   3/7/2024    levothyroxine (SYNTHROID, LEVOTHROID) 50 MCG tablet Take 1 tablet by mouth Daily. 30 tablet 12 3/7/2024    metoprolol succinate XL (TOPROL-XL) 25 MG 24 hr tablet Take 1 tablet by mouth Daily. 90 tablet 3 3/7/2024    Omega-3 1000 MG capsule Take 1 capsule by mouth Daily.   3/7/2024    sulfamethoxazole-trimethoprim (BACTRIM,SEPTRA) 400-80 MG tablet Take 1 tablet by mouth 2 (Two) Times a Day. 14 tablet 0 3/7/2024       Scheduled Meds:enoxaparin, 40 mg, Subcutaneous, Daily  senna-docusate sodium, 2 tablet, Oral, BID  sodium chloride, 10 mL, Intravenous, Q12H      Continuous Infusions:   PRN Meds:.  senna-docusate sodium **AND** polyethylene glycol **AND** bisacodyl **AND** bisacodyl    sodium chloride    sodium chloride    ALLERGIES:  Azithromycin, Erythromycin, Penicillin g, and Penicillins    ROS:  The following systems were reviewed  "  Constitution:  No fevers, chills, no unintentional weight loss  Skin: no rash, no jaundice  Eyes:  No blurry vision, no eye pain  HENT:  No change in hearing or smell  Resp:  No dyspnea or cough  CV:  No chest pain or palpitations  :  No dysuria, hematuria  Musculoskeletal:  No leg cramps or arthralgias, + back pain  Neuro:  No tremor, no numbness  Psych:  No depression or confusion    Objective resting on side of bed, no acute distress, family at bedside    Vital Signs:   Vitals:    03/07/24 1100   BP: 131/65   BP Location: Left arm   Patient Position: Sitting   Pulse: 60   Resp: 13   Temp: 97 °F (36.1 °C)   TempSrc: Axillary   SpO2: 99%   Weight: 62 kg (136 lb 11 oz)   Height: 160 cm (62.99\")       Physical Exam:     General Appearance:    Awake and alert, in no acute distress   Head:    Normocephalic, without obvious abnormality, atraumatic   Throat:   No oral lesions, no thrush, oral mucosa moist   Lungs:     Respirations regular, even and unlabored   Chest Wall:    No abnormalities observed   Abdomen:     Soft, non-tender, nondistended   Rectal:     Deferred   Extremities:   Moves all extremities, no edema, no cyanosis       Skin:   No rash, no jaundice, normal palpation       Neurologic:   Cranial nerves 2 - 12 grossly intact       Results Review:   I reviewed the patient's labs and imaging.  CBC    Results from last 7 days   Lab Units 03/04/24  1000   WBC x10E3/uL 8.9   HEMOGLOBIN g/dL 12.3   PLATELETS x10E3/uL 292     CMP   Results from last 7 days   Lab Units 03/06/24  0922 03/04/24  1000   SODIUM mmol/L 137 137   POTASSIUM mmol/L 5.0 4.8   CHLORIDE mmol/L 101 103   CO2 mmol/L 22 21   BUN mg/dL 17 21   CREATININE mg/dL 1.20* 1.01*   GLUCOSE mg/dL 123* 142*   ALBUMIN g/dL 3.7 3.8   BILIRUBIN mg/dL 0.9 1.0   ALK PHOS IU/L 414* 365*   AST (SGOT) IU/L 339* 381*   ALT (SGPT) IU/L 323* 252*     Cr Clearance Estimated Creatinine Clearance: 28.7 mL/min (A) (by C-G formula based on SCr of 1.2 mg/dL (H)).  Coag   " "  HbA1C   Lab Results   Component Value Date    HGBA1C 5.8 (H) 05/07/2021     Blood Glucose No results found for: \"POCGLU\"  Infection   Results from last 7 days   Lab Units 03/04/24  0952   URINE CULTURE  Final report     UA    Results from last 7 days   Lab Units 03/04/24  0952   BACTERIA UA  Few   URINE CULTURE  Final report     Imaging Results (Last 72 Hours)       ** No results found for the last 72 hours. 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  She presents with outpatient labs suggesting worsening LFTs.  Recent UTI on antibiotics but reports elevated LFTs prior to starting this medication.     Review of records show almost completely normal liver enzymes in September.  Alk phos 414, ,  and total bilirubin 0.9.  Denies abdominal pain, nausea or vomiting.  Creatinine 1.01.  Noncontrasted CT of the abdomen showed no acute process or significant change but there is significant respiratory motion artifact which limits imaging.  Cystic lesion of the pancreas head appears to be similar in size at 2.5 cm.  This is along the right lateral margin of the biliary stent.  She will likely need repeat ERCP with clearance of metal CBD stent.     I discussed the patients findings and my recommendations with the patient.    We appreciate the referral    Electronically signed by WILDER Porter, 03/07/24, 2:34 PM EST.                 Electronically signed by Samy Bryant MD at 03/07/24 9894       "

## 2024-03-08 NOTE — PLAN OF CARE
Goal Outcome Evaluation:   Patient is alert and oriented, able to make needs known to staff. Patient has no complaints of pain noted at this time.

## 2024-03-08 NOTE — CASE MANAGEMENT/SOCIAL WORK
Discharge Planning Assessment   [facility]     Patient Name: Stacy Monroy  MRN: [id number]  Today's Date: 3/8/2024    Admit Date: 3/7/2024    Plan: Return home alone   Discharge Needs Assessment       Row Name 03/08/24 1139       Living Environment    People in Home alone    Current Living Arrangements home    Potentially Unsafe Housing Conditions none    In the past 12 months has the electric, gas, oil, or water company threatened to shut off services in your home? No    Primary Care Provided by self    Provides Primary Care For no one    Family Caregiver if Needed other relative(s)    Family Caregiver Names David    Quality of Family Relationships helpful;involved;supportive    Able to Return to Prior Arrangements yes       Resource/Environmental Concerns    Resource/Environmental Concerns none    Transportation Concerns none       Transportation Needs    In the past 12 months, has lack of transportation kept you from medical appointments or from getting medications? no    In the past 12 months, has lack of transportation kept you from meetings, work, or from getting things needed for daily living? No       Food Insecurity    Within the past 12 months, you worried that your food would run out before you got the money to buy more. Never true    Within the past 12 months, the food you bought just didn't last and you didn't have money to get more. Never true       Transition Planning    Patient/Family Anticipates Transition to home    Patient/Family Anticipated Services at Transition none    Transportation Anticipated car, drives self;family or friend will provide       Discharge Needs Assessment    Readmission Within the Last 30 Days no previous admission in last 30 days    Equipment Currently Used at Home cane, straight    Concerns to be Addressed denies needs/concerns at this time;no discharge needs identified    Anticipated Changes Related to Illness none    Equipment Needed After Discharge none                    Discharge Plan       Row Name 03/08/24 1141       Plan    Plan Return home alone    Patient/Family in Agreement with Plan yes    Plan Comments CM met with pt at bedside to discuss discharge needs. Pt lives at home alone (has relatives that check on her,) drives and is IADLs. PCP and pharmacy verified- pt enrolled in [facility] as requested. No issues stated affording food/ medications or transport, and home environment is safe. Current DME: straight cane. No current HHC and none anticipated on discharge. Family will provide transportation home.                  Demographic Summary       Row Name 03/08/24 1138       General Information    Admission Type inpatient    Arrived From physician office - external    Referral Source admission list    Reason for Consult care coordination/care conference;discharge planning    Preferred Language English       Contact Information    Permission Granted to Share Info With                    Functional Status       Row Name 03/08/24 1139       Functional Status    Usual Activity Tolerance good    Current Activity Tolerance good       Functional Status, IADL    Medications independent    Meal Preparation independent    Housekeeping independent    Laundry independent    Shopping independent       Mental Status    General Appearance WDL WDL       Mental Status Summary    Recent Changes in Mental Status/Cognitive Functioning no changes       Employment/    Current or Previous Occupation not applicable               Paris Brandt RN      Office phone: [phone]  Office fax: [phone]

## 2024-03-09 ENCOUNTER — READMISSION MANAGEMENT (OUTPATIENT)
Dept: CALL CENTER | Facility: HOSPITAL | Age: 89
End: 2024-03-09
Payer: MEDICARE

## 2024-03-09 VITALS
BODY MASS INDEX: 24.22 KG/M2 | TEMPERATURE: 97.8 F | HEART RATE: 62 BPM | RESPIRATION RATE: 20 BRPM | HEIGHT: 63 IN | OXYGEN SATURATION: 93 % | DIASTOLIC BLOOD PRESSURE: 50 MMHG | SYSTOLIC BLOOD PRESSURE: 138 MMHG | WEIGHT: 136.69 LBS

## 2024-03-09 LAB
ALBUMIN SERPL-MCNC: 3.3 G/DL (ref 3.5–5.2)
ALBUMIN/GLOB SERPL: 1.4 G/DL
ALP SERPL-CCNC: 309 U/L (ref 39–117)
ALT SERPL W P-5'-P-CCNC: 151 U/L (ref 1–33)
ANION GAP SERPL CALCULATED.3IONS-SCNC: 8 MMOL/L (ref 5–15)
AST SERPL-CCNC: 86 U/L (ref 1–32)
BASOPHILS # BLD AUTO: 0.1 10*3/MM3 (ref 0–0.2)
BASOPHILS NFR BLD AUTO: 0.7 % (ref 0–1.5)
BILIRUB SERPL-MCNC: 0.3 MG/DL (ref 0–1.2)
BUN SERPL-MCNC: 22 MG/DL (ref 8–23)
BUN/CREAT SERPL: 20 (ref 7–25)
CALCIUM SPEC-SCNC: 9.2 MG/DL (ref 8.2–9.6)
CHLORIDE SERPL-SCNC: 103 MMOL/L (ref 98–107)
CO2 SERPL-SCNC: 23 MMOL/L (ref 22–29)
CREAT SERPL-MCNC: 1.1 MG/DL (ref 0.57–1)
DEPRECATED RDW RBC AUTO: 52.5 FL (ref 37–54)
EGFRCR SERPLBLD CKD-EPI 2021: 46.9 ML/MIN/1.73
EOSINOPHIL # BLD AUTO: 0 10*3/MM3 (ref 0–0.4)
EOSINOPHIL NFR BLD AUTO: 0 % (ref 0.3–6.2)
ERYTHROCYTE [DISTWIDTH] IN BLOOD BY AUTOMATED COUNT: 18.6 % (ref 12.3–15.4)
GLOBULIN UR ELPH-MCNC: 2.3 GM/DL
GLUCOSE SERPL-MCNC: 139 MG/DL (ref 65–99)
HCT VFR BLD AUTO: 34.6 % (ref 34–46.6)
HGB BLD-MCNC: 11 G/DL (ref 12–15.9)
LIPASE SERPL-CCNC: 55 U/L (ref 13–60)
LYMPHOCYTES # BLD AUTO: 1.1 10*3/MM3 (ref 0.7–3.1)
LYMPHOCYTES NFR BLD AUTO: 12 % (ref 19.6–45.3)
MCH RBC QN AUTO: 25.7 PG (ref 26.6–33)
MCHC RBC AUTO-ENTMCNC: 31.9 G/DL (ref 31.5–35.7)
MCV RBC AUTO: 80.7 FL (ref 79–97)
MONOCYTES # BLD AUTO: 0.6 10*3/MM3 (ref 0.1–0.9)
MONOCYTES NFR BLD AUTO: 6.7 % (ref 5–12)
NEUTROPHILS NFR BLD AUTO: 7.6 10*3/MM3 (ref 1.7–7)
NEUTROPHILS NFR BLD AUTO: 80.6 % (ref 42.7–76)
NRBC BLD AUTO-RTO: 0 /100 WBC (ref 0–0.2)
PLATELET # BLD AUTO: 224 10*3/MM3 (ref 140–450)
PMV BLD AUTO: 8.3 FL (ref 6–12)
POTASSIUM SERPL-SCNC: 4.9 MMOL/L (ref 3.5–5.2)
PROT SERPL-MCNC: 5.6 G/DL (ref 6–8.5)
RBC # BLD AUTO: 4.29 10*6/MM3 (ref 3.77–5.28)
SODIUM SERPL-SCNC: 134 MMOL/L (ref 136–145)
WBC NRBC COR # BLD AUTO: 9.4 10*3/MM3 (ref 3.4–10.8)

## 2024-03-09 PROCEDURE — 85025 COMPLETE CBC W/AUTO DIFF WBC: CPT | Performed by: INTERNAL MEDICINE

## 2024-03-09 PROCEDURE — 80053 COMPREHEN METABOLIC PANEL: CPT | Performed by: INTERNAL MEDICINE

## 2024-03-09 PROCEDURE — 83690 ASSAY OF LIPASE: CPT | Performed by: INTERNAL MEDICINE

## 2024-03-09 PROCEDURE — 25010000002 METRONIDAZOLE 500 MG/100ML SOLUTION: Performed by: INTERNAL MEDICINE

## 2024-03-09 RX ORDER — URSODIOL 300 MG/1
300 CAPSULE ORAL 2 TIMES DAILY
Qty: 60 CAPSULE | Refills: 0 | Status: SHIPPED | OUTPATIENT
Start: 2024-03-09 | End: 2024-04-08

## 2024-03-09 RX ORDER — URSODIOL 300 MG/1
300 CAPSULE ORAL 2 TIMES DAILY
Qty: 60 CAPSULE | Refills: 0 | Status: SHIPPED | OUTPATIENT
Start: 2024-03-09 | End: 2024-03-09

## 2024-03-09 RX ORDER — METRONIDAZOLE 500 MG/1
500 TABLET ORAL 3 TIMES DAILY
Qty: 12 TABLET | Refills: 0 | Status: SHIPPED | OUTPATIENT
Start: 2024-03-09 | End: 2024-03-13

## 2024-03-09 RX ORDER — METRONIDAZOLE 500 MG/1
500 TABLET ORAL 3 TIMES DAILY
Qty: 12 TABLET | Refills: 0 | Status: SHIPPED | OUTPATIENT
Start: 2024-03-09 | End: 2024-03-09

## 2024-03-09 RX ORDER — CEFDINIR 300 MG/1
300 CAPSULE ORAL 2 TIMES DAILY
Qty: 8 CAPSULE | Refills: 0 | Status: SHIPPED | OUTPATIENT
Start: 2024-03-09 | End: 2024-03-13

## 2024-03-09 RX ORDER — CEFDINIR 300 MG/1
300 CAPSULE ORAL 2 TIMES DAILY
Qty: 8 CAPSULE | Refills: 0 | Status: SHIPPED | OUTPATIENT
Start: 2024-03-09 | End: 2024-03-09

## 2024-03-09 RX ADMIN — LEVOTHYROXINE SODIUM 50 MCG: 0.05 TABLET ORAL at 05:44

## 2024-03-09 RX ADMIN — METRONIDAZOLE 500 MG: 500 INJECTION, SOLUTION INTRAVENOUS at 05:44

## 2024-03-09 RX ADMIN — DOCUSATE SODIUM 50MG AND SENNOSIDES 8.6MG 2 TABLET: 8.6; 5 TABLET, FILM COATED ORAL at 09:09

## 2024-03-09 RX ADMIN — Medication 10 ML: at 09:09

## 2024-03-09 RX ADMIN — Medication 1 TABLET: at 09:09

## 2024-03-09 RX ADMIN — METOPROLOL SUCCINATE 25 MG: 25 TABLET, FILM COATED, EXTENDED RELEASE ORAL at 09:09

## 2024-03-09 RX ADMIN — METRONIDAZOLE 500 MG: 500 INJECTION, SOLUTION INTRAVENOUS at 10:50

## 2024-03-09 RX ADMIN — URSODIOL 300 MG: 300 CAPSULE ORAL at 09:09

## 2024-03-09 NOTE — DISCHARGE SUMMARY
"             [facility] Medicine Services  Discharge Summary    Date of Service: 24    Patient Name: Stacy Monroy  : 1930  MRN: [id number]    Date of Admission: 3/7/2024  Discharge Diagnosis: Transaminitis  Date of Discharge:  24   Primary Care Physician: Mita Moise MD      Presenting Problem:   Transaminitis [R74.01]    Active and Resolved Hospital Problems:  Active Hospital Problems    Diagnosis POA    **Transaminitis [R74.01] Yes    Pancreatic cyst [K86.2] Unknown      Resolved Hospital Problems   No resolved problems to display.         Hospital Course     HPI:  Per the H&P written by Mikey Sandhu MD, dated 2024:  \"Stacy Monroy is a 89 y.o. female with a history of pacemaker  Patient was sent from her primary care physician as she had outpatient labs showing her LFTs to be in the 300 range  Patient had previous pancreatic stent for slowly growing pancreatic tumor  Patient with history of hypertension on medication  Had CAT scan as an outpatient  Could not have MRI due to her pacemaker  Patient with chronic back pain status post radiofrequency intervention by Dr. Fraser's recently\"    Hospital Course:  Patient was admitted to the hospital, placed on n.p.o. diet.  Liver enzymes on admission noted for CR 1.2, , , , , TB 0.9, patient evaluated by GI.  Patient was taken to the OR for ERCP on 3/8/2024 for endoscopic retrograde cholangiopancreatography with balloon clearance of Clagg metal biliary stent, balloon extraction.  Patient recovered and with return back to her room.  Patient placed on low-fat diet which she has been tolerating well.  Patient denies any pain, however intermittent bloating.  Labs at discharge noted for CR 1.1, , alk phos 309, AST 86, , TB 0.3.  LFTs gradually improving patient is stable, and ready for discharge.  Patient will be discharged home on cefdinir, and Flagyl x 5 days.        DISCHARGE Follow Up Recommendations " for labs and diagnostics: GI nurse practitioner next week to repeat liver enzymes.      Reasons For Change In Medications and Indications for New Medications:      Day of Discharge     Vital Signs:  Temp:  [97.7 °F (36.5 °C)-98.3 °F (36.8 °C)] 97.8 °F (36.6 °C)  Heart Rate:  [60-63] 62  Resp:  [11-20] 20  BP: ()/(50-68) 138/50    Physical Exam:  Physical Exam                         Constitutional: Awake, alert              Eyes: PERRLA, sclerae anicteric, no conjunctival injection              HENT: NCAT, mucous membranes moist              Neck: Supple, no thyromegaly, no lymphadenopathy, trachea midline              Respiratory: Clear to auscultation bilaterally, nonlabored respirations               Cardiovascular: RRR, no murmurs, rubs, or gallops, palpable pedal pulses bilaterally              Gastrointestinal: Positive bowel sounds, soft, nontender, nondistended              Musculoskeletal: No bilateral ankle edema, no clubbing or cyanosis to extremities              Psychiatric: Appropriate affect, cooperative              Neurologic: Oriented x 3, strength symmetric in all extremities, Cranial Nerves grossly intact to confrontation, speech clear              Skin: No rashes     Pertinent  and/or Most Recent Results     LAB RESULTS:      Lab 03/09/24 0547 03/08/24 0340 03/07/24 2002 03/04/24  1000   WBC 9.40 7.00  --  8.9   HEMOGLOBIN 11.0* 11.7*  --  12.3   HEMATOCRIT 34.6 37.2  --  39.0   PLATELETS 224 203  --  292   NEUTROS ABS 7.60* 4.30  --  6.1   LYMPHS ABS 1.10 1.80  --  2.0   MONOS ABS 0.60 0.80  --  0.7   EOS ABS 0.00 0.10  --  0.0   MCV 80.7 79.7  --  81   LACTATE  --   --  1.5  --    PROTIME  --  10.3  --   --          Lab 03/09/24 0547 03/08/24 0340 03/06/24 0922 03/04/24  1000   SODIUM 134* 135* 137 137   POTASSIUM 4.9 4.9 5.0 4.8   CHLORIDE 103 103 101 103   CO2 23.0 22.0 22 21   ANION GAP 8.0 10.0  --   --    BUN 22 18 17 21   CREATININE 1.10* 1.14* 1.20* 1.01*   EGFR 46.9* 45.0*   --   --    GLUCOSE 139* 89 123* 142*   CALCIUM 9.2 9.1 9.5 9.7         Lab 03/09/24  0547 03/08/24  0340 03/06/24  0922 03/04/24  1000   TOTAL PROTEIN 5.6* 5.8*  --   --    ALBUMIN 3.3* 3.2* 3.7 3.8   GLOBULIN 2.3 2.6  --   --    ALT (SGPT) 151* 189* 323* 252*   AST (SGOT) 86* 127* 339* 381*   BILIRUBIN 0.3 0.5 0.9 1.0   ALK PHOS 309* 349* 414* 365*   LIPASE 55  --   --   --          Lab 03/08/24  0340   PROTIME 10.3   INR 0.94                 Brief Urine Lab Results  (Last result in the past 365 days)        Color   Clarity   Blood   Leuk Est   Nitrite   Protein   CREAT   Urine HCG        03/04/24 0952 Yellow  Comment: Test not performed. Unable to obtain accurate result for this test  due to intense color of specimen.     Cloudy         CANCELED  Comment: Test not performed    Result canceled by the ancillary.                   Microbiology Results (last 10 days)       Procedure Component Value - Date/Time    Blood Culture - Blood, Arm, Left [[id number]]  (Normal) Collected: 03/07/24 2002    Lab Status: Preliminary result Specimen: Blood from Arm, Left Updated: 03/08/24 2101     Blood Culture No growth at 24 hours    Blood Culture - Blood, Arm, Right [[id number]]  (Normal) Collected: 03/07/24 1943    Lab Status: Preliminary result Specimen: Blood from Arm, Right Updated: 03/08/24 2101     Blood Culture No growth at 24 hours    Urine Culture - Urine, Urine, Clean Catch [[id number]] Collected: 03/04/24 0952    Lab Status: Final result Specimen: Urine, Clean Catch Updated: 03/06/24 0509     Urine Culture Final report     Result 1 Comment     Comment: Mixed urogenital leo  10,000-25,000 colony forming units per mL         Narrative:      Performed at:  [address] [place] [address]  [id number]  : Cuong Casiano PhD, Phone:  [phone]            [place] ERCP pancreatic and biliary ducts    Result Date: 3/8/2024  Impression: Impression: ERCP fluoroscopic imaging. See operative report for  full detail. Electronically Signed: Mendel Vann MD  3/8/2024 1:01 PM EST  Workstation ID: [id number]    CT Abdomen Without Contrast    Result Date: 3/5/2024  Impression: Impression: 1.No acute process nor significant change identified. Please note however there is significant respiratory motion artifact which limits image quality most pronounced at the level of the pancreatic head where there is a cystic lesion. Electronically Signed: Osman Soto MD  3/5/2024 1:33 PM EST  Workstation ID: [id number]                 Labs Pending at Discharge:  Pending Labs       Order Current Status    Blood Culture - Blood, Arm, Left Preliminary result    Blood Culture - Blood, Arm, Right Preliminary result            Procedures Performed  Procedure(s):  ENDOSCOPIC RETROGRADE CHOLANGIOPANCREATOGRAPHY WITH BALLOON CLEARANCE OF CLOGGED METAL BILIARY STENT, BALLOON STONE EXTRACTION  03/08 1137 ERCP      Consults:   Consults       Date and Time Order Name Status Description    3/7/2024 11:50 AM Inpatient Gastroenterology Consult Completed               Discharge Details        Discharge Medications        New Medications        Instructions Start Date   cefdinir 300 MG capsule  Commonly known as: OMNICEF   300 mg, Oral, 2 Times Daily      metroNIDAZOLE 500 MG tablet  Commonly known as: Flagyl   500 mg, Oral, 3 Times Daily      ursodiol 300 MG capsule  Commonly known as: Actigall   300 mg, Oral, 2 Times Daily             Continue These Medications        Instructions Start Date   calcium citrate-vitamin d 200-250 MG-UNIT tablet tablet  Commonly known as: CITRACAL   1 tablet, Oral, Daily      coenzyme Q10 100 MG capsule   100 mg, Oral, Daily      levothyroxine 50 MCG tablet  Commonly known as: SYNTHROID, LEVOTHROID   50 mcg, Oral, Daily      metoprolol succinate XL 25 MG 24 hr tablet  Commonly known as: TOPROL-XL   25 mg, Oral, Daily      Omega-3 1000 MG capsule   1 tablet, Oral, Daily      sulfamethoxazole-trimethoprim 400-80 MG  tablet  Commonly known as: BACTRIM,SEPTRA   1 tablet, Oral, 2 Times Daily               Allergies   Allergen Reactions    Azithromycin Rash     ALL mycin drugs    Erythromycin Rash     ALL mycins drugs    Penicillin G Rash    Penicillins Rash     ALL mycin drugs         Discharge Disposition: Home, follow-up with GI in 1 week  Home or Self Care    Diet:  Hospital:  Diet Order   Procedures    Diet: Cardiac; Healthy Heart (2-3 Na+); Fluid Consistency: Thin (IDDSI 0)               CODE STATUS:  Code Status and Medical Interventions:   Ordered at: 03/07/24 1150     Code Status (Patient has no pulse and is not breathing):    CPR (Attempt to Resuscitate)     Medical Interventions (Patient has pulse or is breathing):    Full Support         Future Appointments   Date Time Provider Department Center   3/11/2024  4:00 PM Mita Moise MD [facility] [place]   4/19/2024 10:15 AM [facility]   6/24/2024  9:15 AM Mita Moise MD [facility]   7/26/2024  1:45 PM Ruslan Layne MD [facility]       Additional Instructions for the Follow-ups that You Need to Schedule       Discharge Follow-up with Specified Provider: Samy Wood MD; 1 Week   As directed      To: Samy Wood MD   Follow Up: 1 Week   Follow Up Details: [phone]                Time spent on Discharge including face to face service:  >30 minutes    Signature: Electronically signed by WILDER Golden, 03/09/24, 14:33 EST.  [facility]ist Team

## 2024-03-09 NOTE — OUTREACH NOTE
Prep Survey      Flowsheet Row Responses   [facility] patient discharged from? [facility]   Is LACE score < 7 ? No   Eligibility [facility]   Date of Admission 03/07/24   Date of Discharge 03/09/24   Discharge Disposition Home or Self Care   Discharge diagnosis Transaminitis   Does the patient have one of the following disease processes/diagnoses(primary or secondary)? General Surgery   Does the patient have Home health ordered? No   Is there a DME ordered? No   Comments regarding appointments PCP f/u with Mita Moise on Mar 11, 2024 4:00 PM   Medication alerts for this patient Cefdinir, Flagyl, Actigall   Prep survey completed? Yes            Paris WEAVER - Registered Nurse

## 2024-03-09 NOTE — PLAN OF CARE
Problem: Adult Inpatient Plan of Care  Goal: Plan of Care Review  Outcome: Ongoing, Progressing  Goal: Patient-Specific Goal (Individualized)  Outcome: Ongoing, Progressing  Goal: Absence of Hospital-Acquired Illness or Injury  Outcome: Ongoing, Progressing  Intervention: Identify and Manage Fall Risk  Recent Flowsheet Documentation  Taken 3/9/2024 0200 by Dottie James RN  Safety Promotion/Fall Prevention:   activity supervised   clutter free environment maintained   fall prevention program maintained   lighting adjusted   nonskid shoes/slippers when out of bed   room organization consistent   safety round/check completed  Taken 3/9/2024 0000 by Dottie James RN  Safety Promotion/Fall Prevention:   activity supervised   clutter free environment maintained   fall prevention program maintained   lighting adjusted   nonskid shoes/slippers when out of bed   room organization consistent   safety round/check completed  Taken 3/8/2024 2200 by Dottie James RN  Safety Promotion/Fall Prevention:   activity supervised   assistive device/personal items within reach   clutter free environment maintained   fall prevention program maintained   lighting adjusted   nonskid shoes/slippers when out of bed   room organization consistent   safety round/check completed  Taken 3/8/2024 2000 by Dottie James RN  Safety Promotion/Fall Prevention:   activity supervised   clutter free environment maintained   fall prevention program maintained   lighting adjusted   nonskid shoes/slippers when out of bed   room organization consistent   safety round/check completed  Intervention: Prevent Skin Injury  Recent Flowsheet Documentation  Taken 3/9/2024 0200 by Dottie James RN  Body Position:   turned   position changed independently  Taken 3/9/2024 0000 by Dottie James RN  Body Position:   position changed independently   turned  Skin Protection:   adhesive use limited   tubing/devices free from skin contact  Taken  3/8/2024 2200 by Dottie James RN  Body Position:   turned   position changed independently  Taken 3/8/2024 2000 by Dottie James RN  Body Position:   position changed independently   turned  Skin Protection:   adhesive use limited   tubing/devices free from skin contact  Intervention: Prevent and Manage VTE (Venous Thromboembolism) Risk  Recent Flowsheet Documentation  Taken 3/9/2024 0200 by Dottie James RN  Activity Management: activity encouraged  Taken 3/9/2024 0000 by Dottie James RN  Activity Management: activity encouraged  Range of Motion: active ROM (range of motion) encouraged  Taken 3/8/2024 2200 by Dottie James RN  Activity Management: activity encouraged  Taken 3/8/2024 2000 by Dottie James RN  Activity Management: activity encouraged  VTE Prevention/Management: patient refused intervention  Range of Motion: active ROM (range of motion) encouraged  Intervention: Prevent Infection  Recent Flowsheet Documentation  Taken 3/9/2024 0200 by Dottie James RN  Infection Prevention:   cohorting utilized   environmental surveillance performed   equipment surfaces disinfected   hand hygiene promoted   personal protective equipment utilized   rest/sleep promoted   single patient room provided  Taken 3/9/2024 0000 by Dottie James RN  Infection Prevention:   environmental surveillance performed   equipment surfaces disinfected   hand hygiene promoted   personal protective equipment utilized   rest/sleep promoted   single patient room provided  Taken 3/8/2024 2200 by Dottie James RN  Infection Prevention:   environmental surveillance performed   equipment surfaces disinfected   hand hygiene promoted   personal protective equipment utilized   rest/sleep promoted   single patient room provided  Taken 3/8/2024 2000 by Dottie James RN  Infection Prevention:   environmental surveillance performed   equipment surfaces disinfected   hand hygiene promoted   personal protective  equipment utilized   rest/sleep promoted   single patient room provided  Goal: Optimal Comfort and Wellbeing  Outcome: Ongoing, Progressing  Intervention: Provide Person-Centered Care  Recent Flowsheet Documentation  Taken 3/8/2024 2000 by Dottie James RN  Trust Relationship/Rapport:   care explained   choices provided   emotional support provided   empathic listening provided   questions answered   questions encouraged   reassurance provided   thoughts/feelings acknowledged  Goal: Readiness for Transition of Care  Outcome: Ongoing, Progressing     Problem: Skin Injury Risk Increased  Goal: Skin Health and Integrity  Outcome: Ongoing, Progressing  Intervention: Promote and Optimize Oral Intake  Recent Flowsheet Documentation  Taken 3/9/2024 0000 by Dottie James RN  Oral Nutrition Promotion: physical activity promoted  Taken 3/8/2024 2000 by Dottie James RN  Oral Nutrition Promotion: physical activity promoted  Intervention: Optimize Skin Protection  Recent Flowsheet Documentation  Taken 3/9/2024 0200 by Dottie James RN  Head of Bed (HOB) Positioning: HOB at 30 degrees  Taken 3/9/2024 0000 by Dottie James RN  Pressure Reduction Techniques:   frequent weight shift encouraged   heels elevated off bed  Head of Bed (HOB) Positioning: HOB at 30 degrees  Pressure Reduction Devices: pressure-redistributing mattress utilized  Skin Protection:   adhesive use limited   tubing/devices free from skin contact  Taken 3/8/2024 2200 by Dottie James RN  Head of Bed (HOB) Positioning: HOB at 30 degrees  Taken 3/8/2024 2000 by Dottie James RN  Pressure Reduction Techniques:   frequent weight shift encouraged   heels elevated off bed  Head of Bed (HOB) Positioning: HOB at 30 degrees  Pressure Reduction Devices: pressure-redistributing mattress utilized  Skin Protection:   adhesive use limited   tubing/devices free from skin contact     Problem: Fall Injury Risk  Goal: Absence of Fall and Fall-Related  Injury  Outcome: Ongoing, Progressing  Intervention: Identify and Manage Contributors  Recent Flowsheet Documentation  Taken 3/9/2024 0200 by Dottie James RN  Medication Review/Management: medications reviewed  Self-Care Promotion:   independence encouraged   BADL personal objects within reach   BADL personal routines maintained  Taken 3/9/2024 0000 by Dottie James RN  Medication Review/Management: medications reviewed  Self-Care Promotion:   independence encouraged   BADL personal objects within reach   BADL personal routines maintained  Taken 3/8/2024 2200 by Dottie James RN  Medication Review/Management: medications reviewed  Self-Care Promotion:   independence encouraged   BADL personal objects within reach   BADL personal routines maintained  Taken 3/8/2024 2000 by Dottie James RN  Medication Review/Management: medications reviewed  Self-Care Promotion:   independence encouraged   BADL personal objects within reach   BADL personal routines maintained  Intervention: Promote Injury-Free Environment  Recent Flowsheet Documentation  Taken 3/9/2024 0200 by Dottie James RN  Safety Promotion/Fall Prevention:   activity supervised   clutter free environment maintained   fall prevention program maintained   lighting adjusted   nonskid shoes/slippers when out of bed   room organization consistent   safety round/check completed  Taken 3/9/2024 0000 by Dottie James, RN  Safety Promotion/Fall Prevention:   activity supervised   clutter free environment maintained   fall prevention program maintained   lighting adjusted   nonskid shoes/slippers when out of bed   room organization consistent   safety round/check completed  Taken 3/8/2024 2200 by Dottie James, RN  Safety Promotion/Fall Prevention:   activity supervised   assistive device/personal items within reach   clutter free environment maintained   fall prevention program maintained   lighting adjusted   nonskid shoes/slippers when out of  bed   room organization consistent   safety round/check completed  Taken 3/8/2024 2000 by Dottie James, RN  Safety Promotion/Fall Prevention:   activity supervised   clutter free environment maintained   fall prevention program maintained   lighting adjusted   nonskid shoes/slippers when out of bed   room organization consistent   safety round/check completed   Goal Outcome Evaluation:

## 2024-03-09 NOTE — PLAN OF CARE
Problem: Adult Inpatient Plan of Care  Goal: Plan of Care Review  Outcome: Ongoing, Progressing  Goal: Patient-Specific Goal (Individualized)  Outcome: Ongoing, Progressing  Goal: Absence of Hospital-Acquired Illness or Injury  Outcome: Ongoing, Progressing  Intervention: Identify and Manage Fall Risk  Recent Flowsheet Documentation  Taken 3/9/2024 1100 by Kadie Alexis RN  Safety Promotion/Fall Prevention: safety round/check completed  Taken 3/9/2024 0900 by Kadie Alexis RN  Safety Promotion/Fall Prevention: safety round/check completed  Goal: Optimal Comfort and Wellbeing  Outcome: Ongoing, Progressing  Goal: Readiness for Transition of Care  Outcome: Ongoing, Progressing     Problem: Skin Injury Risk Increased  Goal: Skin Health and Integrity  Outcome: Ongoing, Progressing     Problem: Fall Injury Risk  Goal: Absence of Fall and Fall-Related Injury  Outcome: Ongoing, Progressing  Intervention: Promote Injury-Free Environment  Recent Flowsheet Documentation  Taken 3/9/2024 1100 by Kadie Alexis RN  Safety Promotion/Fall Prevention: safety round/check completed  Taken 3/9/2024 0900 by Kadie Alexis RN  Safety Promotion/Fall Prevention: safety round/check completed   Goal Outcome Evaluation:

## 2024-03-11 ENCOUNTER — TRANSITIONAL CARE MANAGEMENT TELEPHONE ENCOUNTER (OUTPATIENT)
Dept: CALL CENTER | Facility: HOSPITAL | Age: 89
End: 2024-03-11
Payer: MEDICARE

## 2024-03-11 ENCOUNTER — OFFICE VISIT (OUTPATIENT)
Dept: FAMILY MEDICINE CLINIC | Facility: CLINIC | Age: 89
End: 2024-03-11
Payer: MEDICARE

## 2024-03-11 VITALS
DIASTOLIC BLOOD PRESSURE: 70 MMHG | HEART RATE: 70 BPM | OXYGEN SATURATION: 98 % | BODY MASS INDEX: 23.07 KG/M2 | WEIGHT: 130.2 LBS | TEMPERATURE: 98 F | SYSTOLIC BLOOD PRESSURE: 114 MMHG | RESPIRATION RATE: 17 BRPM | HEIGHT: 63 IN

## 2024-03-11 DIAGNOSIS — K86.2 PANCREATIC CYST: ICD-10-CM

## 2024-03-11 DIAGNOSIS — R31.29 MICROHEMATURIA: ICD-10-CM

## 2024-03-11 DIAGNOSIS — Z09 HOSPITAL DISCHARGE FOLLOW-UP: Primary | ICD-10-CM

## 2024-03-11 DIAGNOSIS — R74.01 ELEVATED TRANSAMINASE LEVEL: ICD-10-CM

## 2024-03-11 PROBLEM — K83.09 CHOLANGITIS: Status: ACTIVE | Noted: 2024-03-11

## 2024-03-11 LAB
BILIRUB BLD-MCNC: NEGATIVE MG/DL
CLARITY, POC: ABNORMAL
COLOR UR: ABNORMAL
GLUCOSE UR STRIP-MCNC: NEGATIVE MG/DL
KETONES UR QL: NEGATIVE
LEUKOCYTE EST, POC: NEGATIVE
NITRITE UR-MCNC: NEGATIVE MG/ML
PH UR: 5 [PH] (ref 5–8)
PROT UR STRIP-MCNC: ABNORMAL MG/DL
RBC # UR STRIP: NEGATIVE /UL
SP GR UR: 1.01 (ref 1–1.03)
UROBILINOGEN UR QL: NORMAL

## 2024-03-11 NOTE — PROGRESS NOTES
Subjective   Stacy Monroy is a 89 y.o. female. Presents to [facility]    Stacy was seen at [facility] She was admitted on 03/07/24  for abdominal pain. She was discharged on 03/09/24. Discharge diagnosis was transaminitis. Labs that were performed during the encounter included: CMP-abnormal, CBC-abnormal, and INR-0.94. Diagnostic studies that were performed included: CT Scan-cystic lesion . Currently Stacy receives care at home. Complications from the hospital stay include none. The patient stated that they do not need help with their daily life and activities. The patient stated that they do have emotional support at home.  Current outpatient and discharge medications have been reconciled for the patient.  Reviewed by: Mita Moise MD      Chief Complaint   Patient presents with    Hospital Follow Up Visit    Abdominal Pain       Abdominal Pain  This is a new problem. The current episode started 1 to 4 weeks ago. The onset quality is gradual. The problem occurs daily. The problem has been unchanged. The pain is located in the periumbilical region. The pain is at a severity of 3/10. The pain is mild. The quality of the pain is a sensation of fullness. The abdominal pain does not radiate. Associated symptoms include nausea (better). Pertinent negatives include no belching, constipation, diarrhea, dysuria, headaches or vomiting. Treatments tried: cefdinir, bactrim and actigall. The treatment provided mild relief.    She will be seeing GI on the 13th.     I personally reviewed and updated the patient's allergies, medications, problem list, and past medical, surgical, social, and family history. I have reviewed and confirmed the accuracy of the History of Present Illness and Review of Symptoms as documented by the MA/LPN/RN. Mita Moise MD    Allergies:  Allergies   Allergen Reactions    Azithromycin Rash     ALL mycin drugs    Erythromycin Rash     ALL mycins drugs    Penicillin G Rash    Penicillins  Rash     ALL mycin drugs       Social History:  Social History     Socioeconomic History    Marital status:    Tobacco Use    Smoking status: Never    Smokeless tobacco: Never    Tobacco comments:     None   Vaping Use    Vaping status: Never Used   Substance and Sexual Activity    Alcohol use: No    Drug use: No    Sexual activity: Not Currently       Family History:  Family History   Problem Relation Age of Onset    Hypertension Mother     Stroke Mother     Hypertension Father     Stroke Father     Breast cancer Sister 50    Ovarian cancer Daughter 57    Breast cancer Niece 53       Past Medical History :  Patient Active Problem List   Diagnosis    Status post placement of implantable loop recorder    Essential hypertension    Hypothyroidism (acquired)    Acute seasonal allergic rhinitis due to pollen    Atherosclerosis    B12 deficiency    Bilateral carpal tunnel syndrome    Cataracts, bilateral    DNR (do not resuscitate)    Heart murmur    History of chicken pox    Menopausal syndrome    Mixed hyperlipidemia    Pernicious anemia    Influenza vaccine refused    Liver function abnormality    Other fatigue    RUQ abdominal mass    Cardiac arrhythmia    Deleon-Wasserman syncope    Sinus node dysfunction    SSS (sick sinus syndrome)    Acute gastric ulcer without hemorrhage or perforation    Presence of cardiac pacemaker    Intraductal papillary mucinous neoplasm of pancreas    Personal history of other infectious and parasitic diseases    Presence of intraocular lens    Puckering of macula, right eye    Nonexudative age-related macular degeneration    Posterior vitreous detachment    Mammogram declined    Generalized abdominal pain    Acute bilateral low back pain without sciatica    Microhematuria    Iron deficiency anemia secondary to inadequate dietary iron intake    Chronic bilateral low back pain with right-sided sciatica    CRF (chronic renal failure), stage 2 (mild)    Left lower quadrant abdominal pain     Diarrhea    Cyst of pancreas    Right arm pain    Tear of biceps muscle, initial encounter    Rib pain on left side    Suprapubic pain    Transaminitis    Pancreatic cyst    Cholangitis       Medication List:    Current Outpatient Medications:     calcium citrate-vitamin d (CITRACAL) 200-250 MG-UNIT tablet tablet, Take 1 tablet by mouth Daily., Disp: , Rfl:     coenzyme Q10 100 MG capsule, Take 1 capsule by mouth Daily., Disp: , Rfl:     levothyroxine (SYNTHROID, LEVOTHROID) 50 MCG tablet, Take 1 tablet by mouth Daily., Disp: 30 tablet, Rfl: 12    metoprolol succinate XL (TOPROL-XL) 25 MG 24 hr tablet, Take 1 tablet by mouth Daily., Disp: 90 tablet, Rfl: 3    Omega-3 1000 MG capsule, Take 1 capsule by mouth Daily., Disp: , Rfl:     ursodiol (Actigall) 300 MG capsule, Take 1 capsule by mouth 2 (Two) Times a Day for 30 days., Disp: 60 capsule, Rfl: 0    Current Facility-Administered Medications:     cyanocobalamin injection 1,000 mcg, 1,000 mcg, Intramuscular, Q28 Days, Mita Moise MD, 1,000 mcg at 03/04/24 0947    Past Surgical History:  Past Surgical History:   Procedure Laterality Date    BLADDER REPAIR      BREAST BIOPSY Right     CARDIAC ELECTROPHYSIOLOGY PROCEDURE N/A 05/19/2021    Procedure: Pacemaker DC new;  Surgeon: Edmar Rubin MD;  Location: [facility] CATH INVASIVE LOCATION;  Service: Cardiovascular;  Laterality: N/A;    ERCP N/A 09/14/2021    Procedure: ERCP WITH SPHINCTEROTOMY, sphincteroplasty, clearance of bile duct with balloon. brushing, placement of pancreatic stent and placement of metal biliary stent;  Surgeon: Adrienne Wilson MD;  Location: [facility] ENDOSCOPY;  Service: Gastroenterology;  Laterality: N/A;  post op: cystic mass compressing bile duct    ERCP N/A 3/8/2024    Procedure: ENDOSCOPIC RETROGRADE CHOLANGIOPANCREATOGRAPHY WITH BALLOON CLEARANCE OF CLOGGED METAL BILIARY STENT, BALLOON STONE EXTRACTION;  Surgeon: Samy Bryant MD;  Location: [facility] ENDOSCOPY;   "Service: Gastroenterology;  Laterality: N/A;  POST:    HYSTERECTOMY      INSERT / REPLACE / REMOVE PACEMAKER      OTHER SURGICAL HISTORY  2019    Tilt table     OTHER SURGICAL HISTORY  2019    tilt table        Review of Systems:  Review of Systems   Gastrointestinal:  Positive for abdominal pain and nausea (better). Negative for constipation, diarrhea and vomiting.   Genitourinary:  Negative for dysuria.       Physical Exam:  Vital Signs:  Vital Signs:   /70 (BP Location: Right arm, Patient Position: Sitting, Cuff Size: Adult)   Pulse 70   Temp 98 °F (36.7 °C) (Temporal)   Resp 17   Ht 160 cm (62.99\")   Wt 59.1 kg (130 lb 3.2 oz)   SpO2 98% Comment: room air  BMI 23.07 kg/m²     Result Review :   The following data was reviewed by: Mita Moise MD on 03/11/2024:              Latest Reference Range & Units 03/08/24 03:40 03/09/24 05:47   Sodium 136 - 145 mmol/L 135 (L) 134 (L)   Potassium 3.5 - 5.2 mmol/L 4.9 4.9   Chloride 98 - 107 mmol/L 103 103   CO2 22.0 - 29.0 mmol/L 22.0 23.0   Anion Gap 5.0 - 15.0 mmol/L 10.0 8.0   BUN 8 - 23 mg/dL 18 22   Creatinine 0.57 - 1.00 mg/dL 1.14 (H) 1.10 (H)   BUN/Creatinine Ratio 7.0 - 25.0  15.8 20.0   eGFR >60.0 mL/min/1.73 45.0 (L) 46.9 (L)   Glucose 65 - 99 mg/dL 89 139 (H)   Calcium 8.2 - 9.6 mg/dL 9.1 9.2   Alkaline Phosphatase 39 - 117 U/L 349 (H) 309 (H)   Total Protein 6.0 - 8.5 g/dL 5.8 (L) 5.6 (L)   Albumin 3.5 - 5.2 g/dL 3.2 (L) 3.3 (L)   Globulin gm/dL 2.6 2.3   A/G Ratio g/dL 1.2 1.4   AST (SGOT) 1 - 32 U/L 127 (H) 86 (H)   ALT (SGPT) 1 - 33 U/L 189 (H) 151 (H)   Total Bilirubin 0.0 - 1.2 mg/dL 0.5 0.3   Lipase 13 - 60 U/L  55   Protime 9.6 - 11.7 Seconds 10.3    INR 0.93 - 1.10  0.94    WBC 3.40 - 10.80 10*3/mm3 7.00 9.40   RBC 3.77 - 5.28 10*6/mm3 4.66 4.29   Hemoglobin 12.0 - 15.9 g/dL 11.7 (L) 11.0 (L)   Hematocrit 34.0 - 46.6 % 37.2 34.6   Platelets 140 - 450 10*3/mm3 203 224   RDW 12.3 - 15.4 % 18.3 (H) 18.6 (H)   MCV 79.0 - 97.0 fL 79.7 80.7 "   MCH 26.6 - 33.0 pg 25.0 (L) 25.7 (L)   MCHC 31.5 - 35.7 g/dL 31.4 (L) 31.9   MPV 6.0 - 12.0 fL 8.8 8.3   RDW-SD 37.0 - 54.0 fl 54.3 (H) 52.5   Neutrophil Rel % 42.7 - 76.0 % 62.0 80.6 (H)   Lymphocyte Rel % 19.6 - 45.3 % 25.8 12.0 (L)   Monocyte Rel % 5.0 - 12.0 % 10.7 6.7   Eosinophil Rel % 0.3 - 6.2 % 0.8 0.0 (L)   Basophil Rel % 0.0 - 1.5 % 0.7 0.7   Neutrophils Absolute 1.70 - 7.00 10*3/mm3 4.30 7.60 (H)   Lymphocytes Absolute 0.70 - 3.10 10*3/mm3 1.80 1.10   Monocytes Absolute 0.10 - 0.90 10*3/mm3 0.80 0.60   Eosinophils Absolute 0.00 - 0.40 10*3/mm3 0.10 0.00   Basophils Absolute 0.00 - 0.20 10*3/mm3 0.00 0.10   nRBC 0.0 - 0.2 /100 WBC 0.1 0.0   (L): Data is abnormally low  (H): Data is abnormally high    Findings:  Preinjection imaging shows metal common bile duct stent. Wire cannulation of intrahepatic tree with contrast injection reveals intrahepatic duct dilation. Filling defects noted within intrahepatic biliary tree and more extensively throughout the metal   stent with little contrast opacification. Balloon dilation and sweeping performed. Final image appears to show slight improvement of intrahepatic duct dilation. Stable common bile duct stent position.     IMPRESSION:  Impression:  ERCP fluoroscopic imaging. See operative report for full detail.        Electronically Signed: Mendel Vann MD    3/8/2024 1:01 PM EST    Workstation ID: [id number]    Hospital Course:  Patient was admitted to the hospital, placed on n.p.o. diet.  Liver enzymes on admission noted for CR 1.2, , , , , TB 0.9, patient evaluated by GI.  Patient was taken to the OR for ERCP on 3/8/2024 for endoscopic retrograde cholangiopancreatography with balloon clearance of [place] metal biliary stent, balloon extraction.  Patient recovered and with return back to her room.  Patient placed on low-fat diet which she has been tolerating well.  Patient denies any pain, however intermittent bloating.  Labs at  discharge noted for CR 1.1, , alk phos 309, AST 86, , TB 0.3.  LFTs gradually improving patient is stable, and ready for discharge.  Patient will be discharged home on cefdinir, and Flagyl x 5 days.        Brief Urine Lab Results  (Last result in the past 365 days)        Color   Clarity   Blood   Leuk Est   Nitrite   Protein   CREAT   Urine HCG        03/11/24 1026 Dark Yellow   Slightly Cloudy   Negative   Negative   Negative   Trace                      Physical Exam  Vitals reviewed.   Constitutional:       Appearance: Normal appearance. She is well-developed.   HENT:      Head: Normocephalic and atraumatic.   Eyes:      General:         Right eye: No discharge.         Left eye: No discharge.   Cardiovascular:      Rate and Rhythm: Normal rate and regular rhythm.      Heart sounds: Normal heart sounds. No murmur heard.     No friction rub. No gallop.   Pulmonary:      Effort: Pulmonary effort is normal. No respiratory distress.      Breath sounds: Normal breath sounds. No wheezing or rales.   Abdominal:      General: Abdomen is flat. There is no distension.      Palpations: Abdomen is soft. There is no mass.      Tenderness: There is no abdominal tenderness. There is no guarding or rebound.      Hernia: No hernia is present.   Skin:     General: Skin is warm and dry.      Findings: No rash.   Neurological:      Mental Status: She is alert and oriented to person, place, and time.      Coordination: Coordination normal.      Gait: Gait normal.   Psychiatric:         Behavior: Behavior is cooperative.         Assessment and Plan:  Problems Addressed this Visit          Gastrointestinal Abdominal     Pancreatic cyst     She is seeing [facility]  She has a follow up in a few days  She had the stent recannulized            Genitourinary and Reproductive     Microhematuria     resolved         Relevant Orders    POCT urinalysis dipstick, multipro (Completed)     Other Visit Diagnoses       Hospital discharge  follow-up    -  Primary    Elevated transaminase level              Diagnoses         Codes Comments    Hospital discharge follow-up    -  Primary ICD-10-CM: Z09  ICD-9-CM: V67.59     Elevated transaminase level     ICD-10-CM: R74.01  ICD-9-CM: 790.4     Pancreatic cyst     ICD-10-CM: K86.2  ICD-9-CM: 577.2     Microhematuria     ICD-10-CM: R31.29  ICD-9-CM: 599.72              An After Visit Summary and PPPS were given to the patient.       BMI is within normal parameters. No other follow-up for BMI required.

## 2024-03-11 NOTE — OUTREACH NOTE
Call Center TCM Note      Flowsheet Row Responses   [facility] patient discharged from? [facility]   Does the patient have one of the following disease processes/diagnoses(primary or secondary)? General Surgery   TCM attempt successful? Yes   Call start time 1447   Call end time 1448   Discharge diagnosis Transaminitis   Comments seen today, 3/11/24 by Dr. Mita Moise for [facility] f/u.   Does the patient have an appointment with their PCP within 7-14 days of discharge? Yes   TCM call completed? Yes   Wrap up additional comments Pt seen this morning by PCP for [facility] f/u. This fulfills the TCM requirement. No phone call needed per policy.   Call end time 1448   Would this patient benefit from a Referral to [facility] Social Work? No   Is the patient interested in additional calls from an ambulatory ? No            Paris Sanon RN    3/11/2024, 14:48 EDT

## 2024-03-12 LAB
BACTERIA SPEC AEROBE CULT: NORMAL
BACTERIA SPEC AEROBE CULT: NORMAL

## 2024-03-12 NOTE — PAYOR COMM NOTE
"  DISCHARGE NOTICE --  [id number]       This patient discharged  HOME on 3/9/24.  Please advise if additional information is needed to finalize this request.    Thank you!      Stephany Ch  Utilization Review Coordinator  [address]  [address]  Ph: [phone]  Fx: [phone]      Stacy Monroy (89 y.o. Female)       Date of Birth   08/01/1930    Social Security Number       Address   [address] [place] [address]    Home Phone   [phone]    MRN   [id number]       Jain   Roman Catholic    Marital Status                               Admission Date   3/7/24    Admission Type   Elective    Admitting Provider   Mikey Sandhu MD    Attending Provider       Department, Room/Bed   [facility] 2E MEDICAL INPATIENT, 211/1       Discharge Date   3/9/2024    Discharge Disposition   Home or Self Care    Discharge Destination   Home                              Attending Provider: (none)   Allergies: Azithromycin, Erythromycin, Penicillin G, Penicillins    Isolation: None   Infection: None   Code Status: Prior    Ht: 160 cm (62.99\")   Wt: 62 kg (136 lb 11 oz)    Admission Cmt: None   Principal Problem: Transaminitis [R74.01]                   Active Insurance as of 3/7/2024       Primary Coverage       Payor Plan Insurance Group Employer/Plan Group    AETNA MEDICARE REPLACEMENT AETNA MEDICARE REPLACEMENT [id number]       Payor Plan Address Payor Plan Phone Number Payor Plan Fax Number Effective Dates    [address] [phone]  1/1/2020 - None Entered    [facility] [id number]         Subscriber Name Subscriber Birth Date Member ID       STACY MONROY 8/1/1930 [id number]                     Emergency Contacts        (Rel.) Home Phone Work Phone Mobile Phone    GAIL REZA (Friend) [phone] -- --    zane reza (Relative) -- -- [phone]                 Discharge Summary        Kenisha Rivera, APRCHARLEY at 03/09/24 1409 " "      Attestation signed by Karley Garrett MD at 24 1517    I have reviewed this documentation and agree.                               [facility] Medicine Services  Discharge Summary    Date of Service: 24    Patient Name: Stacy Monroy  : 1930  MRN: [id number]    Date of Admission: 3/7/2024  Discharge Diagnosis: Transaminitis  Date of Discharge:  24   Primary Care Physician: Mita Moise MD      Presenting Problem:   Transaminitis [R74.01]    Active and Resolved Hospital Problems:  Active Hospital Problems    Diagnosis POA    **Transaminitis [R74.01] Yes    Pancreatic cyst [K86.2] Unknown      Resolved Hospital Problems   No resolved problems to display.         Hospital Course     HPI:  Per the H&P written by Mikey Sandhu MD, dated 2024:  \"Stacy Monroy is a 89 y.o. female with a history of pacemaker  Patient was sent from her primary care physician as she had outpatient labs showing her LFTs to be in the 300 range  Patient had previous pancreatic stent for slowly growing pancreatic tumor  Patient with history of hypertension on medication  Had CAT scan as an outpatient  Could not have MRI due to her pacemaker  Patient with chronic back pain status post radiofrequency intervention by Dr. Fraser's recently\"    Hospital Course:  Patient was admitted to the hospital, placed on n.p.o. diet.  Liver enzymes on admission noted for CR 1.2, , , , , TB 0.9, patient evaluated by GI.  Patient was taken to the OR for ERCP on 3/8/2024 for endoscopic retrograde cholangiopancreatography with balloon clearance of Clagg metal biliary stent, balloon extraction.  Patient recovered and with return back to her room.  Patient placed on low-fat diet which she has been tolerating well.  Patient denies any pain, however intermittent bloating.  Labs at discharge noted for CR 1.1, , alk phos 309, AST 86, , TB 0.3.  LFTs gradually improving patient is stable, and " ready for discharge.  Patient will be discharged home on cefdinir, and Flagyl x 5 days.        DISCHARGE Follow Up Recommendations for labs and diagnostics: GI nurse practitioner next week to repeat liver enzymes.      Reasons For Change In Medications and Indications for New Medications:      Day of Discharge     Vital Signs:  Temp:  [97.7 °F (36.5 °C)-98.3 °F (36.8 °C)] 97.8 °F (36.6 °C)  Heart Rate:  [60-63] 62  Resp:  [11-20] 20  BP: ()/(50-68) 138/50    Physical Exam:  Physical Exam                         Constitutional: Awake, alert              Eyes: PERRLA, sclerae anicteric, no conjunctival injection              HENT: NCAT, mucous membranes moist              Neck: Supple, no thyromegaly, no lymphadenopathy, trachea midline              Respiratory: Clear to auscultation bilaterally, nonlabored respirations               Cardiovascular: RRR, no murmurs, rubs, or gallops, palpable pedal pulses bilaterally              Gastrointestinal: Positive bowel sounds, soft, nontender, nondistended              Musculoskeletal: No bilateral ankle edema, no clubbing or cyanosis to extremities              Psychiatric: Appropriate affect, cooperative              Neurologic: Oriented x 3, strength symmetric in all extremities, Cranial Nerves grossly intact to confrontation, speech clear              Skin: No rashes     Pertinent  and/or Most Recent Results     LAB RESULTS:      Lab 03/09/24 0547 03/08/24 0340 03/07/24 2002 03/04/24  1000   WBC 9.40 7.00  --  8.9   HEMOGLOBIN 11.0* 11.7*  --  12.3   HEMATOCRIT 34.6 37.2  --  39.0   PLATELETS 224 203  --  292   NEUTROS ABS 7.60* 4.30  --  6.1   LYMPHS ABS 1.10 1.80  --  2.0   MONOS ABS 0.60 0.80  --  0.7   EOS ABS 0.00 0.10  --  0.0   MCV 80.7 79.7  --  81   LACTATE  --   --  1.5  --    PROTIME  --  10.3  --   --          Lab 03/09/24 0547 03/08/24 0340 03/06/24 0922 03/04/24  1000   SODIUM 134* 135* 137 137   POTASSIUM 4.9 4.9 5.0 4.8   CHLORIDE 103 103 101  103   CO2 23.0 22.0 22 21   ANION GAP 8.0 10.0  --   --    BUN 22 18 17 21   CREATININE 1.10* 1.14* 1.20* 1.01*   EGFR 46.9* 45.0*  --   --    GLUCOSE 139* 89 123* 142*   CALCIUM 9.2 9.1 9.5 9.7         Lab 03/09/24  0547 03/08/24  0340 03/06/24  0922 03/04/24  1000   TOTAL PROTEIN 5.6* 5.8*  --   --    ALBUMIN 3.3* 3.2* 3.7 3.8   GLOBULIN 2.3 2.6  --   --    ALT (SGPT) 151* 189* 323* 252*   AST (SGOT) 86* 127* 339* 381*   BILIRUBIN 0.3 0.5 0.9 1.0   ALK PHOS 309* 349* 414* 365*   LIPASE 55  --   --   --          Lab 03/08/24  0340   PROTIME 10.3   INR 0.94                 Brief Urine Lab Results  (Last result in the past 365 days)        Color   Clarity   Blood   Leuk Est   Nitrite   Protein   CREAT   Urine HCG        03/04/24 0952 Yellow  Comment: Test not performed. Unable to obtain accurate result for this test  due to intense color of specimen.     Cloudy         CANCELED  Comment: Test not performed    Result canceled by the ancillary.                   Microbiology Results (last 10 days)       Procedure Component Value - Date/Time    Blood Culture - Blood, Arm, Left [[id number]]  (Normal) Collected: 03/07/24 2002    Lab Status: Preliminary result Specimen: Blood from Arm, Left Updated: 03/08/24 2101     Blood Culture No growth at 24 hours    Blood Culture - Blood, Arm, Right [[id number]]  (Normal) Collected: 03/07/24 1943    Lab Status: Preliminary result Specimen: Blood from Arm, Right Updated: 03/08/24 2101     Blood Culture No growth at 24 hours    Urine Culture - Urine, Urine, Clean Catch [[id number]] Collected: 03/04/24 0952    Lab Status: Final result Specimen: Urine, Clean Catch Updated: 03/06/24 0509     Urine Culture Final report     Result 1 Comment     Comment: Mixed urogenital leo  10,000-25,000 colony forming units per mL         Narrative:      Performed at:  [address] [place] [address]  [id number]  : Cuong Casiano PhD, Phone:  [phone]            [place] ERCP  pancreatic and biliary ducts    Result Date: 3/8/2024  Impression: Impression: ERCP fluoroscopic imaging. See operative report for full detail. Electronically Signed: Mendel Vann MD  3/8/2024 1:01 PM EST  Workstation ID: [id number]    CT Abdomen Without Contrast    Result Date: 3/5/2024  Impression: Impression: 1.No acute process nor significant change identified. Please note however there is significant respiratory motion artifact which limits image quality most pronounced at the level of the pancreatic head where there is a cystic lesion. Electronically Signed: Osman Soto MD  3/5/2024 1:33 PM EST  Workstation ID: [id number]                 Labs Pending at Discharge:  Pending Labs       Order Current Status    Blood Culture - Blood, Arm, Left Preliminary result    Blood Culture - Blood, Arm, Right Preliminary result            Procedures Performed  Procedure(s):  ENDOSCOPIC RETROGRADE CHOLANGIOPANCREATOGRAPHY WITH BALLOON CLEARANCE OF CLOGGED METAL BILIARY STENT, BALLOON STONE EXTRACTION  03/08 1137 ERCP      Consults:   Consults       Date and Time Order Name Status Description    3/7/2024 11:50 AM Inpatient Gastroenterology Consult Completed               Discharge Details        Discharge Medications        New Medications        Instructions Start Date   cefdinir 300 MG capsule  Commonly known as: OMNICEF   300 mg, Oral, 2 Times Daily      metroNIDAZOLE 500 MG tablet  Commonly known as: Flagyl   500 mg, Oral, 3 Times Daily      ursodiol 300 MG capsule  Commonly known as: Actigall   300 mg, Oral, 2 Times Daily             Continue These Medications        Instructions Start Date   calcium citrate-vitamin d 200-250 MG-UNIT tablet tablet  Commonly known as: CITRACAL   1 tablet, Oral, Daily      coenzyme Q10 100 MG capsule   100 mg, Oral, Daily      levothyroxine 50 MCG tablet  Commonly known as: SYNTHROID, LEVOTHROID   50 mcg, Oral, Daily      metoprolol succinate XL 25 MG 24 hr tablet  Commonly known as:  TOPROL-XL   25 mg, Oral, Daily      Omega-3 1000 MG capsule   1 tablet, Oral, Daily      sulfamethoxazole-trimethoprim 400-80 MG tablet  Commonly known as: BACTRIM,SEPTRA   1 tablet, Oral, 2 Times Daily               Allergies   Allergen Reactions    Azithromycin Rash     ALL mycin drugs    Erythromycin Rash     ALL mycins drugs    Penicillin G Rash    Penicillins Rash     ALL mycin drugs         Discharge Disposition: Home, follow-up with GI in 1 week  Home or Self Care    Diet:  Hospital:  Diet Order   Procedures    Diet: Cardiac; Healthy Heart (2-3 Na+); Fluid Consistency: Thin (IDDSI 0)               CODE STATUS:  Code Status and Medical Interventions:   Ordered at: 03/07/24 1150     Code Status (Patient has no pulse and is not breathing):    CPR (Attempt to Resuscitate)     Medical Interventions (Patient has pulse or is breathing):    Full Support         Future Appointments   Date Time Provider Department Center   3/11/2024  4:00 PM Mita Moise MD [facility]   4/19/2024 10:15 AM Lemuel Hallman MD [facility]   6/24/2024  9:15 AM Mita Moise MD [facility]   7/26/2024  1:45 PM Ruslan Layne MD [facility]       Additional Instructions for the Follow-ups that You Need to Schedule       Discharge Follow-up with Specified Provider: Samy Wood MD; 1 Week   As directed      To: Samy Wood MD   Follow Up: 1 Week   Follow Up Details: [phone]                Time spent on Discharge including face to face service:  >30 minutes    Signature: Electronically signed by WILDER Golden, 03/09/24, 14:33 EST.  [facility]ist Team    Electronically signed by Karley Garrett MD at 03/09/24 5955

## 2024-03-13 ENCOUNTER — OFFICE (OUTPATIENT)
Dept: URBAN - METROPOLITAN AREA CLINIC 64 | Facility: CLINIC | Age: 89
End: 2024-03-13
Payer: MEDICARE

## 2024-03-13 VITALS
SYSTOLIC BLOOD PRESSURE: 113 MMHG | HEART RATE: 60 BPM | HEIGHT: 63 IN | DIASTOLIC BLOOD PRESSURE: 72 MMHG | WEIGHT: 126 LBS

## 2024-03-13 DIAGNOSIS — R74.8 ABNORMAL LEVELS OF OTHER SERUM ENZYMES: ICD-10-CM

## 2024-03-13 DIAGNOSIS — R10.30 LOWER ABDOMINAL PAIN, UNSPECIFIED: ICD-10-CM

## 2024-03-13 DIAGNOSIS — R11.0 NAUSEA: ICD-10-CM

## 2024-03-13 DIAGNOSIS — C25.9 MALIGNANT NEOPLASM OF PANCREAS, UNSPECIFIED: ICD-10-CM

## 2024-03-13 PROCEDURE — 99214 OFFICE O/P EST MOD 30 MIN: CPT | Performed by: NURSE PRACTITIONER

## 2024-03-18 ENCOUNTER — READMISSION MANAGEMENT (OUTPATIENT)
Dept: CALL CENTER | Facility: HOSPITAL | Age: 89
End: 2024-03-18
Payer: MEDICARE

## 2024-03-18 NOTE — OUTREACH NOTE
General Surgery Week 2 Survey      Flowsheet Row Responses   [facility] discharged from? [facility]   Does the patient have one of the following disease processes/diagnoses(primary or secondary)? General Surgery   Week 2 attempt successful? Yes   Call start time 1452   Call end time 1503   Discharge diagnosis Transaminitis   Person spoke with today (if not patient) and relationship pt   Meds reviewed with patient/caregiver? Yes   Is the patient having any side effects they believe may be caused by any medication additions or changes? No   Does the patient have all medications related to this admission filled (includes all antibiotics, pain medications, etc.) Yes   Is the patient taking all medications as directed (includes completed medication regime)? Yes   Does the patient have a follow up appointment scheduled with their surgeon? Yes   Has the patient kept scheduled appointments due by today? Yes   Comments 3/11/24 by Dr. Mita Moise for hospital f/u.   Psychosocial issues? No   Did the patient receive a copy of their discharge instructions? Yes   Nursing interventions Reviewed instructions with patient   What is the patient's perception of their health status since discharge? Improving   Nursing interventions Nurse provided patient education   Is the patient /caregiver able to teach back basic post-op care? Take showers only when approved by MD-sponge bathe until then, Lifting as instructed by MD in discharge instructions   Is the patient/caregiver able to teach back signs and symptoms of incisional infection? Fever   Is the patient/caregiver able to teach back steps to recovery at home? Rest and rebuild strength, gradually increase activity, Eat a well-balance diet   If the patient is a current smoker, are they able to teach back resources for cessation? Not a smoker   Is the patient/caregiver able to teach back the hierarchy of who to call/visit for symptoms/problems? PCP, Specialist, Home health  nurse, Urgent Care, ED, 911 Yes   Week 2 call completed? Yes   Is the patient interested in additional calls from an ambulatory ? No   Would this patient benefit from a Referral to [facility] Social Work? No   Wrap up additional comments Pt states she is doing good. Pt had post-op appt, and advised to make a PCP fu appt. Pt inquired about help with food,  when asked if she had food to eat she said she did, and declined any help. Pt was given number to nurse call center if she decided she needed financial/food help.   Call end time 1508            Mayra REAVES - Registered Nurse

## 2024-04-19 ENCOUNTER — OFFICE VISIT (OUTPATIENT)
Dept: PAIN MEDICINE | Facility: CLINIC | Age: 89
End: 2024-04-19
Payer: MEDICARE

## 2024-04-19 VITALS
DIASTOLIC BLOOD PRESSURE: 65 MMHG | RESPIRATION RATE: 16 BRPM | HEART RATE: 67 BPM | OXYGEN SATURATION: 98 % | SYSTOLIC BLOOD PRESSURE: 136 MMHG

## 2024-04-19 DIAGNOSIS — M47.817 LUMBOSACRAL SPONDYLOSIS WITHOUT MYELOPATHY: Primary | ICD-10-CM

## 2024-04-19 DIAGNOSIS — M51.36 DDD (DEGENERATIVE DISC DISEASE), LUMBAR: ICD-10-CM

## 2024-04-19 PROCEDURE — 1159F MED LIST DOCD IN RCRD: CPT | Performed by: STUDENT IN AN ORGANIZED HEALTH CARE EDUCATION/TRAINING PROGRAM

## 2024-04-19 PROCEDURE — 99213 OFFICE O/P EST LOW 20 MIN: CPT | Performed by: STUDENT IN AN ORGANIZED HEALTH CARE EDUCATION/TRAINING PROGRAM

## 2024-04-19 PROCEDURE — 1125F AMNT PAIN NOTED PAIN PRSNT: CPT | Performed by: STUDENT IN AN ORGANIZED HEALTH CARE EDUCATION/TRAINING PROGRAM

## 2024-04-19 PROCEDURE — 1160F RVW MEDS BY RX/DR IN RCRD: CPT | Performed by: STUDENT IN AN ORGANIZED HEALTH CARE EDUCATION/TRAINING PROGRAM

## 2024-04-19 PROCEDURE — G0463 HOSPITAL OUTPT CLINIC VISIT: HCPCS | Performed by: STUDENT IN AN ORGANIZED HEALTH CARE EDUCATION/TRAINING PROGRAM

## 2024-04-19 RX ORDER — URSODIOL 500 MG/1
1 TABLET, FILM COATED ORAL EVERY 12 HOURS SCHEDULED
COMMUNITY
Start: 2024-04-06

## 2024-04-19 NOTE — PROGRESS NOTES
CHIEF COMPLAINT  Chief Complaint   Patient presents with    Back Pain     RFA Follow-up- No Narcotics       Primary Care  Mita Moise MD    Subjective   Stacy Monroy is a 89 y.o. female  who presents for low back pain with right lower extremity radiculopathy.  She reports the pain began approximately 3 to 4 months ago.  She describes the pain primarily in the posterior aspect of the right buttock and hip radiating into the right leg and right posterior thigh.  She reports that the pain is worse with any type of movement and physical activity and slightly improved with rest.  She describes the pain as a burning, shooting sensation.  She denies any red flag symptoms such as loss of bowel or bladder.  She does have plain films which show very significant degenerative change with associated anterolisthesis of up to 9 mm.    History of Present Illness     Location: Low back with radiation to the right buttock and right leg  Onset: Several months ago  Duration: Fairly constant  Timing: Constant throughout the day  Quality: Sharp, stabbing, shooting  Severity: Today: 1       Last Week: 8       Worst: 10  Modifying Factors: Pain is worse with movement and physical activity and slightly improved with rest  Functional Deficit: The pain makes it difficult for him to perform his activities of daily living    Physical Therapy: no    Interval Update 04/19/2024: She is now status post lumbar RFA and reports no significant axial back pain.  She has some what sounds like radicular pain in the posterior aspect of the left leg from just below the buttock to the knee however she reports that this is manageable without any medication.    The following portions of the patient's history were reviewed and updated as appropriate: allergies, current medications, past family history, past medical history, past social history, past surgical history, and problem list.    Procedures:  2/20/2024: Bilateral L4-5, L5-S1 lumbar RFA: 90%  benefit      Current Outpatient Medications:     calcium citrate-vitamin d (CITRACAL) 200-250 MG-UNIT tablet tablet, Take 1 tablet by mouth Daily., Disp: , Rfl:     coenzyme Q10 100 MG capsule, Take 1 capsule by mouth Daily., Disp: , Rfl:     levothyroxine (SYNTHROID, LEVOTHROID) 50 MCG tablet, Take 1 tablet by mouth Daily., Disp: 30 tablet, Rfl: 12    metoprolol succinate XL (TOPROL-XL) 25 MG 24 hr tablet, Take 1 tablet by mouth Daily., Disp: 90 tablet, Rfl: 3    Omega-3 1000 MG capsule, Take 1 capsule by mouth Daily., Disp: , Rfl:     ursodiol (ACTIGALL) 500 MG tablet, Take 1 tablet by mouth Every 12 (Twelve) Hours., Disp: , Rfl:     Current Facility-Administered Medications:     cyanocobalamin injection 1,000 mcg, 1,000 mcg, Intramuscular, Q28 Days, Mita Moise MD, 1,000 mcg at 03/04/24 0947    Review of Systems   Musculoskeletal:  Positive for arthralgias, back pain, gait problem and myalgias. Negative for joint swelling, neck pain and neck stiffness.   Neurological:  Positive for numbness. Negative for weakness.       Vitals:    04/19/24 1004   BP: 136/65   Pulse: 67   Resp: 16   SpO2: 98%   PainSc:   6       Objective   Physical Exam  Vitals and nursing note reviewed. Exam conducted with a chaperone present.   Constitutional:       General: She is not in acute distress.     Appearance: Normal appearance. She is well-developed and normal weight.   Neck:      Trachea: No tracheal deviation.   Musculoskeletal:      Comments: Lumbar Spine Exam:  Tender to palpation over the lumbar paraspinal musculature No  Limited range of motion secondary to pain No  Facet loading positive: Negative  Facets tender to palpation: Negative  Straight leg raise test positive: right    SI Joint Exam:  Katina positive: Negative  PSIS tender: right   SI joint palpation: right  SI joint compression: Negative     Neurological:      Mental Status: She is alert.      Sensory: Sensory deficit present.      Motor: Weakness present.            Assessment & Plan   Problems Addressed this Visit    None  Visit Diagnoses       Lumbosacral spondylosis without myelopathy    -  Primary    DDD (degenerative disc disease), lumbar              Diagnoses         Codes Comments    Lumbosacral spondylosis without myelopathy    -  Primary ICD-10-CM: M47.817  ICD-9-CM: 721.3     DDD (degenerative disc disease), lumbar     ICD-10-CM: M51.36  ICD-9-CM: 722.52             Plan:  Appears to gotten excellent benefit from her lumbar RFA  We can have her follow-up as needed if she needs repeat RFA    --- Follow-up as needed           INSPECT REPORT    As part of the patient's treatment plan, I may be prescribing controlled substances. The patient has been made aware of appropriate use of such medications, including potential risk of somnolence, limited ability to drive and/or work safely, and the potential for dependence or overdose. It has also bee made clear that these medications are for use by this patient only, without concomitant use of alcohol or other substances unless prescribed.     Patient has completed prescribing agreement detailing terms of continued prescribing of controlled substances, including monitoring VALERIA reports, urine drug screening, and pill counts if necessary. The patient is aware that inappropriate use will results in cessation of prescribing such medications.    INSPECT report has been reviewed and scanned into the patient's chart.    As the clinician, I personally reviewed the INSPECT from 4/17/2024.    History and physical exam exhibit continued safe and appropriate use of controlled substances.      EMR Dragon/Transcription disclaimer:   Much of this encounter note is an electronic transcription/translation of spoken language to printed text. The electronic translation of spoken language may permit erroneous, or at times, nonsensical words or phrases to be inadvertently transcribed; Although I have reviewed the note for such errors,  some may still exist.

## 2024-05-08 ENCOUNTER — CLINICAL SUPPORT (OUTPATIENT)
Dept: FAMILY MEDICINE CLINIC | Facility: CLINIC | Age: 89
End: 2024-05-08
Payer: MEDICARE

## 2024-05-08 VITALS
TEMPERATURE: 97.5 F | OXYGEN SATURATION: 97 % | RESPIRATION RATE: 18 BRPM | HEIGHT: 62 IN | WEIGHT: 127.5 LBS | HEART RATE: 70 BPM | BODY MASS INDEX: 23.46 KG/M2

## 2024-05-08 DIAGNOSIS — E53.8 B12 DEFICIENCY: Primary | ICD-10-CM

## 2024-05-08 PROCEDURE — 96372 THER/PROPH/DIAG INJ SC/IM: CPT | Performed by: FAMILY MEDICINE

## 2024-05-08 RX ADMIN — CYANOCOBALAMIN 1000 MCG: 1000 INJECTION, SOLUTION INTRAMUSCULAR; SUBCUTANEOUS at 11:11

## 2024-05-17 ENCOUNTER — HOSPITAL ENCOUNTER (OUTPATIENT)
Dept: GENERAL RADIOLOGY | Facility: HOSPITAL | Age: 89
Discharge: HOME | End: 2024-05-17
Payer: MEDICARE

## 2024-05-17 ENCOUNTER — OFFICE VISIT (OUTPATIENT)
Dept: FAMILY MEDICINE CLINIC | Facility: CLINIC | Age: 89
End: 2024-05-17
Payer: MEDICARE

## 2024-05-17 VITALS
BODY MASS INDEX: 23.55 KG/M2 | RESPIRATION RATE: 20 BRPM | WEIGHT: 128 LBS | OXYGEN SATURATION: 96 % | HEART RATE: 61 BPM | HEIGHT: 62 IN | DIASTOLIC BLOOD PRESSURE: 82 MMHG | SYSTOLIC BLOOD PRESSURE: 128 MMHG

## 2024-05-17 DIAGNOSIS — R10.9 FLANK PAIN: Primary | ICD-10-CM

## 2024-05-17 LAB
BILIRUB BLD-MCNC: NEGATIVE MG/DL
CLARITY, POC: ABNORMAL
COLOR UR: YELLOW
GLUCOSE UR STRIP-MCNC: NEGATIVE MG/DL
KETONES UR QL: NEGATIVE
LEUKOCYTE EST, POC: NEGATIVE
NITRITE UR-MCNC: NEGATIVE MG/ML
PH UR: 6.5 [PH] (ref 5–8)
PROT UR STRIP-MCNC: NEGATIVE MG/DL
RBC # UR STRIP: ABNORMAL /UL
SP GR UR: 1.01 (ref 1–1.03)
UROBILINOGEN UR QL: ABNORMAL

## 2024-05-17 PROCEDURE — 81002 URINALYSIS NONAUTO W/O SCOPE: CPT | Performed by: FAMILY MEDICINE

## 2024-05-17 PROCEDURE — 1125F AMNT PAIN NOTED PAIN PRSNT: CPT | Performed by: FAMILY MEDICINE

## 2024-05-17 PROCEDURE — 1111F DSCHRG MED/CURRENT MED MERGE: CPT | Performed by: FAMILY MEDICINE

## 2024-05-17 PROCEDURE — 99214 OFFICE O/P EST MOD 30 MIN: CPT | Performed by: FAMILY MEDICINE

## 2024-05-17 PROCEDURE — 71046 X-RAY EXAM CHEST 2 VIEWS: CPT

## 2024-05-17 RX ORDER — SULFAMETHOXAZOLE AND TRIMETHOPRIM 800; 160 MG/1; MG/1
1 TABLET ORAL 2 TIMES DAILY
Qty: 20 TABLET | Refills: 0 | Status: SHIPPED | OUTPATIENT
Start: 2024-05-17

## 2024-05-17 NOTE — PROGRESS NOTES
Subjective   Stacy Monroy is a 89 y.o. female.   Chief Complaint   Patient presents with    Flank Pain       History of Present Illness  Left sided flank pain x about a week. No fever or chills, no cough. No GI sx. Not worse with movement        The following portions of the patient's history were reviewed and updated as appropriate: allergies, current medications, past family history, past medical history, past social history, past surgical history, and problem list.    Patient Active Problem List   Diagnosis    Status post placement of implantable loop recorder    Essential hypertension    Hypothyroidism (acquired)    Acute seasonal allergic rhinitis due to pollen    Atherosclerosis    B12 deficiency    Bilateral carpal tunnel syndrome    Cataracts, bilateral    DNR (do not resuscitate)    Heart murmur    History of chicken pox    Menopausal syndrome    Mixed hyperlipidemia    Pernicious anemia    Influenza vaccine refused    Liver function abnormality    Other fatigue    RUQ abdominal mass    Cardiac arrhythmia    Deleon-Wasserman syncope    Sinus node dysfunction    SSS (sick sinus syndrome)    Acute gastric ulcer without hemorrhage or perforation    Presence of cardiac pacemaker    Intraductal papillary mucinous neoplasm of pancreas    Personal history of other infectious and parasitic diseases    Presence of intraocular lens    Puckering of macula, right eye    Nonexudative age-related macular degeneration    Posterior vitreous detachment    Mammogram declined    Generalized abdominal pain    Acute bilateral low back pain without sciatica    Microhematuria    Iron deficiency anemia secondary to inadequate dietary iron intake    Chronic bilateral low back pain with right-sided sciatica    CRF (chronic renal failure), stage 2 (mild)    Left lower quadrant abdominal pain    Diarrhea    Cyst of pancreas    Right arm pain    Tear of biceps muscle, initial encounter    Rib pain on left side    Suprapubic pain     Transaminitis    Pancreatic cyst    Cholangitis       Current Outpatient Medications on File Prior to Visit   Medication Sig Dispense Refill    calcium citrate-vitamin d (CITRACAL) 200-250 MG-UNIT tablet tablet Take 1 tablet by mouth Daily.      coenzyme Q10 100 MG capsule Take 1 capsule by mouth Daily.      levothyroxine (SYNTHROID, LEVOTHROID) 50 MCG tablet Take 1 tablet by mouth Daily. 30 tablet 12    metoprolol succinate XL (TOPROL-XL) 25 MG 24 hr tablet Take 1 tablet by mouth Daily. 90 tablet 3    Omega-3 1000 MG capsule Take 1 capsule by mouth Daily.      ursodiol (ACTIGALL) 500 MG tablet Take 1 tablet by mouth Every 12 (Twelve) Hours.       Current Facility-Administered Medications on File Prior to Visit   Medication Dose Route Frequency Provider Last Rate Last Admin    cyanocobalamin injection 1,000 mcg  1,000 mcg Intramuscular Q28 Days Mita Moise MD   1,000 mcg at 05/08/24 1111     Current outpatient and discharge medications have been reconciled for the patient.  Reviewed by: Luís Mclean MD      Allergies   Allergen Reactions    Azithromycin Rash     ALL mycin drugs    Erythromycin Rash     ALL mycins drugs    Penicillin G Rash    Penicillins Rash     ALL mycin drugs       Review of Systems   Constitutional:  Negative for activity change, appetite change, fatigue and fever.   HENT:  Negative for ear pain, swollen glands and voice change.    Eyes:  Negative for visual disturbance.   Respiratory:  Negative for shortness of breath and wheezing.    Cardiovascular:  Negative for chest pain and leg swelling.   Gastrointestinal:  Negative for abdominal pain, blood in stool, constipation, diarrhea, nausea and vomiting.   Endocrine: Negative for polydipsia and polyuria.   Genitourinary:  Negative for dysuria, frequency and hematuria.   Musculoskeletal:  Negative for joint swelling, neck pain and neck stiffness.   Skin:  Negative for rash and wound.   Neurological:  Negative for weakness, numbness  "and headache.   Psychiatric/Behavioral:  Negative for suicidal ideas and depressed mood.      I have reviewed and confirmed the accuracy of the ROS as documented by the MA/LPN/RN Luís Mclean MD    Objective   Visit Vitals  /82 (BP Location: Right arm, Patient Position: Sitting, Cuff Size: Adult)   Pulse 61   Resp 20   Ht 157.5 cm (62.01\")   Wt 58.1 kg (128 lb)   LMP  (LMP Unknown)   SpO2 96%   BMI 23.41 kg/m²      **  Physical Exam  Constitutional:       Appearance: She is well-developed.   HENT:      Head: Normocephalic and atraumatic.      Right Ear: External ear normal.      Left Ear: External ear normal.      Nose: Nose normal.   Eyes:      Pupils: Pupils are equal, round, and reactive to light.   Cardiovascular:      Rate and Rhythm: Normal rate and regular rhythm.      Heart sounds: Normal heart sounds.   Pulmonary:      Effort: Pulmonary effort is normal.      Breath sounds: Normal breath sounds.   Abdominal:      General: Bowel sounds are normal.      Palpations: Abdomen is soft.   Musculoskeletal:         General: Normal range of motion.      Cervical back: Normal range of motion and neck supple.        Back:       Comments: Area of perceived discomfort. Not reproducible on  palpation/percussion    Skin:     General: Skin is warm and dry.   Neurological:      Mental Status: She is alert and oriented to person, place, and time.   Psychiatric:         Behavior: Behavior normal.         Thought Content: Thought content normal.         Judgment: Judgment normal.       Physical Exam   Back         Ortho Exam   Neurologic Exam     Mental Status   Oriented to person, place, and time.     Cranial Nerves     CN III, IV, VI   Pupils are equal, round, and reactive to light.     Brief Urine Lab Results  (Last result in the past 365 days)        Color   Clarity   Blood   Leuk Est   Nitrite   Protein   CREAT   Urine HCG        05/17/24 1620 Yellow   Cloudy   Trace   Negative   Negative   Negative        "              Diagnoses and all orders for this visit:    1. Flank pain (Primary)  -     POCT urinalysis dipstick, manual  -     sulfamethoxazole-trimethoprim (BACTRIM DS,SEPTRA DS) 800-160 MG per tablet; Take 1 tablet by mouth 2 (Two) Times a Day.  Dispense: 20 tablet; Refill: 0  -     Urine Culture - Urine, Urine, Random Void  -     XR Chest PA & Lateral     Findings discussed. All questions answered.  Differential diagnosis discussed. Tx empirically for uti pending culture. Will check CXR in the event that this is a developing pulmonary process. Follow-up in approximately 3 days for reevaluation if not improved.  Follow-up sooner for worsening symptoms or any concerns.     BMI is within normal parameters. No other follow-up for BMI required.      Expected course, medications, and adverse effects discussed as appropriate.  Call or return if worsening or persistent symptoms.     This document is intended for medical professional use only.   Electronically signed by Luís Mclean MD on 05/17/2024. This content may not have been proofread.

## 2024-05-19 LAB
BACTERIA UR CULT: NORMAL
BACTERIA UR CULT: NORMAL

## 2024-05-20 ENCOUNTER — OFFICE VISIT (OUTPATIENT)
Dept: FAMILY MEDICINE CLINIC | Facility: CLINIC | Age: 89
End: 2024-05-20
Payer: MEDICARE

## 2024-05-20 ENCOUNTER — TELEPHONE (OUTPATIENT)
Dept: FAMILY MEDICINE CLINIC | Facility: CLINIC | Age: 89
End: 2024-05-20

## 2024-05-20 VITALS
WEIGHT: 129.6 LBS | HEIGHT: 62 IN | HEART RATE: 63 BPM | SYSTOLIC BLOOD PRESSURE: 140 MMHG | OXYGEN SATURATION: 96 % | RESPIRATION RATE: 18 BRPM | TEMPERATURE: 97.3 F | BODY MASS INDEX: 23.85 KG/M2 | DIASTOLIC BLOOD PRESSURE: 80 MMHG

## 2024-05-20 DIAGNOSIS — R10.12 LUQ PAIN: ICD-10-CM

## 2024-05-20 DIAGNOSIS — K86.2 CYST OF PANCREAS: ICD-10-CM

## 2024-05-20 DIAGNOSIS — R07.81 RIB PAIN ON LEFT SIDE: ICD-10-CM

## 2024-05-20 DIAGNOSIS — R10.9 FLANK PAIN: Primary | ICD-10-CM

## 2024-05-20 PROCEDURE — G2211 COMPLEX E/M VISIT ADD ON: HCPCS | Performed by: FAMILY MEDICINE

## 2024-05-20 PROCEDURE — 1125F AMNT PAIN NOTED PAIN PRSNT: CPT | Performed by: FAMILY MEDICINE

## 2024-05-20 PROCEDURE — 99214 OFFICE O/P EST MOD 30 MIN: CPT | Performed by: FAMILY MEDICINE

## 2024-05-20 NOTE — TELEPHONE ENCOUNTER
Caller: GAIL REZA    Relationship to patient: Emergency Contact    Best call back number: [phone]     Chief complaint: back and side pain    Type of visit: OFFICE    Requested date:ASAP    If rescheduling, when is the original appointment:     Additional notes: PATIENT IS STILL EXPERIENCING BACK PAIN AND SIDE MAIN SEEN  ON 05/17/24

## 2024-05-20 NOTE — PROGRESS NOTES
Subjective   Stacy Monroy is a 89 y.o. female. Presents to [facility]    Chief Complaint   Patient presents with    Flank Pain       History of Present Illness  Patient seen Dr. Mclean 05/17/24, and is not feeling any better.   Flank Pain  This is a new problem. The current episode started in the past 7 days. The problem has been worse since onset. The pain is present in the lumbar spine. The quality of the pain is described as aching. The pain does not radiate. The pain is at a severity of 5/10. The pain is mild. The pain is The same all the time. Associated symptoms include abdominal pain. Pertinent negatives include no chest pain, dysuria, headaches, pelvic pain, tingling or weakness. Treatments tried: bactrim. The treatment provided no relief.        I personally reviewed and updated the patient's allergies, medications, problem list, and past medical, surgical, social, and family history. I have reviewed and confirmed the accuracy of the History of Present Illness and Review of Symptoms as documented by the MA/LPN/RN. Mita Moise MD    Allergies:  Allergies   Allergen Reactions    Azithromycin Rash     ALL mycin drugs    Erythromycin Rash     ALL mycins drugs    Penicillin G Rash    Penicillins Rash     ALL mycin drugs       Social History:  Social History     Socioeconomic History    Marital status:    Tobacco Use    Smoking status: Never     Passive exposure: Never    Smokeless tobacco: Never    Tobacco comments:     None   Vaping Use    Vaping status: Never Used   Substance and Sexual Activity    Alcohol use: No    Drug use: No    Sexual activity: Not Currently       Family History:  Family History   Problem Relation Age of Onset    Hypertension Mother     Stroke Mother     Hypertension Father     Stroke Father     Breast cancer Sister 50    Ovarian cancer Daughter 57    Breast cancer Niece 53       Past Medical History :  Patient Active Problem List   Diagnosis    Status post  placement of implantable loop recorder    Essential hypertension    Hypothyroidism (acquired)    Acute seasonal allergic rhinitis due to pollen    Atherosclerosis    B12 deficiency    Bilateral carpal tunnel syndrome    Cataracts, bilateral    DNR (do not resuscitate)    Heart murmur    History of chicken pox    Menopausal syndrome    Mixed hyperlipidemia    Pernicious anemia    Influenza vaccine refused    Liver function abnormality    Other fatigue    RUQ abdominal mass    Cardiac arrhythmia    Deleon-Wasserman syncope    Sinus node dysfunction    SSS (sick sinus syndrome)    Acute gastric ulcer without hemorrhage or perforation    Presence of cardiac pacemaker    Intraductal papillary mucinous neoplasm of pancreas    Personal history of other infectious and parasitic diseases    Presence of intraocular lens    Puckering of macula, right eye    Nonexudative age-related macular degeneration    Posterior vitreous detachment    Mammogram declined    Acute bilateral low back pain without sciatica    Microhematuria    Iron deficiency anemia secondary to inadequate dietary iron intake    Chronic bilateral low back pain with right-sided sciatica    CRF (chronic renal failure), stage 2 (mild)    Left lower quadrant abdominal pain    Diarrhea    Cyst of pancreas    Right arm pain    Tear of biceps muscle, initial encounter    Rib pain on left side    Transaminitis    Cholangitis       Medication List:    Current Outpatient Medications:     calcium citrate-vitamin d (CITRACAL) 200-250 MG-UNIT tablet tablet, Take 1 tablet by mouth Daily., Disp: , Rfl:     coenzyme Q10 100 MG capsule, Take 1 capsule by mouth Daily., Disp: , Rfl:     levothyroxine (SYNTHROID, LEVOTHROID) 50 MCG tablet, Take 1 tablet by mouth Daily., Disp: 30 tablet, Rfl: 12    metoprolol succinate XL (TOPROL-XL) 25 MG 24 hr tablet, Take 1 tablet by mouth Daily., Disp: 90 tablet, Rfl: 3    Omega-3 1000 MG capsule, Take 1 capsule by mouth Daily., Disp: , Rfl:      "sulfamethoxazole-trimethoprim (BACTRIM DS,SEPTRA DS) 800-160 MG per tablet, Take 1 tablet by mouth 2 (Two) Times a Day., Disp: 20 tablet, Rfl: 0    ursodiol (ACTIGALL) 500 MG tablet, Take 1 tablet by mouth Every 12 (Twelve) Hours., Disp: , Rfl:     Current Facility-Administered Medications:     cyanocobalamin injection 1,000 mcg, 1,000 mcg, Intramuscular, Q28 Days, Mita Moise MD, 1,000 mcg at 05/08/24 1111    Past Surgical History:  Past Surgical History:   Procedure Laterality Date    BLADDER REPAIR      BREAST BIOPSY Right     CARDIAC ELECTROPHYSIOLOGY PROCEDURE N/A 05/19/2021    Procedure: Pacemaker DC new;  Surgeon: Edmar Rubin MD;  Location: [facility] CATH INVASIVE LOCATION;  Service: Cardiovascular;  Laterality: N/A;    ERCP N/A 09/14/2021    Procedure: ERCP WITH SPHINCTEROTOMY, sphincteroplasty, clearance of bile duct with balloon. brushing, placement of pancreatic stent and placement of metal biliary stent;  Surgeon: Adrienne Wilson MD;  Location: [facility] ENDOSCOPY;  Service: Gastroenterology;  Laterality: N/A;  post op: cystic mass compressing bile duct    ERCP N/A 3/8/2024    Procedure: ENDOSCOPIC RETROGRADE CHOLANGIOPANCREATOGRAPHY WITH BALLOON CLEARANCE OF CLOGGED METAL BILIARY STENT, BALLOON STONE EXTRACTION;  Surgeon: Samy Bryant MD;  Location: [facility] ENDOSCOPY;  Service: Gastroenterology;  Laterality: N/A;  POST:    HYSTERECTOMY      INSERT / REPLACE / REMOVE PACEMAKER      OTHER SURGICAL HISTORY  2019    Tilt table     OTHER SURGICAL HISTORY  2019    tilt table          Physical Exam:      Vital Signs:    Vitals:    05/20/24 1412   BP: 140/80   Pulse: 63   Resp: 18   Temp: 97.3 °F (36.3 °C)   SpO2: 96%        /80 (BP Location: Right arm, Patient Position: Sitting, Cuff Size: Adult)   Pulse 63   Temp 97.3 °F (36.3 °C) (Temporal)   Resp 18   Ht 157.5 cm (62.01\")   Wt 58.8 kg (129 lb 9.6 oz)   LMP  (LMP Unknown)   SpO2 96% Comment: room air  BMI 23.70 " kg/m²     Wt Readings from Last 3 Encounters:   05/20/24 58.8 kg (129 lb 9.6 oz)   05/17/24 58.1 kg (128 lb)   05/08/24 57.8 kg (127 lb 8 oz)       Result Review :   The following data was reviewed by: Mita Moise MD on 05/20/2024:           Brief Urine Lab Results  (Last result in the past 365 days)        Color   Clarity   Blood   Leuk Est   Nitrite   Protein   CREAT   Urine HCG        05/17/24 1620 Yellow   Cloudy   Trace   Negative   Negative   Negative                     Physical Exam  Vitals and nursing note reviewed.   Constitutional:       General: She is not in acute distress.     Appearance: She is well-developed. She is not diaphoretic.   HENT:      Head: Normocephalic and atraumatic.      Right Ear: Tympanic membrane and external ear normal.      Left Ear: Tympanic membrane and external ear normal.      Nose: Nose normal.      Mouth/Throat:      Pharynx: No oropharyngeal exudate.   Eyes:      General: No scleral icterus.        Right eye: No discharge.         Left eye: No discharge.      Conjunctiva/sclera: Conjunctivae normal.      Pupils: Pupils are equal, round, and reactive to light.   Neck:      Thyroid: No thyromegaly.      Trachea: No tracheal deviation.   Cardiovascular:      Rate and Rhythm: Normal rate and regular rhythm.      Heart sounds: Normal heart sounds. No murmur heard.     No friction rub. No gallop.   Pulmonary:      Effort: Pulmonary effort is normal. No respiratory distress.      Breath sounds: Normal breath sounds. No stridor. No wheezing or rales.   Chest:       Abdominal:      General: Bowel sounds are normal. There is no distension.      Palpations: Abdomen is soft. There is no mass.      Tenderness: There is abdominal tenderness in the left upper quadrant. There is no guarding or rebound.      Comments: Mild tenderness LUQ no rebound   Musculoskeletal:         General: No tenderness or deformity. Normal range of motion.      Cervical back: Normal range of motion and neck  supple.   Lymphadenopathy:      Cervical: No cervical adenopathy.   Skin:     General: Skin is warm and dry.      Capillary Refill: Capillary refill takes less than 2 seconds.      Coloration: Skin is not pale.      Findings: No erythema or rash.   Neurological:      Mental Status: She is alert and oriented to person, place, and time.      Cranial Nerves: No cranial nerve deficit.      Sensory: No sensory deficit.      Motor: No tremor, atrophy or abnormal muscle tone.      Coordination: Coordination normal.      Gait: Gait normal.      Deep Tendon Reflexes: Reflexes are normal and symmetric. Reflexes normal.   Psychiatric:         Behavior: Behavior normal.         Thought Content: Thought content normal.         Cognition and Memory: Memory is not impaired. She does not exhibit impaired recent memory or impaired remote memory.         Judgment: Judgment normal.         Assessment and Plan:  Problems Addressed this Visit          Gastrointestinal Abdominal     Cyst of pancreas     Will check labs, amylase, lipase, cbc, cmp  Will get CT scan  She has hx of this and a stent in her pancreas            Pulmonary and Pneumonias    Rib pain on left side     Other Visit Diagnoses       Flank pain    -  Primary    Relevant Orders    CT Abdomen With Contrast    LUQ pain        Will get labs to check pancreas WIll try to get CT with her hx of pancreatic cyst and stent occluding    Relevant Orders    CT Abdomen With Contrast    Comprehensive Metabolic Panel (Completed)    Amylase (Completed)    CBC & Differential (Completed)    Lipase (Completed)          Diagnoses         Codes Comments    Flank pain    -  Primary ICD-10-CM: R10.9  ICD-9-CM: 789.09     Rib pain on left side     ICD-10-CM: R07.81  ICD-9-CM: 786.50     LUQ pain     ICD-10-CM: R10.12  ICD-9-CM: 789.02 Will get labs to check pancreas WIll try to get CT with her hx of pancreatic cyst and stent occluding    Cyst of pancreas     ICD-10-CM: K86.2  ICD-9-CM: 577.2               BMI is within normal parameters. No other follow-up for BMI required.           An After Visit Summary and PPPS were given to the patient.

## 2024-05-20 NOTE — TELEPHONE ENCOUNTER
Please notify patient that: Cxr showed no acute findings. Recc follow up with PCP for further evaluation as she may need additional testing

## 2024-05-21 LAB
ALBUMIN SERPL-MCNC: 3.5 G/DL (ref 3.6–4.6)
ALBUMIN/GLOB SERPL: 1.3 {RATIO} (ref 1.2–2.2)
ALP SERPL-CCNC: 114 IU/L (ref 44–121)
ALT SERPL-CCNC: 26 IU/L (ref 0–32)
AMYLASE SERPL-CCNC: 53 U/L (ref 31–110)
AST SERPL-CCNC: 39 IU/L (ref 0–40)
BASOPHILS # BLD AUTO: 0 X10E3/UL (ref 0–0.2)
BASOPHILS NFR BLD AUTO: 1 %
BILIRUB SERPL-MCNC: 0.3 MG/DL (ref 0–1.2)
BUN SERPL-MCNC: 23 MG/DL (ref 10–36)
BUN/CREAT SERPL: 17 (ref 12–28)
CALCIUM SERPL-MCNC: 9.6 MG/DL (ref 8.7–10.3)
CHLORIDE SERPL-SCNC: 100 MMOL/L (ref 96–106)
CO2 SERPL-SCNC: 22 MMOL/L (ref 20–29)
CREAT SERPL-MCNC: 1.39 MG/DL (ref 0.57–1)
EGFRCR SERPLBLD CKD-EPI 2021: 35 ML/MIN/1.73
EOSINOPHIL # BLD AUTO: 0 X10E3/UL (ref 0–0.4)
EOSINOPHIL NFR BLD AUTO: 1 %
ERYTHROCYTE [DISTWIDTH] IN BLOOD BY AUTOMATED COUNT: 16.6 % (ref 11.7–15.4)
GLOBULIN SER CALC-MCNC: 2.6 G/DL (ref 1.5–4.5)
GLUCOSE SERPL-MCNC: 87 MG/DL (ref 70–99)
HCT VFR BLD AUTO: 35.6 % (ref 34–46.6)
HGB BLD-MCNC: 11.1 G/DL (ref 11.1–15.9)
IMM GRANULOCYTES # BLD AUTO: 0 X10E3/UL (ref 0–0.1)
IMM GRANULOCYTES NFR BLD AUTO: 0 %
LIPASE SERPL-CCNC: 36 U/L (ref 14–85)
LYMPHOCYTES # BLD AUTO: 2.1 X10E3/UL (ref 0.7–3.1)
LYMPHOCYTES NFR BLD AUTO: 31 %
MCH RBC QN AUTO: 25.6 PG (ref 26.6–33)
MCHC RBC AUTO-ENTMCNC: 31.2 G/DL (ref 31.5–35.7)
MCV RBC AUTO: 82 FL (ref 79–97)
MONOCYTES # BLD AUTO: 0.5 X10E3/UL (ref 0.1–0.9)
MONOCYTES NFR BLD AUTO: 8 %
NEUTROPHILS # BLD AUTO: 4 X10E3/UL (ref 1.4–7)
NEUTROPHILS NFR BLD AUTO: 59 %
PLATELET # BLD AUTO: 288 X10E3/UL (ref 150–450)
POTASSIUM SERPL-SCNC: 4.8 MMOL/L (ref 3.5–5.2)
PROT SERPL-MCNC: 6.1 G/DL (ref 6–8.5)
RBC # BLD AUTO: 4.33 X10E6/UL (ref 3.77–5.28)
SODIUM SERPL-SCNC: 133 MMOL/L (ref 134–144)
WBC # BLD AUTO: 6.6 X10E3/UL (ref 3.4–10.8)

## 2024-05-24 ENCOUNTER — TELEPHONE (OUTPATIENT)
Dept: FAMILY MEDICINE CLINIC | Facility: CLINIC | Age: 89
End: 2024-05-24
Payer: MEDICARE

## 2024-05-24 NOTE — TELEPHONE ENCOUNTER
Called and LMOM for the patient and asking how she was doing as we got a letter from insurance and they are denying the CT scan that was ordered. Called to see how she was feeling and see If she was still having the pain and if she was to be having the pain she might want to reach out to GI and see if there is anything they can suggest for her and or if there is something that they would like to order. Advised the officer will be closed on Monday for the holiday but we will be returning on tuesday and she can call and talk to dr. Moise staff if it is needed

## 2024-05-28 PROBLEM — R10.84 GENERALIZED ABDOMINAL PAIN: Status: RESOLVED | Noted: 2022-12-19 | Resolved: 2024-05-28

## 2024-05-28 PROBLEM — K86.2 PANCREATIC CYST: Status: RESOLVED | Noted: 2024-03-07 | Resolved: 2024-05-28

## 2024-05-28 PROBLEM — R10.2 SUPRAPUBIC PAIN: Status: RESOLVED | Noted: 2024-03-04 | Resolved: 2024-05-28

## 2024-05-28 NOTE — ASSESSMENT & PLAN NOTE
Will check labs, amylase, lipase, cbc, cmp  Will get CT scan  She has hx of this and a stent in her pancreas

## 2024-06-06 ENCOUNTER — CLINICAL SUPPORT (OUTPATIENT)
Dept: FAMILY MEDICINE CLINIC | Facility: CLINIC | Age: 89
End: 2024-06-06
Payer: MEDICARE

## 2024-06-06 DIAGNOSIS — E53.8 B12 DEFICIENCY: Primary | ICD-10-CM

## 2024-06-06 PROCEDURE — 96372 THER/PROPH/DIAG INJ SC/IM: CPT | Performed by: FAMILY MEDICINE

## 2024-06-06 RX ADMIN — CYANOCOBALAMIN 1000 MCG: 1000 INJECTION, SOLUTION INTRAMUSCULAR; SUBCUTANEOUS at 15:06

## 2024-06-17 NOTE — PROGRESS NOTES
Subsequent Medicare Wellness Visit    Subjective    History of Present Illness:  Stacy Monroy is a 89 y.o. female who presents for a Subsequent Medicare Wellness Visit.    The following portions of the patient's history were reviewed and   updated as appropriate: allergies, current medications, past family history, past medical history, past social history, past surgical history, and problem list.  Family History   Problem Relation Age of Onset    Hypertension Mother     Stroke Mother     Hypertension Father     Stroke Father     Breast cancer Sister 50    Ovarian cancer Daughter 57    Breast cancer Niece 53     Past Surgical History:   Procedure Laterality Date    BLADDER REPAIR      BREAST BIOPSY Right     CARDIAC ELECTROPHYSIOLOGY PROCEDURE N/A 05/19/2021    Procedure: Pacemaker DC new;  Surgeon: Edmar Rubin MD;  Location: [facility] CATH INVASIVE LOCATION;  Service: Cardiovascular;  Laterality: N/A;    ERCP N/A 09/14/2021    Procedure: ERCP WITH SPHINCTEROTOMY, sphincteroplasty, clearance of bile duct with balloon. brushing, placement of pancreatic stent and placement of metal biliary stent;  Surgeon: Adrienne Wilson MD;  Location: [facility] ENDOSCOPY;  Service: Gastroenterology;  Laterality: N/A;  post op: cystic mass compressing bile duct    ERCP N/A 3/8/2024    Procedure: ENDOSCOPIC RETROGRADE CHOLANGIOPANCREATOGRAPHY WITH BALLOON CLEARANCE OF CLOGGED METAL BILIARY STENT, BALLOON STONE EXTRACTION;  Surgeon: Samy Bryant MD;  Location: [facility] ENDOSCOPY;  Service: Gastroenterology;  Laterality: N/A;  POST:    HYSTERECTOMY      INSERT / REPLACE / REMOVE PACEMAKER      OTHER SURGICAL HISTORY  2019    Tilt table     OTHER SURGICAL HISTORY  2019    tilt table         Compared to one year ago, the patient feels her physical   health is the same.    Compared to one year ago, the patient feels her mental   health is the same.    Recent Hospitalizations:  This patient has had a [facility]  admission record on file within the last 365 days.    Current Medical Providers:  Patient Care Team:  Mita Moise MD as PCP - General  Mita Moise MD as PCP - Family Medicine    Outpatient Medications Prior to Visit   Medication Sig Dispense Refill    calcium citrate-vitamin d (CITRACAL) 200-250 MG-UNIT tablet tablet Take 1 tablet by mouth Daily.      coenzyme Q10 100 MG capsule Take 1 capsule by mouth Daily.      levothyroxine (SYNTHROID, LEVOTHROID) 50 MCG tablet Take 1 tablet by mouth Daily. 30 tablet 12    metoprolol succinate XL (TOPROL-XL) 25 MG 24 hr tablet Take 1 tablet by mouth Daily. 90 tablet 3    Omega-3 1000 MG capsule Take 1 capsule by mouth Daily.      ursodiol (ACTIGALL) 500 MG tablet Take 1 tablet by mouth Every 12 (Twelve) Hours.      sulfamethoxazole-trimethoprim (BACTRIM DS,SEPTRA DS) 800-160 MG per tablet Take 1 tablet by mouth 2 (Two) Times a Day. (Patient not taking: Reported on 6/24/2024) 20 tablet 0     Facility-Administered Medications Prior to Visit   Medication Dose Route Frequency Provider Last Rate Last Admin    cyanocobalamin injection 1,000 mcg  1,000 mcg Intramuscular Q28 Days Mita Moise MD   1,000 mcg at 06/06/24 1506     Pain Score    06/24/24 0914   PainSc: 0-No pain      No opioid medication identified on active medication list. I have reviewed chart for other potential  high risk medication/s and harmful drug interactions in the elderly.        Aspirin is not on active medication list.  Aspirin use is not indicated based on review of current medical condition/s. Risk of harm outweighs potential benefits.  .    Patient Active Problem List   Diagnosis    Status post placement of implantable loop recorder    Essential hypertension    Hypothyroidism (acquired)    Acute seasonal allergic rhinitis due to pollen    Atherosclerosis    B12 deficiency    Bilateral carpal tunnel syndrome    Cataracts, bilateral    DNR (do not resuscitate)    Heart murmur    History of  "chicken pox    Menopausal syndrome    Mixed hyperlipidemia    Pernicious anemia    Influenza vaccine refused    Liver function abnormality    Other fatigue    RUQ abdominal mass    Cardiac arrhythmia    Deleon-Wasserman syncope    Sinus node dysfunction    SSS (sick sinus syndrome)    Acute gastric ulcer without hemorrhage or perforation    Presence of cardiac pacemaker    Intraductal papillary mucinous neoplasm of pancreas    Personal history of other infectious and parasitic diseases    Presence of intraocular lens    Puckering of macula, right eye    Nonexudative age-related macular degeneration    Posterior vitreous detachment    Mammogram declined    Acute bilateral low back pain without sciatica    Microhematuria    Iron deficiency anemia secondary to inadequate dietary iron intake    Chronic bilateral low back pain with right-sided sciatica    CRF (chronic renal failure), stage 2 (mild)    Left lower quadrant abdominal pain    Diarrhea    Cyst of pancreas    Right arm pain    Tear of biceps muscle, initial encounter    Rib pain on left side    Transaminitis    Cholangitis    Right ear impacted cerumen     Advance Care Planning  Advance Directive is on file.  ACP discussion was held with the patient during this visit. Patient has an advance directive in EMR which is still valid.            Objective    Vitals:    06/24/24 0914   BP: 128/70   BP Location: Right arm   Patient Position: Sitting   Cuff Size: Adult   Pulse: 98   Resp: 18   Temp: 96.9 °F (36.1 °C)   TempSrc: Temporal   SpO2: 97%  Comment: room air   Weight: 57.9 kg (127 lb 9.6 oz)   Height: 157.5 cm (62.01\")   PainSc: 0-No pain       Does the patient have evidence of cognitive impairment? No           HEALTH RISK ASSESSMENT    Smoking Status:  Social History     Tobacco Use   Smoking Status Never    Passive exposure: Never   Smokeless Tobacco Never   Tobacco Comments    None     Alcohol Consumption:  Social History     Substance and Sexual Activity "   Alcohol Use No     Fall Risk Screen:    HOMEROADI Fall Risk Assessment was completed, and patient is at LOW risk for falls.Assessment completed on:2024    Depression Screenin/24/2024     9:00 AM   PHQ-2/PHQ-9 Depression Screening   Little Interest or Pleasure in Doing Things 0-->not at all   Feeling Down, Depressed or Hopeless 0-->not at all   PHQ-9: Brief Depression Severity Measure Score 0       Health Habits and Functional and Cognitive Screenin/24/2024     9:00 AM   Functional & Cognitive Status   Do you have difficulty preparing food and eating? No   Do you have difficulty bathing yourself, getting dressed or grooming yourself? No   Do you have difficulty using the toilet? No   Do you have difficulty moving around from place to place? No   Do you have trouble with steps or getting out of a bed or a chair? No   Current Diet Well Balanced Diet   Dental Exam Up to date   Eye Exam Not up to date   Exercise (times per week) 3 times per week   Current Exercises Include Yard Work   Do you need help using the phone?  No   Are you deaf or do you have serious difficulty hearing?  No   Do you need help to go to places out of walking distance? No   Do you need help shopping? No   Do you need help preparing meals?  No   Do you need help with housework?  No   Do you need help with laundry? No   Do you need help taking your medications? No   Do you need help managing money? No   Do you ever drive or ride in a car without wearing a seat belt? No   Have you felt unusual stress, anger or loneliness in the last month? No   Who do you live with? Alone   If you need help, do you have trouble finding someone available to you? No   Have you been bothered in the last four weeks by sexual problems? No   Do you have difficulty concentrating, remembering or making decisions? No       Age-appropriate Screening Schedule:  Refer to the list below for future screening recommendations based on patient's age, sex and/or  medical conditions. Orders for these recommended tests are listed in the plan section. The patient has been provided with a written plan.    Health Maintenance   Topic Date Due    COVID-19 Vaccine (2023-24 season) 2023    DXA SCAN  2024    RSV Vaccine - Adults (1 - 1-dose 60+ series) 2025 (Originally 1990)    INFLUENZA VACCINE  2028 (Originally 2024)    LIPID PANEL  2025    ANNUAL WELLNESS VISIT  2025    TDAP/TD VACCINES (2 - Td or Tdap) 2030    Pneumococcal Vaccine 65+  Completed    ZOSTER VACCINE  Completed              Assessment & Plan   Medically necessary, significant, and separately identifiable medical problems identified during this visit are addressed on a separate visit note.    CMS Preventative Services Quick Reference  Risk Factors Identified During Encounter  None Identified  The above risks/problems have been discussed with the patient.  Follow up actions/plans if indicated are seen below in the Assessment/Plan Section.  Pertinent information has been shared with the patient in the After Visit Summary.    Follow Up:   No follow-ups on file.     An After Visit Summary and PPPS were made available to the patient.    Medicare Wellness  Personal Prevention Plan of Service     Date of Office Visit:  2024  Encounter Provider:  Mita Moise MD  Place of Service:  [facility]  Patient Name: Stacy Monroy  :  1930    As part of the Medicare Wellness portion of your visit today, we are providing you with this personalized preventive plan of services (PPPS). This plan is based upon recommendations of the [place] Preventive Services Task Force (USPSTF) and the Advisory Committee on Immunization Practices (ACIP).    This lists the preventive care services that should be considered, and provides dates of when you are due. Items listed as completed are up-to-date and do not require any further  intervention.    Health Maintenance   Topic Date Due    COVID-19 Vaccine (5 - 2023-24 season) 09/01/2023    DXA SCAN  01/28/2024    RSV Vaccine - Adults (1 - 1-dose 60+ series) 06/24/2025 (Originally [birth year]1990)    INFLUENZA VACCINE  12/06/2028 (Originally 8/1/2024)    LIPID PANEL  06/20/2025    ANNUAL WELLNESS VISIT  06/24/2025    TDAP/TD VACCINES (2 - Td or Tdap) 08/05/2030    Pneumococcal Vaccine 65+  Completed    ZOSTER VACCINE  Completed       Orders Placed This Encounter   Procedures    Ear Cerumen Removal     This order was created via procedure documentation     Order Specific Question:   Release to patient     Answer:   Routine Release [[id number]]    POCT urinalysis dipstick, manual     Order Specific Question:   Release to patient     Answer:   Routine Release [[id number]]       No follow-ups on file.  Sit-to-Stand Exercise    The sit-to-stand exercise (also known as the chair stand or chair rise exercise) strengthens your lower body and helps you maintain or improve your mobility and independence. The goal is to do the sit-to-stand exercise without using your hands. This will be easier as you become stronger. You should always talk with your health care provider before starting any exercise program, especially if you have had recent surgery.  Do the exercise exactly as told by your health care provider and adjust it as directed. It is normal to feel mild stretching, pulling, tightness, or discomfort as you do this exercise, but you should stop right away if you feel sudden pain or your pain gets worse. Do not begin doing this exercise until told by your health care provider.  What the sit-to-stand exercise does  The sit-to-stand exercise helps to strengthen the muscles in your thighs and the muscles in the center of your body that give you stability (core muscles). This exercise is especially helpful if:  You have had knee or hip surgery.  You have trouble getting up from a chair, out of a car, or off the  toilet.  How to do the sit-to-stand exercise  Sit toward the front edge of a sturdy chair without armrests. Your knees should be bent and your feet should be flat on the floor and shoulder-width apart.  Place your hands lightly on each side of the seat. Keep your back and neck as straight as possible, with your chest slightly forward.  Breathe in slowly. Lean forward and slightly shift your weight to the front of your feet.  Breathe out as you slowly stand up. Use your hands as little as possible.  Stand and pause for a full breath in and out.  Breathe in as you sit down slowly. Tighten your core and abdominal muscles to control your lowering as much as possible.  Breathe out slowly.  Do this exercise 10-15 times. If needed, do it fewer times until you build up strength.  Rest for 1 minute, then do another set of 10-15 repetitions.  To change the difficulty of the sit-to-stand exercise  If the exercise is too difficult, use a chair with sturdy armrests, and push off the armrests to help you come to the standing position. You can also use the armrests to help slowly lower yourself back to sitting. As this gets easier, try to use your arms less. You can also place a firm cushion or pillow on the chair to make the surface higher.  If this exercise is too easy, do not use your arms to help raise or lower yourself. You can also wear a weighted vest, use hand weights, increase your repetitions, or try a lower chair.  General tips  You may feel tired when starting an exercise routine. This is normal.  You may have muscle soreness that lasts a few days. This is normal. As you get stronger, you may not feel muscle soreness.  Use smooth, steady movements.  Do not  hold your breath during strength exercises. This can cause unsafe changes in your blood pressure.  Breathe in slowly through your nose, and breathe out slowly through your mouth.  Summary  Strengthening your lower body is an important step to help you move safely  and independently.  The sit-to-stand exercise helps strengthen the muscles in your thighs and core.  You should always talk with your health care provider before starting any exercise program, especially if you have had recent surgery.  This information is not intended to replace advice given to you by your health care provider. Make sure you discuss any questions you have with your health care provider.  Document Revised: 10/16/2019 Document Reviewed: 02/08/2018  Elsevier Patient Education © 2021 [employer]    Advance Care Planning and Advance Directives     You make decisions on a daily basis - decisions about where you want to live, your career, your home, your life. Perhaps one of the most important decisions you face is your choice for future medical care. Take time to talk with your family and your healthcare team and start planning today.  Advance Care Planning is a process that can help you:  Understand possible future healthcare decisions in light of your own experiences  Reflect on those decision in light of your goals and values  Discuss your decisions with those closest to you and the healthcare professionals that care for you  Make a plan by creating a document that reflects your wishes    Surrogate Decision Maker  In the event of a medical emergency, which has left you unable to communicate or to make your own decisions, you would need someone to make decisions for you.  It is important to discuss your preferences for medical treatment with this person while you are in good health.     Qualities of a surrogate decision maker:  Willing to take on this role and responsibility  Knows what you want for future medical care  Willing to follow your wishes even if they don't agree with them  Able to make difficult medical decisions under stressful circumstances    Advance Directives  These are legal documents you can create that will guide your healthcare team and decision maker(s) when needed. These  documents can be stored in the electronic medical record.    Living Will - a legal document to guide your care if you have a terminal condition or a serious illness and are unable to communicate. States vary by statute in document names/types, but most forms may include one or more of the following:        -  Directions regarding life-prolonging treatments        -  Directions regarding artificially provided nutrition/hydration        -  Choosing a healthcare decision maker        -  Direction regarding organ/tissue donation    Durable Power of  for Healthcare - this document names an -in-fact to make medical decisions for you, but it may also allow this person to make personal and financial decisions for you. Please seek the advice of an  if you need this type of document.    **Advance Directives are not required and no one may discriminate against you if you do not sign one.    Medical Orders  Many states allow specific forms/orders signed by your physician to record your wishes for medical treatment in your current state of health. This form, signed in personal communication with your physician, addresses resuscitation and other medical interventions that you may or may not want.  For more information or to schedule a time with a [facility] Advance Care Planning Facilitator contact: [facility]/ACP or call [phone] and someone will contact you directly.    Fall Prevention in the Home, Adult  Falls can cause injuries and can happen to people of all ages. There are many things you can do to make your home safe and to help prevent falls. Ask for help when making these changes.  What actions can I take to prevent falls?  General Instructions  Use good lighting in all rooms. Replace any light bulbs that burn out.  Turn on the lights in dark areas. Use night-lights.  Keep items that you use often in easy-to-reach places. Lower the shelves around your home if needed.  Set up your  furniture so you have a clear path. Avoid moving your furniture around.  Do not have throw rugs or other things on the floor that can make you trip.  Avoid walking on wet floors.  If any of your floors are uneven, fix them.  Add color or contrast paint or tape to clearly angelina and help you see:  Grab bars or handrails.  First and last steps of staircases.  Where the edge of each step is.  If you use a stepladder:  Make sure that it is fully opened. Do not climb a closed stepladder.  Make sure the sides of the stepladder are locked in place.  Ask someone to hold the stepladder while you use it.  Know where your pets are when moving through your home.  What can I do in the bathroom?         Keep the floor dry. Clean up any water on the floor right away.  Remove soap buildup in the tub or shower.  Use nonskid mats or decals on the floor of the tub or shower.  Attach bath mats securely with double-sided, nonslip rug tape.  If you need to sit down in the shower, use a plastic, nonslip stool.  Install grab bars by the toilet and in the tub and shower. Do not use towel bars as grab bars.  What can I do in the bedroom?  Make sure that you have a light by your bed that is easy to reach.  Do not use any sheets or blankets for your bed that hang to the floor.  Have a firm chair with side arms that you can use for support when you get dressed.  What can I do in the kitchen?  Clean up any spills right away.  If you need to reach something above you, use a step stool with a grab bar.  Keep electrical cords out of the way.  Do not use floor polish or wax that makes floors slippery.  What can I do with my stairs?  Do not leave any items on the stairs.  Make sure that you have a light switch at the top and the bottom of the stairs.  Make sure that there are handrails on both sides of the stairs. Fix handrails that are broken or loose.  Install nonslip stair treads on all your stairs.  Avoid having throw rugs at the top or bottom of  the stairs.  Choose a carpet that does not hide the edge of the steps on the stairs.  Check carpeting to make sure that it is firmly attached to the stairs. Fix carpet that is loose or worn.  What can I do on the outside of my home?  Use bright outdoor lighting.  Fix the edges of walkways and driveways and fix any cracks.  Remove anything that might make you trip as you walk through a door, such as a raised step or threshold.  Trim any bushes or trees on paths to your home.  Check to see if handrails are loose or broken and that both sides of all steps have handrails.  Install guardrails along the edges of any raised decks and porches.  Clear paths of anything that can make you trip, such as tools or rocks.  Have leaves, snow, or ice cleared regularly.  Use sand or salt on paths during winter.  Clean up any spills in your garage right away. This includes grease or oil spills.  What other actions can I take?  Wear shoes that:  Have a low heel. Do not wear high heels.  Have rubber bottoms.  Feel good on your feet and fit well.  Are closed at the toe. Do not wear open-toe sandals.  Use tools that help you move around if needed. These include:  Canes.  Walkers.  Scooters.  Crutches.  Review your medicines with your doctor. Some medicines can make you feel dizzy. This can increase your chance of falling.  Ask your doctor what else you can do to help prevent falls.  Where to find more information  Centers for Disease Control and Prevention, STEADI: www.cdc.gov  National [place] on Aging: www.kaushik.nih.gov  Contact a doctor if:  You are afraid of falling at home.  You feel weak, drowsy, or dizzy at home.  You fall at home.  Summary  There are many simple things that you can do to make your home safe and to help prevent falls.  Ways to make your home safe include removing things that can make you trip and installing grab bars in the bathroom.  Ask for help when making these changes in your home.  This information is not  intended to replace advice given to you by your health care provider. Make sure you discuss any questions you have with your health care provider.  Document Revised: 07/21/2021 Document Reviewed: 07/21/2021  [employer] Patient Education © 2021 [employer]         +++++E/M portion medically necessary secondary to new or uncontrolled chronic problem+++++++    Subjective   Stacy Monroy is here for:    Chief Complaint   Patient presents with    Medicare Wellness-subsequent    Hypertension    Hyperlipidemia    Hypothyroidism       Hypertension  This is a chronic problem. The current episode started more than 1 year ago. The problem is controlled. Associated symptoms include malaise/fatigue. Pertinent negatives include no chest pain, headaches, palpitations or shortness of breath. Risk factors for coronary artery disease include dyslipidemia, post-menopausal state and family history. Current antihypertension treatment includes nothing. The current treatment provides mild improvement.   Hyperlipidemia  This is a chronic problem. The current episode started more than 1 year ago. Exacerbating diseases include hypothyroidism. She has no history of diabetes or obesity. Pertinent negatives include no chest pain or shortness of breath. Current antihyperlipidemic treatment includes herbal therapy. The current treatment provides moderate improvement of lipids. Risk factors for coronary artery disease include dyslipidemia, hypertension, post-menopausal and family history.   Hypothyroidism  This is a chronic problem. The current episode started more than 1 year ago. Pertinent negatives include no chest pain or headaches. Treatments tried: levothyroxine 75 MCG. The treatment provided moderate relief.         Physical Exam:  Review of Systems   Constitutional:  Positive for malaise/fatigue.   Respiratory:  Negative for shortness of breath.    Cardiovascular:  Negative for chest pain and palpitations.        Physical Exam  Vitals and  nursing note reviewed.   Constitutional:       General: She is not in acute distress.     Appearance: She is well-developed. She is not diaphoretic.   HENT:      Head: Normocephalic and atraumatic.      Right Ear: Tympanic membrane and external ear normal.      Left Ear: Tympanic membrane and external ear normal.      Nose: Nose normal.      Mouth/Throat:      Pharynx: No oropharyngeal exudate.   Eyes:      General: No scleral icterus.        Right eye: No discharge.         Left eye: No discharge.      Conjunctiva/sclera: Conjunctivae normal.      Pupils: Pupils are equal, round, and reactive to light.   Neck:      Thyroid: No thyromegaly.      Trachea: No tracheal deviation.   Cardiovascular:      Rate and Rhythm: Normal rate and regular rhythm.      Heart sounds: Normal heart sounds. No murmur heard.     No friction rub. No gallop.   Pulmonary:      Effort: Pulmonary effort is normal. No respiratory distress.      Breath sounds: Normal breath sounds. No stridor. No wheezing or rales.   Abdominal:      General: Bowel sounds are normal. There is no distension.      Palpations: Abdomen is soft. There is no mass.      Tenderness: There is no abdominal tenderness. There is no guarding or rebound.   Musculoskeletal:         General: No tenderness or deformity. Normal range of motion.      Cervical back: Normal range of motion and neck supple.   Lymphadenopathy:      Cervical: No cervical adenopathy.   Skin:     General: Skin is warm and dry.      Capillary Refill: Capillary refill takes less than 2 seconds.      Coloration: Skin is not pale.      Findings: No erythema or rash.   Neurological:      Mental Status: She is alert and oriented to person, place, and time.      Cranial Nerves: No cranial nerve deficit.      Sensory: No sensory deficit.      Motor: No tremor, atrophy or abnormal muscle tone.      Coordination: Coordination normal.      Gait: Gait normal.      Deep Tendon Reflexes: Reflexes are normal and  symmetric. Reflexes normal.   Psychiatric:         Behavior: Behavior normal.         Thought Content: Thought content normal.         Cognition and Memory: Memory is not impaired. She does not exhibit impaired recent memory or impaired remote memory.         Judgment: Judgment normal.         Result Review :   The following data was reviewed by: Mita Moise MD on 06/24/2024:  CMP          3/9/2024    05:47 5/20/2024    14:43 6/20/2024    08:08   CMP   Glucose 139  87  85    BUN 22  23  17    Creatinine 1.10  1.39  0.86    EGFR 46.9      Sodium 134  133  139    Potassium 4.9  4.8  4.7    Chloride 103  100  102    Calcium 9.2  9.6  9.8    Total Protein  6.1  6.3    Total Protein 5.6      Albumin 3.3  3.5  3.7    Globulin  2.6  2.6    Globulin 2.3      Total Bilirubin 0.3  0.3  0.7    Alkaline Phosphatase 309  114  135    AST (SGOT) 86  39  31    ALT (SGPT) 151  26  22    Albumin/Globulin Ratio 1.4      BUN/Creatinine Ratio 20.0  17  20    Anion Gap 8.0        CBC w/diff          3/8/2024    03:40 3/9/2024    05:47 5/20/2024    14:43   CBC w/Diff   WBC 7.00  9.40  6.6    RBC 4.66  4.29  4.33    Hemoglobin 11.7  11.0  11.1    Hematocrit 37.2  34.6  35.6    MCV 79.7  80.7  82    MCH 25.0  25.7  25.6    MCHC 31.4  31.9  31.2    RDW 18.3  18.6  16.6    Platelets 203  224  288    Neutrophil Rel % 62.0  80.6  59    Lymphocyte Rel % 25.8  12.0  31    Monocyte Rel % 10.7  6.7  8    Eosinophil Rel % 0.8  0.0  1    Basophil Rel % 0.7  0.7  1      Lipid Panel          6/20/2024    08:08   Lipid Panel   Total Cholesterol 234    Triglycerides 88    HDL Cholesterol 59    VLDL Cholesterol 15    LDL Cholesterol  160      TSH          9/21/2023    14:21 10/26/2023    15:33 6/20/2024    08:08   TSH   TSH 0.443  3.460  4.310           Brief Urine Lab Results  (Last result in the past 365 days)        Color   Clarity   Blood   Leuk Est   Nitrite   Protein   CREAT   Urine HCG        06/24/24 0923 Yellow   Cloudy   Small   Negative    Negative   1+                 Ear Cerumen Removal    Date/Time: 6/30/2024 12:18 AM    Performed by: Mita Moise MD  Authorized by: Mita Moise MD    Anesthesia:  Local Anesthetic: none  Ceruminolytics applied: Ceruminolytics applied prior to the procedure.  Location details: right ear  Patient tolerance: patient tolerated the procedure well with no immediate complications  Procedure type: irrigation   Sedation:  Patient sedated: no              Assessment and Plan:  Problem List Items Addressed This Visit          Cardiac and Vasculature    Essential hypertension    Current Assessment & Plan     Hypertension is stable and controlled  Continue current treatment regimen.  Dietary sodium restriction.  Blood pressure will be reassessed in 3 months.         Mixed hyperlipidemia    Current Assessment & Plan      Lipid abnormalities are  unchanged    Plan:  Continue same medication/s without change.      Discussed medication dosage, use, side effects, and goals of treatment in detail.    Counseled patient on lifestyle modifications to help control hyperlipidemia.     Patient Treatment Goals:   LDL goal is under 130    Followup at the next regular appointment.            ENT    Right ear impacted cerumen    Relevant Orders    Ear Cerumen Removal       Endocrine and Metabolic    Hypothyroidism (acquired)     Other Visit Diagnoses       Medicare annual wellness visit, subsequent    -  Primary    Relevant Orders    POCT urinalysis dipstick, manual (Completed)            BMI is within normal parameters. No other follow-up for BMI required.

## 2024-06-19 ENCOUNTER — OFFICE (OUTPATIENT)
Dept: URBAN - METROPOLITAN AREA CLINIC 64 | Facility: CLINIC | Age: 89
End: 2024-06-19
Payer: MEDICARE

## 2024-06-19 VITALS
HEART RATE: 59 BPM | DIASTOLIC BLOOD PRESSURE: 53 MMHG | SYSTOLIC BLOOD PRESSURE: 108 MMHG | HEIGHT: 63 IN | WEIGHT: 128 LBS

## 2024-06-19 DIAGNOSIS — R94.5 ABNORMAL RESULTS OF LIVER FUNCTION STUDIES: ICD-10-CM

## 2024-06-19 DIAGNOSIS — C25.9 MALIGNANT NEOPLASM OF PANCREAS, UNSPECIFIED: ICD-10-CM

## 2024-06-19 PROCEDURE — 99212 OFFICE O/P EST SF 10 MIN: CPT | Performed by: NURSE PRACTITIONER

## 2024-06-24 ENCOUNTER — OFFICE VISIT (OUTPATIENT)
Dept: FAMILY MEDICINE CLINIC | Facility: CLINIC | Age: 89
End: 2024-06-24
Payer: MEDICARE

## 2024-06-24 VITALS
SYSTOLIC BLOOD PRESSURE: 128 MMHG | OXYGEN SATURATION: 97 % | HEART RATE: 98 BPM | BODY MASS INDEX: 23.48 KG/M2 | DIASTOLIC BLOOD PRESSURE: 70 MMHG | HEIGHT: 62 IN | RESPIRATION RATE: 18 BRPM | TEMPERATURE: 96.9 F | WEIGHT: 127.6 LBS

## 2024-06-24 DIAGNOSIS — I10 ESSENTIAL HYPERTENSION: ICD-10-CM

## 2024-06-24 DIAGNOSIS — E03.9 HYPOTHYROIDISM (ACQUIRED): ICD-10-CM

## 2024-06-24 DIAGNOSIS — H61.21 RIGHT EAR IMPACTED CERUMEN: ICD-10-CM

## 2024-06-24 DIAGNOSIS — E78.2 MIXED HYPERLIPIDEMIA: ICD-10-CM

## 2024-06-24 DIAGNOSIS — Z00.00 MEDICARE ANNUAL WELLNESS VISIT, SUBSEQUENT: Primary | ICD-10-CM

## 2024-06-24 LAB
BILIRUB BLD-MCNC: NEGATIVE MG/DL
CLARITY, POC: ABNORMAL
COLOR UR: YELLOW
GLUCOSE UR STRIP-MCNC: NEGATIVE MG/DL
KETONES UR QL: NEGATIVE
LEUKOCYTE EST, POC: NEGATIVE
NITRITE UR-MCNC: NEGATIVE MG/ML
PH UR: 5 [PH] (ref 5–8)
PROT UR STRIP-MCNC: ABNORMAL MG/DL
RBC # UR STRIP: ABNORMAL /UL
SP GR UR: 1.01 (ref 1–1.03)
UROBILINOGEN UR QL: NORMAL

## 2024-07-10 ENCOUNTER — CLINICAL SUPPORT (OUTPATIENT)
Dept: FAMILY MEDICINE CLINIC | Facility: CLINIC | Age: 89
End: 2024-07-10
Payer: MEDICARE

## 2024-07-10 DIAGNOSIS — E53.8 B12 DEFICIENCY: Primary | ICD-10-CM

## 2024-07-10 PROCEDURE — 96372 THER/PROPH/DIAG INJ SC/IM: CPT | Performed by: FAMILY MEDICINE

## 2024-07-10 RX ADMIN — CYANOCOBALAMIN 1000 MCG: 1000 INJECTION, SOLUTION INTRAMUSCULAR; SUBCUTANEOUS at 14:46

## 2024-07-23 NOTE — PROGRESS NOTES
Subjective   Stacy Monroy is a 89 y.o. female. Presents to [facility]    Chief Complaint   Patient presents with    Flank Pain       Flank Pain  This is a new problem. The current episode started 1 to 4 weeks ago. The problem has been resolved since onset. The pain is present in the lumbar spine. The quality of the pain is described as aching. The pain does not radiate. The pain is at a severity of 0/10. The patient is experiencing no pain. Treatments tried: bactrim. The treatment provided significant relief.      She is doing better    I personally reviewed and updated the patient's allergies, medications, problem list, and past medical, surgical, social, and family history. I have reviewed and confirmed the accuracy of the History of Present Illness and Review of Symptoms as documented by the MA/LPN/RN. Mita Moise MD    Allergies:  Allergies   Allergen Reactions    Azithromycin Rash     ALL mycin drugs    Erythromycin Rash     ALL mycins drugs    Penicillin G Rash    Penicillins Rash     ALL mycin drugs       Social History:  Social History     Socioeconomic History    Marital status:    Tobacco Use    Smoking status: Never     Passive exposure: Never    Smokeless tobacco: Never    Tobacco comments:     None   Vaping Use    Vaping status: Never Used   Substance and Sexual Activity    Alcohol use: No    Drug use: No    Sexual activity: Not Currently       Family History:  Family History   Problem Relation Age of Onset    Hypertension Mother     Stroke Mother     Hypertension Father     Stroke Father     Breast cancer Sister 50    Ovarian cancer Daughter 57    Breast cancer Niece 53       Past Medical History :  Patient Active Problem List   Diagnosis    Status post placement of implantable loop recorder    Essential hypertension    Hypothyroidism (acquired)    Acute seasonal allergic rhinitis due to pollen    Atherosclerosis    B12 deficiency    Bilateral carpal tunnel syndrome     Cataracts, bilateral    DNR (do not resuscitate)    Heart murmur    History of chicken pox    Menopausal syndrome    Mixed hyperlipidemia    Pernicious anemia    Influenza vaccine refused    Liver function abnormality    Other fatigue    RUQ abdominal mass    Cardiac arrhythmia    Deleon-Wasserman syncope    Sinus node dysfunction    SSS (sick sinus syndrome)    Acute gastric ulcer without hemorrhage or perforation    Presence of cardiac pacemaker    Intraductal papillary mucinous neoplasm of pancreas    Personal history of other infectious and parasitic diseases    Presence of intraocular lens    Puckering of macula, right eye    Nonexudative age-related macular degeneration    Posterior vitreous detachment    Mammogram declined    Acute bilateral low back pain without sciatica    Microhematuria    Iron deficiency anemia secondary to inadequate dietary iron intake    Chronic bilateral low back pain with right-sided sciatica    CRF (chronic renal failure), stage 2 (mild)    Left lower quadrant abdominal pain    Diarrhea    Cyst of pancreas    Right arm pain    Tear of biceps muscle, initial encounter    Rib pain on left side    Transaminitis    Cholangitis    Right ear impacted cerumen       Medication List:    Current Outpatient Medications:     calcium citrate-vitamin d (CITRACAL) 200-250 MG-UNIT tablet tablet, Take 1 tablet by mouth Daily., Disp: , Rfl:     coenzyme Q10 100 MG capsule, Take 1 capsule by mouth Daily., Disp: , Rfl:     levothyroxine (SYNTHROID, LEVOTHROID) 50 MCG tablet, Take 1 tablet by mouth Daily., Disp: 30 tablet, Rfl: 12    metoprolol succinate XL (TOPROL-XL) 25 MG 24 hr tablet, Take 1 tablet by mouth Daily., Disp: 90 tablet, Rfl: 3    Omega-3 1000 MG capsule, Take 1 capsule by mouth Daily., Disp: , Rfl:     Past Surgical History:  Past Surgical History:   Procedure Laterality Date    BLADDER REPAIR      BREAST BIOPSY Right     CARDIAC ELECTROPHYSIOLOGY PROCEDURE N/A 05/19/2021    Procedure:  "Pacemaker DC new;  Surgeon: Edmar Rubin MD;  Location: [facility] CATH INVASIVE LOCATION;  Service: Cardiovascular;  Laterality: N/A;    ERCP N/A 09/14/2021    Procedure: ERCP WITH SPHINCTEROTOMY, sphincteroplasty, clearance of bile duct with balloon. brushing, placement of pancreatic stent and placement of metal biliary stent;  Surgeon: Adrienne Wilson MD;  Location: [facility] ENDOSCOPY;  Service: Gastroenterology;  Laterality: N/A;  post op: cystic mass compressing bile duct    ERCP N/A 3/8/2024    Procedure: ENDOSCOPIC RETROGRADE CHOLANGIOPANCREATOGRAPHY WITH BALLOON CLEARANCE OF CLOGGED METAL BILIARY STENT, BALLOON STONE EXTRACTION;  Surgeon: Samy Bryant MD;  Location: [facility] ENDOSCOPY;  Service: Gastroenterology;  Laterality: N/A;  POST:    HYSTERECTOMY      INSERT / REPLACE / REMOVE PACEMAKER      OTHER SURGICAL HISTORY  2019    Tilt table     OTHER SURGICAL HISTORY  2019    tilt table          Physical Exam:      Vital Signs:    Vitals:    07/25/24 1014   BP: 130/80   Pulse: 78   Resp: 18   Temp: 98 °F (36.7 °C)   SpO2: 98%        /80 (BP Location: Right arm, Patient Position: Sitting, Cuff Size: Adult)   Pulse 78   Temp 98 °F (36.7 °C) (Temporal)   Resp 18   Ht 157.5 cm (62.01\")   Wt 57.2 kg (126 lb)   LMP  (LMP Unknown)   SpO2 98% Comment: RA  BMI 23.04 kg/m²     Wt Readings from Last 3 Encounters:   07/26/24 56.7 kg (125 lb)   07/25/24 57.2 kg (126 lb)   06/24/24 57.9 kg (127 lb 9.6 oz)       Result Review :   The following data was reviewed by: Mita Moise MD on 07/25/2024:           Brief Urine Lab Results  (Last result in the past 365 days)        Color   Clarity   Blood   Leuk Est   Nitrite   Protein   CREAT   Urine HCG        07/25/24 1041 Yellow   Clear   Negative   Negative   Negative   Negative                    Physical Exam  Vitals reviewed.   Constitutional:       Appearance: Normal appearance. She is well-developed.   HENT:      Head: Normocephalic " and atraumatic.   Eyes:      General:         Right eye: No discharge.         Left eye: No discharge.   Cardiovascular:      Rate and Rhythm: Normal rate and regular rhythm.      Heart sounds: Normal heart sounds. No murmur heard.     No friction rub. No gallop.   Pulmonary:      Effort: Pulmonary effort is normal. No respiratory distress.      Breath sounds: Normal breath sounds. No wheezing or rales.   Skin:     General: Skin is warm and dry.      Findings: No rash.   Neurological:      Mental Status: She is alert and oriented to person, place, and time.      Coordination: Coordination normal.      Gait: Gait normal.   Psychiatric:         Behavior: Behavior is cooperative.       Assessment and Plan:  Problems Addressed this Visit    None  Visit Diagnoses       Flank pain    -  Primary    resolved, UTI resolved    Relevant Orders    POCT urinalysis dipstick, manual (Completed)          Diagnoses         Codes Comments    Flank pain    -  Primary ICD-10-CM: R10.9  ICD-9-CM: 789.09 resolved, UTI resolved             BMI is within normal parameters. No other follow-up for BMI required.           An After Visit Summary and PPPS were given to the patient.

## 2024-07-25 ENCOUNTER — OFFICE VISIT (OUTPATIENT)
Dept: FAMILY MEDICINE CLINIC | Facility: CLINIC | Age: 89
End: 2024-07-25
Payer: MEDICARE

## 2024-07-25 VITALS
OXYGEN SATURATION: 98 % | SYSTOLIC BLOOD PRESSURE: 130 MMHG | WEIGHT: 126 LBS | RESPIRATION RATE: 18 BRPM | DIASTOLIC BLOOD PRESSURE: 80 MMHG | HEIGHT: 62 IN | TEMPERATURE: 98 F | BODY MASS INDEX: 23.19 KG/M2 | HEART RATE: 78 BPM

## 2024-07-25 DIAGNOSIS — R10.9 FLANK PAIN: Primary | ICD-10-CM

## 2024-07-25 LAB
BILIRUB BLD-MCNC: NEGATIVE MG/DL
CLARITY, POC: CLEAR
COLOR UR: YELLOW
GLUCOSE UR STRIP-MCNC: NEGATIVE MG/DL
KETONES UR QL: NEGATIVE
LEUKOCYTE EST, POC: NEGATIVE
NITRITE UR-MCNC: NEGATIVE MG/ML
PH UR: 6 [PH] (ref 5–8)
PROT UR STRIP-MCNC: NEGATIVE MG/DL
RBC # UR STRIP: NEGATIVE /UL
SP GR UR: 1.01 (ref 1–1.03)
UROBILINOGEN UR QL: NORMAL

## 2024-07-26 ENCOUNTER — OFFICE VISIT (OUTPATIENT)
Dept: CARDIOLOGY | Facility: CLINIC | Age: 89
End: 2024-07-26
Payer: MEDICARE

## 2024-07-26 VITALS
OXYGEN SATURATION: 97 % | SYSTOLIC BLOOD PRESSURE: 142 MMHG | HEART RATE: 60 BPM | DIASTOLIC BLOOD PRESSURE: 74 MMHG | BODY MASS INDEX: 23 KG/M2 | RESPIRATION RATE: 16 BRPM | WEIGHT: 125 LBS | HEIGHT: 62 IN

## 2024-07-26 DIAGNOSIS — Z95.0 S/P PLACEMENT OF CARDIAC PACEMAKER: Chronic | ICD-10-CM

## 2024-07-26 DIAGNOSIS — I49.5 SSS (SICK SINUS SYNDROME): ICD-10-CM

## 2024-07-26 DIAGNOSIS — I10 ESSENTIAL HYPERTENSION: Primary | ICD-10-CM

## 2024-07-26 PROCEDURE — 1159F MED LIST DOCD IN RCRD: CPT | Performed by: INTERNAL MEDICINE

## 2024-07-26 PROCEDURE — 99214 OFFICE O/P EST MOD 30 MIN: CPT | Performed by: INTERNAL MEDICINE

## 2024-07-26 PROCEDURE — 1160F RVW MEDS BY RX/DR IN RCRD: CPT | Performed by: INTERNAL MEDICINE

## 2024-08-09 ENCOUNTER — CLINICAL SUPPORT (OUTPATIENT)
Dept: FAMILY MEDICINE CLINIC | Facility: CLINIC | Age: 89
End: 2024-08-09
Payer: MEDICARE

## 2024-08-09 DIAGNOSIS — E53.8 B12 DEFICIENCY: Primary | ICD-10-CM

## 2024-08-09 PROCEDURE — 96372 THER/PROPH/DIAG INJ SC/IM: CPT | Performed by: FAMILY MEDICINE

## 2024-08-09 RX ORDER — CYANOCOBALAMIN 1000 UG/ML
1000 INJECTION, SOLUTION INTRAMUSCULAR; SUBCUTANEOUS
Status: SHIPPED | OUTPATIENT
Start: 2024-08-09

## 2024-08-09 RX ADMIN — CYANOCOBALAMIN 1000 MCG: 1000 INJECTION, SOLUTION INTRAMUSCULAR; SUBCUTANEOUS at 11:12

## 2024-08-19 PROCEDURE — 93294 REM INTERROG EVL PM/LDLS PM: CPT | Performed by: NURSE PRACTITIONER

## 2024-08-19 PROCEDURE — 93296 REM INTERROG EVL PM/IDS: CPT | Performed by: NURSE PRACTITIONER

## 2024-09-12 ENCOUNTER — CLINICAL SUPPORT (OUTPATIENT)
Dept: FAMILY MEDICINE CLINIC | Facility: CLINIC | Age: 89
End: 2024-09-12
Payer: MEDICARE

## 2024-09-12 DIAGNOSIS — E53.8 B12 DEFICIENCY: Primary | ICD-10-CM

## 2024-09-12 PROCEDURE — 96372 THER/PROPH/DIAG INJ SC/IM: CPT | Performed by: FAMILY MEDICINE

## 2024-09-12 RX ADMIN — CYANOCOBALAMIN 1000 MCG: 1000 INJECTION, SOLUTION INTRAMUSCULAR; SUBCUTANEOUS at 14:52

## 2024-09-30 NOTE — PROGRESS NOTES
Subjective   Stacy Monroy is a 89 y.o. female. Presents to [facility]    Chief Complaint   Patient presents with    Hypertension    Hypothyroidism       Hypertension  This is a chronic problem. The problem is controlled. Associated symptoms include malaise/fatigue. Pertinent negatives include no chest pain, headaches, palpitations, shortness of breath or sweats. Risk factors for coronary artery disease include post-menopausal state and family history. Current antihypertension treatment includes beta blockers. The current treatment provides moderate improvement.   Hypothyroidism  Presents for follow-up visit. Patient reports no constipation, hoarse voice, leg swelling or palpitations.      Influenza immunization was not given due to  declines .     I personally reviewed and updated the patient's allergies, medications, problem list, and past medical, surgical, social, and family history. I have reviewed and confirmed the accuracy of the History of Present Illness and Review of Symptoms as documented by the MA/LPN/RN. Mita Moise MD    Allergies:  Allergies   Allergen Reactions    Azithromycin Rash     ALL mycin drugs    Erythromycin Rash     ALL mycins drugs    Penicillin G Rash    Penicillins Rash     ALL mycin drugs       Social History:  Social History     Socioeconomic History    Marital status:    Tobacco Use    Smoking status: Never     Passive exposure: Never    Smokeless tobacco: Never    Tobacco comments:     None   Vaping Use    Vaping status: Never Used   Substance and Sexual Activity    Alcohol use: No    Drug use: No    Sexual activity: Not Currently       Family History:  Family History   Problem Relation Age of Onset    Hypertension Mother     Stroke Mother     Hypertension Father     Stroke Father     Breast cancer Sister 50    Ovarian cancer Daughter 57    Breast cancer Niece 53       Past Medical History :  Patient Active Problem List   Diagnosis    Status post placement  of implantable loop recorder    Essential hypertension    Hypothyroidism (acquired)    Acute seasonal allergic rhinitis due to pollen    Atherosclerosis    B12 deficiency    Bilateral carpal tunnel syndrome    Cataracts, bilateral    DNR (do not resuscitate)    Heart murmur    History of chicken pox    Menopausal syndrome    Mixed hyperlipidemia    Pernicious anemia    Influenza vaccine refused    Liver function abnormality    Other fatigue    RUQ abdominal mass    Cardiac arrhythmia    Deleon-Wasserman syncope    Sinus node dysfunction    SSS (sick sinus syndrome)    Acute gastric ulcer without hemorrhage or perforation    Presence of cardiac pacemaker    Intraductal papillary mucinous neoplasm of pancreas    Personal history of other infectious and parasitic diseases    Presence of intraocular lens    Puckering of macula, right eye    Nonexudative age-related macular degeneration    Posterior vitreous detachment    Mammogram declined    Acute bilateral low back pain without sciatica    Microhematuria    Iron deficiency anemia secondary to inadequate dietary iron intake    Chronic bilateral low back pain with right-sided sciatica    CRF (chronic renal failure), stage 2 (mild)    Left lower quadrant abdominal pain    Diarrhea    Cyst of pancreas    Right arm pain    Tear of biceps muscle, initial encounter    Rib pain on left side    Transaminitis    Cholangitis    Right ear impacted cerumen       Medication List:    Current Outpatient Medications:     calcium citrate-vitamin d (CITRACAL) 200-250 MG-UNIT tablet tablet, Take 1 tablet by mouth Daily., Disp: , Rfl:     coenzyme Q10 100 MG capsule, Take 1 capsule by mouth Daily., Disp: , Rfl:     levothyroxine (SYNTHROID, LEVOTHROID) 50 MCG tablet, Take 1 tablet by mouth Daily., Disp: 30 tablet, Rfl: 12    metoprolol succinate XL (TOPROL-XL) 25 MG 24 hr tablet, Take 1 tablet by mouth Daily., Disp: 90 tablet, Rfl: 3    Omega-3 1000 MG capsule, Take 1 capsule by mouth Daily.,  "Disp: , Rfl:     Current Facility-Administered Medications:     cyanocobalamin injection 1,000 mcg, 1,000 mcg, Intramuscular, Q28 Days, Mita Moise MD, 1,000 mcg at 09/12/24 1452    Past Surgical History:  Past Surgical History:   Procedure Laterality Date    BLADDER REPAIR      BREAST BIOPSY Right     CARDIAC ELECTROPHYSIOLOGY PROCEDURE N/A 05/19/2021    Procedure: Pacemaker DC new;  Surgeon: Edmar Rubin MD;  Location: [facility] CATH INVASIVE LOCATION;  Service: Cardiovascular;  Laterality: N/A;    ERCP N/A 09/14/2021    Procedure: ERCP WITH SPHINCTEROTOMY, sphincteroplasty, clearance of bile duct with balloon. brushing, placement of pancreatic stent and placement of metal biliary stent;  Surgeon: Adrienne Wilson MD;  Location: [facility] ENDOSCOPY;  Service: Gastroenterology;  Laterality: N/A;  post op: cystic mass compressing bile duct    ERCP N/A 3/8/2024    Procedure: ENDOSCOPIC RETROGRADE CHOLANGIOPANCREATOGRAPHY WITH BALLOON CLEARANCE OF CLOGGED METAL BILIARY STENT, BALLOON STONE EXTRACTION;  Surgeon: Samy Bryant MD;  Location: [facility] ENDOSCOPY;  Service: Gastroenterology;  Laterality: N/A;  POST:    HYSTERECTOMY      INSERT / REPLACE / REMOVE PACEMAKER      OTHER SURGICAL HISTORY  2019    Tilt table     OTHER SURGICAL HISTORY  2019    tilt table          Physical Exam:      Vital Signs:    Vitals:    10/03/24 0919   BP: 118/80   Pulse: 60   Resp: 18   Temp: 97.6 °F (36.4 °C)   SpO2: 99%        /80 (BP Location: Right arm, Patient Position: Sitting, Cuff Size: Adult)   Pulse 60   Temp 97.6 °F (36.4 °C) (Temporal)   Resp 18   Ht 157.5 cm (62.01\")   Wt 59.4 kg (131 lb)   LMP  (LMP Unknown)   SpO2 99% Comment: ra  BMI 23.95 kg/m²     Wt Readings from Last 3 Encounters:   10/03/24 59.4 kg (131 lb)   07/26/24 56.7 kg (125 lb)   07/25/24 57.2 kg (126 lb)       Result Review :   The following data was reviewed by: Mita Moise MD on 10/03/2024:  CMP          5/20/2024 "    14:43 6/20/2024    08:08 10/3/2024    11:02   CMP   Glucose 87  85  89    BUN 23  17  17    Creatinine 1.39  0.86  0.89    Sodium 133  139  138    Potassium 4.8  4.7  4.6    Chloride 100  102  101    Calcium 9.6  9.8  9.8    Total Protein 6.1  6.3  6.8    Albumin 3.5  3.7  3.9    Globulin 2.6  2.6  2.9    Total Bilirubin 0.3  0.7  0.6    Alkaline Phosphatase 114  135  86    AST (SGOT) 39  31  27    ALT (SGPT) 26  22  17    BUN/Creatinine Ratio 17  20  19      Lipid Panel          6/20/2024    08:08 10/3/2024    11:02   Lipid Panel   Total Cholesterol 234  236    Triglycerides 88  90    HDL Cholesterol 59  59    VLDL Cholesterol 15  16    LDL Cholesterol  160  161      TSH          10/26/2023    15:33 6/20/2024    08:08 10/3/2024    11:02   TSH   TSH 3.460  4.310  3.470               Physical Exam  Vitals reviewed.   Constitutional:       Appearance: Normal appearance. She is well-developed.   HENT:      Head: Normocephalic and atraumatic.   Eyes:      General:         Right eye: No discharge.         Left eye: No discharge.   Cardiovascular:      Rate and Rhythm: Normal rate and regular rhythm.      Heart sounds: Normal heart sounds. No murmur heard.     No friction rub. No gallop.   Pulmonary:      Effort: Pulmonary effort is normal. No respiratory distress.      Breath sounds: Normal breath sounds. No wheezing or rales.   Skin:     General: Skin is warm and dry.      Findings: No rash.   Neurological:      Mental Status: She is alert and oriented to person, place, and time.      Coordination: Coordination normal.      Gait: Gait normal.   Psychiatric:         Behavior: Behavior is cooperative.         Assessment and Plan:  Problems Addressed this Visit          Cardiac and Vasculature    Essential hypertension - Primary     Hypertension is stable and controlled  Continue current treatment regimen.  Dietary sodium restriction.  Blood pressure will be reassessed in 3 months.         Mixed hyperlipidemia       Lipid abnormalities are stable    Plan:  Continue diet changes.      Counseled patient on lifestyle modifications to help control hyperlipidemia.     Patient Treatment Goals:   Total Cholesterol Goal is less than 180    Followup in 6 months.            Endocrine and Metabolic    Hypothyroidism (acquired)     Much better  Continue current treatment         Relevant Medications    levothyroxine (SYNTHROID, LEVOTHROID) 50 MCG tablet     Other Visit Diagnoses       Acquired hypothyroidism        Relevant Medications    levothyroxine (SYNTHROID, LEVOTHROID) 50 MCG tablet          Diagnoses         Codes Comments    Essential hypertension    -  Primary ICD-10-CM: I10  ICD-9-CM: 401.9     Hypothyroidism (acquired)     ICD-10-CM: E03.9  ICD-9-CM: 244.9     Acquired hypothyroidism     ICD-10-CM: E03.9  ICD-9-CM: 244.9     Mixed hyperlipidemia     ICD-10-CM: E78.2  ICD-9-CM: 272.2              BMI is within normal parameters. No other follow-up for BMI required.           An After Visit Summary and PPPS were given to the patient.       This document is intended for medical expert use only. Reading of this document by patients and/or patient's family without participating medical staff guidance may result in misinterpretation and unintended morbidity. Any interpretation of such data is the responsibility of the patient and/or family member responsible for the patient in concert with their primary or specialist providers, not to be left for sources of online searches such as [employer], [employer] or similar queries. Relying on these approaches to knowledge may result in misinterpretation, misguided goals of care and even death should patients or family members try recommendations outside of the realm of professional medical care.

## 2024-10-02 ENCOUNTER — TELEPHONE (OUTPATIENT)
Dept: FAMILY MEDICINE CLINIC | Facility: CLINIC | Age: 89
End: 2024-10-02
Payer: MEDICARE

## 2024-10-02 DIAGNOSIS — E78.2 MIXED HYPERLIPIDEMIA: ICD-10-CM

## 2024-10-02 DIAGNOSIS — I10 ESSENTIAL HYPERTENSION: ICD-10-CM

## 2024-10-02 DIAGNOSIS — E53.8 B12 DEFICIENCY: Primary | ICD-10-CM

## 2024-10-02 DIAGNOSIS — E03.9 HYPOTHYROIDISM (ACQUIRED): ICD-10-CM

## 2024-10-02 NOTE — TELEPHONE ENCOUNTER
Caller: Stacy Monroy    Relationship: Self    Best call back number: [phone]     What is the best time to reach you: BEFORE 11 AM    What was the call regarding: PATIENT IS REQUESTING TO KNOW WHERE SHE NEEDS TO GO TO HAVE HER LAB WORK AT    [facility]

## 2024-10-02 NOTE — TELEPHONE ENCOUNTER
Spoke with patient and let her know to have this completed at [facility]. She expressed understanding

## 2024-10-03 ENCOUNTER — OFFICE VISIT (OUTPATIENT)
Dept: FAMILY MEDICINE CLINIC | Facility: CLINIC | Age: 89
End: 2024-10-03
Payer: MEDICARE

## 2024-10-03 VITALS
TEMPERATURE: 97.6 F | HEART RATE: 60 BPM | RESPIRATION RATE: 18 BRPM | DIASTOLIC BLOOD PRESSURE: 80 MMHG | WEIGHT: 131 LBS | HEIGHT: 62 IN | BODY MASS INDEX: 24.11 KG/M2 | SYSTOLIC BLOOD PRESSURE: 118 MMHG | OXYGEN SATURATION: 99 %

## 2024-10-03 DIAGNOSIS — I10 ESSENTIAL HYPERTENSION: Primary | ICD-10-CM

## 2024-10-03 DIAGNOSIS — E03.9 ACQUIRED HYPOTHYROIDISM: ICD-10-CM

## 2024-10-03 DIAGNOSIS — E03.9 HYPOTHYROIDISM (ACQUIRED): ICD-10-CM

## 2024-10-03 DIAGNOSIS — E78.2 MIXED HYPERLIPIDEMIA: ICD-10-CM

## 2024-10-03 PROCEDURE — 1126F AMNT PAIN NOTED NONE PRSNT: CPT | Performed by: FAMILY MEDICINE

## 2024-10-03 PROCEDURE — 1160F RVW MEDS BY RX/DR IN RCRD: CPT | Performed by: FAMILY MEDICINE

## 2024-10-03 PROCEDURE — G2211 COMPLEX E/M VISIT ADD ON: HCPCS | Performed by: FAMILY MEDICINE

## 2024-10-03 PROCEDURE — 99214 OFFICE O/P EST MOD 30 MIN: CPT | Performed by: FAMILY MEDICINE

## 2024-10-03 PROCEDURE — 1159F MED LIST DOCD IN RCRD: CPT | Performed by: FAMILY MEDICINE

## 2024-10-03 RX ORDER — LEVOTHYROXINE SODIUM 50 UG/1
50 TABLET ORAL DAILY
Qty: 30 TABLET | Refills: 12 | Status: SHIPPED | OUTPATIENT
Start: 2024-10-03

## 2024-10-03 NOTE — ASSESSMENT & PLAN NOTE
"Chief Complaint  Follow-up, Atrial Fibrillation, and pva    Subjective      History of Present Illness {CC  Problem List  Visit  Diagnosis   Encounters  Notes  Medications  Labs  Result Review Imaging  Media :23}     Kailey Nguyen, 71 y.o. female presents to [facility] for Follow-up, Atrial Fibrillation, and pva.    Problem List  Atrial fibrillation  [id number] (age, sex, HTN)  AAD: Flecainide and metoprolol  Monitor, 8/2024: A-fib/a flutter burden 22%  Echo, 8/2024: EF 56-60%  Atrial fibrillation ablation 9/4/2024 with Dr. Miller including pulmonary vein isolation, LA roof/floor line for persistent atrial fibrillation  Hypertension  Hyperlipidemia   Asthma  GERD  Hiatal hernia      Patient recently underwent successful atrial fibrillation ablation with Dr. Miller including pulmonary vein isolation, roof/floor line for persistent atrial fibrillation.  Patient was instructed to continue uninterrupted anticoagulation for no less than 90 days.    Presents today with arrhythmia symptoms much improved since ablation, minimal short duration palpitation since ablation. Denies complications at access site. Dizziness and dyspnea much improved with AF improvement. Compliant with apixaban for anticoagulation with no bleeding complications.       Objective     Vital Signs:   Vitals:    10/03/24 1218   BP: 116/65   BP Location: Left arm   Patient Position: Sitting   Cuff Size: Adult   Pulse: 62   Resp: 16   SpO2: 98%   Weight: 84.8 kg (187 lb)   Height: 165.1 cm (65\")     Body mass index is 31.12 kg/m².  Physical Exam  Vitals and nursing note reviewed.   Constitutional:       Appearance: Normal appearance.   HENT:      Head: Normocephalic.   Eyes:      Extraocular Movements: Extraocular movements intact.   Neck:      Vascular: No carotid bruit.   Cardiovascular:      Rate and Rhythm: Normal rate and regular rhythm.      Pulses: Normal pulses.      Heart sounds: Normal heart sounds, S1 normal " Hypertension is improving with treatment.  Continue current treatment regimen.  Dietary sodium restriction.  Weight loss.  Blood pressure will be reassessed at the next regular appointment.   and S2 normal. No murmur heard.  Pulmonary:      Effort: Pulmonary effort is normal. No respiratory distress.      Breath sounds: Normal breath sounds.   Musculoskeletal:      Cervical back: Neck supple.      Right lower leg: No edema.      Left lower leg: No edema.   Skin:     General: Skin is warm and dry.   Neurological:      General: No focal deficit present.      Mental Status: She is alert.   Psychiatric:         Mood and Affect: Mood normal.         Behavior: Behavior normal.         Thought Content: Thought content normal.          Data Reviewed:{ Labs  Result Review  Imaging  Med Tab  Media :23}     Adult Transthoracic Echo Complete W/ Cont if Necessary Per Protocol (08/09/2024 13:36)  EP/CRM Study (09/04/2024 16:51)  TSH Rfx On Abnormal To Free T4 (08/27/2024 08:05)  Basic Metabolic Panel (08/27/2024 08:05)  CBC (No Diff) (08/27/2024 08:05)  Holter Monitor - 72 Hour Up To 15 Days (07/16/2024 11:49)     Assessment & Plan   Assessment and Plan {CC Problem List  Visit Diagnosis  ROS  Review (Popup)  Dimdim Maintenance  Quality  BestPractice  Medications  SmartSets  SnapShot Encounters  Media :23}     1. AF (paroxysmal atrial fibrillation)  - History of paroxysmal AF dating back 2019  -s/p atrial fibrillation ablation 9/4/2024 with Dr. Miller including pulmonary vein isolation, LA roof/floor line for persistent atrial fibrillation  -Regular rate/rhythm at time of exam.   -[id number]: 3 (age, female, HTN)  -Continue apixaban 5 Mg every 12 hours for anticoagulation  -Continue metoprolol to tartrate 12.5 Mg twice daily as prescribed  -Continue routine EP follow-up as scheduled     2. Primary hypertension  -Well-controlled today, well controlled on home monitoring  -Continue current medication regimen      Follow Up {Instructions Charge Capture  Follow-up Communications :23}     Return if symptoms worsen or fail to improve.      Patient was given instructions and counseling regarding her  condition or for health maintenance advice. Please see specific information pulled into the AVS if appropriate.  Patient was instructed to call the [facility] with any questions, concerns, or worsening symptoms.    Dictated Utilizing Dragon Dictation   Please note that portions of this note were completed with a voice recognition program.   Part of this note may be an electronic transcription/translation of spoken language to printed text using the Dragon Dictation System.

## 2024-10-04 LAB
ALBUMIN SERPL-MCNC: 3.9 G/DL (ref 3.6–4.6)
ALP SERPL-CCNC: 86 IU/L (ref 44–121)
ALT SERPL-CCNC: 17 IU/L (ref 0–32)
AST SERPL-CCNC: 27 IU/L (ref 0–40)
BILIRUB SERPL-MCNC: 0.6 MG/DL (ref 0–1.2)
BUN SERPL-MCNC: 17 MG/DL (ref 10–36)
BUN/CREAT SERPL: 19 (ref 12–28)
CALCIUM SERPL-MCNC: 9.8 MG/DL (ref 8.7–10.3)
CHLORIDE SERPL-SCNC: 101 MMOL/L (ref 96–106)
CHOLEST SERPL-MCNC: 236 MG/DL (ref 100–199)
CHOLEST/HDLC SERPL: 4 RATIO (ref 0–4.4)
CO2 SERPL-SCNC: 23 MMOL/L (ref 20–29)
CREAT SERPL-MCNC: 0.89 MG/DL (ref 0.57–1)
EGFRCR SERPLBLD CKD-EPI 2021: 60 ML/MIN/1.73
GLOBULIN SER CALC-MCNC: 2.9 G/DL (ref 1.5–4.5)
GLUCOSE SERPL-MCNC: 89 MG/DL (ref 70–99)
HDLC SERPL-MCNC: 59 MG/DL
LDLC SERPL CALC-MCNC: 161 MG/DL (ref 0–99)
POTASSIUM SERPL-SCNC: 4.6 MMOL/L (ref 3.5–5.2)
PROT SERPL-MCNC: 6.8 G/DL (ref 6–8.5)
SODIUM SERPL-SCNC: 138 MMOL/L (ref 134–144)
TRIGL SERPL-MCNC: 90 MG/DL (ref 0–149)
TSH SERPL DL<=0.005 MIU/L-ACNC: 3.47 UIU/ML (ref 0.45–4.5)
VIT B12 SERPL-MCNC: 768 PG/ML (ref 232–1245)
VLDLC SERPL CALC-MCNC: 16 MG/DL (ref 5–40)

## 2024-10-12 NOTE — ASSESSMENT & PLAN NOTE
Lipid abnormalities are stable    Plan:  Continue diet changes.      Counseled patient on lifestyle modifications to help control hyperlipidemia.     Patient Treatment Goals:   Total Cholesterol Goal is less than 180    Followup in 6 months.

## 2024-11-07 ENCOUNTER — CLINICAL SUPPORT (OUTPATIENT)
Dept: FAMILY MEDICINE CLINIC | Facility: CLINIC | Age: 89
End: 2024-11-07
Payer: MEDICARE

## 2024-11-07 DIAGNOSIS — E53.8 B12 DEFICIENCY: Primary | ICD-10-CM

## 2024-11-07 PROCEDURE — 96372 THER/PROPH/DIAG INJ SC/IM: CPT | Performed by: FAMILY MEDICINE

## 2024-11-07 RX ADMIN — CYANOCOBALAMIN 1000 MCG: 1000 INJECTION, SOLUTION INTRAMUSCULAR; SUBCUTANEOUS at 14:59

## 2024-12-09 ENCOUNTER — CLINICAL SUPPORT (OUTPATIENT)
Dept: FAMILY MEDICINE CLINIC | Facility: CLINIC | Age: 89
End: 2024-12-09
Payer: MEDICARE

## 2024-12-09 DIAGNOSIS — E53.8 B12 DEFICIENCY: Primary | ICD-10-CM

## 2024-12-09 PROCEDURE — 96372 THER/PROPH/DIAG INJ SC/IM: CPT | Performed by: FAMILY MEDICINE

## 2024-12-09 RX ADMIN — CYANOCOBALAMIN 1000 MCG: 1000 INJECTION, SOLUTION INTRAMUSCULAR; SUBCUTANEOUS at 14:39

## 2024-12-09 NOTE — PROGRESS NOTES
Patient is here today for her Vitamin B12 injection. Patient asks that it be injected ventrogluteal. Patient tolerated well. Injected Left Ventrogluteal. Documentation is also in MAR.   NDC: 89758-590-80  Lot: [id number]  EXP: 12/01/2025

## 2024-12-23 NOTE — PROGRESS NOTES
Subjective   Stacy Monroy is a 89 y.o. female. Presents to [facility]    Chief Complaint   Patient presents with    Hypertension    Hyperlipidemia    Hypothyroidism       Hypertension  This is a chronic problem. The problem is controlled. Associated symptoms include malaise/fatigue. Pertinent negatives include no chest pain, headaches, palpitations, shortness of breath or sweats. Risk factors for coronary artery disease include post-menopausal state and family history. Current antihypertension treatment includes beta blockers. The current treatment provides moderate improvement.   Hypothyroidism  Presents for follow-up visit. Symptoms include fatigue. Patient reports no constipation, hoarse voice, leg swelling or palpitations. Her past medical history is significant for hyperlipidemia. There is no history of diabetes.   Hyperlipidemia  This is a chronic problem. The current episode started more than 1 year ago. Exacerbating diseases include hypothyroidism. She has no history of diabetes or obesity. Pertinent negatives include no chest pain or shortness of breath. She is currently on no antihyperlipidemic treatment. Risk factors for coronary artery disease include dyslipidemia and hypertension.        I personally reviewed and updated the patient's allergies, medications, problem list, and past medical, surgical, social, and family history. I have reviewed and confirmed the accuracy of the History of Present Illness and Review of Symptoms as documented by the MA/LPN/RN. Mita Moise MD    Allergies:  Allergies   Allergen Reactions    Azithromycin Rash     ALL mycin drugs    Erythromycin Rash     ALL mycins drugs    Penicillin G Rash    Penicillins Rash     ALL mycin drugs       Social History:  Social History     Socioeconomic History    Marital status:    Tobacco Use    Smoking status: Never     Passive exposure: Never    Smokeless tobacco: Never    Tobacco comments:     None   Vaping Use     Vaping status: Never Used   Substance and Sexual Activity    Alcohol use: No    Drug use: No    Sexual activity: Not Currently       Family History:  Family History   Problem Relation Age of Onset    Hypertension Mother     Stroke Mother     Hypertension Father     Stroke Father     Breast cancer Sister 50    Ovarian cancer Daughter 57    Breast cancer Niece 53       Past Medical History :  Patient Active Problem List   Diagnosis    Status post placement of implantable loop recorder    Essential hypertension    Hypothyroidism (acquired)    Acute seasonal allergic rhinitis due to pollen    Atherosclerosis    B12 deficiency    Bilateral carpal tunnel syndrome    Cataracts, bilateral    DNR (do not resuscitate)    Heart murmur    History of chicken pox    Menopausal syndrome    Mixed hyperlipidemia    Pernicious anemia    Influenza vaccine refused    Liver function abnormality    Other fatigue    RUQ abdominal mass    Cardiac arrhythmia    Deleon-Wasserman syncope    Sinus node dysfunction    SSS (sick sinus syndrome)    Acute gastric ulcer without hemorrhage or perforation    Presence of cardiac pacemaker    Intraductal papillary mucinous neoplasm of pancreas    Personal history of other infectious and parasitic diseases    Presence of intraocular lens    Puckering of macula, right eye    Nonexudative age-related macular degeneration    Posterior vitreous detachment    Mammogram declined    Acute bilateral low back pain without sciatica    Microhematuria    Iron deficiency anemia secondary to inadequate dietary iron intake    Chronic bilateral low back pain with right-sided sciatica    CRF (chronic renal failure), stage 2 (mild)    Left lower quadrant abdominal pain    Diarrhea    Cyst of pancreas    Right arm pain    Tear of biceps muscle, initial encounter    Rib pain on left side    Transaminitis    Cholangitis    Right ear impacted cerumen       Medication List:    Current Outpatient Medications:     calcium  "citrate-vitamin d (CITRACAL) 200-250 MG-UNIT tablet tablet, Take 1 tablet by mouth Daily., Disp: , Rfl:     coenzyme Q10 100 MG capsule, Take 1 capsule by mouth Daily., Disp: , Rfl:     levothyroxine (SYNTHROID, LEVOTHROID) 50 MCG tablet, Take 1 tablet by mouth Daily., Disp: 30 tablet, Rfl: 12    metoprolol succinate XL (TOPROL-XL) 25 MG 24 hr tablet, Take 1 tablet by mouth Daily., Disp: 90 tablet, Rfl: 3    Omega-3 1000 MG capsule, Take 1 capsule by mouth Daily., Disp: , Rfl:     Past Surgical History:  Past Surgical History:   Procedure Laterality Date    BLADDER REPAIR      BREAST BIOPSY Right     CARDIAC ELECTROPHYSIOLOGY PROCEDURE N/A 05/19/2021    Procedure: Pacemaker DC new;  Surgeon: Edmar Rubin MD;  Location: [facility] CATH INVASIVE LOCATION;  Service: Cardiovascular;  Laterality: N/A;    ERCP N/A 09/14/2021    Procedure: ERCP WITH SPHINCTEROTOMY, sphincteroplasty, clearance of bile duct with balloon. brushing, placement of pancreatic stent and placement of metal biliary stent;  Surgeon: Adrienne Wilson MD;  Location: [facility] ENDOSCOPY;  Service: Gastroenterology;  Laterality: N/A;  post op: cystic mass compressing bile duct    ERCP N/A 3/8/2024    Procedure: ENDOSCOPIC RETROGRADE CHOLANGIOPANCREATOGRAPHY WITH BALLOON CLEARANCE OF CLOGGED METAL BILIARY STENT, BALLOON STONE EXTRACTION;  Surgeon: Samy Bryant MD;  Location: [facility] ENDOSCOPY;  Service: Gastroenterology;  Laterality: N/A;  POST:    HYSTERECTOMY      INSERT / REPLACE / REMOVE PACEMAKER      OTHER SURGICAL HISTORY  2019    Tilt table     OTHER SURGICAL HISTORY  2019    tilt table          Physical Exam:      Vital Signs:    Vitals:    01/03/25 0849   BP: 110/64   Pulse: 66   Resp: 18   Temp: 97.1 °F (36.2 °C)   SpO2: 98%        /64 (BP Location: Right arm, Patient Position: Sitting, Cuff Size: Adult)   Pulse 66   Temp 97.1 °F (36.2 °C) (Temporal)   Resp 18   Ht 157.5 cm (62.01\")   Wt 59.5 kg (131 lb 3.2 oz)   " LMP  (LMP Unknown)   SpO2 98% Comment: ra  BMI 23.99 kg/m²     Wt Readings from Last 3 Encounters:   01/03/25 59.5 kg (131 lb 3.2 oz)   10/03/24 59.4 kg (131 lb)   07/26/24 56.7 kg (125 lb)       Result Review :                Physical Exam  Vitals reviewed.   Constitutional:       Appearance: Normal appearance. She is well-developed.   HENT:      Head: Normocephalic and atraumatic.   Eyes:      General:         Right eye: No discharge.         Left eye: No discharge.   Cardiovascular:      Rate and Rhythm: Normal rate and regular rhythm.      Heart sounds: Normal heart sounds. No murmur heard.     No friction rub. No gallop.   Pulmonary:      Effort: Pulmonary effort is normal. No respiratory distress.      Breath sounds: Normal breath sounds. No wheezing or rales.   Skin:     General: Skin is warm and dry.      Findings: No rash.   Neurological:      Mental Status: She is alert and oriented to person, place, and time.      Coordination: Coordination normal.      Gait: Gait normal.   Psychiatric:         Behavior: Behavior is cooperative.       Disucssed stopping cholesterol labs. She elected and agreed to discontinuing the cholesterol checks    Assessment and Plan:  Problems Addressed this Visit          Cardiac and Vasculature    Essential hypertension - Primary     Hypertension is stable and controlled  Continue current treatment regimen.  Dietary sodium restriction.  Blood pressure will be reassessed in 3 months.         Relevant Orders    Comprehensive Metabolic Panel (Completed)    Mixed hyperlipidemia      Discussed stopping checking as she is 89 years old. She agreed and would like to stop checking cholesterol            Endocrine and Metabolic    Hypothyroidism (acquired)     Recheck labs         Relevant Orders    TSH (Completed)    B12 deficiency    Relevant Orders    Vitamin B12 (Completed)     Diagnoses         Codes Comments    Essential hypertension    -  Primary ICD-10-CM: I10  ICD-9-CM: 401.9      Hypothyroidism (acquired)     ICD-10-CM: E03.9  ICD-9-CM: 244.9     Mixed hyperlipidemia     ICD-10-CM: E78.2  ICD-9-CM: 272.2     B12 deficiency     ICD-10-CM: E53.8  ICD-9-CM: 266.2              BMI is within normal parameters. No other follow-up for BMI required.           An After Visit Summary and PPPS were given to the patient.       This document is intended for medical expert use only. Reading of this document by patients and/or patient's family without participating medical staff guidance may result in misinterpretation and unintended morbidity. Any interpretation of such data is the responsibility of the patient and/or family member responsible for the patient in concert with their primary or specialist providers, not to be left for sources of online searches such as [employer], [employer] or similar queries. Relying on these approaches to knowledge may result in misinterpretation, misguided goals of care and even death should patients or family members try recommendations outside of the realm of professional medical care.

## 2025-01-03 ENCOUNTER — OFFICE VISIT (OUTPATIENT)
Dept: FAMILY MEDICINE CLINIC | Facility: CLINIC | Age: OVER 89
End: 2025-01-03
Payer: MEDICARE

## 2025-01-03 VITALS
TEMPERATURE: 97.1 F | SYSTOLIC BLOOD PRESSURE: 110 MMHG | HEIGHT: 62 IN | HEART RATE: 66 BPM | DIASTOLIC BLOOD PRESSURE: 64 MMHG | BODY MASS INDEX: 24.14 KG/M2 | WEIGHT: 131.2 LBS | RESPIRATION RATE: 18 BRPM | OXYGEN SATURATION: 98 %

## 2025-01-03 DIAGNOSIS — E53.8 B12 DEFICIENCY: ICD-10-CM

## 2025-01-03 DIAGNOSIS — E78.2 MIXED HYPERLIPIDEMIA: ICD-10-CM

## 2025-01-03 DIAGNOSIS — E03.9 HYPOTHYROIDISM (ACQUIRED): ICD-10-CM

## 2025-01-03 DIAGNOSIS — I10 ESSENTIAL HYPERTENSION: Primary | ICD-10-CM

## 2025-01-03 PROCEDURE — 99213 OFFICE O/P EST LOW 20 MIN: CPT | Performed by: FAMILY MEDICINE

## 2025-01-03 PROCEDURE — 96372 THER/PROPH/DIAG INJ SC/IM: CPT | Performed by: FAMILY MEDICINE

## 2025-01-03 RX ORDER — CYANOCOBALAMIN 1000 UG/ML
1000 INJECTION, SOLUTION INTRAMUSCULAR; SUBCUTANEOUS
Status: DISCONTINUED | OUTPATIENT
Start: 2025-01-03 | End: 2025-01-03

## 2025-01-03 RX ADMIN — CYANOCOBALAMIN 1000 MCG: 1000 INJECTION, SOLUTION INTRAMUSCULAR; SUBCUTANEOUS at 09:09

## 2025-01-04 LAB
ALBUMIN SERPL-MCNC: 3.8 G/DL (ref 3.6–4.6)
ALP SERPL-CCNC: 63 IU/L (ref 44–121)
ALT SERPL-CCNC: 14 IU/L (ref 0–32)
AST SERPL-CCNC: 21 IU/L (ref 0–40)
BILIRUB SERPL-MCNC: 0.5 MG/DL (ref 0–1.2)
BUN SERPL-MCNC: 23 MG/DL (ref 10–36)
BUN/CREAT SERPL: 21 (ref 12–28)
CALCIUM SERPL-MCNC: 9.5 MG/DL (ref 8.7–10.3)
CHLORIDE SERPL-SCNC: 103 MMOL/L (ref 96–106)
CO2 SERPL-SCNC: 24 MMOL/L (ref 20–29)
CREAT SERPL-MCNC: 1.07 MG/DL (ref 0.57–1)
EGFRCR SERPLBLD CKD-EPI 2021: 48 ML/MIN/1.73
GLOBULIN SER CALC-MCNC: 2.3 G/DL (ref 1.5–4.5)
GLUCOSE SERPL-MCNC: 90 MG/DL (ref 70–99)
POTASSIUM SERPL-SCNC: 4.9 MMOL/L (ref 3.5–5.2)
PROT SERPL-MCNC: 6.1 G/DL (ref 6–8.5)
SODIUM SERPL-SCNC: 138 MMOL/L (ref 134–144)
TSH SERPL DL<=0.005 MIU/L-ACNC: 4.74 UIU/ML (ref 0.45–4.5)
VIT B12 SERPL-MCNC: 523 PG/ML (ref 232–1245)

## 2025-01-06 NOTE — ASSESSMENT & PLAN NOTE
Discussed stopping checking as she is 89 years old. She agreed and would like to stop checking cholesterol

## 2025-01-30 NOTE — PROGRESS NOTES
Date of Office Visit: 2025  Encounter Provider: Dr. Ruslan Layne  Place of Service: [facility] CARDIOLOG[facility] [place]  Patient Name: Stacy Monroy  :1930  Mita Moise MD    Chief Complaint   Patient presents with    Hypertension    Hyperlipidemia    Pacemaker Check    Follow-up     History of Present Illness    I am pleased to see Mrs. Monroy in my office today as a follow-up.    As you know, patient is 89-year-old white female whose past medical history significant for hypertension, who came today for follow-up.     In 2019, patient was evaluated due to an episode of syncope. Patient underwent echocardiogram in 2019 which showed EF of 55-60%.  Patient had stress test in 2018 at [facility] which was reportedly unremarkable for ischemia.  Holter monitor showed no significant pause of bradycardia.  Tilt-table test showed hypotensive response with bradycardia during tilt-table test. Patient underwent loop recorder implant at [facility] on 2019.    In May 2021, patient had a syncopal episode and was admitted at [facility].  Patient loop recorder showed long pause.  Patient underwent dual-chamber pacemaker implantation.  Postprocedure patient did well.    Patient came today for follow-up.  Patient denies any symptom of chest pain or tightness or heaviness.  Patient lives alone.  She does all chores at home without any restriction.  She denies any orthopnea PND no syncope or presyncope.  No falls reported    Pacemaker is interrogated and it is functioning appropriately.  No change in programming.  Patient has 6.6 years left on.  Patient 69% paced in the atrium and 2% in the ventricle.    Patient is doing well I am pleased with the patient condition.  Her blood pressure is slightly elevated.  I will continue to observe I will not recommend aggressive blood pressure control in this patient because of risk of fall previous blood pressures are  within desirable range      Past Medical History:   Diagnosis Date    Arthritis     Chronic bilateral low back pain with right-sided sciatica 05/05/2023    Chronic pain disorder     Heart murmur     Hyperlipidemia     Hypertension     Irritable bowel syndrome pain in belly an gas    Joint pain     Other specified hypothyroidism 08/15/2019    Presence of cardiac pacemaker 07/09/2021    [facility]         Past Surgical History:   Procedure Laterality Date    BLADDER REPAIR      BREAST BIOPSY Right     CARDIAC ELECTROPHYSIOLOGY PROCEDURE N/A 05/19/2021    Procedure: Pacemaker DC new;  Surgeon: Edmar Rubin MD;  Location: [facility] CATH INVASIVE LOCATION;  Service: Cardiovascular;  Laterality: N/A;    ERCP N/A 09/14/2021    Procedure: ERCP WITH SPHINCTEROTOMY, sphincteroplasty, clearance of bile duct with balloon. brushing, placement of pancreatic stent and placement of metal biliary stent;  Surgeon: Adrienne Wilson MD;  Location: [facility] ENDOSCOPY;  Service: Gastroenterology;  Laterality: N/A;  post op: cystic mass compressing bile duct    ERCP N/A 3/8/2024    Procedure: ENDOSCOPIC RETROGRADE CHOLANGIOPANCREATOGRAPHY WITH BALLOON CLEARANCE OF CLOGGED METAL BILIARY STENT, BALLOON STONE EXTRACTION;  Surgeon: Samy Bryant MD;  Location: [facility] ENDOSCOPY;  Service: Gastroenterology;  Laterality: N/A;  POST:    HYSTERECTOMY      INSERT / REPLACE / REMOVE PACEMAKER      OTHER SURGICAL HISTORY  2019    Tilt table     OTHER SURGICAL HISTORY  2019    tilt table            Current Outpatient Medications:     calcium citrate-vitamin d (CITRACAL) 200-250 MG-UNIT tablet tablet, Take 1 tablet by mouth Daily., Disp: , Rfl:     coenzyme Q10 100 MG capsule, Take 1 capsule by mouth Daily., Disp: , Rfl:     levothyroxine (SYNTHROID, LEVOTHROID) 50 MCG tablet, Take 1 tablet by mouth Daily., Disp: 30 tablet, Rfl: 12    metoprolol succinate XL (TOPROL-XL) 25 MG 24 hr tablet, TAKE 1 TABLET BY MOUTH EVERY DAY, Disp: 90  "tablet, Rfl: 3    Omega-3 1000 MG capsule, Take 1 capsule by mouth Daily., Disp: , Rfl:       Social History     Socioeconomic History    Marital status:    Tobacco Use    Smoking status: Never     Passive exposure: Never    Smokeless tobacco: Never    Tobacco comments:     None   Vaping Use    Vaping status: Never Used   Substance and Sexual Activity    Alcohol use: No    Drug use: No    Sexual activity: Not Currently         Review of Systems   Constitutional: Negative for chills and fever.   HENT:  Negative for ear discharge and nosebleeds.    Eyes:  Negative for discharge and redness.   Cardiovascular:  Negative for chest pain, orthopnea, palpitations, paroxysmal nocturnal dyspnea and syncope.   Respiratory:  Negative for cough, shortness of breath and wheezing.    Endocrine: Negative for heat intolerance.   Skin:  Negative for rash.   Musculoskeletal:  Negative for arthritis and myalgias.   Gastrointestinal:  Negative for abdominal pain, melena, nausea and vomiting.   Genitourinary:  Negative for dysuria and hematuria.   Neurological:  Negative for dizziness, light-headedness, numbness and tremors.   Psychiatric/Behavioral:  Negative for depression. The patient is not nervous/anxious.        Procedures    Procedures    No orders to display           Objective:    /66 (BP Location: Right arm, Patient Position: Sitting, Cuff Size: Large Adult)   Pulse 68   Resp 16   Ht 157 cm (61.81\")   Wt 59 kg (130 lb)   LMP  (LMP Unknown)   SpO2 97%   BMI 23.92 kg/m²         Constitutional:       Appearance: Well-developed.   Eyes:      General: No scleral icterus.        Right eye: No discharge.   HENT:      Head: Normocephalic and atraumatic.   Neck:      Thyroid: No thyromegaly.      Lymphadenopathy: No cervical adenopathy.   Pulmonary:      Effort: Pulmonary effort is normal. No respiratory distress.      Breath sounds: Normal breath sounds. No wheezing. No rales.   Cardiovascular:      Normal rate. " Regular rhythm.      No gallop.    Edema:     Peripheral edema absent.   Abdominal:      Tenderness: There is no abdominal tenderness.   Skin:     Findings: No erythema or rash.   Neurological:      Mental Status: Alert and oriented to person, place, and time.             Assessment:       Diagnosis Plan   1. Essential hypertension        2. Presence of intraocular lens                 Plan:       MDM:    1.  Hypertension:    Blood pressure is controlled slightly on the higher side.  Blood pressure at home are within desirable range continue Toprol-XL    2.  Status post pacemaker:    Pacemaker is functioning appropriately.  No change in programming.  No arrhythmia noted.

## 2025-01-31 ENCOUNTER — OFFICE VISIT (OUTPATIENT)
Dept: CARDIOLOGY | Facility: CLINIC | Age: OVER 89
End: 2025-01-31
Payer: MEDICARE

## 2025-01-31 VITALS
OXYGEN SATURATION: 97 % | RESPIRATION RATE: 16 BRPM | HEART RATE: 68 BPM | DIASTOLIC BLOOD PRESSURE: 66 MMHG | WEIGHT: 130 LBS | BODY MASS INDEX: 23.92 KG/M2 | SYSTOLIC BLOOD PRESSURE: 145 MMHG | HEIGHT: 62 IN

## 2025-01-31 DIAGNOSIS — I10 ESSENTIAL HYPERTENSION: Primary | ICD-10-CM

## 2025-01-31 DIAGNOSIS — Z96.1 PRESENCE OF INTRAOCULAR LENS: ICD-10-CM

## 2025-01-31 DIAGNOSIS — Z95.0 STATUS POST PLACEMENT OF CARDIAC PACEMAKER: ICD-10-CM

## 2025-01-31 RX ORDER — METOPROLOL SUCCINATE 25 MG/1
25 TABLET, EXTENDED RELEASE ORAL DAILY
Qty: 90 TABLET | Refills: 3 | Status: SHIPPED | OUTPATIENT
Start: 2025-01-31

## 2025-02-06 ENCOUNTER — CLINICAL SUPPORT (OUTPATIENT)
Dept: FAMILY MEDICINE CLINIC | Facility: CLINIC | Age: OVER 89
End: 2025-02-06
Payer: MEDICARE

## 2025-02-06 DIAGNOSIS — E53.8 B12 DEFICIENCY: Primary | ICD-10-CM

## 2025-02-06 PROCEDURE — 96372 THER/PROPH/DIAG INJ SC/IM: CPT | Performed by: FAMILY MEDICINE

## 2025-02-06 RX ORDER — CYANOCOBALAMIN 1000 UG/ML
1000 INJECTION, SOLUTION INTRAMUSCULAR; SUBCUTANEOUS ONCE
Status: COMPLETED | OUTPATIENT
Start: 2025-02-06 | End: 2025-02-06

## 2025-02-06 RX ORDER — CYANOCOBALAMIN 1000 UG/ML
1000 INJECTION, SOLUTION INTRAMUSCULAR; SUBCUTANEOUS
Status: DISCONTINUED | OUTPATIENT
Start: 2025-02-06 | End: 2025-02-06

## 2025-02-06 RX ADMIN — CYANOCOBALAMIN 1000 MCG: 1000 INJECTION, SOLUTION INTRAMUSCULAR; SUBCUTANEOUS at 11:26

## 2025-02-06 NOTE — PROGRESS NOTES
Patient is here today for her Vitamin B 12 shot injected her Left Ventrogluteal pt tolerated well. Advised to have another injection in 28 days.

## 2025-03-18 ENCOUNTER — TELEPHONE (OUTPATIENT)
Dept: FAMILY MEDICINE CLINIC | Facility: CLINIC | Age: OVER 89
End: 2025-03-18
Payer: MEDICARE

## 2025-03-18 NOTE — TELEPHONE ENCOUNTER
Caller: Stacy Monroy    Relationship: Self    Best call back number: [phone]     What was the call regarding: PATIENT IS ASKING IF SHE NEEDS TO COMPLETE ANY LAB WORK PRIOR TO HER APPOINTMENT WITH DR. MADDOX ON 04/14/25. PLEASE ADVISE.    Is it okay if the provider responds through MyChart: YES

## 2025-03-18 NOTE — TELEPHONE ENCOUNTER
Called pt and let her know she will be due for labs after the 4th of April. Scheduled her to get her labs done upstairs with us.

## 2025-04-04 ENCOUNTER — CLINICAL SUPPORT (OUTPATIENT)
Dept: FAMILY MEDICINE CLINIC | Facility: CLINIC | Age: OVER 89
End: 2025-04-04
Payer: MEDICARE

## 2025-04-04 DIAGNOSIS — E03.9 HYPOTHYROIDISM (ACQUIRED): ICD-10-CM

## 2025-04-04 DIAGNOSIS — E53.8 B12 DEFICIENCY: Primary | ICD-10-CM

## 2025-04-04 DIAGNOSIS — I10 ESSENTIAL HYPERTENSION: ICD-10-CM

## 2025-04-04 DIAGNOSIS — E78.2 MIXED HYPERLIPIDEMIA: ICD-10-CM

## 2025-04-04 PROCEDURE — 96372 THER/PROPH/DIAG INJ SC/IM: CPT | Performed by: FAMILY MEDICINE

## 2025-04-04 PROCEDURE — 36415 COLL VENOUS BLD VENIPUNCTURE: CPT | Performed by: FAMILY MEDICINE

## 2025-04-04 RX ORDER — CYANOCOBALAMIN 1000 UG/ML
1000 INJECTION, SOLUTION INTRAMUSCULAR; SUBCUTANEOUS ONCE
Status: COMPLETED | OUTPATIENT
Start: 2025-04-04 | End: 2025-04-04

## 2025-04-04 RX ADMIN — CYANOCOBALAMIN 1000 MCG: 1000 INJECTION, SOLUTION INTRAMUSCULAR; SUBCUTANEOUS at 08:51

## 2025-04-05 LAB
ALBUMIN SERPL-MCNC: 4.2 G/DL (ref 3.6–4.6)
ALP SERPL-CCNC: 76 IU/L (ref 44–121)
ALT SERPL-CCNC: 16 IU/L (ref 0–32)
AST SERPL-CCNC: 26 IU/L (ref 0–40)
BASOPHILS # BLD AUTO: 0.1 X10E3/UL (ref 0–0.2)
BASOPHILS NFR BLD AUTO: 1 %
BILIRUB SERPL-MCNC: 0.7 MG/DL (ref 0–1.2)
BUN SERPL-MCNC: 18 MG/DL (ref 10–36)
BUN/CREAT SERPL: 18 (ref 12–28)
CALCIUM SERPL-MCNC: 9.8 MG/DL (ref 8.7–10.3)
CHLORIDE SERPL-SCNC: 104 MMOL/L (ref 96–106)
CHOLEST SERPL-MCNC: 216 MG/DL (ref 100–199)
CHOLEST/HDLC SERPL: 3.4 RATIO (ref 0–4.4)
CO2 SERPL-SCNC: 23 MMOL/L (ref 20–29)
CREAT SERPL-MCNC: 1 MG/DL (ref 0.57–1)
EGFRCR SERPLBLD CKD-EPI 2021: 52 ML/MIN/1.73
EOSINOPHIL # BLD AUTO: 0.2 X10E3/UL (ref 0–0.4)
EOSINOPHIL NFR BLD AUTO: 2 %
ERYTHROCYTE [DISTWIDTH] IN BLOOD BY AUTOMATED COUNT: 16.8 % (ref 11.7–15.4)
GLOBULIN SER CALC-MCNC: 2.5 G/DL (ref 1.5–4.5)
GLUCOSE SERPL-MCNC: 93 MG/DL (ref 70–99)
HCT VFR BLD AUTO: 38.9 % (ref 34–46.6)
HDLC SERPL-MCNC: 63 MG/DL
HGB BLD-MCNC: 12.1 G/DL (ref 11.1–15.9)
IMM GRANULOCYTES # BLD AUTO: 0 X10E3/UL (ref 0–0.1)
IMM GRANULOCYTES NFR BLD AUTO: 0 %
LDLC SERPL CALC-MCNC: 139 MG/DL (ref 0–99)
LYMPHOCYTES # BLD AUTO: 3 X10E3/UL (ref 0.7–3.1)
LYMPHOCYTES NFR BLD AUTO: 37 %
MCH RBC QN AUTO: 24.9 PG (ref 26.6–33)
MCHC RBC AUTO-ENTMCNC: 31.1 G/DL (ref 31.5–35.7)
MCV RBC AUTO: 80 FL (ref 79–97)
MONOCYTES # BLD AUTO: 0.8 X10E3/UL (ref 0.1–0.9)
MONOCYTES NFR BLD AUTO: 10 %
NEUTROPHILS # BLD AUTO: 4.1 X10E3/UL (ref 1.4–7)
NEUTROPHILS NFR BLD AUTO: 50 %
PLATELET # BLD AUTO: 297 X10E3/UL (ref 150–450)
POTASSIUM SERPL-SCNC: 4.8 MMOL/L (ref 3.5–5.2)
PROT SERPL-MCNC: 6.7 G/DL (ref 6–8.5)
RBC # BLD AUTO: 4.86 X10E6/UL (ref 3.77–5.28)
SODIUM SERPL-SCNC: 141 MMOL/L (ref 134–144)
TRIGL SERPL-MCNC: 77 MG/DL (ref 0–149)
TSH SERPL DL<=0.005 MIU/L-ACNC: 4.42 UIU/ML (ref 0.45–4.5)
VIT B12 SERPL-MCNC: 588 PG/ML (ref 232–1245)
VLDLC SERPL CALC-MCNC: 14 MG/DL (ref 5–40)
WBC # BLD AUTO: 8 X10E3/UL (ref 3.4–10.8)

## 2025-04-14 ENCOUNTER — OFFICE VISIT (OUTPATIENT)
Dept: FAMILY MEDICINE CLINIC | Facility: CLINIC | Age: OVER 89
End: 2025-04-14
Payer: MEDICARE

## 2025-04-14 DIAGNOSIS — I10 ESSENTIAL HYPERTENSION: ICD-10-CM

## 2025-04-14 DIAGNOSIS — E78.2 MIXED HYPERLIPIDEMIA: ICD-10-CM

## 2025-04-14 DIAGNOSIS — M54.6 ACUTE BILATERAL THORACIC BACK PAIN: Primary | ICD-10-CM

## 2025-04-14 PROBLEM — M54.50 ACUTE BILATERAL LOW BACK PAIN WITHOUT SCIATICA: Status: RESOLVED | Noted: 2023-05-01 | Resolved: 2025-04-14

## 2025-04-16 VITALS
BODY MASS INDEX: 24.37 KG/M2 | HEIGHT: 62 IN | HEART RATE: 74 BPM | WEIGHT: 132.4 LBS | RESPIRATION RATE: 18 BRPM | DIASTOLIC BLOOD PRESSURE: 63 MMHG | OXYGEN SATURATION: 95 % | TEMPERATURE: 96.9 F | SYSTOLIC BLOOD PRESSURE: 123 MMHG

## 2025-04-17 NOTE — ASSESSMENT & PLAN NOTE
Lipid abnormalities are stable    Plan:  Lipid lowering therapy not prescribed due to diet controlled    Counseled patient on lifestyle modifications to help control hyperlipidemia.     Patient Treatment Goals:   LDL goal is under 160    Followup in 1 year.

## 2025-04-17 NOTE — ASSESSMENT & PLAN NOTE
No tenderness on exam. Will checkxray  Ice three times a day for about 10-15 minutes for the first 1-2 days. Then may alternate heat and ice. Better body mechanics discussed. Home exercises discussed and hand out given. Discussed nsaids and if they can be taken. May need imaging and or PT if persists.  Discussed red flags, if there is severe pain, fever with pain, loss of movement in one or both legs pain, numbness in groin or both legs, trouble urinating or defecating on oneself, then patient is to go to the ER.

## 2025-06-16 ENCOUNTER — OFFICE VISIT (OUTPATIENT)
Dept: FAMILY MEDICINE CLINIC | Facility: CLINIC | Age: OVER 89
End: 2025-06-16
Payer: MEDICARE

## 2025-06-16 ENCOUNTER — HOSPITAL ENCOUNTER (OUTPATIENT)
Dept: GENERAL RADIOLOGY | Facility: HOSPITAL | Age: OVER 89
Discharge: HOME | End: 2025-06-16
Admitting: FAMILY MEDICINE
Payer: MEDICARE

## 2025-06-16 VITALS
HEART RATE: 50 BPM | SYSTOLIC BLOOD PRESSURE: 156 MMHG | OXYGEN SATURATION: 93 % | TEMPERATURE: 98.4 F | HEIGHT: 62 IN | RESPIRATION RATE: 16 BRPM | WEIGHT: 128.2 LBS | BODY MASS INDEX: 23.59 KG/M2 | DIASTOLIC BLOOD PRESSURE: 82 MMHG

## 2025-06-16 DIAGNOSIS — R79.89 ELEVATED LFTS: ICD-10-CM

## 2025-06-16 DIAGNOSIS — M54.6 ACUTE BILATERAL THORACIC BACK PAIN: ICD-10-CM

## 2025-06-16 DIAGNOSIS — E03.9 HYPOTHYROIDISM (ACQUIRED): ICD-10-CM

## 2025-06-16 DIAGNOSIS — I10 ESSENTIAL HYPERTENSION: ICD-10-CM

## 2025-06-16 DIAGNOSIS — E78.2 MIXED HYPERLIPIDEMIA: ICD-10-CM

## 2025-06-16 DIAGNOSIS — M54.6 ACUTE BILATERAL THORACIC BACK PAIN: Primary | ICD-10-CM

## 2025-06-16 DIAGNOSIS — D49.0 INTRADUCTAL PAPILLARY MUCINOUS NEOPLASM OF PANCREAS: ICD-10-CM

## 2025-06-16 LAB
BILIRUB BLD-MCNC: NEGATIVE MG/DL
CLARITY, POC: CLEAR
COLOR UR: YELLOW
GLUCOSE UR STRIP-MCNC: NEGATIVE MG/DL
KETONES UR QL: NEGATIVE
LEUKOCYTE EST, POC: NEGATIVE
NITRITE UR-MCNC: NEGATIVE MG/ML
PH UR: 6.5 [PH] (ref 5–8)
PROT UR STRIP-MCNC: ABNORMAL MG/DL
RBC # UR STRIP: ABNORMAL /UL
SP GR UR: 1.01 (ref 1–1.03)
UROBILINOGEN UR QL: ABNORMAL

## 2025-06-16 PROCEDURE — 1126F AMNT PAIN NOTED NONE PRSNT: CPT | Performed by: FAMILY MEDICINE

## 2025-06-16 PROCEDURE — 1159F MED LIST DOCD IN RCRD: CPT | Performed by: FAMILY MEDICINE

## 2025-06-16 PROCEDURE — 1160F RVW MEDS BY RX/DR IN RCRD: CPT | Performed by: FAMILY MEDICINE

## 2025-06-16 PROCEDURE — 99214 OFFICE O/P EST MOD 30 MIN: CPT | Performed by: FAMILY MEDICINE

## 2025-06-16 PROCEDURE — 81002 URINALYSIS NONAUTO W/O SCOPE: CPT | Performed by: FAMILY MEDICINE

## 2025-06-16 PROCEDURE — 72072 X-RAY EXAM THORAC SPINE 3VWS: CPT

## 2025-06-16 RX ORDER — PREDNISONE 10 MG/1
10 TABLET ORAL TAKE AS DIRECTED
Qty: 16 TABLET | Refills: 0 | Status: SHIPPED | OUTPATIENT
Start: 2025-06-16 | End: 2025-06-26

## 2025-06-16 NOTE — PROGRESS NOTES
Chief Complaint  Back Pain, Headache, and Hypertension    Subjective     CC  Problem List  Visit Diagnosis   Encounters  Notes  Medications  Labs  Result Review Imaging  Media    Stacy Monroy presents to [facility] for   History of Present Illness  Patient presents to the office today with complaint of LUQ abdominal pain that has been ongoing for a few weeks now, reports yesterday she began to have lower left sided Thoracic back pain as well. No hx of trauma, no fever. No  sxs  Back Pain  Chronicity:  New  Onset:  Yesterday  Frequency:  Constantly  Progression since onset:  Unchanged  Pain quality:  Shooting  Radiates to: Around to the left side of her abomen.  Pain-numeric:  7/10  Pain severity:  Moderate  Pain is:  The same all the time  Associated symptoms: numbness (Left leg at times, she has a long hx of LDD.)    Associated symptoms: no chest pain, no dysuria, no fever, no leg pain, no pelvic pain, no tingling, no weakness and no weight loss    Treatments tried: Acetaminophen.  Improvement on treatment:  Mild  Hypertension  Chronicity:  Chronic  Onset:  More than 1 year ago  Progression since onset:  Stable  Condition status:  Controlled  Associated symptoms: no blurred vision, no chest pain, no palpitations, no peripheral edema, no shortness of breath, no sweats, no dyspnea with exertion and no dizziness    CAD risks:  Dyslipidemia, post-menopausal state and family history  Current therapy:  Beta blockers  Improvement on treatment:  Significant  Compliance problems:  No compliance problems  Identifiable causes: chronic renal disease and thyroid problem        Review of Systems   Constitutional:  Negative for appetite change, fever and unexpected weight loss.   Eyes:  Negative for blurred vision.   Respiratory:  Negative for shortness of breath.    Cardiovascular:  Negative for chest pain and palpitations.   Endocrine: Negative for cold intolerance, heat intolerance,  "polydipsia and polyuria.   Genitourinary:  Negative for dysuria and pelvic pain.   Musculoskeletal:  Negative for joint swelling.   Skin:  Negative for rash and bruise.   Neurological:  Positive for numbness (Left leg at times, she has a long hx of LDD.). Negative for dizziness, tingling, weakness and confusion.   Hematological:  Negative for adenopathy. Does not bruise/bleed easily.        Objective   Vital Signs:   /82 (BP Location: Right arm, Patient Position: Sitting, Cuff Size: Adult)   Pulse 50   Temp 98.4 °F (36.9 °C) (Temporal)   Resp 16   Ht 157 cm (61.81\")   Wt 58.2 kg (128 lb 3.2 oz)   SpO2 93%   BMI 23.59 kg/m²     Physical Exam  Constitutional:       General: She is not in acute distress.  HENT:      Head: Atraumatic.   Eyes:      Extraocular Movements: Extraocular movements intact.      Pupils: Pupils are equal, round, and reactive to light.   Cardiovascular:      Rate and Rhythm: Normal rate and regular rhythm.      Pulses: Normal pulses.      Heart sounds: No murmur heard.  Pulmonary:      Effort: Pulmonary effort is normal.      Breath sounds: Normal breath sounds.   Abdominal:      General: Abdomen is flat. Bowel sounds are normal.      Palpations: Abdomen is soft. There is no mass.      Tenderness: There is no abdominal tenderness. There is no right CVA tenderness or left CVA tenderness.   Musculoskeletal:      Cervical back: Neck supple.      Thoracic back: Deformity (moderate kyphosis) present. No swelling, tenderness or bony tenderness. Decreased range of motion.      Lumbar back: No swelling. Decreased range of motion. Negative right straight leg raise test and negative left straight leg raise test.      Right lower leg: No edema.      Left lower leg: No edema (neg eagle's bilaterally).   Lymphadenopathy:      Cervical: No cervical adenopathy.   Skin:     Findings: No bruising or rash.   Neurological:      Mental Status: She is alert and oriented to person, place, and time.      " Sensory: No sensory deficit.      Motor: No weakness.      Gait: Gait abnormal (kphotic, wide based, with cane.).        Result Review :Labs  Result Review  Imaging  Med Tab  Media                 Assessment and Plan CC Problem List  Visit Diagnosis  ROS  Review (Popup)  Health Maintenance  Quality  BestPractice  Medications  SmartSets  SnapShot Encounters  Media  Problem List Items Addressed This Visit          Unprioritized    Essential hypertension    Current Assessment & Plan   Hypertension is stable and controlled  Continue current treatment regimen.  Blood pressure will be reassessed in 1 month.         Relevant Orders    CBC & Differential    Comprehensive Metabolic Panel    Hypothyroidism (acquired)    Current Assessment & Plan   No sxs lab repeated for pending apt w/ Glass.          Relevant Medications    predniSONE (DELTASONE) 10 MG tablet    Other Relevant Orders    TSH    Mixed hyperlipidemia    Current Assessment & Plan      Controlled compliant continue meds, lab pending wellness exam.          Relevant Orders    Lipid Panel With / Chol / HDL Ratio    Intraductal papillary mucinous neoplasm of pancreas    Current Assessment & Plan   Likely contributing to her LUQ abdominal pain, lab, GI apt pending in the week. Lab r/o other pathology         Relevant Orders    CBC & Differential    Comprehensive Metabolic Panel    Amylase    Lipase    Acute bilateral thoracic back pain - Primary    Relevant Medications    predniSONE (DELTASONE) 10 MG tablet    Other Relevant Orders    POCT urinalysis dipstick, manual (Completed)    XR Spine Thoracic 3 View       Follow Up Instructions Charge Capture  Follow-up Communications  Return in about 4 weeks (around 7/14/2025), or if symptoms worsen or fail to improve.  Patient was given instructions and counseling regarding her condition or for health maintenance advice. Please see specific information pulled into the AVS if appropriate.

## 2025-06-16 NOTE — ASSESSMENT & PLAN NOTE
Likely contributing to her LUQ abdominal pain, lab, GI apt pending in the week. Lab r/o other pathology

## 2025-06-17 LAB
ALBUMIN SERPL-MCNC: 4.4 G/DL (ref 3.6–4.6)
ALP SERPL-CCNC: 108 IU/L (ref 44–121)
ALT SERPL-CCNC: 38 IU/L (ref 0–32)
AMYLASE SERPL-CCNC: 65 U/L (ref 31–110)
AST SERPL-CCNC: 57 IU/L (ref 0–40)
BASOPHILS # BLD AUTO: 0.1 X10E3/UL (ref 0–0.2)
BASOPHILS NFR BLD AUTO: 1 %
BILIRUB SERPL-MCNC: 0.7 MG/DL (ref 0–1.2)
BUN SERPL-MCNC: 16 MG/DL (ref 10–36)
BUN/CREAT SERPL: 17 (ref 12–28)
CALCIUM SERPL-MCNC: 10.1 MG/DL (ref 8.7–10.3)
CHLORIDE SERPL-SCNC: 101 MMOL/L (ref 96–106)
CHOLEST SERPL-MCNC: 237 MG/DL (ref 100–199)
CHOLEST/HDLC SERPL: 3.5 RATIO (ref 0–4.4)
CO2 SERPL-SCNC: 21 MMOL/L (ref 20–29)
CREAT SERPL-MCNC: 0.93 MG/DL (ref 0.57–1)
EGFRCR SERPLBLD CKD-EPI 2021: 57 ML/MIN/1.73
EOSINOPHIL # BLD AUTO: 0.1 X10E3/UL (ref 0–0.4)
EOSINOPHIL NFR BLD AUTO: 1 %
ERYTHROCYTE [DISTWIDTH] IN BLOOD BY AUTOMATED COUNT: 17.6 % (ref 11.7–15.4)
GLOBULIN SER CALC-MCNC: 2.7 G/DL (ref 1.5–4.5)
GLUCOSE SERPL-MCNC: 92 MG/DL (ref 70–99)
HCT VFR BLD AUTO: 42.8 % (ref 34–46.6)
HDLC SERPL-MCNC: 67 MG/DL
HGB BLD-MCNC: 12.5 G/DL (ref 11.1–15.9)
IMM GRANULOCYTES # BLD AUTO: 0 X10E3/UL (ref 0–0.1)
IMM GRANULOCYTES NFR BLD AUTO: 0 %
LDLC SERPL CALC-MCNC: 154 MG/DL (ref 0–99)
LIPASE SERPL-CCNC: 25 U/L (ref 14–85)
LYMPHOCYTES # BLD AUTO: 2.4 X10E3/UL (ref 0.7–3.1)
LYMPHOCYTES NFR BLD AUTO: 31 %
MCH RBC QN AUTO: 23.7 PG (ref 26.6–33)
MCHC RBC AUTO-ENTMCNC: 29.2 G/DL (ref 31.5–35.7)
MCV RBC AUTO: 81 FL (ref 79–97)
MONOCYTES # BLD AUTO: 0.5 X10E3/UL (ref 0.1–0.9)
MONOCYTES NFR BLD AUTO: 6 %
NEUTROPHILS # BLD AUTO: 4.6 X10E3/UL (ref 1.4–7)
NEUTROPHILS NFR BLD AUTO: 61 %
PLATELET # BLD AUTO: 322 X10E3/UL (ref 150–450)
POTASSIUM SERPL-SCNC: 4.9 MMOL/L (ref 3.5–5.2)
PROT SERPL-MCNC: 7.1 G/DL (ref 6–8.5)
RBC # BLD AUTO: 5.28 X10E6/UL (ref 3.77–5.28)
SODIUM SERPL-SCNC: 138 MMOL/L (ref 134–144)
TRIGL SERPL-MCNC: 91 MG/DL (ref 0–149)
TSH SERPL DL<=0.005 MIU/L-ACNC: 3.38 UIU/ML (ref 0.45–4.5)
VLDLC SERPL CALC-MCNC: 16 MG/DL (ref 5–40)
WBC # BLD AUTO: 7.7 X10E3/UL (ref 3.4–10.8)

## 2025-06-20 ENCOUNTER — TRANSCRIBE ORDERS (OUTPATIENT)
Dept: ADMINISTRATIVE | Facility: HOSPITAL | Age: OVER 89
End: 2025-06-20
Payer: MEDICARE

## 2025-06-20 ENCOUNTER — OFFICE (OUTPATIENT)
Dept: URBAN - METROPOLITAN AREA CLINIC 64 | Facility: CLINIC | Age: OVER 89
End: 2025-06-20
Payer: MEDICARE

## 2025-06-20 VITALS
SYSTOLIC BLOOD PRESSURE: 136 MMHG | HEART RATE: 60 BPM | SYSTOLIC BLOOD PRESSURE: 140 MMHG | HEIGHT: 63 IN | WEIGHT: 129 LBS | DIASTOLIC BLOOD PRESSURE: 66 MMHG | DIASTOLIC BLOOD PRESSURE: 59 MMHG

## 2025-06-20 DIAGNOSIS — R94.5 ABNORMAL RESULTS OF LIVER FUNCTION STUDIES: ICD-10-CM

## 2025-06-20 DIAGNOSIS — R10.12 ABDOMINAL PAIN, LEFT UPPER QUADRANT: ICD-10-CM

## 2025-06-20 DIAGNOSIS — K86.2 CYST OF PANCREAS: Primary | ICD-10-CM

## 2025-06-20 DIAGNOSIS — R10.12 LEFT UPPER QUADRANT PAIN: ICD-10-CM

## 2025-06-20 DIAGNOSIS — K86.2 CYST OF PANCREAS: ICD-10-CM

## 2025-06-20 PROCEDURE — 99213 OFFICE O/P EST LOW 20 MIN: CPT | Performed by: NURSE PRACTITIONER

## 2025-06-30 NOTE — PROGRESS NOTES
Subsequent Medicare Wellness Visit    Subjective    History of Present Illness:  Stacy Monroy is a 89 y.o. female who presents for a Subsequent Medicare Wellness Visit.    The following portions of the patient's history were reviewed and   updated as appropriate: allergies, current medications, past family history, past medical history, past social history, past surgical history, and problem list.  Family History   Problem Relation Age of Onset    Hypertension Mother     Stroke Mother     Hypertension Father     Stroke Father     Breast cancer Sister 50    Ovarian cancer Daughter 57    Breast cancer Niece 53     Past Surgical History:   Procedure Laterality Date    BLADDER REPAIR      BREAST BIOPSY Right     CARDIAC ELECTROPHYSIOLOGY PROCEDURE N/A 05/19/2021    Procedure: Pacemaker DC new;  Surgeon: Edmar Rubin MD;  Location: [facility] CATH INVASIVE LOCATION;  Service: Cardiovascular;  Laterality: N/A;    ERCP N/A 09/14/2021    Procedure: ERCP WITH SPHINCTEROTOMY, sphincteroplasty, clearance of bile duct with balloon. brushing, placement of pancreatic stent and placement of metal biliary stent;  Surgeon: Adrienne Wilson MD;  Location: [facility] ENDOSCOPY;  Service: Gastroenterology;  Laterality: N/A;  post op: cystic mass compressing bile duct    ERCP N/A 3/8/2024    Procedure: ENDOSCOPIC RETROGRADE CHOLANGIOPANCREATOGRAPHY WITH BALLOON CLEARANCE OF CLOGGED METAL BILIARY STENT, BALLOON STONE EXTRACTION;  Surgeon: Samy Bryant MD;  Location: [facility] ENDOSCOPY;  Service: Gastroenterology;  Laterality: N/A;  POST:    HYSTERECTOMY      INSERT / REPLACE / REMOVE PACEMAKER      OTHER SURGICAL HISTORY  2019    Tilt table     OTHER SURGICAL HISTORY  2019    tilt table         Compared to one year ago, the patient feels her physical   health is the same.    Compared to one year ago, the patient feels her mental   health is the same.    Recent Hospitalizations:  She was not admitted to the hospital  during the last year.       Current Medical Providers:  Patient Care Team:  Mita Moise MD as PCP - General  Mita Moise MD as PCP - Family Medicine    Outpatient Medications Prior to Visit   Medication Sig Dispense Refill    coenzyme Q10 100 MG capsule Take 1 capsule by mouth Daily.      levothyroxine (SYNTHROID, LEVOTHROID) 50 MCG tablet Take 1 tablet by mouth Daily. 30 tablet 12    metoprolol succinate XL (TOPROL-XL) 25 MG 24 hr tablet TAKE 1 TABLET BY MOUTH EVERY DAY 90 tablet 3    Omega-3 1000 MG capsule Take 1 capsule by mouth Daily.       No facility-administered medications prior to visit.     Pain Score    07/08/25 0909   PainSc: 0-No pain      No opioid medication identified on active medication list. I have reviewed chart for other potential  high risk medication/s and harmful drug interactions in the elderly.        Aspirin is not on active medication list.  Aspirin use is not indicated based on review of current medical condition/s. Risk of harm outweighs potential benefits.  .    Patient Active Problem List   Diagnosis    Status post placement of implantable loop recorder    Essential hypertension    Hypothyroidism (acquired)    Acute seasonal allergic rhinitis due to pollen    Atherosclerosis    B12 deficiency    Bilateral carpal tunnel syndrome    Cataracts, bilateral    DNR (do not resuscitate)    Heart murmur    History of chicken pox    Menopausal syndrome    Mixed hyperlipidemia    Pernicious anemia    Influenza vaccine refused    Liver function abnormality    Other fatigue    RUQ abdominal mass    Cardiac arrhythmia    Deleon-Wasserman syncope    Sinus node dysfunction    SSS (sick sinus syndrome)    Acute gastric ulcer without hemorrhage or perforation    Presence of cardiac pacemaker    Intraductal papillary mucinous neoplasm of pancreas    Personal history of other infectious and parasitic diseases    Presence of intraocular lens    Puckering of macula, right eye    Nonexudative  "age-related macular degeneration    Posterior vitreous detachment    Mammogram declined    Microhematuria    Iron deficiency anemia secondary to inadequate dietary iron intake    Chronic bilateral low back pain with right-sided sciatica    CRF (chronic renal failure), stage 2 (mild)    Left lower quadrant abdominal pain    Diarrhea    Cyst of pancreas    Right arm pain    Tear of biceps muscle, initial encounter    Rib pain on left side    Transaminitis    Cholangitis    Right ear impacted cerumen    Acute bilateral thoracic back pain     Advance Care Planning  Advance Directive is on file.  ACP discussion was held with the patient during this visit. Patient has an advance directive in EMR which is still valid.  No discussion or changes           Objective    Vitals:    25 0909   BP: 134/72   BP Location: Right arm   Patient Position: Sitting   Cuff Size: Adult   Pulse: 77   Resp: 17   Temp: 96.9 °F (36.1 °C)   TempSrc: Temporal   SpO2: 97%  Comment: ra   Weight: 59.1 kg (130 lb 3.2 oz)   Height: 157 cm (61.81\")   PainSc: 0-No pain       Does the patient have evidence of cognitive impairment? No           HEALTH RISK ASSESSMENT    Smoking Status:  Social History     Tobacco Use   Smoking Status Never    Passive exposure: Never   Smokeless Tobacco Never   Tobacco Comments    None     Alcohol Consumption:  Social History     Substance and Sexual Activity   Alcohol Use No     Fall Risk Screen:    STEADI Fall Risk Assessment was completed, and patient is at LOW risk for falls.Assessment completed on:2025    Depression Screenin/8/2025     9:00 AM   PHQ-2/PHQ-9 Depression Screening   Little interest or pleasure in doing things Not at all   Feeling down, depressed, or hopeless Not at all       Health Habits and Functional and Cognitive Screenin/8/2025     9:00 AM   Functional & Cognitive Status   Do you have difficulty preparing food and eating? No   Do you have difficulty bathing yourself, " getting dressed or grooming yourself? No   Do you have difficulty using the toilet? No   Do you have difficulty moving around from place to place? No   Do you have trouble with steps or getting out of a bed or a chair? No   Current Diet Other   Dental Exam Up to date   Eye Exam Up to date   Exercise (times per week) 2 times per week   Current Exercises Include Other   Do you need help using the phone?  No   Are you deaf or do you have serious difficulty hearing?  No   Do you need help to go to places out of walking distance? No   Do you need help shopping? No   Do you need help preparing meals?  No   Do you need help with housework?  No   Do you need help with laundry? No   Do you need help taking your medications? No   Do you need help managing money? No   Do you ever drive or ride in a car without wearing a seat belt? No   Have you felt unusual fatigue (could be tiredness), stress, anger or loneliness in the last month? No   Who do you live with? Alone   If you need help, do you have trouble finding someone available to you? No   Have you been bothered in the last four weeks by sexual problems? No   Do you have difficulty concentrating, remembering or making decisions? No       Age-appropriate Screening Schedule:  Refer to the list below for future screening recommendations based on patient's age, sex and/or medical conditions. Orders for these recommended tests are listed in the plan section. The patient has been provided with a written plan.    Health Maintenance   Topic Date Due    RSV Vaccine - Adults (1 - 1-dose 75+ series) Never done    DXA SCAN  01/28/2024    COVID-19 Vaccine (5 - 2024-25 season) 09/01/2024    INFLUENZA VACCINE  12/06/2028 (Originally 10/1/2025)    LIPID PANEL  06/16/2026    ANNUAL WELLNESS VISIT  07/08/2026    TDAP/TD VACCINES (2 - Td or Tdap) 08/05/2030    Pneumococcal Vaccine 50+  Completed    ZOSTER VACCINE  Completed              Assessment & Plan   Medically necessary, significant,  and separately identifiable medical problems identified during this visit are addressed on a separate visit note.    CMS Preventative Services Quick Reference  Risk Factors Identified During Encounter  None Identified  The above risks/problems have been discussed with the patient.  Follow up actions/plans if indicated are seen below in the Assessment/Plan Section.  Pertinent information has been shared with the patient in the After Visit Summary.    Follow Up:   No follow-ups on file.     An After Visit Summary and PPPS were made available to the patient.    Medicare Wellness  Personal Prevention Plan of Service     Date of Office Visit:  2025  Encounter Provider:  Mita Moise MD  Place of Service:  [facility]  Patient Name: Stacy Monroy  :  1930    As part of the Medicare Wellness portion of your visit today, we are providing you with this personalized preventive plan of services (PPPS). This plan is based upon recommendations of the [place] Preventive Services Task Force (USPSTF) and the Advisory Committee on Immunization Practices (ACIP).    This lists the preventive care services that should be considered, and provides dates of when you are due. Items listed as completed are up-to-date and do not require any further intervention.    Health Maintenance   Topic Date Due    RSV Vaccine - Adults (1 - 1-dose 75+ series) Never done    DXA SCAN  2024    COVID-19 Vaccine (2024- season) 2024    INFLUENZA VACCINE  2028 (Originally 10/1/2025)    LIPID PANEL  2026    ANNUAL WELLNESS VISIT  2026    TDAP/TD VACCINES (2 - Td or Tdap) 2030    Pneumococcal Vaccine 50+  Completed    ZOSTER VACCINE  Completed       Orders Placed This Encounter   Procedures    Comprehensive Metabolic Panel     Release to patient:   Routine Release [[id number]]       No follow-ups on file.  Sit-to-Stand Exercise    The sit-to-stand exercise (also known  as the chair stand or chair rise exercise) strengthens your lower body and helps you maintain or improve your mobility and independence. The goal is to do the sit-to-stand exercise without using your hands. This will be easier as you become stronger. You should always talk with your health care provider before starting any exercise program, especially if you have had recent surgery.  Do the exercise exactly as told by your health care provider and adjust it as directed. It is normal to feel mild stretching, pulling, tightness, or discomfort as you do this exercise, but you should stop right away if you feel sudden pain or your pain gets worse. Do not begin doing this exercise until told by your health care provider.  What the sit-to-stand exercise does  The sit-to-stand exercise helps to strengthen the muscles in your thighs and the muscles in the center of your body that give you stability (core muscles). This exercise is especially helpful if:  You have had knee or hip surgery.  You have trouble getting up from a chair, out of a car, or off the toilet.  How to do the sit-to-stand exercise  Sit toward the front edge of a sturdy chair without armrests. Your knees should be bent and your feet should be flat on the floor and shoulder-width apart.  Place your hands lightly on each side of the seat. Keep your back and neck as straight as possible, with your chest slightly forward.  Breathe in slowly. Lean forward and slightly shift your weight to the front of your feet.  Breathe out as you slowly stand up. Use your hands as little as possible.  Stand and pause for a full breath in and out.  Breathe in as you sit down slowly. Tighten your core and abdominal muscles to control your lowering as much as possible.  Breathe out slowly.  Do this exercise 10-15 times. If needed, do it fewer times until you build up strength.  Rest for 1 minute, then do another set of 10-15 repetitions.  To change the difficulty of the  sit-to-stand exercise  If the exercise is too difficult, use a chair with sturdy armrests, and push off the armrests to help you come to the standing position. You can also use the armrests to help slowly lower yourself back to sitting. As this gets easier, try to use your arms less. You can also place a firm cushion or pillow on the chair to make the surface higher.  If this exercise is too easy, do not use your arms to help raise or lower yourself. You can also wear a weighted vest, use hand weights, increase your repetitions, or try a lower chair.  General tips  You may feel tired when starting an exercise routine. This is normal.  You may have muscle soreness that lasts a few days. This is normal. As you get stronger, you may not feel muscle soreness.  Use smooth, steady movements.  Do not  hold your breath during strength exercises. This can cause unsafe changes in your blood pressure.  Breathe in slowly through your nose, and breathe out slowly through your mouth.  Summary  Strengthening your lower body is an important step to help you move safely and independently.  The sit-to-stand exercise helps strengthen the muscles in your thighs and core.  You should always talk with your health care provider before starting any exercise program, especially if you have had recent surgery.  This information is not intended to replace advice given to you by your health care provider. Make sure you discuss any questions you have with your health care provider.  Document Revised: 10/16/2019 Document Reviewed: 02/08/2018  Elsevier Patient Education © 2021 [employer] Inc[employer]    Advance Care Planning and Advance Directives     You make decisions on a daily basis - decisions about where you want to live, your career, your home, your life. Perhaps one of the most important decisions you face is your choice for future medical care. Take time to talk with your family and your healthcare team and start planning today.  Advance Care  Planning is a process that can help you:  Understand possible future healthcare decisions in light of your own experiences  Reflect on those decision in light of your goals and values  Discuss your decisions with those closest to you and the healthcare professionals that care for you  Make a plan by creating a document that reflects your wishes    Surrogate Decision Maker  In the event of a medical emergency, which has left you unable to communicate or to make your own decisions, you would need someone to make decisions for you.  It is important to discuss your preferences for medical treatment with this person while you are in good health.     Qualities of a surrogate decision maker:  Willing to take on this role and responsibility  Knows what you want for future medical care  Willing to follow your wishes even if they don't agree with them  Able to make difficult medical decisions under stressful circumstances    Advance Directives  These are legal documents you can create that will guide your healthcare team and decision maker(s) when needed. These documents can be stored in the electronic medical record.    Living Will - a legal document to guide your care if you have a terminal condition or a serious illness and are unable to communicate. States vary by statute in document names/types, but most forms may include one or more of the following:        -  Directions regarding life-prolonging treatments        -  Directions regarding artificially provided nutrition/hydration        -  Choosing a healthcare decision maker        -  Direction regarding organ/tissue donation    Durable Power of  for Healthcare - this document names an -in-fact to make medical decisions for you, but it may also allow this person to make personal and financial decisions for you. Please seek the advice of an  if you need this type of document.    **Advance Directives are not required and no one may discriminate  against you if you do not sign one.    Medical Orders  Many states allow specific forms/orders signed by your physician to record your wishes for medical treatment in your current state of health. This form, signed in personal communication with your physician, addresses resuscitation and other medical interventions that you may or may not want.  For more information or to schedule a time with a [facility] Advance Care Planning Facilitator contact: [facility]/ACP or call [phone] and someone will contact you directly.    Fall Prevention in the Home, Adult  Falls can cause injuries and can happen to people of all ages. There are many things you can do to make your home safe and to help prevent falls. Ask for help when making these changes.  What actions can I take to prevent falls?  General Instructions  Use good lighting in all rooms. Replace any light bulbs that burn out.  Turn on the lights in dark areas. Use night-lights.  Keep items that you use often in easy-to-reach places. Lower the shelves around your home if needed.  Set up your furniture so you have a clear path. Avoid moving your furniture around.  Do not have throw rugs or other things on the floor that can make you trip.  Avoid walking on wet floors.  If any of your floors are uneven, fix them.  Add color or contrast paint or tape to clearly angelina and help you see:  Grab bars or handrails.  First and last steps of staircases.  Where the edge of each step is.  If you use a stepladder:  Make sure that it is fully opened. Do not climb a closed stepladder.  Make sure the sides of the stepladder are locked in place.  Ask someone to hold the stepladder while you use it.  Know where your pets are when moving through your home.  What can I do in the bathroom?         Keep the floor dry. Clean up any water on the floor right away.  Remove soap buildup in the tub or shower.  Use nonskid mats or decals on the floor of the tub or shower.  Attach bath  mats securely with double-sided, nonslip rug tape.  If you need to sit down in the shower, use a plastic, nonslip stool.  Install grab bars by the toilet and in the tub and shower. Do not use towel bars as grab bars.  What can I do in the bedroom?  Make sure that you have a light by your bed that is easy to reach.  Do not use any sheets or blankets for your bed that hang to the floor.  Have a firm chair with side arms that you can use for support when you get dressed.  What can I do in the kitchen?  Clean up any spills right away.  If you need to reach something above you, use a step stool with a grab bar.  Keep electrical cords out of the way.  Do not use floor polish or wax that makes floors slippery.  What can I do with my stairs?  Do not leave any items on the stairs.  Make sure that you have a light switch at the top and the bottom of the stairs.  Make sure that there are handrails on both sides of the stairs. Fix handrails that are broken or loose.  Install nonslip stair treads on all your stairs.  Avoid having throw rugs at the top or bottom of the stairs.  Choose a carpet that does not hide the edge of the steps on the stairs.  Check carpeting to make sure that it is firmly attached to the stairs. Fix carpet that is loose or worn.  What can I do on the outside of my home?  Use bright outdoor lighting.  Fix the edges of walkways and driveways and fix any cracks.  Remove anything that might make you trip as you walk through a door, such as a raised step or threshold.  Trim any bushes or trees on paths to your home.  Check to see if handrails are loose or broken and that both sides of all steps have handrails.  Install guardrails along the edges of any raised decks and porches.  Clear paths of anything that can make you trip, such as tools or rocks.  Have leaves, snow, or ice cleared regularly.  Use sand or salt on paths during winter.  Clean up any spills in your garage right away. This includes grease or oil  spills.  What other actions can I take?  Wear shoes that:  Have a low heel. Do not wear high heels.  Have rubber bottoms.  Feel good on your feet and fit well.  Are closed at the toe. Do not wear open-toe sandals.  Use tools that help you move around if needed. These include:  Canes.  Walkers.  Scooters.  Crutches.  Review your medicines with your doctor. Some medicines can make you feel dizzy. This can increase your chance of falling.  Ask your doctor what else you can do to help prevent falls.  Where to find more information  Centers for Disease Control and Prevention, STEADI: www.cdc.gov  [place] on Aging: www.kaushik.nih.gov  Contact a doctor if:  You are afraid of falling at home.  You feel weak, drowsy, or dizzy at home.  You fall at home.  Summary  There are many simple things that you can do to make your home safe and to help prevent falls.  Ways to make your home safe include removing things that can make you trip and installing grab bars in the bathroom.  Ask for help when making these changes in your home.  This information is not intended to replace advice given to you by your health care provider. Make sure you discuss any questions you have with your health care provider.  Document Revised: 07/21/2021 Document Reviewed: 07/21/2021  [employer] Patient Education © 2021 [employer]         Additional E&M Note during same encounter follows:  Patient has additional, significant, and separately identifiable condition(s)/problem(s) that require work above and beyond the Medicare Wellness Visit       Objective     Subjective   Stacy Porfirio is here for:    Chief Complaint   Patient presents with    Medicare Wellness-subsequent    Hypertension    Hyperlipidemia       Subjective   Stacy is also being seen today for an annual adult preventative physical exam.  and Stacy is also being seen today for additional medical problem/s.    Hypertension  Chronicity:  Chronic  Onset:  More than 1 year ago  Condition  status:  Controlled  Associated symptoms: malaise/fatigue    Associated symptoms: no chest pain, no headaches, no neck pain, no palpitations and no shortness of breath    CAD risks:  Dyslipidemia, post-menopausal state and family history  Current therapy:  Nothing  Improvement on treatment:  Mild  Hyperlipidemia  This is a chronic problem. The current episode started more than 1 year ago. Exacerbating diseases include hypothyroidism. She has no history of diabetes or obesity. Pertinent negatives include no chest pain or shortness of breath. Current antihyperlipidemic treatment includes herbal therapy. The current treatment provides moderate improvement of lipids. Risk factors for coronary artery disease include dyslipidemia, hypertension, post-menopausal and family history.   Hypothyroidism  This is a chronic problem. The current episode started more than 1 year ago. Associated symptoms include fatigue. Pertinent negatives include no chest pain, headaches, neck pain or vomiting. Treatments tried: levothyroxine. The treatment provided moderate relief.             Physical Exam:  Review of Systems   Constitutional:  Positive for fatigue and malaise/fatigue.   Respiratory:  Negative for shortness of breath.    Cardiovascular:  Negative for chest pain and palpitations.   Gastrointestinal:  Negative for vomiting.   Musculoskeletal:  Negative for neck pain.        Vitals:    07/08/25 0909   BP: 134/72   Pulse: 77   Resp: 17   Temp: 96.9 °F (36.1 °C)   SpO2: 97%       Physical Exam  Vitals and nursing note reviewed.   Constitutional:       General: She is not in acute distress.     Appearance: She is well-developed. She is not diaphoretic.   HENT:      Head: Normocephalic and atraumatic.      Right Ear: Tympanic membrane and external ear normal.      Left Ear: Tympanic membrane and external ear normal.      Nose: Nose normal.      Mouth/Throat:      Pharynx: No oropharyngeal exudate.   Eyes:      General: No scleral  icterus.        Right eye: No discharge.         Left eye: No discharge.      Conjunctiva/sclera: Conjunctivae normal.      Pupils: Pupils are equal, round, and reactive to light.   Neck:      Thyroid: No thyromegaly.      Trachea: No tracheal deviation.   Cardiovascular:      Rate and Rhythm: Normal rate and regular rhythm.      Heart sounds: Normal heart sounds. No murmur heard.     No friction rub. No gallop.   Pulmonary:      Effort: Pulmonary effort is normal. No respiratory distress.      Breath sounds: Normal breath sounds. No stridor. No wheezing or rales.   Abdominal:      General: Bowel sounds are normal. There is no distension.      Palpations: Abdomen is soft. There is no mass.      Tenderness: There is no abdominal tenderness. There is no guarding or rebound.   Musculoskeletal:         General: No tenderness or deformity. Normal range of motion.      Cervical back: Normal range of motion and neck supple.   Lymphadenopathy:      Cervical: No cervical adenopathy.   Skin:     General: Skin is warm and dry.      Capillary Refill: Capillary refill takes less than 2 seconds.      Coloration: Skin is not pale.      Findings: No erythema or rash.   Neurological:      Mental Status: She is alert and oriented to person, place, and time.      Cranial Nerves: No cranial nerve deficit.      Sensory: No sensory deficit.      Motor: No tremor, atrophy or abnormal muscle tone.      Coordination: Coordination normal.      Gait: Gait normal.      Deep Tendon Reflexes: Reflexes are normal and symmetric. Reflexes normal.   Psychiatric:         Behavior: Behavior normal.         Thought Content: Thought content normal.         Cognition and Memory: Memory is not impaired. She does not exhibit impaired recent memory or impaired remote memory.         Judgment: Judgment normal.         Result Review :   The following data was reviewed by: Mita Moise MD on 07/08/2025:         Assessment and Plan:  Problem List Items  Addressed This Visit          Advance Directives and General Issues    DNR (do not resuscitate)    Mammogram declined       Cardiac and Vasculature    Essential hypertension    Current Assessment & Plan   Hypertension is stable and controlled  Continue current treatment regimen.  Blood pressure will be reassessed in 3 months.  Blood pressure is better.          Mixed hyperlipidemia    Current Assessment & Plan    Will check labs  Continue current diet change         Relevant Orders    Comprehensive Metabolic Panel (Completed)       Endocrine and Metabolic    B12 deficiency    Overview   recheck today         Current Assessment & Plan   Injection given            Gastrointestinal Abdominal     Cyst of pancreas    Current Assessment & Plan   PER CT scan done by GI the cyst is smaller compared to 2023    Also there is some LAD in CT. She has not heard from GI yet. CBC did not show elevated WBC  She is going to call GI    If she does not hear from them or get a hold of them, she is going to let me know            Musculoskeletal and Injuries    Chronic bilateral low back pain with right-sided sciatica    Current Assessment & Plan   She will be going back to pain management          Other Visit Diagnoses         Medicare annual wellness visit, subsequent    -  Primary      Acquired hypothyroidism                BMI is within normal parameters. No other follow-up for BMI required.               Assessment and Plan Additional age appropriate preventative wellness advice topics were discussed during today's preventative wellness exam(some topics already addressed during AWV portion of the note above):    Physical Activity: Advised cardiovascular activity 150 minutes per week as tolerated. (example brisk walk for 30 minutes, 5 days a week).   Nutrition: Discussed nutrition plan with patient. Information shared in after visit summary. Goal is for a well balanced diet to enhance overall health.   Healthy Weight: Discussed  current and goal BMI with patient. Steps to attain this goal discussed. Information shared in after visit summary.                          Follow Up   No follow-ups on file.  Patient was given instructions and counseling regarding his condition or for health maintenance advice. Please see specific information pulled into the AVS if appropriate.

## 2025-07-03 ENCOUNTER — HOSPITAL ENCOUNTER (OUTPATIENT)
Dept: CT IMAGING | Facility: HOSPITAL | Age: OVER 89
Discharge: HOME | End: 2025-07-03
Admitting: NURSE PRACTITIONER
Payer: MEDICARE

## 2025-07-03 DIAGNOSIS — R10.12 ABDOMINAL PAIN, LEFT UPPER QUADRANT: ICD-10-CM

## 2025-07-03 DIAGNOSIS — R94.5 ABNORMAL RESULTS OF LIVER FUNCTION STUDIES: ICD-10-CM

## 2025-07-03 DIAGNOSIS — K86.2 CYST OF PANCREAS: ICD-10-CM

## 2025-07-03 PROCEDURE — 25510000001 IOPAMIDOL PER 1 ML: Performed by: NURSE PRACTITIONER

## 2025-07-03 PROCEDURE — 74177 CT ABD & PELVIS W/CONTRAST: CPT

## 2025-07-03 RX ORDER — IOPAMIDOL 755 MG/ML
100 INJECTION, SOLUTION INTRAVASCULAR
Status: COMPLETED | OUTPATIENT
Start: 2025-07-03 | End: 2025-07-03

## 2025-07-03 RX ADMIN — IOPAMIDOL 100 ML: 755 INJECTION, SOLUTION INTRAVENOUS at 09:57

## 2025-07-08 ENCOUNTER — OFFICE VISIT (OUTPATIENT)
Dept: FAMILY MEDICINE CLINIC | Facility: CLINIC | Age: OVER 89
End: 2025-07-08
Payer: MEDICARE

## 2025-07-08 VITALS
WEIGHT: 130.2 LBS | HEIGHT: 62 IN | TEMPERATURE: 96.9 F | SYSTOLIC BLOOD PRESSURE: 134 MMHG | HEART RATE: 77 BPM | OXYGEN SATURATION: 97 % | BODY MASS INDEX: 23.96 KG/M2 | RESPIRATION RATE: 17 BRPM | DIASTOLIC BLOOD PRESSURE: 72 MMHG

## 2025-07-08 DIAGNOSIS — E53.8 B12 DEFICIENCY: ICD-10-CM

## 2025-07-08 DIAGNOSIS — Z66 DNR (DO NOT RESUSCITATE): ICD-10-CM

## 2025-07-08 DIAGNOSIS — E78.2 MIXED HYPERLIPIDEMIA: ICD-10-CM

## 2025-07-08 DIAGNOSIS — Z00.00 MEDICARE ANNUAL WELLNESS VISIT, SUBSEQUENT: Primary | ICD-10-CM

## 2025-07-08 DIAGNOSIS — K86.2 CYST OF PANCREAS: ICD-10-CM

## 2025-07-08 DIAGNOSIS — G89.29 CHRONIC BILATERAL LOW BACK PAIN WITH RIGHT-SIDED SCIATICA: ICD-10-CM

## 2025-07-08 DIAGNOSIS — I10 ESSENTIAL HYPERTENSION: ICD-10-CM

## 2025-07-08 DIAGNOSIS — E03.9 ACQUIRED HYPOTHYROIDISM: ICD-10-CM

## 2025-07-08 DIAGNOSIS — Z53.20 MAMMOGRAM DECLINED: ICD-10-CM

## 2025-07-08 DIAGNOSIS — M54.41 CHRONIC BILATERAL LOW BACK PAIN WITH RIGHT-SIDED SCIATICA: ICD-10-CM

## 2025-07-08 RX ORDER — CYANOCOBALAMIN 1000 UG/ML
1000 INJECTION, SOLUTION INTRAMUSCULAR; SUBCUTANEOUS ONCE
Status: COMPLETED | OUTPATIENT
Start: 2025-07-08 | End: 2025-07-08

## 2025-07-08 RX ADMIN — CYANOCOBALAMIN 1000 MCG: 1000 INJECTION, SOLUTION INTRAMUSCULAR; SUBCUTANEOUS at 09:58

## 2025-07-08 NOTE — ASSESSMENT & PLAN NOTE
PER CT scan done by GI the cyst is smaller compared to 2023    Also there is some LAD in CT. She has not heard from GI yet. CBC did not show elevated WBC  She is going to call GI    If she does not hear from them or get a hold of them, she is going to let me know

## 2025-07-09 LAB
ALBUMIN SERPL-MCNC: 3.8 G/DL (ref 3.6–4.6)
ALP SERPL-CCNC: 73 IU/L (ref 44–121)
ALT SERPL-CCNC: 16 IU/L (ref 0–32)
AST SERPL-CCNC: 22 IU/L (ref 0–40)
BILIRUB SERPL-MCNC: 0.5 MG/DL (ref 0–1.2)
BUN SERPL-MCNC: 18 MG/DL (ref 10–36)
BUN/CREAT SERPL: 19 (ref 12–28)
CALCIUM SERPL-MCNC: 9.7 MG/DL (ref 8.7–10.3)
CHLORIDE SERPL-SCNC: 105 MMOL/L (ref 96–106)
CO2 SERPL-SCNC: 21 MMOL/L (ref 20–29)
CREAT SERPL-MCNC: 0.93 MG/DL (ref 0.57–1)
EGFRCR SERPLBLD CKD-EPI 2021: 57 ML/MIN/1.73
GLOBULIN SER CALC-MCNC: 2.1 G/DL (ref 1.5–4.5)
GLUCOSE SERPL-MCNC: 82 MG/DL (ref 70–99)
POTASSIUM SERPL-SCNC: 4.6 MMOL/L (ref 3.5–5.2)
PROT SERPL-MCNC: 5.9 G/DL (ref 6–8.5)
SODIUM SERPL-SCNC: 139 MMOL/L (ref 134–144)

## 2025-07-10 NOTE — ASSESSMENT & PLAN NOTE
Hypertension is stable and controlled  Continue current treatment regimen.  Blood pressure will be reassessed in 3 months.  Blood pressure is better.

## 2025-08-06 ENCOUNTER — OFFICE VISIT (OUTPATIENT)
Dept: PAIN MEDICINE | Facility: CLINIC | Age: OVER 89
End: 2025-08-06
Payer: MEDICARE

## 2025-08-06 VITALS
BODY MASS INDEX: 23.74 KG/M2 | SYSTOLIC BLOOD PRESSURE: 156 MMHG | DIASTOLIC BLOOD PRESSURE: 78 MMHG | RESPIRATION RATE: 16 BRPM | OXYGEN SATURATION: 99 % | HEART RATE: 60 BPM | WEIGHT: 129 LBS

## 2025-08-06 DIAGNOSIS — M54.14 THORACIC RADICULOPATHY: ICD-10-CM

## 2025-08-06 DIAGNOSIS — M54.16 LUMBAR RADICULOPATHY: ICD-10-CM

## 2025-08-06 DIAGNOSIS — M47.817 LUMBOSACRAL SPONDYLOSIS WITHOUT MYELOPATHY: Primary | ICD-10-CM

## 2025-08-11 ENCOUNTER — TELEPHONE (OUTPATIENT)
Dept: CARDIOLOGY | Facility: CLINIC | Age: OVER 89
End: 2025-08-11
Payer: MEDICARE

## 2025-08-18 LAB
MDC_IDC_MSMT_BATTERY_REMAINING_LONGEVITY: 75 MO
MDC_IDC_MSMT_BATTERY_REMAINING_PERCENTAGE: 63 %
MDC_IDC_MSMT_BATTERY_RRT_TRIGGER: 2.6
MDC_IDC_MSMT_BATTERY_STATUS: NORMAL
MDC_IDC_MSMT_BATTERY_VOLTAGE: 3.01
MDC_IDC_MSMT_LEADCHNL_RA_DTM: NORMAL
MDC_IDC_MSMT_LEADCHNL_RA_IMPEDANCE_VALUE: 450
MDC_IDC_MSMT_LEADCHNL_RA_PACING_THRESHOLD_AMPLITUDE: 1
MDC_IDC_MSMT_LEADCHNL_RA_PACING_THRESHOLD_POLARITY: NORMAL
MDC_IDC_MSMT_LEADCHNL_RA_PACING_THRESHOLD_PULSEWIDTH: 0.4
MDC_IDC_MSMT_LEADCHNL_RA_SENSING_INTR_AMPL: 2.9
MDC_IDC_MSMT_LEADCHNL_RV_DTM: NORMAL
MDC_IDC_MSMT_LEADCHNL_RV_IMPEDANCE_VALUE: 360
MDC_IDC_MSMT_LEADCHNL_RV_PACING_THRESHOLD_AMPLITUDE: 1
MDC_IDC_MSMT_LEADCHNL_RV_PACING_THRESHOLD_POLARITY: NORMAL
MDC_IDC_MSMT_LEADCHNL_RV_PACING_THRESHOLD_PULSEWIDTH: 0.4
MDC_IDC_MSMT_LEADCHNL_RV_SENSING_INTR_AMPL: 6
MDC_IDC_PG_IMPLANT_DTM: NORMAL
MDC_IDC_PG_MFG: NORMAL
MDC_IDC_PG_MODEL: NORMAL
MDC_IDC_PG_SERIAL: NORMAL
MDC_IDC_PG_TYPE: NORMAL
MDC_IDC_SESS_DTM: NORMAL
MDC_IDC_SESS_TYPE: NORMAL
MDC_IDC_SET_BRADY_AT_MODE_SWITCH_RATE: 180
MDC_IDC_SET_BRADY_LOWRATE: 60
MDC_IDC_SET_BRADY_MAX_SENSOR_RATE: 130
MDC_IDC_SET_BRADY_MAX_TRACKING_RATE: 130
MDC_IDC_SET_BRADY_MODE: NORMAL
MDC_IDC_SET_BRADY_PAV_DELAY: 200
MDC_IDC_SET_BRADY_SAV_DELAY: 180
MDC_IDC_SET_LEADCHNL_RA_PACING_AMPLITUDE: 2
MDC_IDC_SET_LEADCHNL_RA_PACING_POLARITY: NORMAL
MDC_IDC_SET_LEADCHNL_RA_PACING_PULSEWIDTH: 0.4
MDC_IDC_SET_LEADCHNL_RA_SENSING_POLARITY: NORMAL
MDC_IDC_SET_LEADCHNL_RA_SENSING_SENSITIVITY: 0.5
MDC_IDC_SET_LEADCHNL_RV_PACING_AMPLITUDE: 1.25
MDC_IDC_SET_LEADCHNL_RV_PACING_POLARITY: NORMAL
MDC_IDC_SET_LEADCHNL_RV_PACING_PULSEWIDTH: 0.4
MDC_IDC_SET_LEADCHNL_RV_SENSING_POLARITY: NORMAL
MDC_IDC_SET_LEADCHNL_RV_SENSING_SENSITIVITY: 2
MDC_IDC_STAT_AT_BURDEN_PERCENT: 0
MDC_IDC_STAT_BRADY_RA_PERCENT_PACED: 69
MDC_IDC_STAT_BRADY_RV_PERCENT_PACED: 1.9

## (undated) DEVICE — HIGH PERFORMANCE GUIDEWIRE: Brand: DREAMWIRE

## (undated) DEVICE — PK ENDO GI 50

## (undated) DEVICE — WIREGUIDED CYTOLOGY BRUSH: Brand: RX CYTOLOGY BRUSH

## (undated) DEVICE — CABL BIPOL W/ALLGTR CLIP/SM 12FT

## (undated) DEVICE — PAPR PRNT PK SONY W RIBN UPC55

## (undated) DEVICE — DEV POSITION LK AND BIOP CAP SYS

## (undated) DEVICE — STAPLER 35 WIDE: Brand: MEDLINE INDUSTRIES, INC.

## (undated) DEVICE — ELECTRD DEFIB M/FUNC PROPADZ RADIOL 2PK

## (undated) DEVICE — VIOLET BRAIDED (POLYGLACTIN 910), SYNTHETIC ABSORBABLE SUTURE: Brand: COATED VICRYL

## (undated) DEVICE — SPHINCTEROTOME: Brand: DREAMTOME™ RX 44

## (undated) DEVICE — ST ACC MICROPUNCTURE STFF/CANN PLAT/TP 4F 21G 40CM

## (undated) DEVICE — SUT SILK 2/0 FS BLK 18IN 685G

## (undated) DEVICE — BITEBLOCK ENDO W/STRAP 60F A/ LF DISP

## (undated) DEVICE — 3M™ IOBAN™ 2 ANTIMICROBIAL INCISE DRAPE 6650EZ: Brand: IOBAN™ 2

## (undated) DEVICE — ERBE NESSY®PLATE 170 SPLIT; 168CM²; CABLE 3M: Brand: ERBE

## (undated) DEVICE — PENCL HND ROCKRSWTCH HOLSTR EZ CLEAN TP CRD 10FT

## (undated) DEVICE — BALLOON DILATATION CATHETER: Brand: HURRICANE™ RX

## (undated) DEVICE — PACEMAKER CDS: Brand: MEDLINE INDUSTRIES, INC.

## (undated) DEVICE — INTRO SHEATH PRELUDE SNAP .038 6F 13CM W/SDPRT

## (undated) DEVICE — 3M™ PATIENT PLATE, CORDED, SPLIT, LARGE, 40 PER CASE, 1179: Brand: 3M™

## (undated) DEVICE — RETRIEVAL BALLOON CATHETER: Brand: EXTRACTOR™ PRO RX

## (undated) DEVICE — DEV INFL CRE STERIFLATE 60CC DISP